# Patient Record
Sex: FEMALE | Race: WHITE | NOT HISPANIC OR LATINO | Employment: OTHER | URBAN - METROPOLITAN AREA
[De-identification: names, ages, dates, MRNs, and addresses within clinical notes are randomized per-mention and may not be internally consistent; named-entity substitution may affect disease eponyms.]

---

## 2017-07-06 ENCOUNTER — TRANSCRIBE ORDERS (OUTPATIENT)
Dept: ADMINISTRATIVE | Facility: HOSPITAL | Age: 77
End: 2017-07-06

## 2017-07-06 DIAGNOSIS — M81.8 IDIOPATHIC OSTEOPOROSIS: Primary | ICD-10-CM

## 2017-07-12 ENCOUNTER — HOSPITAL ENCOUNTER (OUTPATIENT)
Dept: RADIOLOGY | Age: 77
Discharge: HOME | End: 2017-07-12
Payer: MEDICARE

## 2017-07-12 DIAGNOSIS — M81.8 IDIOPATHIC OSTEOPOROSIS: ICD-10-CM

## 2017-07-12 PROCEDURE — 77080 DXA BONE DENSITY AXIAL: CPT

## 2018-11-24 ENCOUNTER — HOSPITAL ENCOUNTER (EMERGENCY)
Facility: HOSPITAL | Age: 78
End: 2018-11-24
Attending: EMERGENCY MEDICINE
Payer: MEDICARE

## 2018-11-24 ENCOUNTER — APPOINTMENT (EMERGENCY)
Dept: CT IMAGING | Facility: HOSPITAL | Age: 78
End: 2018-11-24
Payer: MEDICARE

## 2018-11-24 ENCOUNTER — HOSPITAL ENCOUNTER (INPATIENT)
Facility: HOSPITAL | Age: 78
LOS: 16 days | Discharge: HOME HEALTH | DRG: 021 | End: 2018-12-10
Attending: EMERGENCY MEDICINE | Admitting: ANESTHESIOLOGY
Payer: MEDICARE

## 2018-11-24 VITALS
SYSTOLIC BLOOD PRESSURE: 122 MMHG | RESPIRATION RATE: 16 BRPM | DIASTOLIC BLOOD PRESSURE: 59 MMHG | OXYGEN SATURATION: 95 % | TEMPERATURE: 98.1 F | WEIGHT: 140 LBS | HEART RATE: 88 BPM

## 2018-11-24 DIAGNOSIS — Z98.890 S/P COIL EMBOLIZATION OF CEREBRAL ANEURYSM: ICD-10-CM

## 2018-11-24 DIAGNOSIS — R30.0 DYSURIA: ICD-10-CM

## 2018-11-24 DIAGNOSIS — I60.7 RUPTURED ANEURYSM OF INTRACRANIAL ARTERY (HCC): ICD-10-CM

## 2018-11-24 DIAGNOSIS — I60.9 SAH (SUBARACHNOID HEMORRHAGE) (HCC): Primary | ICD-10-CM

## 2018-11-24 PROBLEM — H40.9 GLAUCOMA: Status: ACTIVE | Noted: 2018-11-24

## 2018-11-24 LAB
ABO GROUP BLD: NORMAL
ANION GAP SERPL CALCULATED.3IONS-SCNC: 9 MMOL/L (ref 4–13)
APTT PPP: 27 SECONDS (ref 26–38)
BASOPHILS # BLD AUTO: 0.05 THOUSANDS/ΜL (ref 0–0.1)
BASOPHILS NFR BLD AUTO: 1 % (ref 0–1)
BLD GP AB SCN SERPL QL: NEGATIVE
BUN SERPL-MCNC: 11 MG/DL (ref 5–25)
CALCIUM SERPL-MCNC: 9 MG/DL (ref 8.3–10.1)
CHLORIDE SERPL-SCNC: 103 MMOL/L (ref 100–108)
CO2 SERPL-SCNC: 28 MMOL/L (ref 21–32)
CREAT SERPL-MCNC: 0.57 MG/DL (ref 0.6–1.3)
EOSINOPHIL # BLD AUTO: 0.04 THOUSAND/ΜL (ref 0–0.61)
EOSINOPHIL NFR BLD AUTO: 1 % (ref 0–6)
ERYTHROCYTE [DISTWIDTH] IN BLOOD BY AUTOMATED COUNT: 13.7 % (ref 11.6–15.1)
GFR SERPL CREATININE-BSD FRML MDRD: 90 ML/MIN/1.73SQ M
GLUCOSE SERPL-MCNC: 102 MG/DL (ref 65–140)
HCT VFR BLD AUTO: 40.5 % (ref 34.8–46.1)
HGB BLD-MCNC: 12.7 G/DL (ref 11.5–15.4)
IMM GRANULOCYTES # BLD AUTO: 0.02 THOUSAND/UL (ref 0–0.2)
IMM GRANULOCYTES NFR BLD AUTO: 0 % (ref 0–2)
INR PPP: 1.05 (ref 0.86–1.17)
LYMPHOCYTES # BLD AUTO: 0.6 THOUSANDS/ΜL (ref 0.6–4.47)
LYMPHOCYTES NFR BLD AUTO: 11 % (ref 14–44)
MCH RBC QN AUTO: 31 PG (ref 26.8–34.3)
MCHC RBC AUTO-ENTMCNC: 31.4 G/DL (ref 31.4–37.4)
MCV RBC AUTO: 99 FL (ref 82–98)
MONOCYTES # BLD AUTO: 0.25 THOUSAND/ΜL (ref 0.17–1.22)
MONOCYTES NFR BLD AUTO: 5 % (ref 4–12)
NEUTROPHILS # BLD AUTO: 4.51 THOUSANDS/ΜL (ref 1.85–7.62)
NEUTS SEG NFR BLD AUTO: 82 % (ref 43–75)
NRBC BLD AUTO-RTO: 0 /100 WBCS
PLATELET # BLD AUTO: 188 THOUSANDS/UL (ref 149–390)
PMV BLD AUTO: 10.5 FL (ref 8.9–12.7)
POTASSIUM SERPL-SCNC: 3.9 MMOL/L (ref 3.5–5.3)
PROTHROMBIN TIME: 13.8 SECONDS (ref 11.8–14.2)
RBC # BLD AUTO: 4.1 MILLION/UL (ref 3.81–5.12)
RH BLD: POSITIVE
SODIUM SERPL-SCNC: 140 MMOL/L (ref 136–145)
SPECIMEN EXPIRATION DATE: NORMAL
TROPONIN I SERPL-MCNC: <0.02 NG/ML
WBC # BLD AUTO: 5.47 THOUSAND/UL (ref 4.31–10.16)

## 2018-11-24 PROCEDURE — 84484 ASSAY OF TROPONIN QUANT: CPT | Performed by: EMERGENCY MEDICINE

## 2018-11-24 PROCEDURE — 86900 BLOOD TYPING SEROLOGIC ABO: CPT | Performed by: RADIOLOGY

## 2018-11-24 PROCEDURE — 96375 TX/PRO/DX INJ NEW DRUG ADDON: CPT

## 2018-11-24 PROCEDURE — 36415 COLL VENOUS BLD VENIPUNCTURE: CPT | Performed by: EMERGENCY MEDICINE

## 2018-11-24 PROCEDURE — 85730 THROMBOPLASTIN TIME PARTIAL: CPT | Performed by: RADIOLOGY

## 2018-11-24 PROCEDURE — 96365 THER/PROPH/DIAG IV INF INIT: CPT

## 2018-11-24 PROCEDURE — 1123F ACP DISCUSS/DSCN MKR DOCD: CPT | Performed by: FAMILY MEDICINE

## 2018-11-24 PROCEDURE — 99291 CRITICAL CARE FIRST HOUR: CPT

## 2018-11-24 PROCEDURE — 99221 1ST HOSP IP/OBS SF/LOW 40: CPT | Performed by: ANESTHESIOLOGY

## 2018-11-24 PROCEDURE — 86850 RBC ANTIBODY SCREEN: CPT | Performed by: RADIOLOGY

## 2018-11-24 PROCEDURE — 80048 BASIC METABOLIC PNL TOTAL CA: CPT | Performed by: EMERGENCY MEDICINE

## 2018-11-24 PROCEDURE — 70496 CT ANGIOGRAPHY HEAD: CPT

## 2018-11-24 PROCEDURE — 85025 COMPLETE CBC W/AUTO DIFF WBC: CPT | Performed by: EMERGENCY MEDICINE

## 2018-11-24 PROCEDURE — 86901 BLOOD TYPING SEROLOGIC RH(D): CPT | Performed by: RADIOLOGY

## 2018-11-24 PROCEDURE — 85610 PROTHROMBIN TIME: CPT | Performed by: RADIOLOGY

## 2018-11-24 PROCEDURE — 70498 CT ANGIOGRAPHY NECK: CPT

## 2018-11-24 PROCEDURE — 93005 ELECTROCARDIOGRAM TRACING: CPT

## 2018-11-24 PROCEDURE — 96366 THER/PROPH/DIAG IV INF ADDON: CPT

## 2018-11-24 RX ORDER — NIMODIPINE 30 MG/1
60 CAPSULE, LIQUID FILLED ORAL
Status: DISCONTINUED | OUTPATIENT
Start: 2018-11-24 | End: 2018-11-24 | Stop reason: HOSPADM

## 2018-11-24 RX ORDER — DORZOLAMIDE HYDROCHLORIDE AND TIMOLOL MALEATE 20; 5 MG/ML; MG/ML
1 SOLUTION/ DROPS OPHTHALMIC 2 TIMES DAILY
COMMUNITY

## 2018-11-24 RX ORDER — ACETAMINOPHEN 325 MG/1
650 TABLET ORAL EVERY 4 HOURS PRN
Status: DISCONTINUED | OUTPATIENT
Start: 2018-11-24 | End: 2018-11-28

## 2018-11-24 RX ORDER — CHLORHEXIDINE GLUCONATE 0.12 MG/ML
15 RINSE ORAL EVERY 12 HOURS SCHEDULED
Status: DISCONTINUED | OUTPATIENT
Start: 2018-11-24 | End: 2018-12-01

## 2018-11-24 RX ORDER — ONDANSETRON 2 MG/ML
4 INJECTION INTRAMUSCULAR; INTRAVENOUS ONCE
Status: COMPLETED | OUTPATIENT
Start: 2018-11-24 | End: 2018-11-24

## 2018-11-24 RX ORDER — NIMODIPINE 30 MG/1
60 CAPSULE, LIQUID FILLED ORAL
Status: DISCONTINUED | OUTPATIENT
Start: 2018-11-24 | End: 2018-11-24

## 2018-11-24 RX ORDER — LEVETIRACETAM 500 MG/1
500 TABLET ORAL EVERY 12 HOURS
Status: COMPLETED | OUTPATIENT
Start: 2018-11-25 | End: 2018-12-01

## 2018-11-24 RX ORDER — LABETALOL HYDROCHLORIDE 5 MG/ML
10 INJECTION, SOLUTION INTRAVENOUS EVERY 4 HOURS PRN
Status: DISCONTINUED | OUTPATIENT
Start: 2018-11-24 | End: 2018-12-08

## 2018-11-24 RX ORDER — TRAVOPROST OPHTHALMIC SOLUTION 0.04 MG/ML
1 SOLUTION OPHTHALMIC
COMMUNITY

## 2018-11-24 RX ORDER — DIPHENOXYLATE HYDROCHLORIDE AND ATROPINE SULFATE 2.5; .025 MG/1; MG/1
1 TABLET ORAL DAILY
COMMUNITY

## 2018-11-24 RX ORDER — LEVETIRACETAM 500 MG/1
500 TABLET ORAL EVERY 12 HOURS SCHEDULED
Status: DISCONTINUED | OUTPATIENT
Start: 2018-11-24 | End: 2018-11-24

## 2018-11-24 RX ORDER — HYDRALAZINE HYDROCHLORIDE 20 MG/ML
5 INJECTION INTRAMUSCULAR; INTRAVENOUS EVERY 4 HOURS PRN
Status: DISCONTINUED | OUTPATIENT
Start: 2018-11-24 | End: 2018-11-25

## 2018-11-24 RX ORDER — DORZOLAMIDE HYDROCHLORIDE AND TIMOLOL MALEATE 20; 5 MG/ML; MG/ML
1 SOLUTION/ DROPS OPHTHALMIC 2 TIMES DAILY
Status: DISCONTINUED | OUTPATIENT
Start: 2018-11-25 | End: 2018-12-10 | Stop reason: HOSPADM

## 2018-11-24 RX ADMIN — LEVETIRACETAM 1000 MG: 100 INJECTION, SOLUTION INTRAVENOUS at 18:04

## 2018-11-24 RX ADMIN — NIMODIPINE 60 MG: 30 CAPSULE, LIQUID FILLED ORAL at 22:20

## 2018-11-24 RX ADMIN — CHLORHEXIDINE GLUCONATE 15 ML: 1.2 RINSE ORAL at 22:00

## 2018-11-24 RX ADMIN — IOHEXOL 85 ML: 350 INJECTION, SOLUTION INTRAVENOUS at 16:26

## 2018-11-24 RX ADMIN — ONDANSETRON 4 MG: 2 INJECTION INTRAMUSCULAR; INTRAVENOUS at 17:51

## 2018-11-24 RX ADMIN — SODIUM CHLORIDE 2.5 MG/HR: 0.9 INJECTION, SOLUTION INTRAVENOUS at 17:12

## 2018-11-24 NOTE — ED NOTES
Assumed care to call pharmacy for Cardene, per pharmacy they are making it and will send it when ready       Raymond Silva RN  11/24/18 2381

## 2018-11-24 NOTE — EMTALA/ACUTE CARE TRANSFER
[address] [place] [address],[address] [place] [address]  Dept: [phone]      EMTALA TRANSFER CONSENT    NAME Regulo Pruitt                                         1940                              MRN [id number]    I have been informed of my rights regarding examination, treatment, and transfer   by Dr Sherry Shi MD    Benefits: Specialized equipment and/or services available at the receiving facility (Include comment)________________________ (neurosurgery evaluation)    Risks: Potential for delay in receiving treatment, Potential deterioration of medical condition, Loss of IV, Increased discomfort during transfer, Possible worsening of condition or death during transfer      Transfer Request   I acknowledge that my medical condition has been evaluated and explained to me by the emergency department physician or other qualified medical person and/or my attending physician who has recommended and offered to me further medical examination and treatment  I understand the Hospital's obligation with respect to the treatment and stabilization of my emergency medical condition  I nevertheless request to be transferred  I release the Hospital, the doctor, and any other persons caring for me from all responsibility or liability for any injury or ill effects that may result from my transfer and agree to accept all responsibility for the consequences of my choice to transfer, rather than receive stabilizing treatment at the Hospital  I understand that because the transfer is my request, my insurance may not provide reimbursement for the services  The Hospital will assist and direct me and my family in how to make arrangements for transfer, but the hospital is not liable for any fees charged by the transport service    In spite of this understanding, I refuse to consent to further medical examination and treatment which has been offered to me, and request transfer to Accepting Facility Name, [address] : [facility]  I authorize the performance of emergency medical procedures and treatments upon me in both transit and upon arrival at the receiving facility  Additionally, I authorize the release of any and all medical records to the receiving facility and request they be transported with me, if possible  I authorize the performance of emergency medical procedures and treatments upon me in both transit and upon arrival at the receiving facility  Additionally, I authorize the release of any and all medical records to the receiving facility and request they be transported with me, if possible  I understand that the safest mode of transportation during a medical emergency is an ambulance and that the Hospital advocates the use of this mode of transport  Risks of traveling to the receiving facility by car, including absence of medical control, life sustaining equipment, such as oxygen, and medical personnel has been explained to me and I fully understand them  (EYAD CORRECT BOX BELOW)  [  ]  I consent to the stated transfer and to be transported by ambulance/helicopter  [  ]  I consent to the stated transfer, but refuse transportation by ambulance and accept full responsibility for my transportation by car  I understand the risks of non-ambulance transfers and I exonerate the Hospital and its staff from any deterioration in my condition that results from this refusal     X___________________________________________    DATE  18  TIME________  Signature of patient or legally responsible individual signing on patient behalf           RELATIONSHIP TO PATIENT_________________________          Provider Certification    NAME Ofelia Garcia                                         1940                              N [id number]    A medical screening exam was performed on the above named patient    Based on the examination:    Condition Necessitating Transfer The primary encounter diagnosis was SAH (subarachnoid hemorrhage) ([facility] [address] )  A diagnosis of Ruptured aneurysm of intracranial artery (HCC) was also pertinent to this visit  Patient Condition: The patient has been stabilized such that within reasonable medical probability, no material deterioration of the patient condition or the condition of the unborn child(francia) is likely to result from the transfer    Reason for Transfer: Level of Care needed not available at this facility    Transfer Requirements: [facility]   · Space available and qualified personnel available for treatment as acknowledged by    · Agreed to accept transfer and to provide appropriate medical treatment as acknowledged by          · Appropriate medical records of the examination and treatment of the patient are provided at the time of transfer   [facility], [address] _______  · Transfer will be performed by qualified personnel from    and appropriate transfer equipment as required, including the use of necessary and appropriate life support measures      Provider Certification: I have examined the patient and explained the following risks and benefits of being transferred/refusing transfer to the patient/family:  General risk, such as traffic hazards, adverse weather conditions, rough terrain or turbulence, possible failure of equipment (including vehicle or aircraft), or consequences of actions of persons outside the control of the transport personnel      Based on these reasonable risks and benefits to the patient and/or the unborn child(francia), and based upon the information available at the time of the patients examination, I certify that the medical benefits reasonably to be expected from the provision of appropriate medical treatments at another medical facility outweigh the increasing risks, if any, to the individuals medical condition, and in the case of labor to the unborn child, from effecting the transfer      X____________________________________________ DATE 11/24/18        TIME_______      ORIGINAL - SEND TO MEDICAL RECORDS   COPY - SEND WITH PATIENT DURING TRANSFER
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
## 2018-11-25 ENCOUNTER — ANESTHESIA EVENT (INPATIENT)
Dept: RADIOLOGY | Facility: HOSPITAL | Age: 78
DRG: 021 | End: 2018-11-25
Payer: MEDICARE

## 2018-11-25 ENCOUNTER — APPOINTMENT (INPATIENT)
Dept: RADIOLOGY | Facility: HOSPITAL | Age: 78
DRG: 021 | End: 2018-11-25
Payer: MEDICARE

## 2018-11-25 ENCOUNTER — APPOINTMENT (INPATIENT)
Dept: NON INVASIVE DIAGNOSTICS | Facility: HOSPITAL | Age: 78
DRG: 021 | End: 2018-11-25
Payer: MEDICARE

## 2018-11-25 ENCOUNTER — ANESTHESIA (INPATIENT)
Dept: RADIOLOGY | Facility: HOSPITAL | Age: 78
DRG: 021 | End: 2018-11-25
Payer: MEDICARE

## 2018-11-25 LAB
ANION GAP SERPL CALCULATED.3IONS-SCNC: 5 MMOL/L (ref 4–13)
ATRIAL RATE: 79 BPM
BASOPHILS # BLD AUTO: 0.01 THOUSANDS/ΜL (ref 0–0.1)
BASOPHILS NFR BLD AUTO: 0 % (ref 0–1)
BUN SERPL-MCNC: 10 MG/DL (ref 5–25)
CALCIUM SERPL-MCNC: 9 MG/DL (ref 8.3–10.1)
CHLORIDE SERPL-SCNC: 102 MMOL/L (ref 100–108)
CHOLEST SERPL-MCNC: 211 MG/DL (ref 50–200)
CO2 SERPL-SCNC: 28 MMOL/L (ref 21–32)
CREAT SERPL-MCNC: 0.49 MG/DL (ref 0.6–1.3)
EOSINOPHIL # BLD AUTO: 0.01 THOUSAND/ΜL (ref 0–0.61)
EOSINOPHIL NFR BLD AUTO: 0 % (ref 0–6)
ERYTHROCYTE [DISTWIDTH] IN BLOOD BY AUTOMATED COUNT: 13.8 % (ref 11.6–15.1)
EST. AVERAGE GLUCOSE BLD GHB EST-MCNC: 100 MG/DL
GFR SERPL CREATININE-BSD FRML MDRD: 94 ML/MIN/1.73SQ M
GLUCOSE SERPL-MCNC: 97 MG/DL (ref 65–140)
HBA1C MFR BLD: 5.1 % (ref 4.2–6.3)
HCT VFR BLD AUTO: 36.6 % (ref 34.8–46.1)
HDLC SERPL-MCNC: 82 MG/DL (ref 40–60)
HGB BLD-MCNC: 11.6 G/DL (ref 11.5–15.4)
IMM GRANULOCYTES # BLD AUTO: 0.02 THOUSAND/UL (ref 0–0.2)
IMM GRANULOCYTES NFR BLD AUTO: 0 % (ref 0–2)
LDLC SERPL CALC-MCNC: 117 MG/DL (ref 0–100)
LYMPHOCYTES # BLD AUTO: 0.98 THOUSANDS/ΜL (ref 0.6–4.47)
LYMPHOCYTES NFR BLD AUTO: 14 % (ref 14–44)
MCH RBC QN AUTO: 30.8 PG (ref 26.8–34.3)
MCHC RBC AUTO-ENTMCNC: 31.7 G/DL (ref 31.4–37.4)
MCV RBC AUTO: 97 FL (ref 82–98)
MONOCYTES # BLD AUTO: 0.53 THOUSAND/ΜL (ref 0.17–1.22)
MONOCYTES NFR BLD AUTO: 8 % (ref 4–12)
NEUTROPHILS # BLD AUTO: 5.36 THOUSANDS/ΜL (ref 1.85–7.62)
NEUTS SEG NFR BLD AUTO: 78 % (ref 43–75)
NRBC BLD AUTO-RTO: 0 /100 WBCS
P AXIS: 69 DEGREES
PLATELET # BLD AUTO: 203 THOUSANDS/UL (ref 149–390)
PMV BLD AUTO: 10.3 FL (ref 8.9–12.7)
POTASSIUM SERPL-SCNC: 3.2 MMOL/L (ref 3.5–5.3)
PR INTERVAL: 170 MS
QRS AXIS: 51 DEGREES
QRSD INTERVAL: 92 MS
QT INTERVAL: 372 MS
QTC INTERVAL: 426 MS
RBC # BLD AUTO: 3.77 MILLION/UL (ref 3.81–5.12)
SODIUM SERPL-SCNC: 135 MMOL/L (ref 136–145)
T WAVE AXIS: 54 DEGREES
TRIGL SERPL-MCNC: 59 MG/DL
VENTRICULAR RATE: 79 BPM
WBC # BLD AUTO: 6.91 THOUSAND/UL (ref 4.31–10.16)

## 2018-11-25 PROCEDURE — 76937 US GUIDE VASCULAR ACCESS: CPT | Performed by: RADIOLOGY

## 2018-11-25 PROCEDURE — 83036 HEMOGLOBIN GLYCOSYLATED A1C: CPT | Performed by: EMERGENCY MEDICINE

## 2018-11-25 PROCEDURE — 36223 PLACE CATH CAROTID/INOM ART: CPT

## 2018-11-25 PROCEDURE — 99291 CRITICAL CARE FIRST HOUR: CPT | Performed by: EMERGENCY MEDICINE

## 2018-11-25 PROCEDURE — 76937 US GUIDE VASCULAR ACCESS: CPT

## 2018-11-25 PROCEDURE — C1894 INTRO/SHEATH, NON-LASER: HCPCS

## 2018-11-25 PROCEDURE — C1769 GUIDE WIRE: HCPCS

## 2018-11-25 PROCEDURE — 99223 1ST HOSP IP/OBS HIGH 75: CPT | Performed by: NEUROLOGICAL SURGERY

## 2018-11-25 PROCEDURE — C1887 CATHETER, GUIDING: HCPCS

## 2018-11-25 PROCEDURE — 80048 BASIC METABOLIC PNL TOTAL CA: CPT | Performed by: EMERGENCY MEDICINE

## 2018-11-25 PROCEDURE — 75898 FOLLOW-UP ANGIOGRAPHY: CPT | Performed by: RADIOLOGY

## 2018-11-25 PROCEDURE — 75894 X-RAYS TRANSCATH THERAPY: CPT | Performed by: RADIOLOGY

## 2018-11-25 PROCEDURE — 85025 COMPLETE CBC W/AUTO DIFF WBC: CPT | Performed by: EMERGENCY MEDICINE

## 2018-11-25 PROCEDURE — 80061 LIPID PANEL: CPT | Performed by: EMERGENCY MEDICINE

## 2018-11-25 PROCEDURE — 61626 TCAT PERM OCCLS/EMBOL NONCNS: CPT

## 2018-11-25 PROCEDURE — 93010 ELECTROCARDIOGRAM REPORT: CPT | Performed by: INTERNAL MEDICINE

## 2018-11-25 PROCEDURE — 36226 PLACE CATH VERTEBRAL ART: CPT | Performed by: RADIOLOGY

## 2018-11-25 PROCEDURE — 36223 PLACE CATH CAROTID/INOM ART: CPT | Performed by: RADIOLOGY

## 2018-11-25 PROCEDURE — 61624 TCAT PERM OCCLS/EMBOLJ CNS: CPT | Performed by: RADIOLOGY

## 2018-11-25 PROCEDURE — 03VG3DZ RESTRICTION OF INTRACRANIAL ARTERY WITH INTRALUMINAL DEVICE, PERCUTANEOUS APPROACH: ICD-10-PCS | Performed by: FAMILY MEDICINE

## 2018-11-25 PROCEDURE — 36217 PLACE CATHETER IN ARTERY: CPT | Performed by: RADIOLOGY

## 2018-11-25 PROCEDURE — C2628 CATHETER, OCCLUSION: HCPCS

## 2018-11-25 PROCEDURE — 36226 PLACE CATH VERTEBRAL ART: CPT

## 2018-11-25 PROCEDURE — C1760 CLOSURE DEV, VASC: HCPCS

## 2018-11-25 PROCEDURE — 75894 X-RAYS TRANSCATH THERAPY: CPT

## 2018-11-25 RX ORDER — HYDROMORPHONE HCL/PF 1 MG/ML
0.2 SYRINGE (ML) INJECTION
Status: CANCELLED | OUTPATIENT
Start: 2018-11-25

## 2018-11-25 RX ORDER — ONDANSETRON 2 MG/ML
INJECTION INTRAMUSCULAR; INTRAVENOUS AS NEEDED
Status: DISCONTINUED | OUTPATIENT
Start: 2018-11-25 | End: 2018-11-25 | Stop reason: SURG

## 2018-11-25 RX ORDER — LABETALOL HYDROCHLORIDE 5 MG/ML
5 INJECTION, SOLUTION INTRAVENOUS
Status: CANCELLED | OUTPATIENT
Start: 2018-11-25

## 2018-11-25 RX ORDER — DEXAMETHASONE SODIUM PHOSPHATE 4 MG/ML
INJECTION, SOLUTION INTRA-ARTICULAR; INTRALESIONAL; INTRAMUSCULAR; INTRAVENOUS; SOFT TISSUE AS NEEDED
Status: DISCONTINUED | OUTPATIENT
Start: 2018-11-25 | End: 2018-11-25 | Stop reason: SURG

## 2018-11-25 RX ORDER — ONDANSETRON 2 MG/ML
INJECTION INTRAMUSCULAR; INTRAVENOUS
Status: COMPLETED
Start: 2018-11-25 | End: 2018-11-25

## 2018-11-25 RX ORDER — SODIUM CHLORIDE, SODIUM GLUCONATE, SODIUM ACETATE, POTASSIUM CHLORIDE, MAGNESIUM CHLORIDE, SODIUM PHOSPHATE, DIBASIC, AND POTASSIUM PHOSPHATE .53; .5; .37; .037; .03; .012; .00082 G/100ML; G/100ML; G/100ML; G/100ML; G/100ML; G/100ML; G/100ML
100 INJECTION, SOLUTION INTRAVENOUS CONTINUOUS
Status: DISCONTINUED | OUTPATIENT
Start: 2018-11-25 | End: 2018-11-27

## 2018-11-25 RX ORDER — MEPERIDINE HYDROCHLORIDE 25 MG/ML
12.5 INJECTION INTRAMUSCULAR; INTRAVENOUS; SUBCUTANEOUS AS NEEDED
Status: CANCELLED | OUTPATIENT
Start: 2018-11-25

## 2018-11-25 RX ORDER — POTASSIUM CHLORIDE 14.9 MG/ML
20 INJECTION INTRAVENOUS
Status: COMPLETED | OUTPATIENT
Start: 2018-11-25 | End: 2018-11-25

## 2018-11-25 RX ORDER — ALBUTEROL SULFATE 2.5 MG/3ML
2.5 SOLUTION RESPIRATORY (INHALATION) ONCE AS NEEDED
Status: CANCELLED | OUTPATIENT
Start: 2018-11-25

## 2018-11-25 RX ORDER — LIDOCAINE HYDROCHLORIDE 10 MG/ML
INJECTION, SOLUTION INFILTRATION; PERINEURAL AS NEEDED
Status: DISCONTINUED | OUTPATIENT
Start: 2018-11-25 | End: 2018-11-25 | Stop reason: SURG

## 2018-11-25 RX ORDER — POTASSIUM CHLORIDE 20MEQ/15ML
40 LIQUID (ML) ORAL ONCE
Status: DISCONTINUED | OUTPATIENT
Start: 2018-11-25 | End: 2018-11-25

## 2018-11-25 RX ORDER — PROPOFOL 10 MG/ML
INJECTION, EMULSION INTRAVENOUS AS NEEDED
Status: DISCONTINUED | OUTPATIENT
Start: 2018-11-25 | End: 2018-11-25 | Stop reason: SURG

## 2018-11-25 RX ORDER — EPHEDRINE SULFATE 50 MG/ML
INJECTION, SOLUTION INTRAVENOUS AS NEEDED
Status: DISCONTINUED | OUTPATIENT
Start: 2018-11-25 | End: 2018-11-25 | Stop reason: SURG

## 2018-11-25 RX ORDER — ALBUMIN, HUMAN INJ 5% 5 %
12.5 SOLUTION INTRAVENOUS ONCE
Status: COMPLETED | OUTPATIENT
Start: 2018-11-25 | End: 2018-11-25

## 2018-11-25 RX ORDER — ONDANSETRON 2 MG/ML
4 INJECTION INTRAMUSCULAR; INTRAVENOUS ONCE AS NEEDED
Status: CANCELLED | OUTPATIENT
Start: 2018-11-25

## 2018-11-25 RX ORDER — SODIUM CHLORIDE 9 MG/ML
100 INJECTION, SOLUTION INTRAVENOUS CONTINUOUS
Status: CANCELLED | OUTPATIENT
Start: 2018-11-25

## 2018-11-25 RX ORDER — ALBUMIN, HUMAN INJ 5% 5 %
25 SOLUTION INTRAVENOUS ONCE
Status: DISCONTINUED | OUTPATIENT
Start: 2018-11-25 | End: 2018-11-25

## 2018-11-25 RX ORDER — FENTANYL CITRATE 50 UG/ML
INJECTION, SOLUTION INTRAMUSCULAR; INTRAVENOUS AS NEEDED
Status: DISCONTINUED | OUTPATIENT
Start: 2018-11-25 | End: 2018-11-25 | Stop reason: SURG

## 2018-11-25 RX ORDER — FENTANYL CITRATE/PF 50 MCG/ML
50 SYRINGE (ML) INJECTION
Status: CANCELLED | OUTPATIENT
Start: 2018-11-25

## 2018-11-25 RX ORDER — HYDRALAZINE HYDROCHLORIDE 20 MG/ML
5 INJECTION INTRAMUSCULAR; INTRAVENOUS EVERY 4 HOURS PRN
Status: DISCONTINUED | OUTPATIENT
Start: 2018-11-25 | End: 2018-12-08

## 2018-11-25 RX ORDER — ROCURONIUM BROMIDE 10 MG/ML
INJECTION, SOLUTION INTRAVENOUS AS NEEDED
Status: DISCONTINUED | OUTPATIENT
Start: 2018-11-25 | End: 2018-11-25 | Stop reason: SURG

## 2018-11-25 RX ORDER — SODIUM CHLORIDE 9 MG/ML
INJECTION, SOLUTION INTRAVENOUS CONTINUOUS PRN
Status: DISCONTINUED | OUTPATIENT
Start: 2018-11-25 | End: 2018-11-25 | Stop reason: SURG

## 2018-11-25 RX ORDER — TRAVOPROST OPHTHALMIC SOLUTION 0.04 MG/ML
1 SOLUTION OPHTHALMIC
Status: DISCONTINUED | OUTPATIENT
Start: 2018-11-25 | End: 2018-12-10 | Stop reason: HOSPADM

## 2018-11-25 RX ORDER — ONDANSETRON 2 MG/ML
4 INJECTION INTRAMUSCULAR; INTRAVENOUS EVERY 4 HOURS PRN
Status: DISCONTINUED | OUTPATIENT
Start: 2018-11-25 | End: 2018-12-10 | Stop reason: HOSPADM

## 2018-11-25 RX ORDER — FENTANYL CITRATE/PF 50 MCG/ML
25 SYRINGE (ML) INJECTION
Status: CANCELLED | OUTPATIENT
Start: 2018-11-25

## 2018-11-25 RX ORDER — POTASSIUM CHLORIDE 20MEQ/15ML
40 LIQUID (ML) ORAL ONCE
Status: DISCONTINUED | OUTPATIENT
Start: 2018-11-25 | End: 2018-11-26

## 2018-11-25 RX ORDER — GLYCOPYRROLATE 0.2 MG/ML
INJECTION INTRAMUSCULAR; INTRAVENOUS AS NEEDED
Status: DISCONTINUED | OUTPATIENT
Start: 2018-11-25 | End: 2018-11-25 | Stop reason: SURG

## 2018-11-25 RX ADMIN — DORZOLAMIDE HYDROCHLORIDE AND TIMOLOL MALEATE 1 DROP: 20; 5 SOLUTION/ DROPS OPHTHALMIC at 13:57

## 2018-11-25 RX ADMIN — LIDOCAINE HYDROCHLORIDE 50 MG: 10 INJECTION, SOLUTION INFILTRATION; PERINEURAL at 09:53

## 2018-11-25 RX ADMIN — ROCURONIUM BROMIDE 10 MG: 10 INJECTION INTRAVENOUS at 11:53

## 2018-11-25 RX ADMIN — ALBUMIN HUMAN 12.5 G: 0.05 INJECTION, SOLUTION INTRAVENOUS at 07:30

## 2018-11-25 RX ADMIN — SODIUM CHLORIDE: 0.9 INJECTION, SOLUTION INTRAVENOUS at 13:17

## 2018-11-25 RX ADMIN — LEVETIRACETAM 500 MG: 500 TABLET, FILM COATED ORAL at 17:11

## 2018-11-25 RX ADMIN — PROPOFOL 150 MG: 10 INJECTION, EMULSION INTRAVENOUS at 09:53

## 2018-11-25 RX ADMIN — IODIXANOL 115 ML: 320 INJECTION, SOLUTION INTRAVASCULAR at 13:14

## 2018-11-25 RX ADMIN — NIMODIPINE 30 MG: 30 CAPSULE, LIQUID FILLED ORAL at 13:56

## 2018-11-25 RX ADMIN — ACETAMINOPHEN 650 MG: 325 TABLET, FILM COATED ORAL at 21:35

## 2018-11-25 RX ADMIN — NIMODIPINE 60 MG: 30 CAPSULE, LIQUID FILLED ORAL at 02:28

## 2018-11-25 RX ADMIN — CHLORHEXIDINE GLUCONATE 15 ML: 1.2 RINSE ORAL at 07:30

## 2018-11-25 RX ADMIN — DEXAMETHASONE SODIUM PHOSPHATE 4 MG: 4 INJECTION, SOLUTION INTRAMUSCULAR; INTRAVENOUS at 11:06

## 2018-11-25 RX ADMIN — EPHEDRINE SULFATE 10 MG: 50 INJECTION, SOLUTION INTRAMUSCULAR; INTRAVENOUS; SUBCUTANEOUS at 10:07

## 2018-11-25 RX ADMIN — NIMODIPINE 30 MG: 30 CAPSULE, LIQUID FILLED ORAL at 20:28

## 2018-11-25 RX ADMIN — EPHEDRINE SULFATE 10 MG: 50 INJECTION, SOLUTION INTRAMUSCULAR; INTRAVENOUS; SUBCUTANEOUS at 10:13

## 2018-11-25 RX ADMIN — SODIUM CHLORIDE, SODIUM GLUCONATE, SODIUM ACETATE, POTASSIUM CHLORIDE AND MAGNESIUM CHLORIDE 75 ML/HR: 526; 502; 368; 37; 30 INJECTION, SOLUTION INTRAVENOUS at 04:00

## 2018-11-25 RX ADMIN — POTASSIUM CHLORIDE 20 MEQ: 200 INJECTION, SOLUTION INTRAVENOUS at 15:15

## 2018-11-25 RX ADMIN — NIMODIPINE 30 MG: 30 CAPSULE, LIQUID FILLED ORAL at 21:35

## 2018-11-25 RX ADMIN — SODIUM CHLORIDE: 0.9 INJECTION, SOLUTION INTRAVENOUS at 09:46

## 2018-11-25 RX ADMIN — NIMODIPINE 60 MG: 30 CAPSULE, LIQUID FILLED ORAL at 05:46

## 2018-11-25 RX ADMIN — ACETAMINOPHEN 650 MG: 325 TABLET, FILM COATED ORAL at 02:32

## 2018-11-25 RX ADMIN — EPHEDRINE SULFATE 5 MG: 50 INJECTION, SOLUTION INTRAMUSCULAR; INTRAVENOUS; SUBCUTANEOUS at 11:14

## 2018-11-25 RX ADMIN — LEVETIRACETAM 500 MG: 500 TABLET, FILM COATED ORAL at 05:45

## 2018-11-25 RX ADMIN — GLYCOPYRROLATE 0.4 MG: 0.2 INJECTION, SOLUTION INTRAMUSCULAR; INTRAVENOUS at 13:06

## 2018-11-25 RX ADMIN — ONDANSETRON 4 MG: 2 INJECTION INTRAMUSCULAR; INTRAVENOUS at 15:15

## 2018-11-25 RX ADMIN — ROCURONIUM BROMIDE 10 MG: 10 INJECTION INTRAVENOUS at 11:01

## 2018-11-25 RX ADMIN — TRAVOPROST OPHTHALMIC SOLUTION 1 DROP: 0.04 SOLUTION OPHTHALMIC at 21:35

## 2018-11-25 RX ADMIN — ROCURONIUM BROMIDE 50 MG: 10 INJECTION INTRAVENOUS at 09:53

## 2018-11-25 RX ADMIN — NEOSTIGMINE METHYLSULFATE 3 MG: 1 INJECTION, SOLUTION INTRAMUSCULAR; INTRAVENOUS; SUBCUTANEOUS at 13:06

## 2018-11-25 RX ADMIN — ONDANSETRON 4 MG: 2 INJECTION INTRAMUSCULAR; INTRAVENOUS at 11:06

## 2018-11-25 RX ADMIN — POTASSIUM CHLORIDE 20 MEQ: 200 INJECTION, SOLUTION INTRAVENOUS at 17:11

## 2018-11-25 RX ADMIN — DORZOLAMIDE HYDROCHLORIDE AND TIMOLOL MALEATE 1 DROP: 20; 5 SOLUTION/ DROPS OPHTHALMIC at 00:41

## 2018-11-25 RX ADMIN — ROCURONIUM BROMIDE 10 MG: 10 INJECTION INTRAVENOUS at 10:44

## 2018-11-25 RX ADMIN — ROCURONIUM BROMIDE 20 MG: 10 INJECTION INTRAVENOUS at 12:15

## 2018-11-25 RX ADMIN — FENTANYL CITRATE 100 MCG: 50 INJECTION, SOLUTION INTRAMUSCULAR; INTRAVENOUS at 09:53

## 2018-11-25 RX ADMIN — EPHEDRINE SULFATE 5 MG: 50 INJECTION, SOLUTION INTRAMUSCULAR; INTRAVENOUS; SUBCUTANEOUS at 10:38
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
## 2018-11-25 NOTE — SEDATION DOCUMENTATION
Cerebral arteriogram completed  Coil placed anterior communicating artery   R femoral artery closed with mynx   Site CDI , no oozing no hematoma  Bedrest x 4 hours starting 1300   Report called to ICU , spoke with [facility] [place] operated by [facility]   Patient transferred back to ICU with IR RN and anesthesia

## 2018-11-25 NOTE — ANESTHESIA PROCEDURE NOTES
Arterial Line Insertion  Date/Time: 11/25/2018 9:50 AM  Performed by: Radha Screen by: Mohini Aragon   Consent: Verbal consent obtained  Written consent obtained  Preparation: Patient was prepped and draped in the usual sterile fashion    Indications: hemodynamic monitoring  Orientation:  Left  Location: radial artery  Anesthesia: see MAR for details  Procedure Details:  Needle gauge: 20  Seldinger technique: Seldinger technique used  Number of attempts: 1

## 2018-11-25 NOTE — ANESTHESIA POSTPROCEDURE EVALUATION
Post-Op Assessment Note      CV Status:  Stable    Mental Status:  Alert and awake    Hydration Status:  Euvolemic    PONV Controlled:  Controlled    Airway Patency:  Patent    Post Op Vitals Reviewed: Yes          Staff: CRNA       Comments: pt  transported to icu on portable monitor  pt  awake following commands    report given to receiving RN ar bedside          BP   118/57   Temp   96 6   Pulse  80   Resp   16   SpO2   99

## 2018-11-25 NOTE — PROGRESS NOTES
Ms Eduar Novak presents with a HH1, [address] on 11/24/18  She was found to have an ACOM aneurysm on CTA most likely responsible for her hemorrhage  Her symptoms presented acutely therefore she is BD 2  On exam she is fully intact without appreciable deficit  Currently with minimal complaints of headaches  Labs have been reviewed  Plan is for cerebral angiogram with possible coil embolization  Risks and benefits have been discussed with her and her family  She has elected to proceed

## 2018-11-25 NOTE — PHYSICAL THERAPY NOTE
PT Cancellation     PT orders received and chart reviewed  Per RN, pt is scheduled for IR for conventional angio and coiling and is not currently appropriate for PT evaluation  Will follow and see when medically appropriate       Antione Joe, PT, DPT

## 2018-11-25 NOTE — SPEECH THERAPY NOTE
Speech Language/Pathology  Dysphagia consult received  Patient currently in IR  Will most likely be on ventilator during and after procedure   RN asked to check back and assess if appropriate in am

## 2018-11-25 NOTE — CONSULTS
Consultation - Neurosurgery   Nallely Neville 69 y o  female MRN: [id number]  Unit/Bed#: CW -01 Encounter: [id number]      Assessment/Plan     · 70 yo SAH #2, HH1, F3  · AComm aneurysm on CTA  · For IR today with Dr Maldonado Distance to attempt to coil  If that is not technically feasible, may need to discuss craniotomy  · MAP goal < 100, SBP < 140 until aneurysm secured  · Nimotop to prevent vasospasm  TCDs daily  · Na goal > 135    History of Present Illness     69y o  year old female who yesterday at 2:30 p m  had acute onset of nausea, neck pain, and malaise  Did not have any vomiting  Denied focal weakness  Was brought to [facility] ER, also mention diaphoresis, photophobia  CT scan showed aneurysmal pattern of subarachnoid hemorrhage, more base of skull and anterior to the rostral spinal cord  CTA demonstrated 5 mm A-comm aneurysm  Patient was [facility] for management  Review of Systems   From ED note, personally reviewed:   Constitutional: Positive for diaphoresis (resolved)  Negative for chills and fever  HENT: Negative for congestion  Eyes: Negative for visual disturbance  Respiratory: Negative for cough and shortness of breath  Cardiovascular: Negative for chest pain and leg swelling  Gastrointestinal: Negative for abdominal pain, diarrhea, nausea and vomiting  Genitourinary: Negative for dysuria and frequency  Musculoskeletal: Positive for neck pain  Negative for arthralgias, back pain, joint swelling, myalgias and neck stiffness  Skin: Negative for rash  Neurological: Positive for headaches  Negative for dizziness, syncope, speech difficulty, weakness, light-headedness and numbness     Psychiatric/Behavioral: Negative for agitation, behavioral problems and confusion         Historical Information     Past Medical History:   Diagnosis Date    Fracture of arm     Glaucoma     Hypotension        Past Surgical History:   Procedure Laterality Date    BUNIONECTOMY      BUNIONECTOMY      CATARACT EXTRACTION      TONSILLECTOMY         History   Alcohol Use    1 2 oz/week    2 Glasses of wine per week     Comment: social     History   Drug Use No     History   Smoking Status    Never Smoker   Smokeless Tobacco    Not on file       No family history on file  Denies family history of aneurysm    Meds/Allergies     all current active meds have been reviewed, current meds:   Current Facility-Administered Medications   Medication Dose Route Frequency    acetaminophen (TYLENOL) tablet 650 mg  650 mg Oral Q4H PRN    chlorhexidine (PERIDEX) 0 12 % oral rinse 15 mL  15 mL Swish & Spit Q12H [facility]    dorzolamide-timolol (COSOPT) 22 3-6 8 MG/ML ophthalmic solution 1 drop  1 drop Both Eyes BID    hydrALAZINE (APRESOLINE) injection 5 mg  5 mg Intravenous Q4H PRN    labetalol (NORMODYNE) injection 10 mg  10 mg Intravenous Q4H PRN    levETIRAcetam (KEPPRA) tablet 500 mg  500 mg Oral Q12H    multi-electrolyte (ISOLYTE-S PH 7 4 equivalent) IV solution  100 mL/hr Intravenous Continuous    niMODipine (NIMOTOP) oral solution 30 mg  30 mg Per NG Tube Q2H    and PTA meds:   Prior to Admission Medications   Prescriptions Last Dose Informant Patient Reported?  Taking?   dorzolamide-timolol (COSOPT) 22 3-6 8 MG/ML ophthalmic solution   Yes No   Sig: Administer 1 drop to both eyes 2 (two) times a day   multivitamin (THERAGRAN) TABS   Yes No   Sig: Take 1 tablet by mouth daily   travoprost (TRAVATAN-Z) 0 004 % ophthalmic solution   Yes No   Si drop daily at bedtime      Facility-Administered Medications: None       Allergies   Allergen Reactions    Codeine GI Intolerance       Objective       Intake/Output Summary (Last 24 hours) at 18 0818  Last data filed at 18 0800   Gross per 24 hour   Intake            587 5 ml   Output              700 ml   Net           -112 5 ml       Vitals:Blood pressure 94/53, pulse 70, temperature 98 3 °F (36 8 °C), temperature source Oral, resp  rate 15, height 5' 4" (1 626 m), weight 61 7 kg (136 lb 0 4 oz), SpO2 99 %, not currently breastfeeding  ,Body mass index is 23 35 kg/m²  Physical Exam   Constitutional: She is oriented to person, place, and time  She appears well-developed and well-nourished  No distress  HENT:   Head: Normocephalic  Eyes: No scleral icterus  Neck: Normal range of motion  Cardiovascular: Normal rate  Pulmonary/Chest: Effort normal    Abdominal: Soft  Neurological: She is alert and oriented to person, place, and time  GCS eye subscore is 4  GCS verbal subscore is 5  GCS motor subscore is 6  Skin: Skin is warm and dry  Psychiatric: She has a normal mood and affect  Her speech is normal and behavior is normal        Neurologic Exam     Mental Status   Oriented to person, place, and time  Attention: normal    Speech: speech is normal   Level of consciousness: alert    Cranial Nerves     CN VII   Facial expression full, symmetric  Motor Exam   Muscle bulk: normal  Overall muscle tone: normal  Moves all extremities, grossly normal     Gait, Coordination, and Reflexes     Tremor   Resting tremor: absent  Intention tremor: absent  Action tremor: absent  No aids  Lab Results:   I have personally reviewed pertinent results      Lab Results   Component Value Date    WBC 6 91 11/25/2018    HGB 11 6 11/25/2018    HCT 36 6 11/25/2018    MCV 97 11/25/2018     11/25/2018    MCH 30 8 11/25/2018    MCHC 31 7 11/25/2018    RDW 13 8 11/25/2018    MPV 10 3 11/25/2018    NRBC 0 11/25/2018    SODIUM 135 (L) 11/25/2018     11/25/2018    CO2 28 11/25/2018    BUN 10 11/25/2018    CREATININE 0 49 (L) 11/25/2018    CALCIUM 9 0 11/25/2018    EGFR 94 11/25/2018    ABO A 11/24/2018    INR 1 05 11/24/2018       Imaging Studies:     Cta Head And Neck With And Without Contrast    Result Date: 11/24/2018  Narrative: CTA NECK AND BRAIN WITH AND WITHOUT CONTRAST INDICATION: Severe headache COMPARISON:   None  TECHNIQUE:  Routine CT imaging of the Brain without contrast   Post contrast imaging was performed after administration of iodinated contrast through the neck and brain  Post contrast axial 0 625 mm images timed to opacify the arterial system  3D rendering was performed on an independent workstation  MIP reconstructions performed  Coronal reconstructions were performed of the noncontrast portion of the brain  Radiation dose length product (DLP) for this visit:  [phone] mGy-cm   This examination, like all CT scans performed in the [facility], was performed utilizing techniques to minimize radiation dose exposure, including the use of iterative reconstruction and automated exposure control  IV Contrast:  85 mL of iohexol (OMNIPAQUE)  IMAGE QUALITY:   Diagnostic FINDINGS: NONCONTRAST BRAIN PARENCHYMA:  Diffuse acute subarachnoid hemorrhage throughout the basal cisterns  Subarachnoid hemorrhage in the interhemispheric fissure anteriorly and throughout the sylvian fissures  Few scattered foci over the frontoparietal convexities and temporal sulci  Minimal microangiopathic disease  No evidence of mass effect  VENTRICLES AND EXTRA-AXIAL SPACES:  No hydrocephalus  VISUALIZED ORBITS AND PARANASAL SINUSES:  Right lens replacement noted  Bilateral enophthalmos  No retro-orbital inflammation or hematoma  No significant paranasal sinus disease CERVICAL VASCULATURE AORTIC ARCH AND GREAT VESSELS:  Three-vessel configuration aortic arch  No subclavian artery stenosis  RIGHT VERTEBRAL ARTERY CERVICAL SEGMENT:  Normal origin  The vessel is normal in caliber throughout the neck  LEFT VERTEBRAL ARTERY CERVICAL SEGMENT:  Normal origin  The vessel is normal in caliber throughout the neck  RIGHT EXTRACRANIAL CAROTID SEGMENT:  Normal caliber common carotid artery  Normal bifurcation and cervical internal carotid artery  No stenosis or dissection   LEFT EXTRACRANIAL CAROTID SEGMENT: Normal caliber common carotid artery  Normal bifurcation and cervical internal carotid artery  No stenosis or dissection  NASCET criteria was used to determine the degree of internal carotid artery diameter stenosis  INTRACRANIAL VASCULATURE INTERNAL CAROTID ARTERIES:  Normal enhancement of the intracranial portions of the internal carotid arteries  Normal ophthalmic artery origins  Normal ICA terminus  ANTERIOR CIRCULATION:  Hypoplastic or absent right A1 segment  Anterior communicating artery aneurysm pointing anteriorly and superiorly  Aneurysm measures 5 x 4 x 3 mm  Approximately 2 mm neck  Bilateral A2 segments arise from the aneurysm MIDDLE CEREBRAL ARTERY CIRCULATION:  M1 segment and middle cerebral artery branches demonstrate normal enhancement bilaterally  DISTAL VERTEBRAL ARTERIES:  Normal distal vertebral arteries  Posterior inferior cerebellar artery origins are normal  Normal vertebral basilar junction  BASILAR ARTERY:  Basilar artery is normal in caliber  Normal superior cerebellar arteries  POSTERIOR CEREBRAL ARTERIES: Both posterior cerebral arteries arises from the basilar tip  Both arteries demonstrate normal enhancement  Posterior communicating arteries not visualized  DURAL VENOUS SINUSES:  Patent  NON VASCULAR ANATOMY BONY STRUCTURES:  No acute osseous abnormality  SOFT TISSUES OF THE NECK:  No soft tissue mass or collection  THORACIC INLET:  Clear lung apices  Impression: 1  Diffuse subarachnoid hemorrhage throughout the basal cisterns, sylvian fissures and over the convexities  No evidence of mass effect  2   Anterior communicating artery 5 mm aneurysm  3   No evidence of stenosis or occlusion of the major vessels of the [place]  4   No atherosclerotic disease, stenosis, dissection or occlusion of the carotid or vertebral arteries    I personally discussed this study with Ana Maria Tran on 11/24/2018 at 4:45 PM   Workstation performed: [id number]       I have personally reviewed pertinent reports     and I have personally reviewed pertinent films in PACS    VTE Prophylaxis: Sequential compression device (Venodyne)  and RX contraindicated due to: need for surgery

## 2018-11-25 NOTE — ANESTHESIA PREPROCEDURE EVALUATION
Review of Systems/Medical History  Patient summary reviewed        Cardiovascular  Negative cardio ROS    Pulmonary  Negative pulmonary ROS        GI/Hepatic  Negative GI/hepatic ROS          Negative  ROS        Endo/Other  Negative endo/other ROS      GYN  Negative gynecology ROS          Hematology  Negative hematology ROS      Musculoskeletal  Negative musculoskeletal ROS        Neurology  Negative neurology ROS   Cerebral bleeding with side effects ,    Psychology   Negative psychology ROS              Physical Exam    Airway    Mallampati score: I  TM Distance: >3 FB  Neck ROM: full     Dental   No notable dental hx     Cardiovascular  Comment: Negative ROS,     Pulmonary      Other Findings        Anesthesia Plan  ASA Score- 3 Emergent    Anesthesia Type- general with ASA Monitors  Additional Monitors: arterial line  Airway Plan: ETT  Comment: Patient seen and examined  History reviewed  Patient to be done under general anesthesia with ETT and routine monitors  Risks discussed with the patient  Consent obtained        Plan Factors-    Induction- intravenous  Postoperative Plan-     Informed Consent- Anesthetic plan and risks discussed with patient  I personally reviewed this patient with the CRNA  Discussed and agreed on the Anesthesia Plan with the ANGÉLICA Paul

## 2018-11-25 NOTE — PROGRESS NOTES
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 today  - PT, OT, speech eval  - TCD b i d  For 21 days starting 11/25  - Nimodipine 30 mg Q2H oral solution 2/2 BP drop w/ 60 mg dose  -Patient is unable to tolerate the tablet  - strict blood pressure control of systolic BP <812 with p r n  Labetalol and hydralazine  - 500 mg Keppra q 12 hours for seizure prophylaxis  - neuro checks q 1 hour    Glaucoma  - Start home Cosopt BID   - Patient's son will bring in Travoprost today                CV:  Goal blood pressure SBP<140  Controlled with medications as noted above                    Lung:  No acute issues                 GI:  No acute issues, GI ppx not indicated                 FEN:      · Fluids: 75 ml/ hr isolyte until no longer NPO  · Electrolytes:  K 3 2, will replete when patient back from IR  · Nutrition: NPO for procedure                 :  No acute issues  creatinine of 0 57, 0 49 today                 ID: Afebrile  No leukocytosis                 Heme:  Hemoglobin of 12 7 yesterday, today 11 6  DVT prophylaxis held  SCDs B/L                 Endo:  Glucose of 102 yesterday, 97 on today's BMP                   Msk/Skin:  Frequent off loading                 Disposition:  ICU level care    Code Status: Level 1 - Full Code    ______________________________________________________________________    Chief Complaint:  Subarachnoid hemorrhage    24 Hour Events:  Patient initially presented to [facility] on   Patient had acute onset of a headache, though patient does normally have headaches  Patient was found to have a subarachnoid hemorrhage and aneurysm at [facility]  Patient was [facility] to [facility]  Patient had no focal neurological deficits on exam and had an uneventful evening  Planned coiling of aneurysm today  Review of Systems   Musculoskeletal: Positive for neck pain  All other systems reviewed and are negative     ______________________________________________________________________    Physical Exam:   Physical Exam   Constitutional: She appears well-developed and well-nourished  No distress  HENT:   Head: Normocephalic and atraumatic  Eyes: Pupils are equal, round, and reactive to light  Cardiovascular: Normal rate and regular rhythm  Pulmonary/Chest: Effort normal and breath sounds normal    Abdominal: Soft  Bowel sounds are normal    Neurological:   Alert & oriented w/o focal motor deficits or CN deficits on exam today   Skin: Skin is warm and dry  Vitals reviewed  ______________________________________________________________________  Vitals:    18 0110 18 0140 18 0210 18 0228   BP: 107/58 111/59 110/64 110/64   Pulse: 78 74 72    Resp: 15 18 14    Temp:       TempSrc:       SpO2: 100% 100% 100%    Weight:       Height:          Temp:  [98 1 °F (36 7 °C)-99 7 °F (37 6 °C)] 99 7 °F (37 6 °C)  HR:  [72-96] 72  Resp:  [14-20] 14  BP: (101-172)/(54-78) 110/64    Temperature:   Temp (24hrs), Av 9 °F (37 2 °C), Min:98 1 °F (36 7 °C), Max:99 7 °F (37 6 °C)    Current Temperature: 99 7 °F (37 6 °C)  Weights:   IBW: 54 7 kg    Body mass index is 23 35 kg/m²    Weight (last 2 days)     Date/Time   Weight    18  61 7 (136 02)            Hemodynamic Monitoring:  N/A     Non-Invasive/Invasive Ventilation Settings:  Respiratory    Lab Data (Last 4 hours)    None         O2/Vent Data (Last 4 hours)    None No results found for: PHART, WIE5WNJ, PO2ART, JZK4IKG, V0NNBYHU, BEART, SOURCE  SpO2: SpO2: 100 %  Intake and Outputs:  I/O       11/23 0701 - 11/24 0700 11/24 0701 - 11/25 0700    Urine (mL/kg/hr)  700    Total Output   700    Net   -700              UOP: 100 ml/hr   Nutrition:        Diet Orders            Start     Ordered    11/25/18 0001  Diet NPO  Diet effective midnight     Question Answer Comment   Diet Type NPO    RD to adjust diet per protocol? No        11/24/18 2110          Labs:     Results from last 7 days  Lab Units 11/25/18  0501 11/24/18  1610   WBC Thousand/uL 6 91 5 47   HEMOGLOBIN g/dL 11 6 12 7   HEMATOCRIT % 36 6 40 5   PLATELETS Thousands/uL 203 188   NEUTROS PCT % 78* 82*   MONOS PCT % 8 5       Results from last 7 days  Lab Units 11/25/18  0501 11/24/18  1610   SODIUM mmol/L 135* 140   POTASSIUM mmol/L 3 2* 3 9   CHLORIDE mmol/L 102 103   CO2 mmol/L 28 28   ANION GAP mmol/L 5 9   BUN mg/dL 10 11   CREATININE mg/dL 0 49* 0 57*   CALCIUM mg/dL 9 0 9 0            Results from last 7 days  Lab Units 11/24/18  2159   INR  1 05   PTT seconds 27       Results from last 7 days  Lab Units 11/24/18  1610   TROPONIN I ng/mL <0 02         Imaging: Cta Head And Neck With And Without Contrast    Result Date: 11/24/2018  Impression: 1  Diffuse subarachnoid hemorrhage throughout the basal cisterns, sylvian fissures and over the convexities  No evidence of mass effect  2   Anterior communicating artery 5 mm aneurysm  3   No evidence of stenosis or occlusion of the major vessels of the [place]  4   No atherosclerotic disease, stenosis, dissection or occlusion of the carotid or vertebral arteries  I personally discussed this study with Juan Ramon Aguilera on 11/24/2018 at 4:45 PM   Workstation performed: [id number]    I have personally reviewed pertinent reports  Allergies:    Allergies   Allergen Reactions    Codeine GI Intolerance     Medications:   Scheduled Meds:    Current Facility-Administered Medications:  acetaminophen 650 mg Oral Q4H PRN Milady Dobson, DO    chlorhexidine 15 mL Swish & Spit Q12H [facility] Tao Dobson, DO    dorzolamide-timolol 1 drop Both Eyes BID Tao Dobson, DO    hydrALAZINE 5 mg Intravenous Q4H PRN Tao Dobson, DO    labetalol 10 mg Intravenous Q4H PRN Tao Dobson, DO    levETIRAcetam 500 mg Oral Q12H Tao Dobson, DO    multi-electrolyte 100 mL/hr Intravenous Continuous Veverly MD Lebron Last Rate: 100 mL/hr (11/25/18 0630)   niMODipine 30 mg Per NG Tube Q2H Veverly MD Lebron      Facility-Administered Medications Ordered in Other Encounters:  ePHEDrine   PRN Curtis Crew, CRNA   fentanyl citrate (PF)  Intravenous PRN Curtis Crew, CRNA   lidocaine   PRN Curtis Crew, CRNA   phenlyephrine   PRN Curtis Crew, CRNA   propofol  Intravenous PRN Curtis Crew, CRNA   rocuronium   PRN Curtis Crew, CRNA   sodium chloride   Continuous PRN Curtis Crew, CRNA     Continuous Infusions:   PRN Meds:    acetaminophen 650 mg Q4H PRN   hydrALAZINE 5 mg Q4H PRN   labetalol 10 mg Q4H PRN     VTE Pharmacologic Prophylaxis: Holding currently  VTE Mechanical Prophylaxis: sequential compression device  Invasive lines and devices:   Invasive Devices     Peripheral Intravenous Line            Peripheral IV 11/24/18 Left Antecubital less than 1 day    Peripheral IV 11/24/18 Right Antecubital less than 1 day                 YG Ballard

## 2018-11-25 NOTE — PROGRESS NOTES
Patient alert and oriented  Came in with Elissa Dailey and [facility] Nurse anesthetists  1 Coil anterior communicating artery  VS: 125/64 (84) 74HR 100% 18RR, room air  Dr Mk Patel and Dr Kelly Colon bedside to evaluate patient and speak to family

## 2018-11-25 NOTE — OCCUPATIONAL THERAPY NOTE
OT Cancel Note  Orders received, pt's chart reviewed  Per nursing, pt is currently not appropriate for OT eval   OT to continue to follow and re-attempt as appropriate      HUNG Holbrook, OTR/L

## 2018-11-25 NOTE — BRIEF OP NOTE (RAD/CATH)
IR CEREBRAL ANGIOGRAPHY / INTERVENTION  Procedure Note    PATIENT NAME: Ingrid George  : 1940  MRN: [id number]     Pre-op Diagnosis:   1  SAH (subarachnoid hemorrhage) ([facility])      Post-op Diagnosis:   1  SAH (subarachnoid hemorrhage) ([facility] [address] )        Surgeon:   Julio Garcia MD  Assistants:     No qualified resident was available, Resident is only observing    Estimated Blood Loss: 50 cc  Findings: 4 5 x 3 5 x 2 5 mm (width x height x neck) ACOM aneurysm  Successful coil embolization with trace residual filling at the base  Right femoral sheath removed, closure device deployed       Specimens: none    Complications:  none    Anesthesia: General    Jluio Garcia MD     Date: 2018  Time: 1:10 PM

## 2018-11-25 NOTE — PROGRESS NOTES
Patient assessed at bedside with attending, Dr Tika Larios, upon return from coil embolization of ACOM aneurysm by Dr Juan Aguila  Patient awake, in no distress, denies pain, calm, responds appropriately, follows commands, exhibits normal motor strength of B/L UE & LE grossly  Bed rest for 3 hours post-procedure, BP monitoring via A-line with SBP goal <140 and MAP>75 per NeuroSx  Will continue to monitor, [facility] neuro checks      YG Gregory  PGY-2  [place] [id number]

## 2018-11-26 ENCOUNTER — APPOINTMENT (INPATIENT)
Dept: NON INVASIVE DIAGNOSTICS | Facility: HOSPITAL | Age: 78
DRG: 021 | End: 2018-11-26
Payer: MEDICARE

## 2018-11-26 PROBLEM — Z98.890 S/P COIL EMBOLIZATION OF CEREBRAL ANEURYSM: Status: ACTIVE | Noted: 2018-11-26

## 2018-11-26 LAB
ANION GAP SERPL CALCULATED.3IONS-SCNC: 6 MMOL/L (ref 4–13)
BASOPHILS # BLD AUTO: 0.03 THOUSANDS/ΜL (ref 0–0.1)
BASOPHILS NFR BLD AUTO: 0 % (ref 0–1)
BUN SERPL-MCNC: 8 MG/DL (ref 5–25)
CALCIUM SERPL-MCNC: 8.2 MG/DL (ref 8.3–10.1)
CHLORIDE SERPL-SCNC: 108 MMOL/L (ref 100–108)
CO2 SERPL-SCNC: 28 MMOL/L (ref 21–32)
CREAT SERPL-MCNC: 0.47 MG/DL (ref 0.6–1.3)
EOSINOPHIL # BLD AUTO: 0.04 THOUSAND/ΜL (ref 0–0.61)
EOSINOPHIL NFR BLD AUTO: 1 % (ref 0–6)
ERYTHROCYTE [DISTWIDTH] IN BLOOD BY AUTOMATED COUNT: 13.9 % (ref 11.6–15.1)
GFR SERPL CREATININE-BSD FRML MDRD: 96 ML/MIN/1.73SQ M
GLUCOSE SERPL-MCNC: 100 MG/DL (ref 65–140)
HCT VFR BLD AUTO: 33.9 % (ref 34.8–46.1)
HGB BLD-MCNC: 10.6 G/DL (ref 11.5–15.4)
IMM GRANULOCYTES # BLD AUTO: 0.03 THOUSAND/UL (ref 0–0.2)
IMM GRANULOCYTES NFR BLD AUTO: 0 % (ref 0–2)
LYMPHOCYTES # BLD AUTO: 1.29 THOUSANDS/ΜL (ref 0.6–4.47)
LYMPHOCYTES NFR BLD AUTO: 16 % (ref 14–44)
MAGNESIUM SERPL-MCNC: 2.2 MG/DL (ref 1.6–2.6)
MCH RBC QN AUTO: 30.8 PG (ref 26.8–34.3)
MCHC RBC AUTO-ENTMCNC: 31.3 G/DL (ref 31.4–37.4)
MCV RBC AUTO: 99 FL (ref 82–98)
MONOCYTES # BLD AUTO: 0.77 THOUSAND/ΜL (ref 0.17–1.22)
MONOCYTES NFR BLD AUTO: 10 % (ref 4–12)
NEUTROPHILS # BLD AUTO: 5.97 THOUSANDS/ΜL (ref 1.85–7.62)
NEUTS SEG NFR BLD AUTO: 73 % (ref 43–75)
NRBC BLD AUTO-RTO: 0 /100 WBCS
PHOSPHATE SERPL-MCNC: 2.9 MG/DL (ref 2.3–4.1)
PLATELET # BLD AUTO: 193 THOUSANDS/UL (ref 149–390)
PMV BLD AUTO: 10.2 FL (ref 8.9–12.7)
POTASSIUM SERPL-SCNC: 3.3 MMOL/L (ref 3.5–5.3)
RBC # BLD AUTO: 3.44 MILLION/UL (ref 3.81–5.12)
SODIUM SERPL-SCNC: 142 MMOL/L (ref 136–145)
WBC # BLD AUTO: 8.13 THOUSAND/UL (ref 4.31–10.16)

## 2018-11-26 PROCEDURE — 83735 ASSAY OF MAGNESIUM: CPT | Performed by: FAMILY MEDICINE

## 2018-11-26 PROCEDURE — 93886 INTRACRANIAL COMPLETE STUDY: CPT

## 2018-11-26 PROCEDURE — 84100 ASSAY OF PHOSPHORUS: CPT | Performed by: FAMILY MEDICINE

## 2018-11-26 PROCEDURE — 99024 POSTOP FOLLOW-UP VISIT: CPT | Performed by: PHYSICIAN ASSISTANT

## 2018-11-26 PROCEDURE — 99291 CRITICAL CARE FIRST HOUR: CPT | Performed by: EMERGENCY MEDICINE

## 2018-11-26 PROCEDURE — 85025 COMPLETE CBC W/AUTO DIFF WBC: CPT | Performed by: FAMILY MEDICINE

## 2018-11-26 PROCEDURE — 80048 BASIC METABOLIC PNL TOTAL CA: CPT | Performed by: FAMILY MEDICINE

## 2018-11-26 PROCEDURE — 92610 EVALUATE SWALLOWING FUNCTION: CPT

## 2018-11-26 PROCEDURE — G8998 SWALLOW D/C STATUS: HCPCS

## 2018-11-26 PROCEDURE — 93886 INTRACRANIAL COMPLETE STUDY: CPT | Performed by: SURGERY

## 2018-11-26 PROCEDURE — G8978 MOBILITY CURRENT STATUS: HCPCS | Performed by: PHYSICAL THERAPIST

## 2018-11-26 PROCEDURE — G8997 SWALLOW GOAL STATUS: HCPCS

## 2018-11-26 PROCEDURE — G8979 MOBILITY GOAL STATUS: HCPCS | Performed by: PHYSICAL THERAPIST

## 2018-11-26 PROCEDURE — G8996 SWALLOW CURRENT STATUS: HCPCS

## 2018-11-26 PROCEDURE — 99222 1ST HOSP IP/OBS MODERATE 55: CPT | Performed by: PHYSICAL MEDICINE & REHABILITATION

## 2018-11-26 PROCEDURE — 97163 PT EVAL HIGH COMPLEX 45 MIN: CPT | Performed by: PHYSICAL THERAPIST

## 2018-11-26 RX ORDER — ALBUMIN, HUMAN INJ 5% 5 %
12.5 SOLUTION INTRAVENOUS ONCE
Status: COMPLETED | OUTPATIENT
Start: 2018-11-26 | End: 2018-11-26

## 2018-11-26 RX ORDER — ALBUMIN, HUMAN INJ 5% 5 %
SOLUTION INTRAVENOUS
Status: DISPENSED
Start: 2018-11-26 | End: 2018-11-26

## 2018-11-26 RX ORDER — LIDOCAINE HYDROCHLORIDE 10 MG/ML
INJECTION, SOLUTION EPIDURAL; INFILTRATION; INTRACAUDAL; PERINEURAL
Status: COMPLETED
Start: 2018-11-26 | End: 2018-11-26

## 2018-11-26 RX ORDER — POTASSIUM CHLORIDE 20 MEQ/1
40 TABLET, EXTENDED RELEASE ORAL 2 TIMES DAILY
Status: DISCONTINUED | OUTPATIENT
Start: 2018-11-26 | End: 2018-11-28

## 2018-11-26 RX ORDER — HEPARIN SODIUM 5000 [USP'U]/ML
5000 INJECTION, SOLUTION INTRAVENOUS; SUBCUTANEOUS EVERY 8 HOURS SCHEDULED
Status: DISCONTINUED | OUTPATIENT
Start: 2018-11-26 | End: 2018-12-07

## 2018-11-26 RX ADMIN — CHLORHEXIDINE GLUCONATE 15 ML: 1.2 RINSE ORAL at 20:06

## 2018-11-26 RX ADMIN — LEVETIRACETAM 500 MG: 500 TABLET, FILM COATED ORAL at 06:20

## 2018-11-26 RX ADMIN — LIDOCAINE HYDROCHLORIDE: 10 INJECTION, SOLUTION EPIDURAL; INFILTRATION; INTRACAUDAL; PERINEURAL at 22:23

## 2018-11-26 RX ADMIN — POTASSIUM CHLORIDE 40 MEQ: 1500 TABLET, EXTENDED RELEASE ORAL at 08:52

## 2018-11-26 RX ADMIN — HEPARIN SODIUM 5000 UNITS: 5000 INJECTION INTRAVENOUS; SUBCUTANEOUS at 22:26

## 2018-11-26 RX ADMIN — NOREPINEPHRINE BITARTRATE 2 MCG/MIN: 1 INJECTION INTRAVENOUS at 04:44

## 2018-11-26 RX ADMIN — TRAVOPROST OPHTHALMIC SOLUTION 1 DROP: 0.04 SOLUTION OPHTHALMIC at 22:27

## 2018-11-26 RX ADMIN — POTASSIUM CHLORIDE 40 MEQ: 1500 TABLET, EXTENDED RELEASE ORAL at 17:49

## 2018-11-26 RX ADMIN — NIMODIPINE 30 MG: 30 CAPSULE, LIQUID FILLED ORAL at 08:52

## 2018-11-26 RX ADMIN — NIMODIPINE 30 MG: 30 CAPSULE, LIQUID FILLED ORAL at 17:50

## 2018-11-26 RX ADMIN — ALBUMIN HUMAN 12.5 G: 0.05 INJECTION, SOLUTION INTRAVENOUS at 04:15

## 2018-11-26 RX ADMIN — NIMODIPINE 30 MG: 30 CAPSULE, LIQUID FILLED ORAL at 20:07

## 2018-11-26 RX ADMIN — NIMODIPINE 30 MG: 30 CAPSULE, LIQUID FILLED ORAL at 04:55

## 2018-11-26 RX ADMIN — DORZOLAMIDE HYDROCHLORIDE AND TIMOLOL MALEATE 1 DROP: 20; 5 SOLUTION/ DROPS OPHTHALMIC at 00:00

## 2018-11-26 RX ADMIN — NIMODIPINE 30 MG: 30 CAPSULE, LIQUID FILLED ORAL at 22:27

## 2018-11-26 RX ADMIN — DORZOLAMIDE HYDROCHLORIDE AND TIMOLOL MALEATE 1 DROP: 20; 5 SOLUTION/ DROPS OPHTHALMIC at 12:24

## 2018-11-26 RX ADMIN — NIMODIPINE 30 MG: 30 CAPSULE, LIQUID FILLED ORAL at 06:20

## 2018-11-26 RX ADMIN — NIMODIPINE 30 MG: 30 CAPSULE, LIQUID FILLED ORAL at 10:19

## 2018-11-26 RX ADMIN — NIMODIPINE 30 MG: 30 CAPSULE, LIQUID FILLED ORAL at 16:17

## 2018-11-26 RX ADMIN — ACETAMINOPHEN 650 MG: 325 TABLET, FILM COATED ORAL at 07:31

## 2018-11-26 RX ADMIN — SODIUM CHLORIDE 500 ML: 0.9 INJECTION, SOLUTION INTRAVENOUS at 17:45

## 2018-11-26 RX ADMIN — NOREPINEPHRINE BITARTRATE 6 MCG/MIN: 1 INJECTION INTRAVENOUS at 13:30

## 2018-11-26 RX ADMIN — NIMODIPINE 30 MG: 30 CAPSULE, LIQUID FILLED ORAL at 12:25

## 2018-11-26 RX ADMIN — HEPARIN SODIUM 5000 UNITS: 5000 INJECTION INTRAVENOUS; SUBCUTANEOUS at 14:20

## 2018-11-26 RX ADMIN — CHLORHEXIDINE GLUCONATE 15 ML: 1.2 RINSE ORAL at 08:52

## 2018-11-26 RX ADMIN — NIMODIPINE 30 MG: 30 CAPSULE, LIQUID FILLED ORAL at 14:20

## 2018-11-26 RX ADMIN — LEVETIRACETAM 500 MG: 500 TABLET, FILM COATED ORAL at 17:49

## 2018-11-26 NOTE — SPEECH THERAPY NOTE
Speech-Language Pathology Bedside Swallow Evaluation      Patient Name: Cindy Chen    PVVSF'O Date: 11/26/2018     Problem List  Patient Active Problem List   Diagnosis    SAH ([address]arachnoid [address]) [address] [address]    Glaucoma    S/P coil embolization of cerebral aneurysm       Past Medical History  Past Medical History:   Diagnosis Date    Fracture of arm     Glaucoma     Hypotension        Past Surgical History  Past Surgical History:   Procedure Laterality Date    BUNIONECTOMY      BUNIONECTOMY      CATARACT EXTRACTION      TONSILLECTOMY         Summary   Pt presented with functional appearing oral and pharyngeal stage swallowing skills with materials administered today  Risk for Aspiration: Absent      Recommendations: regular diet and thin liquids     Recommended Form of Meds: whole with liquid     Aspiration precautions and compensatory swallowing strategies: n/a      Current Medical Status  Pt is a 69 y o  female who presented to [facility] from [address] s/p sudden onset headache  Patient found to have aneurysm of the anterior communicating artery  Patient transferred to [place] and is s/p coil embolization yesterday  Past medical history:  Please see H&P for details    Social/Education/Vocational Hx:  Pt lives with family      Swallow Information   Current Risks for Dysphagia & Aspiration: recent surgery and recent intubation     Current Symptoms/Concerns: none    Current Diet: regular diet and thin liquids      Baseline Diet: regular diet and thin liquids      Baseline Assessment   Behavior/Cognition: alert    Speech/Language Status: able to participate in conversation and able to follow commands    Patient Positioning: upright in chair    Pain Status/Interventions/Response to Interventions: No report of or nonverbal indications of pain  Swallow Mechanism Exam   Oral-motor structures and function are WNL for symmetry, strength, ROM & coordination      Facial: symmetrical  Labial: WFL  Lingual: WFL  Velum: symmetrical  Mandible: adequate ROM  Dentition: adequate  Vocal quality:clear/adequate     Consistencies Assessed and Performance   Consistencies Administered: thin liquids and hard solids  Specific materials administered included saltine and thin liquids     Oral Stage: WFL  Mastication was adequate with the materials administered today  Bolus formation and transfer were functional with no significant oral residue noted  No overt s/s reduced oral control  Pharyngeal Stage: WFL    Swallow Mechanics:  Swallowing initiation appeared prompt  Laryngeal rise was palpated and judged to be within functional limits  No coughing, throat clearing, change in vocal quality or respiratory status noted today  Esophageal Concerns: none reported    Summary and Recommendations (see above)    Results Reviewed with: patient     Treatment Recommended: No ST warranted at this time   Continue regular diet with thin liquids

## 2018-11-26 NOTE — PLAN OF CARE
Problem: PHYSICAL THERAPY ADULT  Goal: Performs mobility at highest level of function for planned discharge setting  See evaluation for individualized goals  Treatment/Interventions: Functional transfer training, LE strengthening/ROM, Therapeutic exercise, Endurance training, Cognitive reorientation, Patient/family training, Bed mobility, Gait training, Equipment eval/education, Spoke to nursing, Continued evaluation, Compensatory technique education  Equipment Recommended: Nakul Palm       See flowsheet documentation for full assessment, interventions and recommendations  Prognosis: Good  Problem List: Decreased strength, Decreased endurance, Decreased range of motion, Impaired balance, Decreased safety awareness, Impaired judgement, Decreased coordination, Decreased mobility  Assessment: Pt is a 69year old female [facility] with complaints of HA, neck pain and diaphoresis which has since resolved  Pt transferred to [facility] on 11/24/18 for aSAH and has since undergone a coil emobolization for ACOM aneurysm  Current medical needs include fall risk precautions, daily TCD and multiple lines  PMHx significant for glaucoma  Upon PT evaluation pt presented w functional impairments including Decreased strength;Decreased endurance;Decreased range of motion; Impaired balance;Decreased safety awareness; Impaired judgement;Decreased coordination;Decreased mobility  Objective measures on the Barthel Index also reveal limitations  Pt transf supine to sit supervision  Upon positional changes pt noted some lightheadedness that resolved w time  Pt amb 240' w RW min assist x1 for safety  Observed gait deficits include instability, Forward Flexion;Decreased foot clearance; Excessively slow; Short stride  Pt transfer sit to stand/stand to sit min assist x1 w cues for safety following impulsivity  Pt seated in chair end of session w all needs in reach; nsg made aware of pt set up   Prior to admission pt was living in two story [facility] w her ; 2 [address]RI[address] no rails  She was prev Independent w ADLs and mobility and denies use of AD  Pt states her  is available to help upon d/c but states he is currently undergoing testing for a suggested dx of PD  She also has a son who lives locally and can help prn  Based on current functional impairments, environ barriers and medical hx, pt presented to PT for a high complexity evaluation and her projected prognosis is good  Current presentation is unstable given her need for daily TCD monitoring, fall risk precautions and decline from baseline mobility  Recommend cont w skilled PT during hospital stay and d/c home w family support and OP PT f/u when medically cleared  Barriers to Discharge: Inaccessible home environment  Barriers to Discharge Comments: pt needs to demonstrate competence w stairs  Recommendation: Home with family support, Outpatient PT (pending progress)          See flowsheet documentation for full assessment

## 2018-11-26 NOTE — PROGRESS NOTES
Progress Note - Neurosurgery   Per Left 69 y o  female MRN: [id number]  Unit/Bed#:  -01 Encounter: [id number]    Assessment:  1  Status post coil embolization for subarachnoid hemorrhage (11/25/2018)  2  HH 1, F3 SAH (11/24/2018)  3  BD ( 11/23/2018)  4  ACOM       Plan:   · Exam: A&OX3, PERRL, EOMI, 2 mm, conjugate  Speech fluent  Finger-to-nose normal and no pronator drift bilaterally  Motor strength is 5/5 throughout  Sensory function to light touch is normal bilaterally  DTR brisk throughout  No clonus and Babinski negative bilaterally  · Images:  CTA done on 11/24/2018 demonstrates diffuse subarachnoid hemorrhage throughout the basal cisterns, sylvian fissures and over the convexities without evidence of mass effect  Anterior communicating artery 5 mm aneurysm noted  · Pain control:  Primary team  · DVT ppx:   · SCDs  · Continue heparin subcu  · Seizure ppx:  Continue Keppra  · Activity:  As tolerated  · PT/OT evaluation & treatment  · Medical Mx:  Per Primary team  · Seizure prophylaxis:  continue Keppra  · Neurocheck:  Q 1 H  · HOB>30-45 degree  · TCD:   · Right =1 33  · Left = 0 92  · SBP<220  · MAP>75  · NSx following by neuromonitoring, spasm watch and reviewing images  Call for concern or problem  Subjective/Objective   Chief Complaint: " I am doing fine"/ post  coil progress note    Subjective:  Patient reports improvement with her headache and neck pain, claims mild headache  Denies any blurry vision or double vision  Appetite is good, denies nausea or vomiting  Denies speech issues or swallowing problem  Denies numbness, weakness, paresthesia and extremities  She was out of bed and walked around without gait instability or tendency to fall  Denies fever, chills, rigors, cough or chest pain      Objective:  Patient is supine in bed propped up in no pain distress    I/O       11/24 0701 - 11/25 0700 11/25 0701 - 11/26 0700 11/26 0701 - 11/27 0700    I V  (mL/kg) 151 3 (2 5) 3408 9 (55 2)     IV Piggyback  697 5     Total Intake(mL/kg) 151 3 (2 5) 4106 4 (66 6)     Urine (mL/kg/hr) 700 5735 (3 9) 175 (1 9)    Blood  150     Total Output 700 5885 175    Net -548 8 1776 6 -175                 Invasive Devices     Peripheral Intravenous Line            Peripheral IV 11/24/18 Left Antecubital 1 day    Peripheral IV 11/24/18 Right Antecubital 1 day          Arterial Line            Arterial Line 11/25/18 Left Radial less than 1 day          Drain            Urethral Catheter Temperature probe 16 Fr  less than 1 day                Physical Exam:  Vitals: Blood pressure 110/55, pulse 76, temperature 98 6 °F (37 °C), temperature source Probe, resp  rate (!) 11, height 5' 4" (1 626 m), weight 61 7 kg (136 lb 0 4 oz), SpO2 97 %, not currently breastfeeding  ,Body mass index is 23 35 kg/m²        General appearance: alert, appears stated age, cooperative and no distress  Head: Normocephalic, without obvious abnormality, atraumatic  Eyes: EOMI, PERRL, 2 mm conjugate  Neck: supple, symmetrical, trachea midline and NT  Lungs: non labored breathing  Heart: regular heart rate  Neurologic:   Mental status: Alert, oriented x3, thought content appropriate  Cranial nerves: grossly intact (Cranial nerves II-XII)  Sensory: normal to  light throughout  Motor: moving all extremities without focal weakness, strength is 5/5 bilaterally  Reflexes: 3+ and symmetric, without clonus and Babinski is negative bilaterally  Coordination: finger to nose normal bilaterally, no drift bilaterally      Lab Results:    Results from last 7 days  Lab Units 11/26/18  0514 11/25/18  0501 11/24/18  1610   WBC Thousand/uL 8 13 6 91 5 47   HEMOGLOBIN g/dL 10 6* 11 6 12 7   HEMATOCRIT % 33 9* 36 6 40 5   PLATELETS Thousands/uL 193 203 188   NEUTROS PCT % 73 78* 82*   MONOS PCT % 10 8 5       Results from last 7 days  Lab Units 11/26/18  0514 11/25/18  0501 11/24/18  1610   POTASSIUM mmol/L 3 3* 3 2* 3 9   CHLORIDE mmol/L 108 102 103   CO2 mmol/L 28 28 28   BUN mg/dL 8 10 11   CREATININE mg/dL 0 47* 0 49* 0 57*   CALCIUM mg/dL 8 2* 9 0 9 0       Results from last 7 days  Lab Units 11/26/18  0514   MAGNESIUM mg/dL 2 2       Results from last 7 days  Lab Units 11/26/18  0514   PHOSPHORUS mg/dL 2 9       Results from last 7 days  Lab Units 11/24/18  2159   INR  1 05   PTT seconds 27     No results found for: TROPONINT  ABG:No results found for: PHART, EMU7TDC, PO2ART, XON5NUU, E0VGVECH, BEART, SOURCE    Imaging Studies: I have personally reviewed pertinent reports  and I have personally reviewed pertinent films in PACS    EKG, Pathology, and Other Studies: I have personally reviewed pertinent reports        VTE Pharmacologic Prophylaxis: Heparin SQ    VTE Mechanical Prophylaxis: sequential compression device
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
## 2018-11-26 NOTE — PROGRESS NOTES
Progress Note - Critical Care   Anabell Cheema 69 y o  female MRN: [id number]  Unit/Bed#: [address] -01 Encounter: [id number]    Attending Physician: Dontae Vega, DO  ______________________________________________________________________  Assessment and Plan:   Principal Problem:    SAH (subarachnoid hemorrhage) ([facility] [address] )  Active Problems:    Glaucoma  Resolved Problems:    * No resolved hospital problems  *               Neuro:   SAH   1   SAH (HH1, F3) 2/2 ACoA aneurysm, bleed day #3  -POD#1 s/p coiling embolization of anterior communicating artery 5 mm aneurysm   - TCD BID for 21 days starting today; R: 1 33, L: 0 92  - Nimodipine 30 mg Q2H oral solution 2/2 BP drop w/ 60 mg dose                -Patient is unable to tolerate the tablet     -Levo added 2/2 BP inadequate for Nimodipine to be given  -BP control of systolic BP <439 with PRN Labetalol & hydralazine (no doses given)  -500 mg Keppra Q12H for seizure prophylaxis  -Neuro checks Q1H  -PT, OT, speech eval     Glaucoma  -Continue home Cosopt BID & Travoprost 1gtt OU QHS     CV:    -Goal SBP<140, MAP>75  -SBP controlled without PRN labetalol/hydralazine  -Levo added overnight to meet MAP goal, currently slightly below goal     Pulm:   -No acute issues, SpO2>96% on RA  -Encourage incentive spirometry, deep breathing and coughing     GI:    -No acute issues  -GI ppx not indicated     FEN:   -Fluids: 75 ml/ hr isolyte stopped, will give isolyte bolus/albumin as needed   -Na 142 today, mag 2 2, phos 2 9  -HypoK: K 3 3, 3 2 yesterday but repleted only via 40 mEq as patient refused 2nd dose   -Replete today via 40 mEq x2  -Nutrition: Regular diet, no restrictions     :    -UOP 1400 over 2 hours per nursing staff, IVF shut off and next 2 hour  mL  -UOP 5735 over past 24H, 238 mL/hr; will D/C rudolph and do voiding trial  -Continue I/O monitoring for increased UOP, urinary retention protocol  -Cr 0 47 today     ID:    -No active issues  -Afebrile, no leukocytosis     Heme:    -Hemoglobin of 11 6 yesterday, today 10 6 , continue to monitor, goal Hb>8   -DVT pharmacologic prophylaxis held, start today; SCDs B/L     Endo:    -No active issues  -Glucose of 97 yesterday, 100 on today's BMP     Msk/Skin:    -No active issues  -Frequent off loading & repositioning to prevent skin breakdown     Disposition:  ICU level care 2/2 SAH     Code Status: Level 1 - Full Code    ______________________________________________________________________    24 Hour Events: UOP 1400 over 2 hours overnight per RN, IVF stopped and UOP normalized to 175 mL over next 2 hours  Levo added 2/2 nimodipine parameters  Denies pain, had mild headache earlier but now resolved  Eating and drinking normally, passing flatus, no BM yet   ______________________________________________________________________    Physical Exam:   Physical Exam   Constitutional: She is oriented to person, place, and time  She appears well-developed and well-nourished  Cardiovascular: Normal rate and regular rhythm  2/6 LEW   Pulmonary/Chest: Effort normal and breath sounds normal    Abdominal: Soft  Bowel sounds are normal    Neurological: She is alert and oriented to person, place, and time  No cranial nerve deficit  Motor strength 5/5 B/L UE & LE, follows all commands, no pronator drift   Skin: Skin is warm      ______________________________________________________________________  Vitals:    18 0631 18 0636 18 0641 18 0645   BP: 110/51 (!) 107/46 (!) 105/43 111/56   Pulse: 80 82 84 82   Resp: 14 15 15 15   Temp: 98 2 °F (36 8 °C) 98 2 °F (36 8 °C) 98 2 °F (36 8 °C) 98 2 °F (36 8 °C)   TempSrc:       SpO2: 98% 98% 98% 98%   Weight:       Height:           Temperature:   Temp (24hrs), Av 1 °F (36 7 °C), Min:96 1 °F (35 6 °C), Max:99 3 °F (37 4 °C)    Current Temperature: 98 2 °F (36 8 °C)  Weights:   IBW: 54 7 kg    Body mass index is 23 35 kg/m²    Weight (last 2 days)     Date/Time Weight    11/24/18 2015  61 7 (136 02)            Hemodynamic Monitoring: A-line in L radial placed 11/25/18     Non-Invasive/Invasive Ventilation Settings:  Respiratory    Lab Data (Last 4 hours)    None         O2/Vent Data (Last 4 hours)    None              Intake and Outputs:  I/O       11/24 0701 - 11/25 0700 11/25 0701 - 11/26 0700 11/26 0701 - 11/27 0700    I V  (mL/kg) 151 3 (2 5) 3408 9 (55 2)     IV Piggyback  697 5     Total Intake(mL/kg) 151 3 (2 5) 4106 4 (66 6)     Urine (mL/kg/hr) 700 5735 (3 9)     Blood  150     Total Output 700 5885      Net -548 8 -1778 6                 UOP: 238 mL/hour     Nutrition:        Diet Orders            Start     Ordered    11/25/18 1732  Diet Regular; Regular House  Diet effective midnight     Question Answer Comment   Diet Type Regular    Regular Regular House    RD to adjust diet per protocol? No        11/25/18 1731        Labs:     Results from last 7 days  Lab Units 11/26/18  0514 11/25/18  0501 11/24/18  1610   WBC Thousand/uL 8 13 6 91 5 47   HEMOGLOBIN g/dL 10 6* 11 6 12 7   HEMATOCRIT % 33 9* 36 6 40 5   PLATELETS Thousands/uL 193 203 188   NEUTROS PCT % 73 78* 82*   MONOS PCT % 10 8 5       Results from last 7 days  Lab Units 11/26/18  0514 11/25/18  0501 11/24/18  1610   POTASSIUM mmol/L 3 3* 3 2* 3 9   CHLORIDE mmol/L 108 102 103   CO2 mmol/L 28 28 28   BUN mg/dL 8 10 11   CREATININE mg/dL 0 47* 0 49* 0 57*   CALCIUM mg/dL 8 2* 9 0 9 0       Results from last 7 days  Lab Units 11/26/18  0514   MAGNESIUM mg/dL 2 2     Lab Results   Component Value Date    PHOS 2 9 11/26/2018        Results from last 7 days  Lab Units 11/24/18  2159   INR  1 05   PTT seconds 27       0  Lab Value Date/Time   TROPONINI <0 02 11/24/2018 1610     Allergies:    Allergies   Allergen Reactions    Codeine GI Intolerance     Medications:   Scheduled Meds:    Current Facility-Administered Medications:  acetaminophen 650 mg Oral Q4H PRN Tao Dobson, DO    albumin human chlorhexidine 15 mL Swish & Spit Q12H [address] Tao Dobson, DO    dorzolamide-timolol 1 drop Both Eyes BID George Zainab Dobson DO    hydrALAZINE 5 mg Intravenous Q4H PRN Flores Szymanski MD    labetalol 10 mg Intravenous Q4H PRN Dorfelisa Dobson, DO    levETIRAcetam 500 mg Oral Q12H [place] Dobson DO    multi-electrolyte 100 mL/hr Intravenous Continuous Flores Szymanski MD Last Rate: Stopped (11/26/18 0600)   niMODipine 30 mg Per NG Tube Q2H Flores Szymanski MD    norepinephrine 1-30 mcg/min Intravenous Titrated Kana Ewing MD Last Rate: 8 mcg/min (11/26/18 0800)   ondansetron 4 mg Intravenous Q4H PRN Flores Szymanski MD    potassium chloride 40 mEq Oral BID Flores Szymanski MD    potassium chloride 40 mEq Oral Once Flores Szymanski MD    travoprost 1 drop Both Eyes HS Flores Szymanski MD      Continuous Infusions:    multi-electrolyte 100 mL/hr Last Rate: Stopped (11/26/18 0600)   norepinephrine 1-30 mcg/min Last Rate: 8 mcg/min (11/26/18 0800)     PRN Meds:    acetaminophen 650 mg Q4H PRN   hydrALAZINE 5 mg Q4H PRN   labetalol 10 mg Q4H PRN   ondansetron 4 mg Q4H PRN     VTE Pharmacologic Prophylaxis: Pharmacologic VTE Prophylaxis contraindicated due to [facility], will start SQ heparin today  VTE Mechanical Prophylaxis: sequential compression device  Invasive lines and devices: Invasive Devices     Peripheral Intravenous Line            Peripheral IV 11/24/18 Left Antecubital 1 day    Peripheral IV 11/24/18 Right Antecubital 1 day          Arterial Line            Arterial Line 11/25/18 Left Radial less than 1 day          Drain            Urethral Catheter Temperature probe 16 Fr  less than 1 day                     Portions of the record may have been created with voice recognition software  Occasional wrong word or "sound a like" substitutions may have occurred due to the inherent limitations of voice recognition software  Read the chart carefully and recognize, using context, where substitutions have occurred      Flores Szymanski MD

## 2018-11-26 NOTE — NUTRITION
11/26/18 1421   Recommendations/Interventions   Interventions Diet: continued as ordered   Nutrition Recommendations Continue diet as ordered; Other (specify)  (Replete K+)

## 2018-11-26 NOTE — PHYSICAL THERAPY NOTE
PT Initial Evaluation     11/26/18 1008   Pain Assessment   Pain Assessment No/denies pain   Pain Score No Pain   Hospital Pain Intervention(s) Repositioned; Ambulation/increased activity   Response to Interventions tolerated; noted mild LE stiffness during amb that was relieved w rest   Home Living   Type of Home House  (townhouse)   Home Layout Two level; Able to live on main level with bedroom/bathroom;Stairs to enter without rails  (2 CHRISTIAN)   Additional Comments pt stated rails are going to be installed for CHRISTIAN   Prior Function   Level of Ray Independent with ADLs and functional mobility   Lives With Spouse  ()   Receives Help From Family  (son lives locally)   Falls in the last 6 months 0   Vocational Retired   Comments pt states she was prev very active, denies use of AD to amb   Restrictions/Precautions   Weight Bearing Precautions Per Order No   Other Precautions Fall Risk;Telemetry;Multiple lines   General   Additional Pertinent History s/p coil emobolization for ACOM aneurysm   Family/Caregiver Present No   Cognition   Overall Cognitive Status WFL   Arousal/Participation Alert   Orientation Level Oriented X4   Memory Within functional limits   Following Commands Follows all commands and directions without difficulty   RUE Assessment   RUE Assessment WFL  (able to propel RW and assist in transf)   LUE Assessment   LUE Assessment WFL  (able to propel RW and assist in transf)   RLE Assessment   RLE Assessment WFL   Strength RLE   RLE Overall Strength 4-/5   LLE Assessment   LLE Assessment WFL   Strength LLE   LLE Overall Strength 4-/5   Coordination   Movements are Fluid and Coordinated 0   Coordination and Movement Description instability noted during amb   Sensation WFL   Light Touch   RLE Light Touch Grossly intact   LLE Light Touch Grossly intact   Bed Mobility   Supine to Sit 5  Supervision   Additional items HOB elevated; Bedrails; Increased time required;Verbal cues  (vc for safety to monitor sx during transfers)   Transfers   Sit to Stand 4  Minimal assistance   Additional items Assist x 1; Increased time required;Verbal cues   Stand to Sit 4  Minimal assistance   Additional items Assist x 1; Impulsive;Verbal cues;Armrests   Stand pivot 4  Minimal assistance   Additional items Assist x 1;Verbal cues; Impulsive   Additional Comments CG and vc for safety   Ambulation/Elevation   Gait pattern Forward Flexion;Decreased foot clearance; Excessively slow; Short stride   Gait Assistance 4  Minimal assist   Additional items Assist x 1;Verbal cues  (vc for proper use of RW and for safety)   Assistive Device Rolling walker   Distance 240'   Balance   Dynamic Sitting Fair   Static Standing Fair -  (RW for stability)   Dynamic Standing Poor +  (RW for stability)   Ambulatory Poor +  (RW for stability)   Endurance Deficit   Endurance Deficit Yes   Endurance Deficit Description limited by fatigue   Activity Tolerance   Activity Tolerance Patient tolerated treatment well   Nurse Made Aware yes- cleared for PT   Assessment   Prognosis Good   Problem List Decreased strength;Decreased endurance;Decreased range of motion; Impaired balance;Decreased safety awareness; Impaired judgement;Decreased coordination;Decreased mobility   Assessment Pt is a 69year old female admitted to [facility] with complaints of HA, neck pain and diaphoresis which has since resolved  Pt transferred to [facility] on 11/24/18 for aSAH and has since undergone a coil emobolization for ACOM aneurysm  Current medical needs include fall risk precautions, daily TCD and multiple lines  PMHx significant for glaucoma  Upon PT evaluation pt presented w functional impairments including Decreased strength;Decreased endurance;Decreased range of motion; Impaired balance;Decreased safety awareness; Impaired judgement;Decreased coordination;Decreased mobility  Objective measures on the Barthel Index also reveal limitations  Pt transf supine to sit supervision   Upon positional changes pt noted some lightheadedness that resolved w time  Pt amb 240' w RW min assist x1 for safety  Observed gait deficits include instability, Forward Flexion;Decreased foot clearance; Excessively slow; Short stride  Pt transfer sit to stand/stand to sit min assist x1 w cues for safety following impulsivity  Pt seated in chair end of session w all needs in reach; nsg made aware of pt set up  Prior to admission pt was living in two story town house w her ; 2 CHRISTIAN no rails  She was prev Independent w ADLs and mobility and denies use of AD  Pt states her  is available to help upon d/c but states he is currently undergoing testing for a suggested dx of PD  She also has a son who lives locally and can help prn  Based on current functional impairments, environ barriers and medical hx, pt presented to PT for a high complexity evaluation and her projected prognosis is good  Current presentation is unstable given her need for daily TCD monitoring, fall risk precautions and decline from baseline mobility  Recommend cont w skilled PT during hospital stay and d/c home w family support and OP PT f/u when medically cleared  Barriers to Discharge Inaccessible home environment   Barriers to Discharge Comments pt needs to demonstrate competence w stairs   Goals   Patient Goals return home   STG Expiration Date 12/06/18   Short Term Goal #1 1  Pt will be Independent w bed mobility (roll L/R) for repositioning and comfort  2  Pt will improve dynamic standing balance by 1 grade  3  Pt will improve BLE strength by 1 grade to facilitate functional activity  4  Pt will transfer sit to stand/stand to sit supervision  5  Pt will amb 300' supervision w least restrictive device for community and household distances  6  Pt will ascend/descend 2 steps min assist x1 for access to home  Plan   Treatment/Interventions Functional transfer training;LE strengthening/ROM; Therapeutic exercise; Endurance training;Cognitive reorientation;Patient/family training;Bed mobility;Gait training;Equipment eval/education;Spoke to nursing;Continued evaluation; Compensatory technique education   PT Frequency (3-5/wk)   Recommendation   Recommendation Home with family support; Outpatient PT  (pending progress)   Equipment Recommended Walker   Modified Aman Scale   Modified Leake Scale 3   Barthel Index   Feeding 10   Bathing 0   Grooming Score 5   Dressing Score 5   Bladder Score 10   Bowels Score 10   Toilet Use Score 5   Transfers (Bed/Chair) Score 10   Mobility (Level Surface) Score 10   Stairs Score 0   Barthel Index Score 65

## 2018-11-27 ENCOUNTER — APPOINTMENT (INPATIENT)
Dept: NON INVASIVE DIAGNOSTICS | Facility: HOSPITAL | Age: 78
DRG: 021 | End: 2018-11-27
Payer: MEDICARE

## 2018-11-27 ENCOUNTER — APPOINTMENT (INPATIENT)
Dept: RADIOLOGY | Facility: HOSPITAL | Age: 78
DRG: 021 | End: 2018-11-27
Payer: MEDICARE

## 2018-11-27 PROBLEM — E87.6 HYPOKALEMIA: Status: ACTIVE | Noted: 2018-11-27

## 2018-11-27 PROBLEM — E83.39 HYPOPHOSPHATEMIA: Status: ACTIVE | Noted: 2018-11-27

## 2018-11-27 LAB
ANION GAP SERPL CALCULATED.3IONS-SCNC: 6 MMOL/L (ref 4–13)
BASOPHILS # BLD AUTO: 0.04 THOUSANDS/ΜL (ref 0–0.1)
BASOPHILS NFR BLD AUTO: 1 % (ref 0–1)
BUN SERPL-MCNC: 9 MG/DL (ref 5–25)
CALCIUM SERPL-MCNC: 8.4 MG/DL (ref 8.3–10.1)
CHLORIDE SERPL-SCNC: 106 MMOL/L (ref 100–108)
CO2 SERPL-SCNC: 26 MMOL/L (ref 21–32)
CREAT SERPL-MCNC: 0.43 MG/DL (ref 0.6–1.3)
EOSINOPHIL # BLD AUTO: 0.1 THOUSAND/ΜL (ref 0–0.61)
EOSINOPHIL NFR BLD AUTO: 1 % (ref 0–6)
ERYTHROCYTE [DISTWIDTH] IN BLOOD BY AUTOMATED COUNT: 13.9 % (ref 11.6–15.1)
GFR SERPL CREATININE-BSD FRML MDRD: 98 ML/MIN/1.73SQ M
GLUCOSE SERPL-MCNC: 99 MG/DL (ref 65–140)
HCT VFR BLD AUTO: 37.1 % (ref 34.8–46.1)
HGB BLD-MCNC: 12 G/DL (ref 11.5–15.4)
IMM GRANULOCYTES # BLD AUTO: 0.03 THOUSAND/UL (ref 0–0.2)
IMM GRANULOCYTES NFR BLD AUTO: 0 % (ref 0–2)
LYMPHOCYTES # BLD AUTO: 1.1 THOUSANDS/ΜL (ref 0.6–4.47)
LYMPHOCYTES NFR BLD AUTO: 14 % (ref 14–44)
MAGNESIUM SERPL-MCNC: 2 MG/DL (ref 1.6–2.6)
MCH RBC QN AUTO: 31.3 PG (ref 26.8–34.3)
MCHC RBC AUTO-ENTMCNC: 32.3 G/DL (ref 31.4–37.4)
MCV RBC AUTO: 97 FL (ref 82–98)
MONOCYTES # BLD AUTO: 0.63 THOUSAND/ΜL (ref 0.17–1.22)
MONOCYTES NFR BLD AUTO: 8 % (ref 4–12)
NEUTROPHILS # BLD AUTO: 6.01 THOUSANDS/ΜL (ref 1.85–7.62)
NEUTS SEG NFR BLD AUTO: 76 % (ref 43–75)
NRBC BLD AUTO-RTO: 0 /100 WBCS
PHOSPHATE SERPL-MCNC: 2.2 MG/DL (ref 2.3–4.1)
PLATELET # BLD AUTO: 203 THOUSANDS/UL (ref 149–390)
PMV BLD AUTO: 9.8 FL (ref 8.9–12.7)
POTASSIUM SERPL-SCNC: 3.9 MMOL/L (ref 3.5–5.3)
RBC # BLD AUTO: 3.84 MILLION/UL (ref 3.81–5.12)
SODIUM SERPL-SCNC: 138 MMOL/L (ref 136–145)
WBC # BLD AUTO: 7.91 THOUSAND/UL (ref 4.31–10.16)

## 2018-11-27 PROCEDURE — 84100 ASSAY OF PHOSPHORUS: CPT | Performed by: INTERNAL MEDICINE

## 2018-11-27 PROCEDURE — 97116 GAIT TRAINING THERAPY: CPT

## 2018-11-27 PROCEDURE — 99233 SBSQ HOSP IP/OBS HIGH 50: CPT | Performed by: RADIOLOGY

## 2018-11-27 PROCEDURE — 83735 ASSAY OF MAGNESIUM: CPT | Performed by: INTERNAL MEDICINE

## 2018-11-27 PROCEDURE — 93886 INTRACRANIAL COMPLETE STUDY: CPT

## 2018-11-27 PROCEDURE — 80048 BASIC METABOLIC PNL TOTAL CA: CPT | Performed by: INTERNAL MEDICINE

## 2018-11-27 PROCEDURE — G8988 SELF CARE GOAL STATUS: HCPCS

## 2018-11-27 PROCEDURE — 97167 OT EVAL HIGH COMPLEX 60 MIN: CPT

## 2018-11-27 PROCEDURE — 70496 CT ANGIOGRAPHY HEAD: CPT

## 2018-11-27 PROCEDURE — 99291 CRITICAL CARE FIRST HOUR: CPT | Performed by: EMERGENCY MEDICINE

## 2018-11-27 PROCEDURE — 85025 COMPLETE CBC W/AUTO DIFF WBC: CPT | Performed by: INTERNAL MEDICINE

## 2018-11-27 PROCEDURE — 93886 INTRACRANIAL COMPLETE STUDY: CPT | Performed by: SURGERY

## 2018-11-27 PROCEDURE — 70498 CT ANGIOGRAPHY NECK: CPT

## 2018-11-27 PROCEDURE — G8987 SELF CARE CURRENT STATUS: HCPCS

## 2018-11-27 RX ORDER — SODIUM CHLORIDE, SODIUM GLUCONATE, SODIUM ACETATE, POTASSIUM CHLORIDE, MAGNESIUM CHLORIDE, SODIUM PHOSPHATE, DIBASIC, AND POTASSIUM PHOSPHATE .53; .5; .37; .037; .03; .012; .00082 G/100ML; G/100ML; G/100ML; G/100ML; G/100ML; G/100ML; G/100ML
100 INJECTION, SOLUTION INTRAVENOUS CONTINUOUS
Status: DISPENSED | OUTPATIENT
Start: 2018-11-27 | End: 2018-11-27

## 2018-11-27 RX ORDER — SODIUM CHLORIDE, SODIUM GLUCONATE, SODIUM ACETATE, POTASSIUM CHLORIDE, MAGNESIUM CHLORIDE, SODIUM PHOSPHATE, DIBASIC, AND POTASSIUM PHOSPHATE .53; .5; .37; .037; .03; .012; .00082 G/100ML; G/100ML; G/100ML; G/100ML; G/100ML; G/100ML; G/100ML
75 INJECTION, SOLUTION INTRAVENOUS CONTINUOUS
Status: DISPENSED | OUTPATIENT
Start: 2018-11-27 | End: 2018-11-28

## 2018-11-27 RX ORDER — SODIUM CHLORIDE, SODIUM GLUCONATE, SODIUM ACETATE, POTASSIUM CHLORIDE, MAGNESIUM CHLORIDE, SODIUM PHOSPHATE, DIBASIC, AND POTASSIUM PHOSPHATE .53; .5; .37; .037; .03; .012; .00082 G/100ML; G/100ML; G/100ML; G/100ML; G/100ML; G/100ML; G/100ML
1000 INJECTION, SOLUTION INTRAVENOUS ONCE
Status: COMPLETED | OUTPATIENT
Start: 2018-11-27 | End: 2018-11-27

## 2018-11-27 RX ADMIN — NIMODIPINE 30 MG: 30 CAPSULE, LIQUID FILLED ORAL at 00:15

## 2018-11-27 RX ADMIN — SODIUM CHLORIDE, SODIUM GLUCONATE, SODIUM ACETATE, POTASSIUM CHLORIDE, MAGNESIUM CHLORIDE, SODIUM PHOSPHATE, DIBASIC, AND POTASSIUM PHOSPHATE 75 ML/HR: .53; .5; .37; .037; .03; .012; .00082 INJECTION, SOLUTION INTRAVENOUS at 11:53

## 2018-11-27 RX ADMIN — CHLORHEXIDINE GLUCONATE 15 ML: 1.2 RINSE ORAL at 21:20

## 2018-11-27 RX ADMIN — NIMODIPINE 30 MG: 30 CAPSULE, LIQUID FILLED ORAL at 16:18

## 2018-11-27 RX ADMIN — NIMODIPINE 30 MG: 30 CAPSULE, LIQUID FILLED ORAL at 17:54

## 2018-11-27 RX ADMIN — DORZOLAMIDE HYDROCHLORIDE AND TIMOLOL MALEATE 1 DROP: 20; 5 SOLUTION/ DROPS OPHTHALMIC at 00:15

## 2018-11-27 RX ADMIN — POTASSIUM CHLORIDE 40 MEQ: 1500 TABLET, EXTENDED RELEASE ORAL at 17:54

## 2018-11-27 RX ADMIN — HEPARIN SODIUM 5000 UNITS: 5000 INJECTION INTRAVENOUS; SUBCUTANEOUS at 21:22

## 2018-11-27 RX ADMIN — LEVETIRACETAM 500 MG: 500 TABLET, FILM COATED ORAL at 06:11

## 2018-11-27 RX ADMIN — LEVETIRACETAM 500 MG: 500 TABLET, FILM COATED ORAL at 17:54

## 2018-11-27 RX ADMIN — HEPARIN SODIUM 5000 UNITS: 5000 INJECTION INTRAVENOUS; SUBCUTANEOUS at 14:52

## 2018-11-27 RX ADMIN — NIMODIPINE 30 MG: 30 CAPSULE, LIQUID FILLED ORAL at 06:11

## 2018-11-27 RX ADMIN — NIMODIPINE 30 MG: 30 CAPSULE, LIQUID FILLED ORAL at 13:39

## 2018-11-27 RX ADMIN — ACETAMINOPHEN 650 MG: 325 TABLET, FILM COATED ORAL at 16:18

## 2018-11-27 RX ADMIN — NIMODIPINE 30 MG: 30 CAPSULE, LIQUID FILLED ORAL at 14:52

## 2018-11-27 RX ADMIN — NIMODIPINE 30 MG: 30 CAPSULE, LIQUID FILLED ORAL at 21:32

## 2018-11-27 RX ADMIN — IOHEXOL 85 ML: 350 INJECTION, SOLUTION INTRAVENOUS at 05:59

## 2018-11-27 RX ADMIN — NIMODIPINE 30 MG: 30 CAPSULE, LIQUID FILLED ORAL at 02:41

## 2018-11-27 RX ADMIN — SODIUM CHLORIDE, SODIUM GLUCONATE, SODIUM ACETATE, POTASSIUM CHLORIDE, MAGNESIUM CHLORIDE, SODIUM PHOSPHATE, DIBASIC, AND POTASSIUM PHOSPHATE 100 ML/HR: .53; .5; .37; .037; .03; .012; .00082 INJECTION, SOLUTION INTRAVENOUS at 10:40

## 2018-11-27 RX ADMIN — NIMODIPINE 30 MG: 30 CAPSULE, LIQUID FILLED ORAL at 04:33

## 2018-11-27 RX ADMIN — NIMODIPINE 30 MG: 30 CAPSULE, LIQUID FILLED ORAL at 19:59

## 2018-11-27 RX ADMIN — TRAVOPROST OPHTHALMIC SOLUTION 1 DROP: 0.04 SOLUTION OPHTHALMIC at 21:22

## 2018-11-27 RX ADMIN — NIMODIPINE 30 MG: 30 CAPSULE, LIQUID FILLED ORAL at 10:39

## 2018-11-27 RX ADMIN — POTASSIUM & SODIUM PHOSPHATES POWDER PACK 280-160-250 MG 2 PACKET: 280-160-250 PACK at 10:37

## 2018-11-27 RX ADMIN — DORZOLAMIDE HYDROCHLORIDE AND TIMOLOL MALEATE 1 DROP: 20; 5 SOLUTION/ DROPS OPHTHALMIC at 11:54

## 2018-11-27 RX ADMIN — POTASSIUM & SODIUM PHOSPHATES POWDER PACK 280-160-250 MG 2 PACKET: 280-160-250 PACK at 16:18

## 2018-11-27 RX ADMIN — POTASSIUM CHLORIDE 40 MEQ: 1500 TABLET, EXTENDED RELEASE ORAL at 09:26

## 2018-11-27 RX ADMIN — ACETAMINOPHEN 650 MG: 325 TABLET, FILM COATED ORAL at 04:33

## 2018-11-27 RX ADMIN — SODIUM CHLORIDE, SODIUM GLUCONATE, SODIUM ACETATE, POTASSIUM CHLORIDE, MAGNESIUM CHLORIDE, SODIUM PHOSPHATE, DIBASIC, AND POTASSIUM PHOSPHATE 1000 ML: .53; .5; .37; .037; .03; .012; .00082 INJECTION, SOLUTION INTRAVENOUS at 09:26

## 2018-11-27 RX ADMIN — HEPARIN SODIUM 5000 UNITS: 5000 INJECTION INTRAVENOUS; SUBCUTANEOUS at 06:11

## 2018-11-27 RX ADMIN — NIMODIPINE 30 MG: 30 CAPSULE, LIQUID FILLED ORAL at 09:20

## 2018-11-27 NOTE — PHYSICAL THERAPY NOTE
PHYSICAL THERAPY NOTE          Patient Name: Ivory Velasco  JGYQU'D Date: 11/27/2018 11/27/18 1445   Pain Assessment   Pain Assessment No/denies pain   Pain Score No Pain   Restrictions/Precautions   Weight Bearing Precautions Per Order No   Other Precautions Chair Alarm; Bed Alarm;Multiple lines; Fall Risk;Telemetry   General   Chart Reviewed Yes   Family/Caregiver Present Yes   Cognition   Orientation Level Oriented X4   Subjective   Subjective "I feel better since I ate"   Bed Mobility   Supine to Sit 5  Supervision   Additional items Assist x 1; Increased time required   Transfers   Sit to Stand 5  Supervision   Additional items Assist x 1; Increased time required   Stand to Sit 5  Supervision   Additional items Assist x 1; Increased time required   Stand pivot 5  Supervision   Additional items Assist x 1; Increased time required   Ambulation/Elevation   Gait pattern Foward flexed; Inconsistent kee   Gait Assistance 4  Minimal assist   Additional items Assist x 1   Assistive Device Other (Comment)  (IV pole)   Distance 240   Balance   Static Sitting Fair +   Dynamic Sitting Fair   Static Standing Fair -   Dynamic Standing Poor +   Ambulatory Poor +   Endurance Deficit   Endurance Deficit Yes   Endurance Deficit Description limited by fatigue   Activity Tolerance   Activity Tolerance Patient limited by fatigue   Nurse Made Aware yes, Florinda Adjutant gave clearance to work with pt   Assessment   Prognosis Good   Problem List Decreased strength;Decreased endurance;Decreased mobility; Decreased safety awareness; Impaired balance   Assessment Pt demonstrated improved ability to complete transfers this session with decreased A  Required instruction for pacing and sitting EOB prior to standing to avoid additional orthostatic episodes as nsg reported pt experienced this morning  Improved balance with decreased need for Rw this session   Continues to require single UE support, utilized IV this session, may benefit from trial with SPC  Pt will benefit from inpt skilled PT to maximize functional mobility and safety  Barriers to Discharge Inaccessible home environment   Goals   Patient Goals To go home   STG Expiration Date 12/06/18   Treatment Day 1   Plan   Treatment/Interventions Functional transfer training;LE strengthening/ROM; Endurance training;Bed mobility;Gait training;Spoke to nursing;OT   Progress Improving as expected   PT Frequency (3-5x/wk)   Recommendation   Recommendation Outpatient PT; Home with family support   Equipment Recommended Alisa Rojas PT

## 2018-11-27 NOTE — PROGRESS NOTES
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   - TCD BID for 21 days (through 12/17), R: 1 32 (1 33 11/26), L: 0 69 (0 92 11/26)  - Nimodipine 30 mg Q2H PO soln 2/2 low BP (PO soln since unable to tolerate tablet)               -Levo added 2/2 BP inadequate for Nimodipine to be given  -BP control of systolic BP <593 with PRN Labetalol & hydralazine (no doses given)  -500 mg Keppra Q12H for seizure prophylaxis, neuro checks Q1H  -CTA overnight unchanged, no MRI needed per NeuroSx  -PT, OT, speech eval      Glaucoma  -Continue home Cosopt BID & Travoprost 1gtt OU QHS     CV:    -Goal SBP<140, MAP>75  -SBP controlled without PRN labetalol/hydralazine  -Levo on to meet MAP goal, currently at goal; may transition to midodrine to wean levo    1  s/p syncope: Likely orthostatic hypotensive episode, especially since patient is of advanced age & nimodipine given 10 minutes prior to episode; continue to monitor  -Out of bed slowly with assistance      Pulm:   -No acute issues, SpO2>96% on RA  -Encourage incentive spirometry, deep breathing and coughing     GI:    -No acute issues  -GI ppx not indicated     FEN:   -Fluids:  Will give 1L isolyte bolus 2/2 net -1992 2 mL over past 24H  -Na 138 today, mag 2 0, phos 2 2, replete with 32 mmol via phos-nak 2 packets x2  -K 3 9, replete today via 28 4 mEq contained in phos-nak  -Nutrition: Regular diet, no restrictions     :    -UOP 4250 over past 24H, 177 mL/hr  -Goal euvolemia; overall net -1992 2 mL over past 24H  -Continue I/O monitoring, urinary retention protocol  -Cr 0 43 today, monitor and give IVF 2/2 contrast load overnight     ID:    -No active issues  -Afebrile, no leukocytosis     Heme:    -Hemoglobin of 10 6 yesterday, today 12 0, continue to monitor, goal Hb>8   -DVT ppx: SQ Heparin; SCDs B/L     Endo:    -No active issues  -Glucose of 97 yesterday, 100 on today's BMP     Msk/Skin:    -No active issues  -Frequent off loading & repositioning to prevent skin breakdown     Disposition:  ICU level care 2/2 SAH  Code Status: Level 1 - Full Code    ______________________________________________________________________    24 Hour Events:   Patient had episode of syncope at 4:43 a m  this morning when trying to get out of bed, but had no fall as RN was there and helped her back into bed  Says she felt slightly lightheaded at time of episode but denies having experienced any chest pain, shortness of breath, vertigo, headache, vision changes, or extremity weakness prior to episode  RN reported patient did not respond to him for about 30 seconds before returning to GCS 15  Currently, patient states she feels well, slept okay with adequate PO Intake, denies pain     ______________________________________________________________________    Physical Exam:   Physical Exam   Constitutional: She is oriented to person, place, and time  She appears well-developed and well-nourished  No distress  HENT:   Head: Normocephalic and atraumatic  Eyes: Pupils are equal, round, and reactive to light  Conjunctivae and EOM are normal    Cardiovascular: Normal rate and regular rhythm  Pulmonary/Chest: Effort normal and breath sounds normal    Abdominal: Soft  Bowel sounds are normal    Neurological: She is alert and oriented to person, place, and time  No cranial nerve deficit     Motor strength 5/5 B/L UE & LE, follows all commands, no pronator drift, sensation intact grossly     ______________________________________________________________________  Vitals:    18 0400 18 0433 18 0500 18 0611   BP: 109/63 117/56 98/56 128/59   BP Location: Right arm  Right arm Right arm   Pulse: 74  70 68   Resp: 14  16 12   Temp: 98 5 °F (36 9 °C)      TempSrc: Oral      SpO2: 94%  95% 96%   Weight:       Height:           Temperature:   Temp (24hrs), Av 5 °F (36 9 °C), Min:98 °F (36 7 °C), Max:99 °F (37 2 °C)    Current Temperature: 98 5 °F (36 9 °C)  Weights:   IBW: 54 7 kg    Body mass index is 23 35 kg/m²  Weight (last 2 days)     None           Non-Invasive/Invasive Ventilation Settings:  Respiratory    Lab Data (Last 4 hours)    None         O2/Vent Data (Last 4 hours)    None                Intake and Outputs:  I/O       701 -  0700  07 -  0700  07 -  0700    P  O   1360     I V  (mL/kg) 3408 9 (55 2) 397 8 (6 4)     IV Piggyback 697 5 500     Total Intake(mL/kg) 4106 4 (66 6) 2257 8 (36 6)     Urine (mL/kg/hr) 5735 (3 9) 4250 (2 9)     Blood 150      Total Output 5885 4250      Net -1778  2                 UOP: 177 mlL/hour     Nutrition:        Diet Orders            Start     Ordered    18 173  Diet Regular; Regular House  Diet effective midnight     Question Answer Comment   Diet Type Regular    Regular Regular House    RD to adjust diet per protocol?  No        18 1731          Labs:     Results from last 7 days  Lab Units 18  0440 18  0514 18  0501   WBC Thousand/uL 7 91 8 13 6 91   HEMOGLOBIN g/dL 12 0 10 6* 11 6   HEMATOCRIT % 37 1 33 9* 36 6   PLATELETS Thousands/uL 203 193 203   NEUTROS PCT % 76* 73 78*   MONOS PCT % 8 10 8       Results from last 7 days  Lab Units 18  0440 18  0514 18  0501   POTASSIUM mmol/L 3 9 3 3* 3 2*   CHLORIDE mmol/L 106 108 102   CO2 mmol/L 26 28 28   BUN mg/dL 9 8 10   CREATININE mg/dL 0 43* 0 47* 0 49*   CALCIUM mg/dL 8 4 8 2* 9 0       Results from last 7 days  Lab Units 11/27/18  0440 11/26/18  0514   MAGNESIUM mg/dL 2 0 2 2     Lab Results   Component Value Date    PHOS 2 2 (L) 11/27/2018    PHOS 2 9 11/26/2018        Results from last 7 days  Lab Units 11/24/18  2159   INR  1 05   PTT seconds 27     Imaging: I have personally reviewed pertinent reports  and I have personally reviewed pertinent films in PACS  CTA head and neck with and without contrast [[id number]] Collected: 11/27/18 0600   Order Status: Completed Updated: 11/27/18 0635   Narrative:     CTA NECK AND BRAIN WITH AND WITHOUT CONTRAST    INDICATION: s/p coiling, with acute change in mental status  r/o bleed, vasospasm history of headache   Found to have anterior communicating artery aneurysm   Status post 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, post procedure day #2     Evaluate for vasospasm   Dizziness   Fall while walking to bathroom  COMPARISON:   CTA neck and brain November 24, 2018 and cerebral angiogram with coil embolization November 25, 2018  TECHNIQUE:  Routine CT imaging of the Brain without contrast   Post contrast imaging was performed after administration of iodinated contrast through the neck and brain  Post contrast axial 0 625 mm images timed to opacify the arterial system       3D rendering was performed on an independent workstation    MIP reconstructions performed   Coronal reconstructions were performed of the noncontrast portion of the brain       Radiation dose length product (DLP) for this visit:  1417 43 mGy-cm    This examination, like all CT scans performed in the [facility], was performed utilizing techniques to minimize radiation dose exposure, including the use of   iterative reconstruction and automated exposure control        IV Contrast:  85 mL of iohexol (OMNIPAQUE)     IMAGE QUALITY:   Diagnostic    FINDINGS:  NONCONTRAST BRAIN  PARENCHYMA:    No acute intraparenchymal hemorrhage  There is resolving diffuse subarachnoid hemorrhage       There has been interval 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   Beam hardening artifact from the coil pack  Areas of decreased attenuation in the periventricular regions and centrum semiovale bilaterally, consistent with chronic small vessel ischemic change  Area of decreased attenuation in the right basal ganglia (series 2, image 22)   In retrospect, this may have been present on the prior examination, however obscured due to the subarachnoid hemorrhage in the right sylvian fissure as well as the mild mass   effect on the right temporal lobe       Partially empty sella turcica  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     VENTRICLES AND EXTRA-AXIAL SPACES:    Ventricles are mildly dilated, however remain similar to the prior study  Persistent mild sulcal effacement of the temporal lobes bilaterally  VISUALIZED ORBITS AND PARANASAL SINUSES:    Prior right-sided lens surgery   Globes otherwise symmetric and intact  Mild mucosal thickening in the ethmoid air cells bilaterally  Remaining paranasal sinuses and mastoid air cells are clear  CERVICAL VASCULATURE  AORTIC ARCH AND GREAT VESSELS:    The brachiocephalic artery and left common carotid artery arise from the ascending aorta, normal variant   Mild calcified atherosclerotic plaque formation at the origin of the left subclavian artery  RIGHT VERTEBRAL ARTERY CERVICAL SEGMENT:  Normal origin  The vessel is normal in caliber throughout the neck  LEFT VERTEBRAL ARTERY CERVICAL SEGMENT:  Normal origin  The vessel is normal in caliber throughout the neck  RIGHT EXTRACRANIAL CAROTID SEGMENT:  Normal caliber common carotid artery   Normal bifurcation and cervical internal carotid artery   No stenosis or dissection  LEFT EXTRACRANIAL CAROTID SEGMENT:  Normal caliber common carotid artery   Normal bifurcation and cervical internal carotid artery   No stenosis or dissection  NASCET criteria was used to determine the degree of internal carotid artery diameter stenosis  INTRACRANIAL VASCULATURE   INTERNAL CAROTID ARTERIES:  Normal enhancement of the intracranial portions of the internal carotid arteries   Normal ophthalmic artery origins   Normal ICA terminus  ANTERIOR CIRCULATION:    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   A1 segment of the left anterior cerebral artery is normal     Successful 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  Symmetric filling of bilateral A2 segments  MIDDLE CEREBRAL ARTERY CIRCULATION:  M1 segment and middle cerebral artery branches demonstrate normal enhancement bilaterally  DISTAL VERTEBRAL ARTERIES:  Normal distal vertebral arteries   Posterior inferior cerebellar artery origins are normal  Normal vertebral basilar junction  BASILAR ARTERY:  Basilar artery is normal in caliber   Normal superior cerebellar arteries  POSTERIOR CEREBRAL ARTERIES: Both posterior cerebral arteries arises from the basilar tip   Both arteries demonstrate normal enhancement    Normal posterior communicating arteries  DURAL VENOUS SINUSES:  Normal       NON VASCULAR ANATOMY  BONY STRUCTURES:  No acute osseous abnormality   Mild degenerative changes in the cervical spine  SOFT TISSUES OF THE NECK:  Unremarkable  THORACIC INLET:  Unremarkable  Impression:     1   Status post 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, post procedure day #2   Resolving diffuse subarachnoid hemorrhage   No evidence of vasospasm    2  Ruthellen Mattes of decreased attenuation right basal ganglia, compatible with age-indeterminate lacunar infarction   In retrospect, this may have been present on the prior examination, however obscured due to the subarachnoid hemorrhage in the right sylvian   fissure as well as the mild mass effect on the right temporal lobe   Consider follow-up MRI of the brain with diffusion-weighted imaging if clinically warranted  3   Cerebral atrophy with chronic small vessel ischemic change  Allergies: Allergies   Allergen Reactions    Codeine GI Intolerance     Medications:   Scheduled Meds:  Current Facility-Administered Medications:  acetaminophen 650 mg Oral Q4H PRN Tao Dobson, DO    chlorhexidine 15 mL Swish & Spit Q12H [facility] Tao Dobson, DO    dorzolamide-timolol 1 drop Both Eyes BID Tao Dobson,     heparin (porcine) 5,000 Units Subcutaneous [facility] Kd Mcmahon, DO    hydrALAZINE 5 mg Intravenous Q4H PRN Laura Valentino MD    labetalol 10 mg Intravenous Q4H PRN Fili Sadaf Dobson, DO    levETIRAcetam 500 mg Oral Q12H Kd Mcmahon, DO    multi-electrolyte 100 mL/hr Intravenous Continuous Laura Valentino MD Last Rate: Stopped (11/26/18 0600)   niMODipine 30 mg Per NG Tube Q2H Laura Valentino MD    norepinephrine 1-30 mcg/min Intravenous Titrated Yoli Hough MD Last Rate: 1 mcg/min (11/27/18 0456)   ondansetron 4 mg Intravenous Q4H PRN Laura Valentino MD    potassium chloride 40 mEq Oral BID Laura Valentino MD    travoprost 1 drop Both Eyes HS Laura Valentino MD      Continuous Infusions:  multi-electrolyte 100 mL/hr Last Rate: Stopped (11/26/18 0600)   norepinephrine 1-30 mcg/min Last Rate: 1 mcg/min (11/27/18 0456)     PRN Meds:    acetaminophen 650 mg Q4H PRN   hydrALAZINE 5 mg Q4H PRN   labetalol 10 mg Q4H PRN   ondansetron 4 mg Q4H PRN     VTE Pharmacologic Prophylaxis: Heparin  VTE Mechanical Prophylaxis: sequential compression device  Invasive lines and devices: Invasive Devices     Peripheral Intravenous Line            Peripheral IV 11/24/18 Left Antecubital 2 days    Peripheral IV 11/24/18 Right Antecubital 2 days          Arterial Line            Arterial Line 11/25/18 Left Radial 1 day                     Portions of the record may have been created with voice recognition software    Occasional wrong word or "sound a like" substitutions may have occurred due to the inherent limitations of voice recognition software  Read the chart carefully and recognize, using context, where substitutions have occurred      Ana Dan MD

## 2018-11-27 NOTE — PLAN OF CARE
Problem: OCCUPATIONAL THERAPY ADULT  Goal: Performs self-care activities at highest level of function for planned discharge setting  See evaluation for individualized goals  Treatment Interventions: ADL retraining, Functional transfer training, Endurance training, Patient/family training, Equipment evaluation/education, Continued evaluation, Energy conservation, Activityengagement          See flowsheet documentation for full assessment, interventions and recommendations  Limitation: Decreased ADL status, Decreased endurance, Decreased self-care trans, Decreased high-level ADLs  Prognosis: Fair  Assessment: Pt is a 68 y/o female seen for OT eval s/p adm to [facility] w/ acute onset of headache  Initially presented to [facility], 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  Transferred to [facility] for further management  Pt is s/p "coil embolization of ACOM aneurysm " performed on 11/25/18  Pt is dx'd w/ SAH  Comorbidities include a h/o glaucoma, hypophosphatemia  Pt with active OT orders and activity as tolerated orders  Pt lives with spouse (son lives 15 mins away but works during the day), in [address], 1st floor setup w/ 1/2 bath, 2 CHRISTIAN, no handrails  Pt was I w/  ADLS and IADLS, drove, & required use of DME PTA including s/c, grab bars, raised toilet seat and can get a stair glide if needed  Pt is currently demonstrating the following occupational deficits: S UB ADLS, Min A LB ADLS, S transfers and Min A functional mobility  These deficits that are impacting pt's baseline areas of occupation are a result of the following impairments: endurance, activity tolerance, functional mobility, functional standing tolerance and decreased I w/ ADLS/IADLS  The following Occupational Performance Areas to address include: grooming, bathing/shower, toilet hygiene, dressing, health maintenance, functional mobility, community mobility, clothing management, cleaning, meal prep and household maintenance   Pt scored overall 65/100 on the Barthel Index  Based on the aforementioned OT evaluation, functional performance deficits, and assessments, pt has been identified as a high complexity evaluation  Recommend Home OT vs  Home w/ family support upon D/C, pending progress, once medically stable   Pt to continue to benefit from acute immediate OT services to address the following goals 3-5x/week to  w/in 7-10 days:      OT Discharge Recommendation: Home OT (vs  Home w/ Family support pending progress)  OT - OK to Discharge: Yes (when medically stable)      Comments: Kelly Araiza MS, OTR/L

## 2018-11-27 NOTE — OCCUPATIONAL THERAPY NOTE
[address]  Evaluation     Patient Name: Paco Sesay  [id number] Date: 11/27/2018  Problem List  Patient Active Problem List   Diagnosis    [facility] [address] )    Glaucoma    S/P coil embolization of cerebral aneurysm    Hypophosphatemia     Past Medical History  Past Medical History:   Diagnosis Date    Fracture of arm     Glaucoma     Hypotension      Past Surgical History  Past Surgical History:   Procedure Laterality Date    BUNIONECTOMY      BUNIONECTOMY      CATARACT EXTRACTION      IR CEREBRAL ANGIOGRAPHY / INTERVENTION  11/25/2018    TONSILLECTOMY             11/27/18 1435   Note Type   Note type Eval/Treat   Restrictions/Precautions   Weight Bearing Precautions Per Order No   Other Precautions Chair Alarm; Bed Alarm;Multiple lines;Telemetry; Fall Risk;Pain   Pain Assessment   Pain Assessment No/denies pain   Pain Score No Pain   Home Living   Type of [address] [place] [address] Two level;1/2 bath on main level;Bed/bath upstairs   Bathroom Shower/Tub Tub/shower unit   Bathroom Toilet Raised   Bathroom Equipment Shower chair;Grab bars in shower;Grab bars around toilet   [address] Other (Comment)  (denies any)   Additional Comments Pt reports living in 2 SH, 1/2 bath on 1st, bed/bath upstairs   Prior Function   Level of [place] Independent with ADLs and functional mobility   Lives With Spouse   Receives Help From Family  (son lives local)   ADL Assistance Independent   IADLs Independent   Falls in the last 6 months 0   Vocational Retired   Comments Pt reports being I w/ ADLS, IADLS, transfers and functional mobility PTA   Lifestyle   Autonomy Pt reports being I w/ ADLS, IADLS, transfers and functional mobility PTA   Reciprocal Relationships Pt lives w/ spouse; has son who lives nearby   Service to Others Pt is retired   Intrinsic Gratification Pt enjoys being active   Psychosocial   Psychosocial (WDL) WDL   Length of Time/Family Visitation 6-15 min  (spouse, son) ADL   Eating Assistance 5  Supervision/Setup   Eating Deficit Setup; Increased time to complete   Grooming Assistance 5  Supervision/Setup   UB Bathing Assistance 5  Supervision/Setup   LB Bathing Assistance 4  Minimal Assistance   [address] 5  Supervision/Setup   LB [place] 4  Minimal Assistance   Functional Deficit Steadying;Verbal cueing;Supervision/safety; Increased time to complete   Bed Mobility   Supine to Sit 5  Supervision   Additional items Increased time required;HOB elevated;Verbal cues   Sit to Supine Unable to assess   Additional Comments Pt went from supine to sit w/ S for safety and increased time   HOB elevated for assist   Transfers   Sit to Stand 5  Supervision   Additional items Increased time required;Verbal cues   Stand to Sit 5  Supervision   Additional items Increased time required;Verbal cues   Additional Comments Pt performed sit-stand from EOB w/ S for safety, VC for proper technique   Functional Mobility   Functional Mobility 4  Minimal assistance   Additional Comments Pt ambulated short household distance w/ Min A x1 for safety/balance, no use of DME   Balance   Static Sitting Fair +   Dynamic Sitting Fair   Static Standing Fair -   Dynamic Standing Poor +   Ambulatory Poor +   Activity Tolerance   Activity Tolerance Patient limited by fatigue   Medical Staff Made Aware Zabrina TOBIN Escort   Nurse Made Aware yes, Risa   RUE Assessment   RUE Assessment WFL   LUE Assessment   LUE Assessment WFL   Hand Function   Gross Motor Coordination Functional   Fine Motor Coordination Functional   Sensation   Light Touch No apparent deficits   Proprioception   Proprioception No apparent deficits   Vision-Basic Assessment   Current Vision Wears glasses all the time   Visual History Glaucoma   Cognition   Overall Cognitive Status WFL   Arousal/Participation Cooperative;Responsive   Attention Within functional limits   Orientation Level Oriented X4   Memory Within functional limits   Following Commands Follows all commands and directions without difficulty   Comments Pt is pleasant and cooperative   Assessment   Limitation Decreased ADL status; Decreased endurance;Decreased self-care trans;Decreased high-level ADLs   Prognosis Fair   Assessment Pt is a 70 y/o female seen for OT eval s/p adm to [facility] w/ acute onset of headache  Initially presented to [facility], 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  Transferred to [facility] for further management  Pt is s/p "coil embolization of ACOM aneurysm " performed on 11/25/18  Pt is dx'd w/ SAH  Comorbidities include a h/o glaucoma, hypophosphatemia  Pt with active OT orders and activity as tolerated orders  Pt lives with spouse (son lives 15 mins away but works during the day), in [address], 1st floor setup w/ 1/2 bath, 2 CHRISTIAN, no handrails  Pt was I w/  ADLS and IADLS, drove, & required use of DME PTA including s/c, grab bars, raised toilet seat and can get a stair glide if needed  Pt is currently demonstrating the following occupational deficits: S UB ADLS, Min A LB ADLS, S transfers and Min A functional mobility  These deficits that are impacting pt's baseline areas of occupation are a result of the following impairments: endurance, activity tolerance, functional mobility, functional standing tolerance and decreased I w/ ADLS/IADLS  The following Occupational Performance Areas to address include: grooming, bathing/shower, toilet hygiene, dressing, health maintenance, functional mobility, community mobility, clothing management, cleaning, meal prep and household maintenance  Pt scored overall 65/100 on the Barthel Index  Based on the aforementioned OT evaluation, functional performance deficits, and assessments, pt has been identified as a high complexity evaluation   Recommend Home OT vs  Home w/ family support upon D/C, pending progress, once medically stable  Pt to continue to benefit from acute immediate OT services to address the following goals 3-5x/week to  w/in 7-10 days:    Goals   Patient Goals to be independent again   LTG Time Frame 7-10   Long Term Goal #1 below listed goals   Plan   Treatment Interventions ADL retraining;Functional transfer training; Endurance training;Patient/family training;Equipment evaluation/education;Continued evaluation; Energy conservation; Activityengagement   Goal Expiration Date 18   OT Frequency 3-5x/wk   Recommendation   OT Discharge Recommendation Home OT  (vs  Home w/ Family support pending progress)   OT - OK to Discharge Yes  (when medically stable)   Barthel Index   Feeding 10   Bathing 0   Grooming Score 5   Dressing Score 5   Bladder Score 10   Bowels Score 10   Toilet Use Score 5   Transfers (Bed/Chair) Score 10   Mobility (Level Surface) Score 10   Stairs Score 0   Barthel Index Score 65   Modified Hardesty Scale   Modified Hardesty Scale 3        GOALS    1) Pt will improve activity tolerance to G for min 30 min txment sessions    2) Pt will complete UB/LB dressing/self care w/ mod I using adaptive device and DME as needed    3) Pt will complete bathing w/ Mod I w/ use of AE and DME as needed    4) Pt will complete toileting w/ mod I w/ G hygiene/thoroughness using DME as needed    5) Pt will improve functional transfers to Mod I on/off all surfaces using DME as needed w/ G balance/safety     6) Pt will improve functional mobility during ADL/IADL/leisure tasks to Mod I using DME as needed w/ G balance/safety     7) Pt will participate in simulated IADL management task to increase independence to Mod I w/ G safety and endurance    8) Pt will be attentive 100% of the time during ongoing functional cognitive assessment w/ G participation to assist w/ safe d/c planning/recommendations    9) Pt will demonstrate G carryover of pt/caregiver education and training as appropriate w/o cues w/ good tolerance to increase safety during functional tasks    10) Pt will demonstrate 100% carryover of energy conservation techniques t/o functional I/ADL/leisure tasks w/o cues s/p skilled education       Jaquan Peralta MS, OTR/L

## 2018-11-27 NOTE — PROGRESS NOTES
Progress Note - Neurosurgery   Savannah Em 69 y o  female MRN: [id number]  Unit/Bed#:  -01 Encounter: [id number]      Assessment:  1  Status post coil embolization for subarachnoid hemorrhage (11/25/2018)  2  HH 1, F3 SAH (11/24/2018)  3  BD ( 11/23/2018)  4  ACOM         Plan:   § Exam: A&OX3, PERRL, EOMI, 2 mm, conjugate  Speech fluent  Finger-to-nose normal and no pronator drift bilaterally  Motor strength is 5/5 throughout  Sensory function to light touch is normal bilaterally  DTR brisk throughout  No clonus and Babinski negative bilaterally  § Images:  CTA done on 11/24/2018 demonstrates diffuse subarachnoid hemorrhage throughout the basal cisterns, sylvian fissures and over the convexities without evidence of mass effect  Anterior communicating artery 5 mm aneurysm noted  § CTA or 11/27/2018 demonstrates status post coil embolization of anterior communicating artery and the postop day 2 resolving diffuse subarachnoid hemorrhage without evidence of vasospasm  Also area of decreased attenuation in the right basal ganglia compatible with age-indeterminate lacunar infraction  ? Pain control:  Primary team  ? DVT ppx:   § SCDs  § Continue heparin subcu  ? Seizure ppx:  Continue Keppra  ? Activity:  As tolerated  ? PT/OT evaluation & treatment  ? Medical Mx:  Per Primary team  ? Seizure prophylaxis:  continue Keppra  ? Neurocheck:  Q 1 H  CT head if GCS drops 2POINTS/1h  ? Continue Vasospasm watch  ? HOB>30-45 degree  ? TCD:   § Right =1 32  § Left = 0 69  ? SBP<220  ? MAP>75, currently 66   ? Patient is doing great today, except that she had an episode of light headedness last night when she get OOB and walked to the bath room  Feeling tired  Neurologically nonfocal   Repeat CT head shows improvement with subarachnoid hemorrhage, without evidence of a spasm  Neurosurgery following closely by neuromonitoring, vasospasm watch, and pain control    Call for concern or problem  Subjective/Objective   Chief Complaint:  "I am a little tired"/postop progress note    Subjective:  Patient reports his feeling tired and sleepy this morning because she was up frequently for exam during the night and she could not  get enough sleep  She denies any headache, blurry vision, diplopia, nausea or vomiting  Denies numbness, weakness or paresthesias extremities  She claims she had lightheadedness yesterday night when she goes to the bathroom  Denies history of gait instability or tendency to fall  Denies bowel or bladder issues  Fever, chills, rigors, cough or chest pain  Objective:  Patient is supine in bed, and communicating well and in no pain distress    I/O       11/25 0701 - 11/26 0700 11/26 0701 - 11/27 0700 11/27 0701 - 11/28 0700    P  O   1360 240    I V  (mL/kg) 3408 9 (55 2) 397 8 (6 4)     IV Piggyback 697 5 500     Total Intake(mL/kg) 4106 4 (66 6) 2257 8 (36 6) 240 (3 9)    Urine (mL/kg/hr) 5735 (3 9) 4250 (2 9) 900 (2 3)    Blood 150      Total Output 5885 4250 900    Net -1778 6 -1992 2 -660                 Invasive Devices     Peripheral Intravenous Line            Peripheral IV 11/24/18 Left Antecubital 2 days    Peripheral IV 11/24/18 Right Antecubital 2 days          Arterial Line            Arterial Line 11/25/18 Left Radial 2 days                Physical Exam:  Vitals: Blood pressure 102/55, pulse 78, temperature 99 °F (37 2 °C), temperature source Oral, resp  rate 17, height 5' 4" (1 626 m), weight 61 7 kg (136 lb 0 4 oz), SpO2 98 %, not currently breastfeeding  ,Body mass index is 23 35 kg/m²      Hemodynamic Monitoring: MAP: Arterial Line MAP (mmHg): 78 mmHg    General appearance: alert, appears stated age, cooperative and no distress  Head: Normocephalic, without obvious abnormality, atraumatic  Eyes: EOMI, 2 mm conjugate  Neck: supple, symmetrical, trachea midline and NT  Lungs: non labored breathing  Heart: regular heart rate  Neurologic:   Mental status: Alert, oriented x3, thought content appropriate  Cranial nerves: grossly intact (Cranial nerves II-XII)  Sensory: normal to light touch throughout  Motor: moving all extremities without focal weakness, strength is 5/5 bilaterally  Reflexes:  3 + and symmetric, without clonus and Babinski negative bilaterally  Coordination: finger to nose normal bilaterally, no drift bilaterally      Lab Results:    Results from last 7 days  Lab Units 11/27/18  0440 11/26/18  0514 11/25/18  0501   WBC Thousand/uL 7 91 8 13 6 91   HEMOGLOBIN g/dL 12 0 10 6* 11 6   HEMATOCRIT % 37 1 33 9* 36 6   PLATELETS Thousands/uL 203 193 203   NEUTROS PCT % 76* 73 78*   MONOS PCT % 8 10 8       Results from last 7 days  Lab Units 11/27/18  0440 11/26/18  0514 11/25/18  0501   POTASSIUM mmol/L 3 9 3 3* 3 2*   CHLORIDE mmol/L 106 108 102   CO2 mmol/L 26 28 28   BUN mg/dL 9 8 10   CREATININE mg/dL 0 43* 0 47* 0 49*   CALCIUM mg/dL 8 4 8 2* 9 0       Results from last 7 days  Lab Units 11/27/18  0440 11/26/18  0514   MAGNESIUM mg/dL 2 0 2 2       Results from last 7 days  Lab Units 11/27/18  0440 11/26/18  0514   PHOSPHORUS mg/dL 2 2* 2 9       Results from last 7 days  Lab Units 11/24/18  2159   INR  1 05   PTT seconds 27     No results found for: TROPONINT  ABG:No results found for: PHART, BEM0JEH, PO2ART, ZVN8UNA, B2EDBXNC, BEART, SOURCE    Imaging Studies: I have personally reviewed pertinent reports  and I have personally reviewed pertinent films in PACS    EKG, Pathology, and Other Studies: I have personally reviewed pertinent reports        VTE Pharmacologic Prophylaxis: Heparin SQ    VTE Mechanical Prophylaxis: sequential compression device

## 2018-11-27 NOTE — PLAN OF CARE
Problem: PHYSICAL THERAPY ADULT  Goal: Performs mobility at highest level of function for planned discharge setting  See evaluation for individualized goals  Treatment/Interventions: Functional transfer training, LE strengthening/ROM, Therapeutic exercise, Endurance training, Cognitive reorientation, Patient/family training, Bed mobility, Gait training, Equipment eval/education, Spoke to nursing, Continued evaluation, Compensatory technique education  Equipment Recommended: Arabella Socks       See flowsheet documentation for full assessment, interventions and recommendations  Outcome: Progressing  Prognosis: Good  Problem List: Decreased strength, Decreased endurance, Decreased mobility, Decreased safety awareness, Impaired balance  Assessment: Pt demonstrated improved ability to complete transfers this session with decreased A  Required instruction for pacing and sitting EOB prior to standing to avoid additional orthostatic episodes as nsg reported pt experienced this morning  Improved balance with decreased need for Rw this session  Continues to require single UE support, utilized IV this session, may benefit from trial with SPC  Pt will benefit from inpt skilled PT to maximize functional mobility and safety  Barriers to Discharge: Inaccessible home environment  Barriers to Discharge Comments: pt needs to demonstrate competence w stairs  Recommendation: Outpatient PT, Home with family support          See flowsheet documentation for full assessment

## 2018-11-28 ENCOUNTER — APPOINTMENT (INPATIENT)
Dept: NON INVASIVE DIAGNOSTICS | Facility: HOSPITAL | Age: 78
DRG: 021 | End: 2018-11-28
Payer: MEDICARE

## 2018-11-28 PROBLEM — E83.39 HYPOPHOSPHATEMIA: Status: RESOLVED | Noted: 2018-11-27 | Resolved: 2018-11-28

## 2018-11-28 PROBLEM — E87.6 HYPOKALEMIA: Status: ACTIVE | Noted: 2018-11-28

## 2018-11-28 LAB
ANION GAP SERPL CALCULATED.3IONS-SCNC: 6 MMOL/L (ref 4–13)
BASOPHILS # BLD AUTO: 0.03 THOUSANDS/ΜL (ref 0–0.1)
BASOPHILS NFR BLD AUTO: 0 % (ref 0–1)
BUN SERPL-MCNC: 8 MG/DL (ref 5–25)
CALCIUM SERPL-MCNC: 8.1 MG/DL (ref 8.3–10.1)
CHLORIDE SERPL-SCNC: 106 MMOL/L (ref 100–108)
CO2 SERPL-SCNC: 27 MMOL/L (ref 21–32)
CREAT SERPL-MCNC: 0.35 MG/DL (ref 0.6–1.3)
EOSINOPHIL # BLD AUTO: 0.07 THOUSAND/ΜL (ref 0–0.61)
EOSINOPHIL NFR BLD AUTO: 1 % (ref 0–6)
ERYTHROCYTE [DISTWIDTH] IN BLOOD BY AUTOMATED COUNT: 13.9 % (ref 11.6–15.1)
GFR SERPL CREATININE-BSD FRML MDRD: 105 ML/MIN/1.73SQ M
GLUCOSE SERPL-MCNC: 109 MG/DL (ref 65–140)
HCT VFR BLD AUTO: 37.2 % (ref 34.8–46.1)
HGB BLD-MCNC: 11.9 G/DL (ref 11.5–15.4)
IMM GRANULOCYTES # BLD AUTO: 0.02 THOUSAND/UL (ref 0–0.2)
IMM GRANULOCYTES NFR BLD AUTO: 0 % (ref 0–2)
LYMPHOCYTES # BLD AUTO: 0.81 THOUSANDS/ΜL (ref 0.6–4.47)
LYMPHOCYTES NFR BLD AUTO: 10 % (ref 14–44)
MAGNESIUM SERPL-MCNC: 2.2 MG/DL (ref 1.6–2.6)
MCH RBC QN AUTO: 30.7 PG (ref 26.8–34.3)
MCHC RBC AUTO-ENTMCNC: 32 G/DL (ref 31.4–37.4)
MCV RBC AUTO: 96 FL (ref 82–98)
MONOCYTES # BLD AUTO: 0.53 THOUSAND/ΜL (ref 0.17–1.22)
MONOCYTES NFR BLD AUTO: 6 % (ref 4–12)
NEUTROPHILS # BLD AUTO: 7.05 THOUSANDS/ΜL (ref 1.85–7.62)
NEUTS SEG NFR BLD AUTO: 83 % (ref 43–75)
NRBC BLD AUTO-RTO: 0 /100 WBCS
PHOSPHATE SERPL-MCNC: 2.9 MG/DL (ref 2.3–4.1)
PLATELET # BLD AUTO: 215 THOUSANDS/UL (ref 149–390)
PMV BLD AUTO: 9.9 FL (ref 8.9–12.7)
POTASSIUM SERPL-SCNC: 3.6 MMOL/L (ref 3.5–5.3)
RBC # BLD AUTO: 3.88 MILLION/UL (ref 3.81–5.12)
SODIUM SERPL-SCNC: 139 MMOL/L (ref 136–145)
WBC # BLD AUTO: 8.51 THOUSAND/UL (ref 4.31–10.16)

## 2018-11-28 PROCEDURE — 99024 POSTOP FOLLOW-UP VISIT: CPT | Performed by: PHYSICIAN ASSISTANT

## 2018-11-28 PROCEDURE — 97116 GAIT TRAINING THERAPY: CPT

## 2018-11-28 PROCEDURE — 05HC33Z INSERTION OF INFUSION DEVICE INTO LEFT BASILIC VEIN, PERCUTANEOUS APPROACH: ICD-10-PCS | Performed by: FAMILY MEDICINE

## 2018-11-28 PROCEDURE — 99232 SBSQ HOSP IP/OBS MODERATE 35: CPT | Performed by: EMERGENCY MEDICINE

## 2018-11-28 PROCEDURE — 93886 INTRACRANIAL COMPLETE STUDY: CPT | Performed by: SURGERY

## 2018-11-28 PROCEDURE — 93886 INTRACRANIAL COMPLETE STUDY: CPT

## 2018-11-28 PROCEDURE — 97110 THERAPEUTIC EXERCISES: CPT

## 2018-11-28 PROCEDURE — 99232 SBSQ HOSP IP/OBS MODERATE 35: CPT | Performed by: PHYSICAL MEDICINE & REHABILITATION

## 2018-11-28 PROCEDURE — 36569 INSJ PICC 5 YR+ W/O IMAGING: CPT

## 2018-11-28 PROCEDURE — C1751 CATH, INF, PER/CENT/MIDLINE: HCPCS

## 2018-11-28 PROCEDURE — 80048 BASIC METABOLIC PNL TOTAL CA: CPT | Performed by: FAMILY MEDICINE

## 2018-11-28 PROCEDURE — 83735 ASSAY OF MAGNESIUM: CPT | Performed by: FAMILY MEDICINE

## 2018-11-28 PROCEDURE — 85025 COMPLETE CBC W/AUTO DIFF WBC: CPT | Performed by: FAMILY MEDICINE

## 2018-11-28 PROCEDURE — 84100 ASSAY OF PHOSPHORUS: CPT | Performed by: FAMILY MEDICINE

## 2018-11-28 RX ORDER — LANOLIN ALCOHOL/MO/W.PET/CERES
2 CREAM (GRAM) TOPICAL DAILY
COMMUNITY
End: 2020-01-06

## 2018-11-28 RX ORDER — SODIUM CHLORIDE, SODIUM GLUCONATE, SODIUM ACETATE, POTASSIUM CHLORIDE, MAGNESIUM CHLORIDE, SODIUM PHOSPHATE, DIBASIC, AND POTASSIUM PHOSPHATE .53; .5; .37; .037; .03; .012; .00082 G/100ML; G/100ML; G/100ML; G/100ML; G/100ML; G/100ML; G/100ML
50 INJECTION, SOLUTION INTRAVENOUS CONTINUOUS
Status: DISCONTINUED | OUTPATIENT
Start: 2018-11-28 | End: 2018-11-29

## 2018-11-28 RX ORDER — BISACODYL 10 MG
10 SUPPOSITORY, RECTAL RECTAL DAILY PRN
Status: DISCONTINUED | OUTPATIENT
Start: 2018-11-28 | End: 2018-12-08

## 2018-11-28 RX ORDER — SODIUM CHLORIDE, SODIUM GLUCONATE, SODIUM ACETATE, POTASSIUM CHLORIDE, MAGNESIUM CHLORIDE, SODIUM PHOSPHATE, DIBASIC, AND POTASSIUM PHOSPHATE .53; .5; .37; .037; .03; .012; .00082 G/100ML; G/100ML; G/100ML; G/100ML; G/100ML; G/100ML; G/100ML
1000 INJECTION, SOLUTION INTRAVENOUS ONCE
Status: COMPLETED | OUTPATIENT
Start: 2018-11-28 | End: 2018-11-28

## 2018-11-28 RX ORDER — MAGNESIUM 30 MG
30 TABLET ORAL DAILY
COMMUNITY
End: 2020-06-05

## 2018-11-28 RX ORDER — ACETAMINOPHEN 325 MG/1
650 TABLET ORAL EVERY 6 HOURS PRN
Status: DISCONTINUED | OUTPATIENT
Start: 2018-11-28 | End: 2018-11-30

## 2018-11-28 RX ORDER — POTASSIUM CHLORIDE 20 MEQ/1
40 TABLET, EXTENDED RELEASE ORAL ONCE
Status: COMPLETED | OUTPATIENT
Start: 2018-11-28 | End: 2018-11-28

## 2018-11-28 RX ORDER — POLYETHYLENE GLYCOL 3350 17 G/17G
17 POWDER, FOR SOLUTION ORAL DAILY PRN
Status: DISCONTINUED | OUTPATIENT
Start: 2018-11-28 | End: 2018-11-29

## 2018-11-28 RX ORDER — AMOXICILLIN 250 MG
1 CAPSULE ORAL
Status: DISCONTINUED | OUTPATIENT
Start: 2018-11-28 | End: 2018-11-29

## 2018-11-28 RX ORDER — BISACODYL 10 MG
10 SUPPOSITORY, RECTAL RECTAL DAILY PRN
Status: DISCONTINUED | OUTPATIENT
Start: 2018-11-28 | End: 2018-11-28

## 2018-11-28 RX ADMIN — ACETAMINOPHEN 650 MG: 325 TABLET, FILM COATED ORAL at 03:25

## 2018-11-28 RX ADMIN — CHLORHEXIDINE GLUCONATE 15 ML: 1.2 RINSE ORAL at 22:28

## 2018-11-28 RX ADMIN — POTASSIUM CHLORIDE 40 MEQ: 1500 TABLET, EXTENDED RELEASE ORAL at 07:38

## 2018-11-28 RX ADMIN — NIMODIPINE 30 MG: 30 CAPSULE, LIQUID FILLED ORAL at 02:10

## 2018-11-28 RX ADMIN — TRAVOPROST OPHTHALMIC SOLUTION 1 DROP: 0.04 SOLUTION OPHTHALMIC at 22:30

## 2018-11-28 RX ADMIN — LEVETIRACETAM 500 MG: 500 TABLET, FILM COATED ORAL at 05:00

## 2018-11-28 RX ADMIN — NIMODIPINE 30 MG: 30 CAPSULE, LIQUID FILLED ORAL at 20:46

## 2018-11-28 RX ADMIN — HEPARIN SODIUM 5000 UNITS: 5000 INJECTION INTRAVENOUS; SUBCUTANEOUS at 05:00

## 2018-11-28 RX ADMIN — ONDANSETRON 4 MG: 2 INJECTION INTRAMUSCULAR; INTRAVENOUS at 16:45

## 2018-11-28 RX ADMIN — NIMODIPINE 30 MG: 30 CAPSULE, LIQUID FILLED ORAL at 06:22

## 2018-11-28 RX ADMIN — SODIUM CHLORIDE, SODIUM GLUCONATE, SODIUM ACETATE, POTASSIUM CHLORIDE, MAGNESIUM CHLORIDE, SODIUM PHOSPHATE, DIBASIC, AND POTASSIUM PHOSPHATE 75 ML/HR: .53; .5; .37; .037; .03; .012; .00082 INJECTION, SOLUTION INTRAVENOUS at 14:30

## 2018-11-28 RX ADMIN — NIMODIPINE 30 MG: 30 CAPSULE, LIQUID FILLED ORAL at 14:44

## 2018-11-28 RX ADMIN — SODIUM CHLORIDE, SODIUM GLUCONATE, SODIUM ACETATE, POTASSIUM CHLORIDE, MAGNESIUM CHLORIDE, SODIUM PHOSPHATE, DIBASIC, AND POTASSIUM PHOSPHATE 1000 ML: .53; .5; .37; .037; .03; .012; .00082 INJECTION, SOLUTION INTRAVENOUS at 07:38

## 2018-11-28 RX ADMIN — NIMODIPINE 30 MG: 30 CAPSULE, LIQUID FILLED ORAL at 04:56

## 2018-11-28 RX ADMIN — HEPARIN SODIUM 5000 UNITS: 5000 INJECTION INTRAVENOUS; SUBCUTANEOUS at 22:28

## 2018-11-28 RX ADMIN — POTASSIUM & SODIUM PHOSPHATES POWDER PACK 280-160-250 MG 2 PACKET: 280-160-250 PACK at 07:38

## 2018-11-28 RX ADMIN — NIMODIPINE 30 MG: 30 CAPSULE, LIQUID FILLED ORAL at 16:08

## 2018-11-28 RX ADMIN — NIMODIPINE 30 MG: 30 CAPSULE, LIQUID FILLED ORAL at 00:18

## 2018-11-28 RX ADMIN — DORZOLAMIDE HYDROCHLORIDE AND TIMOLOL MALEATE 1 DROP: 20; 5 SOLUTION/ DROPS OPHTHALMIC at 11:15

## 2018-11-28 RX ADMIN — NIMODIPINE 30 MG: 30 CAPSULE, LIQUID FILLED ORAL at 12:00

## 2018-11-28 RX ADMIN — NOREPINEPHRINE BITARTRATE 2 MCG/MIN: 1 INJECTION INTRAVENOUS at 02:08

## 2018-11-28 RX ADMIN — DORZOLAMIDE HYDROCHLORIDE AND TIMOLOL MALEATE 1 DROP: 20; 5 SOLUTION/ DROPS OPHTHALMIC at 00:18

## 2018-11-28 RX ADMIN — NIMODIPINE 30 MG: 30 CAPSULE, LIQUID FILLED ORAL at 07:40

## 2018-11-28 RX ADMIN — CHLORHEXIDINE GLUCONATE 15 ML: 1.2 RINSE ORAL at 07:38

## 2018-11-28 RX ADMIN — SENNOSIDES AND DOCUSATE SODIUM 1 TABLET: 8.6; 5 TABLET ORAL at 22:38

## 2018-11-28 RX ADMIN — LEVETIRACETAM 500 MG: 500 TABLET, FILM COATED ORAL at 17:45

## 2018-11-28 RX ADMIN — NIMODIPINE 30 MG: 30 CAPSULE, LIQUID FILLED ORAL at 22:29

## 2018-11-28 RX ADMIN — NIMODIPINE 30 MG: 30 CAPSULE, LIQUID FILLED ORAL at 17:45

## 2018-11-28 RX ADMIN — HEPARIN SODIUM 5000 UNITS: 5000 INJECTION INTRAVENOUS; SUBCUTANEOUS at 14:45

## 2018-11-28 RX ADMIN — NIMODIPINE 30 MG: 30 CAPSULE, LIQUID FILLED ORAL at 11:15

## 2018-11-28 RX ADMIN — POLYETHYLENE GLYCOL 3350 17 G: 17 POWDER, FOR SOLUTION ORAL at 15:00

## 2018-11-28 NOTE — PHYSICAL THERAPY NOTE
Physical Therapy Evaluation     [facility]    Admitting Diagnosis  [facility] [I60 9]    Problem List  Patient Active Problem List   Diagnosis    [facility]    Glaucoma    S/P coil embolization of cerebral aneurysm    Hypokalemia       Past Medical History  Past Medical History:   Diagnosis Date    Fracture of arm     Glaucoma     Hypotension        Past Surgical History  Past Surgical History:   Procedure Laterality Date    BUNIONECTOMY      BUNIONECTOMY      CATARACT EXTRACTION      IR CEREBRAL ANGIOGRAPHY / INTERVENTION  11/25/2018    TONSILLECTOMY         Felipe Skinnerarely         11/28/18 1226   Pain Assessment   Pain Assessment No/denies pain   Pain Score No Pain   Restrictions/Precautions   Weight Bearing Precautions Per Order No   Other Precautions Chair Alarm; Fall Risk;Multiple lines; Bed Alarm;Telemetry   General   Chart Reviewed Yes   Additional Pertinent History s/p coil emobolization for ACOM aneurysm   Response to Previous Treatment Patient with no complaints from previous session  Family/Caregiver Present No   Cognition   Overall Cognitive Status WFL   Arousal/Participation Cooperative   Attention Within functional limits   Orientation Level Oriented X4   Memory Within functional limits   Following Commands Follows all commands and directions without difficulty   Comments Pt is pleasant and cooperative and able to converse beyond basic needs  Subjective   Subjective pt reports that she feels much better since her last PT session  She is excited to get up and moving  Bed Mobility   Additional Comments Pt OOB in chair at the start of session   Transfers   Sit to Stand 5  Supervision   Additional items Assist x 1; Increased time required;Armrests   Stand to Sit 5  Supervision   Additional items Assist x 1; Increased time required;Verbal cues;Armrests   Ambulation/Elevation   Gait pattern Decreased foot clearance   Gait Assistance 5  Supervision   Additional items Assist x 1;Verbal cues   Assistive Device Rolling walker   Distance 480'   Balance   Static Sitting Fair +   Dynamic Sitting Fair   Static Standing Fair -   Dynamic Standing Fair -   Ambulatory Fair -   Endurance Deficit   Endurance Deficit Yes   Endurance Deficit Description fatigue   Activity Tolerance   Activity Tolerance Patient tolerated treatment well;Patient limited by fatigue   Nurse Made Aware okay to see per RN   Exercises   Hamstring Sets Sitting;20 reps;AROM; Bilateral  (manual resistance knee flexion)   Quad Sets 20 reps; Sitting;Bilateral;AROM  (Manual resistance knee extension )   Hip Flexion 20 reps; Sitting;AROM; Bilateral   Ankle Pumps 20 reps; Sitting;Bilateral;AROM   Assessment   Prognosis Good   Problem List Decreased strength;Decreased endurance; Impaired balance;Decreased mobility; Decreased safety awareness   Assessment Pt was seen for PT tx session for focus on LE strengthening and ambulation/endurance training  Pt was pleasanat and cooperative throughout the session and agreeable to participate in PT  Pt performed the above listed LE therapuetic exercises seated in recliner  She was (S) for transfers and ambulation  Pt ambulated approximately 480' with IV pole for support and had normalized gait speed  Pt was fatigued by the end of the session but was pleased with progress  Pt was educated on HEP consisting of above exercise  PT will continue to see pt to address balance and endurance  PT recommendation remains home with OPPT  Barriers to Discharge Inaccessible home environment   Barriers to Discharge Comments Pt needs to demonstrate stair competence   Goals   Patient Goals Go home   STG Expiration Date 12/05/18   Short Term Goal #1 1  Pt will amb greater than 500' supervision w least restrictive device for community and household distances   2  Pt will ascend/descend 2 steps (S) for access to home  3  Pt will improve standing dynamic and ambulatory balance by one grade for improved safety during mobility  4  Pt will participate in strength, endurance, balance, and transfer training for improved activity tolerance  Treatment Day 2   Plan   Treatment/Interventions Functional transfer training;LE strengthening/ROM; Therapeutic exercise; Endurance training;Equipment eval/education;Gait training; Compensatory technique education   Progress Progressing toward goals   PT Frequency Other (Comment)  (3-5x/wk)   Recommendation   Recommendation Outpatient PT; Home with family support  (pending progress)   Equipment Recommended Other (Comment)  (Continue to assess)   PT - OK to Discharge No  (Pending stair competence)            Julia Arreola, SPT

## 2018-11-28 NOTE — UTILIZATION REVIEW
Continued Stay Review    Date:11-28-18      Vital Signs: BP (!) 131/45   Pulse 74   Temp 98 °F (36 7 °C) (Oral)   Resp 16   Ht 5' 4" (1 626 m)   Wt 61 7 kg (136 lb 0 4 oz)   LMP  (LMP Unknown)   SpO2 98%   Breastfeeding? No   BMI 23 35 kg/m²     Medications:   Scheduled Meds:   Current Facility-Administered Medications:  acetaminophen 650 mg Oral Q6H PRN   bisacodyl 10 mg Rectal Daily PRN   chlorhexidine 15 mL Swish & Spit Q12H [facility]   dorzolamide-timolol 1 drop Both Eyes BID   heparin (porcine) 5,000 Units Subcutaneous Q8H [facility]   hydrALAZINE 5 mg Intravenous Q4H PRN   labetalol 10 mg Intravenous Q4H PRN   levETIRAcetam 500 mg Oral Q12H   multi-electrolyte 75 mL/hr Intravenous Continuous   niMODipine 30 mg Per NG Tube Q2H   ondansetron 4 mg Intravenous Q4H PRN   polyethylene glycol 17 g Oral Daily PRN   senna-docusate sodium 1 tablet Oral HS   travoprost 1 drop Both Eyes HS          Age/Sex: 69 y o  female   picc line placement today  Iv LEVOPHED DISCONTINUED  TODAY    ICU level care 2/2 SAH, TCDs daily        Assessment/Plan:     Assessment:  1  Status post coil embolization for subarachnoid hemorrhage (11/25/2018)  2  HH 1, F3 SAH (11/24/2018)  3  BD ( 11/23/2018)  4  ACOM         Plan:   § Exam: A&OX3, EOMI, 2 mm, conjugate   Speech fluent   Finger-to-nose normal and no pronator drift bilaterally   Motor strength is 5/5 throughout   Sensory function to light touch is normal bilaterally  DTR brisk throughout   No clonus and Babinski negative bilaterally    [place] done on 11/24/2018 demonstrates diffuse subarachnoid hemorrhage throughout the basal cisterns, sylvian fissures and over the convexities without evidence of mass effect   Anterior communicating artery 5 mm aneurysm noted  § CTA or 11/27/2018 demonstrates status post coil embolization of anterior communicating artery and the postop day 2 resolving diffuse subarachnoid hemorrhage without evidence of vasospasm   Also area of decreased attenuation in the right basal ganglia compatible with age-indeterminate lacunar infraction  ? Pain control:  Primary team  ? DVT ppx:   § SCDs  § Continue heparin subcu  ? Seizure ppx:  Continue Keppra  ? Activity:  As tolerated  ? PT/OT evaluation & treatment  ? Medical Mx:  Per Primary team  ? Seizure prophylaxis:  continue Keppra  ? Neurocheck:  Q 1 H  CT head if GCS drops 2POINTS/1h  ? Continue Vasospasm watch  ? HOB>30-45 degree  ? TCD:   § Right =1 45  § Left = 0 9  ? SBP<220  ? MAP>65      :  -UOP 5725 mL over past 24H (238 mL/hr)  -Monitor I&Os, net -2056 mL over past 24H  -Will give isolyte bolus, increase IVF               -Euvolemia goal to reduce risk of vasospasm  -Continue I/O monitoring, urinary retention protocol  -Cr 0 35 today, monitor and continue IVF 2/2 contrast load from CTA 11/27/18     F/E/N:   1  Fluids/Electrolytes  -Will give isolyte 1L bolus & start IVF isolyte @ 75 mL/hr 2/2 net -2L  -Na 139 today, mag 2 2, phos 2 9 s/p repletion yesterday  -Will give phos-nak 2 packets (16 mmol phos, 14 2 mEQ K, 13 6 mEq Na)  -K 3 6, replete today via 40 mEq Kdur + 14 2 mEq in phos-nak as noted above  -BMP in a m  Discharge Plan: PM &R CONSULT COMPLETED     CLEARED FOR DISCHARGE TO HOME WITH OP FOLLOW UP

## 2018-11-28 NOTE — PLAN OF CARE
Problem: PHYSICAL THERAPY ADULT  Goal: Performs mobility at highest level of function for planned discharge setting  See evaluation for individualized goals  Treatment/Interventions: Functional transfer training, LE strengthening/ROM, Therapeutic exercise, Endurance training, Cognitive reorientation, Patient/family training, Bed mobility, Gait training, Equipment eval/education, Spoke to nursing, Continued evaluation, Compensatory technique education  Equipment Recommended: Ilya Dhaliwal       See flowsheet documentation for full assessment, interventions and recommendations  Outcome: Progressing  Prognosis: Good  Problem List: Decreased strength, Decreased endurance, Impaired balance, Decreased mobility, Decreased safety awareness  Assessment: Pt was seen for PT tx session for focus on LE strengthening and ambulation/endurance training  Pt was pleasanat and cooperative throughout the session and agreeable to participate in PT  Pt performed the above listed LE therapuetic exercises seated in recliner  She was (S) for transfers and ambulation  Pt ambulated approximately 480' with IV pole for support and had normalized gait speed  Pt was fatigued by the end of the session but was pleased with progress  Pt was educated on HEP consisting of above exercise  PT will continue to see pt to address balance and endurance  PT recommendation remains home with OPPT  Barriers to Discharge: Inaccessible home environment  Barriers to Discharge Comments: Pt needs to demonstrate stair competence  Recommendation: Outpatient PT, Home with family support (pending progress)     PT - OK to Discharge: No (Pending stair competence)    See flowsheet documentation for full assessment

## 2018-11-28 NOTE — PROGRESS NOTES
11/28/18 1100   Clinical Encounter Type   Visited With Patient   Routine Visit Introduction   Patient Spiritual Encounters   Spiritual Encounter Notes prayer support

## 2018-11-28 NOTE — PROGRESS NOTES
Dr Hershal Baumgarten and critical care team rounded and evaluated patient face to face at bedside  Per order the MAP goal is now greater than 65mmHg and Levophed drip was discontinued  Picc line insertion was discussed and agreed upon  There was room change done for better monitoring of patient, patient is now located in ICU bed 209

## 2018-11-28 NOTE — PROGRESS NOTES
PM&R Consult Follow Up Note  Tiff Whitehead 69 y o  female MRN: [id number]  Unit/Bed#: CW -01 Encounter: [id number]     Assessment and Recommendations:  Ms Tiff Whitehead is a 69 y o  female with PMH of glaucoma and documented history of HTN who presented with [facility] s/p [facility] coiling  SAH  Glaucoma  Syncope    Impairments:  Impaired functional mobility and ability to perform ADL's    Recommendations:  - Continue PT/OT while inpatient  Discussed patient's progress with therapies who state patient is motivated and tolerating therapies     - On Nimodipine  - On Keppra (Day 5)  - On Levophed still  - DVT PPx: heparin, SCDs  - Consider YARELI stockings, monitoring orthostatics  - Bowel: On senokot QD, miralax and dulcolax as needed  No documented BM in 4 days  Consider giving miralax today  - Based on her current function, anticipate with continued sessions with PT/OT during her acute hospital stay, she should be able to be progressed to at least a S level and can be discharged home with outpatient therapies once medically stable  She is agreeable to this plan  Thank you for allowing the PM&R service to participate in the care of Ms Tiff Whitehead  We will continue to follow her progress with you  Please do not hesitate to call with questions or concerns  Interval History:    - Had near syncope 11/26 at night  On levophed for BP support  - CTA 11/27/18 with resolving diffuse SAH without evidence of vasospasm, and age-indeterminate R basal ganglia lacunar infarction    Subjective: No acute events  Notes some lightheadedness transferring from bed to chair, that resolves once she "gets her bearings " She denies any headache, changes in vision, palpitations, CP, SOB  She notes at home, she eats frequently to keep her sugar up and tends to run low  She denies any N/V, fevers, chills, new numbness/tingling/weakness   Has not yet had a bowel movement, but no abdominal pain, tolerating PO, and passing gas  No new urinary dysfunction      Objective:    Physical Therapy: from 11/27:  Bed Mobility   Supine to Sit 5  Supervision   Additional items Assist x 1; Increased time required   Transfers   Sit to Stand 5  Supervision   Additional items Assist x 1; Increased time required   Stand to Sit 5  Supervision   Additional items Assist x 1; Increased time required   Stand pivot 5  Supervision   Additional items Assist x 1; Increased time required   Ambulation/Elevation   Gait pattern Foward flexed; Inconsistent kee   Gait Assistance 4  Minimal assist   Additional items Assist x 1   Assistive Device Other (Comment)  (IV pole)   Distance 240          Occupational Therapy from 11/27: ADL   Eating Assistance 5  Supervision/Setup   Eating Deficit Setup; Increased time to complete   Grooming Assistance 5  Supervision/Setup   UB Bathing Assistance 5  Supervision/Setup   LB Bathing Assistance 4  Minimal Assistance   [address] 5  Supervision/Setup   LB [place] 4  Minimal Assistance   Functional Deficit Steadying;Verbal cueing;Supervision/safety; Increased time to complete   Bed Mobility   Supine to Sit 5  Supervision   Additional items Increased time required;HOB elevated;Verbal cues   Sit to Supine Unable to assess   Additional Comments Pt went from supine to sit w/ S for safety and increased time   HOB elevated for assist   Transfers   Sit to Stand 5  Supervision   Additional items Increased time required;Verbal cues   Stand to Sit 5  Supervision   Additional items Increased time required;Verbal cues   Additional Comments Pt performed sit-stand from EOB w/ S for safety, VC for proper technique   Functional Mobility   Functional Mobility 4  Minimal assistance   Additional Comments Pt ambulated short household distance w/ Min A x1 for safety/balance, no use of DME          Speech Therapy 11/26/18:  No further speech needs, cognition and swallow WFL  Vital Signs:      Temp:  [97 7 °F (36 5 °C)-99 °F (37 2 °C)] 98 6 °F (37 °C)  HR:  [68-88] 73  Resp:  [13-20] 16  BP: ()/() 127/64  Arterial Line BP: (108-134)/(50-74) 126/63   Intake/Output Summary (Last 24 hours) at 11/28/18 0847  Last data filed at 11/28/18 3276   Gross per 24 hour   Intake          3982 16 ml   Output             4700 ml   Net          -717 84 ml        Laboratory:      Lab Results   Component Value Date    HGB 11 9 11/28/2018    HCT 37 2 11/28/2018    WBC 8 51 11/28/2018     Lab Results   Component Value Date    BUN 8 11/28/2018    K 3 6 11/28/2018     11/28/2018    CREATININE 0 35 (L) 11/28/2018     Lab Results   Component Value Date    PROTIME 13 8 11/24/2018    INR 1 05 11/24/2018        Gen: No acute distress, Well-nourished, well-appearing  HEENT: Moist mucus membranes, Normocephalic/Atraumatic  Cardiovascular: Regular rate, rhythm, S1/S2  Distal pulses palpable  Heme/Extr: No edema/clubbing/cyanosis  Pulmonary: Non-labored breathing  Lungs CTAB  : No rudolph  GI: Soft, non-tender, non-distended  BS+  MSK: ROM is WFL in all extremities  No effusions or deformities  Bulk is symmetric  Strength is intact throughout  Integumentary: Skin is warm, dry  No rashes or ulcers  Neuro: AAOx3, CN 2-12 intact  Sensation intact to light touch throughout  Speech is intact  Appropriate to questioning  Tone is normal  DTR brisk, but no UMN signs  Psych: Normal mood and affect

## 2018-11-28 NOTE — PROGRESS NOTES
Progress Note - Neurosurgery   Fleurette Section 69 y o  female MRN: [id number]  Unit/Bed#:  -01 Encounter: [id number]      Assessment:  1  Status post coil embolization for subarachnoid hemorrhage (11/25/2018)  2  HH 1, F3 SAH (11/24/2018)  3  BD ( 11/23/2018)  4  ACOM         Plan:   § Exam: A&OX3, EOMI, 2 mm, conjugate   Speech fluent   Finger-to-nose normal and no pronator drift bilaterally   Motor strength is 5/5 throughout   Sensory function to light touch is normal bilaterally  DTR brisk throughout   No clonus and Babinski negative bilaterally  [place] done on 11/24/2018 demonstrates diffuse subarachnoid hemorrhage throughout the basal cisterns, sylvian fissures and over the convexities without evidence of mass effect   Anterior communicating artery 5 mm aneurysm noted  § CTA or 11/27/2018 demonstrates status post coil embolization of anterior communicating artery and the postop day 2 resolving diffuse subarachnoid hemorrhage without evidence of vasospasm  Also area of decreased attenuation in the right basal ganglia compatible with age-indeterminate lacunar infraction  ? Pain control:  Primary team  ? DVT ppx:   § SCDs  § Continue heparin subcu  ? Seizure ppx:  Continue Keppra  ? Activity:  As tolerated  ? PT/OT evaluation & treatment  ? Medical Mx:  Per Primary team  ? Seizure prophylaxis:  continue Keppra  ? Neurocheck:  Q 1 H  CT head if GCS drops 2POINTS/1h  ? Continue Vasospasm watch  ? HOB>30-45 degree  ? TCD:   § Right =1 45  § Left = 0 9  ? SBP<220  ? MAP>65  ? Patient is doing great, has no complaints over night, neuro exam non focal  Patient doesn't need any pressors right now, [place] and  MAP >65  Continue TCD monitoring  Call for concern or problem  Subjective/Objective   Chief Complaint: "I am doing fine"/post embolization progress note    Subjective:  Patient reports no complaints overnight  Denies any headache, nausea or vomiting  Denies blurry vision or diplopia  Denies numbness weakness, paresthesia and extremities  Has good appetite  Denies bowel or bladder issues  She was up and walked to the bathroom without feeling dizziness or lightheadedness  Denies fever, chills, rigors, cough or chest pain  Objective:  Patient is supine in bed in no pain distress    I/O       11/26 0701 - 11/27 0700 11/27 0701 - 11/28 0700 11/28 0701 - 11/29 0700    P  O  1360 1550     I V  (mL/kg) 397 8 (6 4) 2639 7 (42 8)     IV Piggyback 500      Total Intake(mL/kg) 2257 8 (36 6) 4189 7 (67 9)     Urine (mL/kg/hr) 4250 (2 9) 5725 (3 9)     Total Output 4250 5725      Net -1992 2 -4387 3                   Invasive Devices     Peripheral Intravenous Line            Peripheral IV 11/24/18 Left Antecubital 3 days    Peripheral IV 11/24/18 Right Antecubital 3 days          Arterial Line            Arterial Line 11/25/18 Left Radial 2 days                Physical Exam:  Vitals: Blood pressure 127/64, pulse 73, temperature 98 6 °F (37 °C), temperature source Oral, resp  rate 16, height 5' 4" (1 626 m), weight 61 7 kg (136 lb 0 4 oz), SpO2 96 %, not currently breastfeeding  ,Body mass index is 23 35 kg/m²      Hemodynamic Monitoring: MAP: Arterial Line MAP (mmHg): 90 mmHg    General appearance: alert, appears stated age, cooperative and no distress  Head: Normocephalic, without obvious abnormality, atraumatic  Eyes: EOMI, 2 mm conjugate  Neck: supple, symmetrical, trachea midline and NT  Lungs: non labored breathing  Heart: regular heart rate  Neurologic:   Mental status: Alert, oriented x3, thought content appropriate  Cranial nerves: grossly intact (Cranial nerves II-XII)  Sensory: normal to light touch throughout  Motor: moving all extremities without focal weakness; strength is 5/5 bilaterally  Reflexes: 3+ and symmetric, without clonus and Babinski negative bilaterally  Coordination: finger to nose normal bilaterally, no drift bilaterally      Lab Results:    Results from last 7 days  Lab Units 11/28/18  0451 11/27/18  0440 11/26/18  0514   WBC Thousand/uL 8 51 7 91 8 13   HEMOGLOBIN g/dL 11 9 12 0 10 6*   HEMATOCRIT % 37 2 37 1 33 9*   PLATELETS Thousands/uL 215 203 193   NEUTROS PCT % 83* 76* 73   MONOS PCT % 6 8 10       Results from last 7 days  Lab Units 11/28/18  0451 11/27/18  0440 11/26/18  0514   POTASSIUM mmol/L 3 6 3 9 3 3*   CHLORIDE mmol/L 106 106 108   CO2 mmol/L 27 26 28   BUN mg/dL 8 9 8   CREATININE mg/dL 0 35* 0 43* 0 47*   CALCIUM mg/dL 8 1* 8 4 8 2*       Results from last 7 days  Lab Units 11/28/18  0451 11/27/18  0440 11/26/18  0514   MAGNESIUM mg/dL 2 2 2 0 2 2       Results from last 7 days  Lab Units 11/28/18  0451 11/27/18  0440 11/26/18  0514   PHOSPHORUS mg/dL 2 9 2 2* 2 9       Results from last 7 days  Lab Units 11/24/18  2159   INR  1 05   PTT seconds 27     No results found for: TROPONINT  ABG:No results found for: PHART, TZN1SGP, PO2ART, DNB2TUQ, I0OZZFDK, BEART, SOURCE    Imaging Studies: I have personally reviewed pertinent reports  and I have personally reviewed pertinent films in PACS    EKG, Pathology, and Other Studies: I have personally reviewed pertinent reports        VTE Pharmacologic Prophylaxis: Heparin sq    VTE Mechanical Prophylaxis: sequential compression device

## 2018-11-28 NOTE — PROCEDURES
Insert PICC line  Date/Time: 11/28/2018 2:20 PM  Performed by: Jarrett Diaz  Authorized by: Deana Puga     Patient location:  Bedside  Other Assisting Provider: Yes (comment) Lynn Posada)    Consent:     Consent obtained:  Written (by MD)    Consent given by:  Patient    Procedural risks discussed: by MD   Universal protocol:     Procedure explained and questions answered to patient or proxy's satisfaction: yes      Relevant documents present and verified: yes      Test results available and properly labeled: yes      Radiology Images displayed and confirmed  If images not available, report reviewed: yes      Required blood products, implants, devices, and special equipment available: yes      Site/side marked: yes      Immediately prior to procedure, a time out was called: yes      Patient identity confirmed:  Verbally with patient and arm band  Pre-procedure details:     Hand hygiene: Hand hygiene performed prior to insertion      Sterile barrier technique: All elements of maximal sterile technique followed      Skin preparation:  ChloraPrep    Skin preparation agent: Skin preparation agent completely dried prior to procedure    Indications:     PICC line indications: medications requiring central line    Anesthesia (see MAR for exact dosages):      Anesthesia method:  Local infiltration    Local anesthetic:  Lidocaine 1% w/o epi  Procedure details:     Location:  [place]    Vessel type: vein      Laterality:  Right    Approach: percutaneous technique used      Patient position:  Flat    Procedural supplies:  Triple lumen    Catheter size:  5 Fr    Landmarks identified: yes      Ultrasound guidance: yes      Sterile ultrasound techniques: Sterile gel and sterile probe covers were used      Number of attempts:  1    Successful placement: yes      Vessel of catheter tip end:  [place]lock 3CG confirmed    Total catheter length (cm):  34    Catheter out on skin (cm):  0    Max flow rate:  999    Arm circumference:  30  Post-procedure details:     Post-procedure:  Dressing applied and securement device placed    Assessment:  Blood return through all ports and free fluid flow    Post-procedure complications: none      Patient tolerance of procedure:   Tolerated well, no immediate complications    Observer: Yes      Observer name:  Fabby Vasquez RN

## 2018-11-28 NOTE — PROGRESS NOTES
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   -TCD BID for 21 days (through 12/17), R: 1 45 (1 32 11/27), L: 0 9 (0 69 11/27)  -Patient remains GCS 15 w/o any deficits on exam  - Nimodipine 30 mg Q2H PO soln 2/2 low BP (PO soln since unable to tolerate tablet)               -Levo on 2/2 goal MAP>75 mL   -BP control of SBP <140 with PRN Labetalol & hydralazine (no doses given)  -500 mg Keppra Q12H for seizure prophylaxis (on day 5), seizure precautions  -Neuro checks Q2H, exam stable; PT, OT  -CTA 11/27/18 unchanged (obtained s/p syncope w/o fall), no MRI per NeuroSx  -Tylenol 650 mg Q6H PRN for mild pain/HA/fever, 1 dose given over past 24H     2  Glaucoma  -Continue home Cosopt BID & Travoprost 1gtt OU QHS     CV:    -Goal SBP<140, MAP>75  -SBP controlled without PRN labetalol/hydralazine  -Levo running @ 2 mcg/hr for MAP>75, currently at goal over past 24H (76-94)  -Consider PICC placement if levo will remain on for long duration  -Alternatively, could transition to midodrine to wean levo     1  s/p syncope 11/27/18: Likely orthostatic hypotensive episode, especially since patient is of advanced age & nimodipine given 10 minutes prior to episode; continue to monitor  -Out of bed slowly with assistance, fall precautions  -Of note, patient apparently got in & out of bed on her own overnight w/o issues      Pulm:   -No acute issues, SpO2>95% on RA  -Encourage incentive spirometry, deep breathing and coughing    GI:   -GI ppx: Not indicated  -Bowel regimen: senokot QD, miralax qd PRN, dulcolax supp  PRN    :  -UOP 5725 mL over past 24H (238 mL/hr)  -Monitor I&Os, net -2056 mL over past 24H  -Will give isolyte bolus, increase IVF    -Euvolemia goal to reduce risk of vasospasm  -Continue I/O monitoring, urinary retention protocol  -Cr 0 35 today, monitor and continue IVF 2/2 contrast load from CTA 11/27/18    F/E/N:   1  Fluids/Electrolytes  -Will give isolyte 1L bolus & start IVF isolyte @ 75 mL/hr 2/2 net -2L  -Na 139 today, mag 2 2, phos 2 9 s/p repletion yesterday  -Will give phos-nak 2 packets (16 mmol phos, 14 2 mEQ K, 13 6 mEq Na)  -K 3 6, replete today via 40 mEq Kdur + 14 2 mEq in phos-nak as noted above  -BMP in a m      2   Nutrition   -Regular diet, adequate PO intake 1550 over past 24H     ID:    -No active issues  -Remains afebrile, no leukocytosis     Heme:    -Hemoglobin of 12 0 yesterday, today 11 9, stable   -Continue to monitor, goal Hb>8   -DVT ppx: SQ Heparin; SCDs B/L     Endo:    -No active issues  -Glucose of 100 yesterday, 109 on today's BMP     Msk/Skin:    -No active issues  -Frequent off loading & repositioning to prevent skin breakdown     Disposition:  ICU level care 2/2 SAH, TCDs daily    Code Status: Level 1 - Full Code  ______________________________________________________________________    24 Hour Events: Denies pain, slept well, eating well, no complaints  Of note, patient apparently got in & out of bed on her own overnight w/o issues, no syncope or unsteadiness per her report   ______________________________________________________________________    Physical Exam:   Physical Exam   Constitutional: She is oriented to person, place, and time  She appears well-developed and well-nourished  HENT:   Head: Normocephalic and atraumatic     Bony protuberance on R-sided superior hard palate   Eyes: Pupils are equal, round, and reactive to light  EOM are normal    Cardiovascular: Normal rate and regular rhythm  2/6 LEW   Pulmonary/Chest: Effort normal and breath sounds normal    Abdominal: Soft  Bowel sounds are normal    Musculoskeletal: She exhibits no edema  Neurological: She is alert and oriented to person, place, and time  No cranial nerve deficit  GCS 15, follows all commands, no pronator drift, motor strength 5/5 B/L UE/LE   Skin: Skin is warm and dry  Vitals reviewed  ______________________________________________________________________  Vitals:    18 0400 18 0456 18 0500 18 0622   BP: 123/62 123/62 134/69 120/60   BP Location: Right arm  Right arm Right arm   Pulse: 68  70 73   Resp: 14  13 16   Temp:   98 6 °F (37 °C)    TempSrc:   Oral    SpO2: 95%  95% 96%   Weight:       Height:           Temperature:   Temp (24hrs), Av 3 °F (36 8 °C), Min:97 7 °F (36 5 °C), Max:99 °F (37 2 °C)    Current Temperature: 98 6 °F (37 °C)  Weights:   IBW: 54 7 kg    Body mass index is 23 35 kg/m²  Weight (last 2 days)     None        Hemodynamic Monitoring: A-line L radial in place (no EVD)     Non-Invasive/Invasive Ventilation Settings:  Respiratory    Lab Data (Last 4 hours)    None         O2/Vent Data (Last 4 hours)    None              Intake and Outputs:  I/O        07 -  0700  07 -  0700    P  O  1360 1550    I V  (mL/kg) 397 8 (6 4) 2118 6 (34 3)    IV Piggyback 500     Total Intake(mL/kg) 2257 8 (36 6) 3668 6 (59 5)    Urine (mL/kg/hr) 4250 (2 9) 5325 (3 6)    Total Output 4250 5325    Net - 2  4              UOP: 238 mL/hour     Nutrition:        Diet Orders            Start     Ordered    18 1732  Diet Regular; Regular House  Diet effective midnight     Question Answer Comment   Diet Type Regular    Regular Regular House    RD to adjust diet per protocol?  No        18 1735        Labs:     Results from last 7 days  Lab Units 18  5525 11/27/18  0440 11/26/18  0514   WBC Thousand/uL 8 51 7 91 8 13   HEMOGLOBIN g/dL 11 9 12 0 10 6*   HEMATOCRIT % 37 2 37 1 33 9*   PLATELETS Thousands/uL 215 203 193   NEUTROS PCT % 83* 76* 73   MONOS PCT % 6 8 10       Results from last 7 days  Lab Units 11/28/18  0451 11/27/18  0440 11/26/18  0514   POTASSIUM mmol/L 3 6 3 9 3 3*   CHLORIDE mmol/L 106 106 108   CO2 mmol/L 27 26 28   BUN mg/dL 8 9 8   CREATININE mg/dL 0 35* 0 43* 0 47*   CALCIUM mg/dL 8 1* 8 4 8 2*       Results from last 7 days  Lab Units 11/28/18  0451 11/27/18  0440 11/26/18  0514   MAGNESIUM mg/dL 2 2 2 0 2 2     Lab Results   Component Value Date    PHOS 2 9 11/28/2018    PHOS 2 2 (L) 11/27/2018    PHOS 2 9 11/26/2018        Results from last 7 days  Lab Units 11/24/18  2159   INR  1 05   PTT seconds 27       0  Lab Value Date/Time   TROPONINI <0 02 11/24/2018 1610     Allergies:    Allergies   Allergen Reactions    Codeine GI Intolerance     Medications:   Scheduled Meds:  Current Facility-Administered Medications:  acetaminophen 650 mg Oral Q4H PRN Tao Dobson DO    chlorhexidine 15 mL Swish & Spit Q12H [address] Tao Dobson DO    dorzolamide-timolol 1 drop Both Eyes BID Tao Dobson DO    heparin (porcine) 5,000 Units Subcutaneous [facility] Danial Rincon DO    hydrALAZINE 5 mg Intravenous Q4H PRN Ester Tinsley MD    labetalol 10 mg Intravenous Q4H PRN Saunderskasey Dobson DO    levETIRAcetam 500 mg Oral Q12H Danial Rincon DO    multi-electrolyte 75 mL/hr Intravenous Continuous Ester Tinsley MD Last Rate: 75 mL/hr (11/27/18 1153)   niMODipine 30 mg Per NG Tube Q2H Ester Tinsley MD    norepinephrine 1-30 mcg/min Intravenous Titrated Radha Boss MD Last Rate: 2 mcg/min (11/28/18 0208)   ondansetron 4 mg Intravenous Q4H PRN Ester Tinsley MD    potassium chloride 40 mEq Oral BID Ester Tinsley MD    travoprost 1 drop Both Eyes HS Ester Tinsley MD      Continuous Infusions:  multi-electrolyte 75 mL/hr Last Rate: 75 mL/hr (11/27/18 0397) norepinephrine 1-30 mcg/min Last Rate: 2 mcg/min (11/28/18 0208)     PRN Meds:    acetaminophen 650 mg Q4H PRN   hydrALAZINE 5 mg Q4H PRN   labetalol 10 mg Q4H PRN   ondansetron 4 mg Q4H PRN     VTE Pharmacologic Prophylaxis: Heparin  VTE Mechanical Prophylaxis: sequential compression device  Invasive lines and devices: Invasive Devices     Peripheral Intravenous Line            Peripheral IV 11/24/18 Left Antecubital 3 days    Peripheral IV 11/24/18 Right Antecubital 3 days          Arterial Line            Arterial Line 11/25/18 Left Radial 2 days                     Portions of the record may have been created with voice recognition software  Occasional wrong word or "sound a like" substitutions may have occurred due to the inherent limitations of voice recognition software  Read the chart carefully and recognize, using context, where substitutions have occurred      Danyelle Lane MD

## 2018-11-28 NOTE — PROGRESS NOTES
11/28/18 1100   Spiritual Beliefs/Perceptions   Support Systems Spouse/significant other;Children   Stress Factors   Patient Stress Factors None identified   Coping Responses   Patient Coping Accepting   Plan of Care   Comments provided pastoral presence and prayer support     Assessment Completed by: Unit visit

## 2018-11-29 ENCOUNTER — APPOINTMENT (INPATIENT)
Dept: NON INVASIVE DIAGNOSTICS | Facility: HOSPITAL | Age: 78
DRG: 021 | End: 2018-11-29
Payer: MEDICARE

## 2018-11-29 PROBLEM — E87.6 HYPOKALEMIA: Status: RESOLVED | Noted: 2018-11-28 | Resolved: 2018-11-29

## 2018-11-29 LAB
ALBUMIN SERPL BCP-MCNC: 3.3 G/DL (ref 3.5–5)
ALP SERPL-CCNC: 50 U/L (ref 46–116)
ALT SERPL W P-5'-P-CCNC: 25 U/L (ref 12–78)
ANION GAP SERPL CALCULATED.3IONS-SCNC: 5 MMOL/L (ref 4–13)
AST SERPL W P-5'-P-CCNC: 25 U/L (ref 5–45)
BILIRUB SERPL-MCNC: 0.49 MG/DL (ref 0.2–1)
BUN SERPL-MCNC: 7 MG/DL (ref 5–25)
CA-I BLD-SCNC: 1.14 MMOL/L (ref 1.12–1.32)
CALCIUM SERPL-MCNC: 8.3 MG/DL (ref 8.3–10.1)
CHLORIDE SERPL-SCNC: 104 MMOL/L (ref 100–108)
CO2 SERPL-SCNC: 28 MMOL/L (ref 21–32)
CREAT SERPL-MCNC: 0.49 MG/DL (ref 0.6–1.3)
GFR SERPL CREATININE-BSD FRML MDRD: 94 ML/MIN/1.73SQ M
GLUCOSE SERPL-MCNC: 96 MG/DL (ref 65–140)
MAGNESIUM SERPL-MCNC: 2.3 MG/DL (ref 1.6–2.6)
PHOSPHATE SERPL-MCNC: 3.1 MG/DL (ref 2.3–4.1)
POTASSIUM SERPL-SCNC: 4 MMOL/L (ref 3.5–5.3)
PROT SERPL-MCNC: 6.5 G/DL (ref 6.4–8.2)
SODIUM SERPL-SCNC: 137 MMOL/L (ref 136–145)

## 2018-11-29 PROCEDURE — 99291 CRITICAL CARE FIRST HOUR: CPT | Performed by: EMERGENCY MEDICINE

## 2018-11-29 PROCEDURE — 93886 INTRACRANIAL COMPLETE STUDY: CPT

## 2018-11-29 PROCEDURE — 82330 ASSAY OF CALCIUM: CPT | Performed by: FAMILY MEDICINE

## 2018-11-29 PROCEDURE — 83735 ASSAY OF MAGNESIUM: CPT | Performed by: INTERNAL MEDICINE

## 2018-11-29 PROCEDURE — 99024 POSTOP FOLLOW-UP VISIT: CPT | Performed by: PHYSICIAN ASSISTANT

## 2018-11-29 PROCEDURE — 84100 ASSAY OF PHOSPHORUS: CPT | Performed by: INTERNAL MEDICINE

## 2018-11-29 PROCEDURE — 93886 INTRACRANIAL COMPLETE STUDY: CPT | Performed by: SURGERY

## 2018-11-29 PROCEDURE — 80053 COMPREHEN METABOLIC PANEL: CPT | Performed by: FAMILY MEDICINE

## 2018-11-29 RX ORDER — POLYETHYLENE GLYCOL 3350 17 G/17G
17 POWDER, FOR SOLUTION ORAL DAILY
Status: COMPLETED | OUTPATIENT
Start: 2018-11-29 | End: 2018-11-29

## 2018-11-29 RX ORDER — POLYETHYLENE GLYCOL 3350 17 G/17G
17 POWDER, FOR SOLUTION ORAL DAILY PRN
Status: COMPLETED | OUTPATIENT
Start: 2018-11-29 | End: 2018-11-29

## 2018-11-29 RX ORDER — POTASSIUM CHLORIDE 20 MEQ/1
20 TABLET, EXTENDED RELEASE ORAL ONCE
Status: COMPLETED | OUTPATIENT
Start: 2018-11-29 | End: 2018-11-29

## 2018-11-29 RX ORDER — AMOXICILLIN 250 MG
2 CAPSULE ORAL
Status: DISCONTINUED | OUTPATIENT
Start: 2018-11-29 | End: 2018-12-03

## 2018-11-29 RX ADMIN — DORZOLAMIDE HYDROCHLORIDE AND TIMOLOL MALEATE 1 DROP: 20; 5 SOLUTION/ DROPS OPHTHALMIC at 11:00

## 2018-11-29 RX ADMIN — TRAVOPROST OPHTHALMIC SOLUTION 1 DROP: 0.04 SOLUTION OPHTHALMIC at 22:46

## 2018-11-29 RX ADMIN — NIMODIPINE 30 MG: 30 CAPSULE, LIQUID FILLED ORAL at 02:35

## 2018-11-29 RX ADMIN — DORZOLAMIDE HYDROCHLORIDE AND TIMOLOL MALEATE 1 DROP: 20; 5 SOLUTION/ DROPS OPHTHALMIC at 00:40

## 2018-11-29 RX ADMIN — NIMODIPINE 30 MG: 30 CAPSULE, LIQUID FILLED ORAL at 17:55

## 2018-11-29 RX ADMIN — NIMODIPINE 30 MG: 30 CAPSULE, LIQUID FILLED ORAL at 16:17

## 2018-11-29 RX ADMIN — NIMODIPINE 30 MG: 30 CAPSULE, LIQUID FILLED ORAL at 06:23

## 2018-11-29 RX ADMIN — LEVETIRACETAM 500 MG: 500 TABLET, FILM COATED ORAL at 17:55

## 2018-11-29 RX ADMIN — POTASSIUM CHLORIDE 20 MEQ: 1500 TABLET, EXTENDED RELEASE ORAL at 08:52

## 2018-11-29 RX ADMIN — HEPARIN SODIUM 5000 UNITS: 5000 INJECTION INTRAVENOUS; SUBCUTANEOUS at 22:46

## 2018-11-29 RX ADMIN — NIMODIPINE 30 MG: 30 CAPSULE, LIQUID FILLED ORAL at 00:40

## 2018-11-29 RX ADMIN — NIMODIPINE 30 MG: 30 CAPSULE, LIQUID FILLED ORAL at 08:52

## 2018-11-29 RX ADMIN — LEVETIRACETAM 500 MG: 500 TABLET, FILM COATED ORAL at 05:00

## 2018-11-29 RX ADMIN — NIMODIPINE 30 MG: 30 CAPSULE, LIQUID FILLED ORAL at 14:25

## 2018-11-29 RX ADMIN — SODIUM CHLORIDE 500 ML: 0.9 INJECTION, SOLUTION INTRAVENOUS at 08:59

## 2018-11-29 RX ADMIN — NIMODIPINE 30 MG: 30 CAPSULE, LIQUID FILLED ORAL at 20:19

## 2018-11-29 RX ADMIN — POTASSIUM & SODIUM PHOSPHATES POWDER PACK 280-160-250 MG 2 PACKET: 280-160-250 PACK at 08:52

## 2018-11-29 RX ADMIN — NIMODIPINE 30 MG: 30 CAPSULE, LIQUID FILLED ORAL at 10:58

## 2018-11-29 RX ADMIN — POLYETHYLENE GLYCOL 3350 17 G: 17 POWDER, FOR SOLUTION ORAL at 16:28

## 2018-11-29 RX ADMIN — NIMODIPINE 30 MG: 30 CAPSULE, LIQUID FILLED ORAL at 22:46

## 2018-11-29 RX ADMIN — HEPARIN SODIUM 5000 UNITS: 5000 INJECTION INTRAVENOUS; SUBCUTANEOUS at 14:25

## 2018-11-29 RX ADMIN — NIMODIPINE 30 MG: 30 CAPSULE, LIQUID FILLED ORAL at 12:56

## 2018-11-29 RX ADMIN — NIMODIPINE 30 MG: 30 CAPSULE, LIQUID FILLED ORAL at 04:57

## 2018-11-29 RX ADMIN — SENNOSIDES AND DOCUSATE SODIUM 2 TABLET: 8.6; 5 TABLET ORAL at 22:46

## 2018-11-29 RX ADMIN — SODIUM CHLORIDE, SODIUM GLUCONATE, SODIUM ACETATE, POTASSIUM CHLORIDE, MAGNESIUM CHLORIDE, SODIUM PHOSPHATE, DIBASIC, AND POTASSIUM PHOSPHATE 75 ML/HR: .53; .5; .37; .037; .03; .012; .00082 INJECTION, SOLUTION INTRAVENOUS at 04:56

## 2018-11-29 RX ADMIN — CHLORHEXIDINE GLUCONATE 15 ML: 1.2 RINSE ORAL at 22:46

## 2018-11-29 RX ADMIN — POLYETHYLENE GLYCOL 3350 17 G: 17 POWDER, FOR SOLUTION ORAL at 08:52

## 2018-11-29 RX ADMIN — HEPARIN SODIUM 5000 UNITS: 5000 INJECTION INTRAVENOUS; SUBCUTANEOUS at 05:00

## 2018-11-29 NOTE — PROGRESS NOTES
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   -TCD BID for 21 days (through 12/17), R: 1 6 (1 45 11/27), L: 1 0 (0 90 11/27)  -Patient remains GCS 15 w/o any deficits on exam  -Nimodipine 30 mg Q2H PO soln 2/2 low BP (PO soln since unable to tolerate tablet)   -Maintain goal SBP <140, MAP>65 to reduce vasospasm risk; A-line removed 11/29/18  -500 mg Keppra Q12H for seizure prophylaxis (on day 6/7), seizure precautions  -Monitor electrolytes for cerebral salt wasting w/ goal Na 140-150  -Neuro checks Q2H, PT, OT  -CTA 11/27/18 unchanged (obtained s/p syncope w/o fall), no MRI per NeuroSx  -Tylenol 650 mg Q6H PRN for mild pain/HA/fever, 1 dose given over past 24H     2  Glaucoma  -Continue home Cosopt BID & Travoprost 1gtt OU QHS     CV:    -Goal SBP<140, MAP>65; off levo, currently at goal over past 24H  -SBP controlled without meds, no PRN labetalol/hydralazine needed     1  s/p syncope 11/27/18: Likely orthostatic hypotensive episode, especially since patient is of advanced age & nimodipine given 10 minutes prior to episode  -No recurrent syncopal episodes  -Out of bed slowly with assistance, fall precautions     Pulm:   -No acute issues, SpO2>95% on RA  -Encourage incentive spirometry, deep breathing and coughing     GI:   -GI ppx: Not indicated  -Bowel regimen: senokot QD, miralax qd PRN, dulcolax supp   PRN  -No BM since admission, will give miralax today & increase senna to 2 tabs     :  -UOP 4000 mL over past 24H (166 7 mL/hr)  -Monitor I&Os, net -1106 mL over past 24H  -Will give  mL bolus   -Continue IVF isolyte @50 mL/hr 2/2 net -1L w/ increased UOP              -Euvolemia goal to reduce risk of vasospasm  -Continue I/O monitoring, urinary retention protocol  -Cr 0 49 today (stable) s/p contrast load from CTA 11/27/18     F/E/N:   1  Fluids/Electrolytes  -Will give  mL bolus & continue IVF isolyte @50 mL/hr   -Goal Na 140-150, Na 137 today; will encourage salty diet  -Mag 2 3, phos 3 1 s/p phos-nak 2 packets (16 mmol phos, 14 2 mEQ K, 13 6 mEq Na)   -K 4 0, replete today via 20 mEq Kdur + 14 2 mEq in phos-nak x2 as noted above  -iCal 1 14; repeat BMP in a m      2   Nutrition   -Regular diet, adequate PO intake 1076 over past 24H  -Encourage salty diet to keep Na 140-150     ID:    -No active issues  -Remains afebrile, no leukocytosis     Heme:    -Hemoglobin of 11 9 yesterday, 12 0 11/27/18  -CBC lab holiday today  -Continue to monitor, goal Hb>8   -DVT ppx: SQ Heparin; SCDs B/L     Endo:    -No active issues  -HbA1C 5 1% on admission  -Glucose of 96 on today's BMP (109 yesterday)     Msk/Skin:    -No active issues, patient ambulating around unit with assistance  -Frequent off loading & repositioning to prevent skin breakdown  -PICC placed R basilic 65/34/39 for ease of IVF, lab draws     Disposition:  ICU level care 2/2 SAH, TCDs daily     Code Status: Level 1 - Full Code  ______________________________________________________________________    24 Hour Events:  Patient slept well (other than being woken up every 2 hours for nimodipine); , vision changes, denies pain anywhere  No bowel movement since Saturday, eating well & drinking well  A-line removed 11/29/18    No events overnight reported by nursing staff   ______________________________________________________________________    Physical Exam:   Physical Exam   Constitutional: She is oriented to person, place, and time  She appears well-developed and well-nourished  HENT:   Head: Normocephalic and atraumatic  Eyes: Pupils are equal, round, and reactive to light  EOM are normal    Cardiovascular: Normal rate and regular rhythm  2/6 LEW   Pulmonary/Chest: Effort normal and breath sounds normal    Abdominal: Soft  Bowel sounds are normal    Musculoskeletal: Normal range of motion  She exhibits no edema  Neurological: She is alert and oriented to person, place, and time  No cranial nerve deficit or sensory deficit  GCS 15 w/ 5/5 motor strength in B/L UE & LE, no pronator drift   Skin: Skin is warm and dry  Vitals reviewed  ______________________________________________________________________  Vitals:    18 0300 18 0400 18 0500 18 0600   BP: 113/60 125/61 116/58 117/61   BP Location: Left arm Left arm Left arm Left arm   Pulse: 78 72 76 72   Resp: 17 14 14 12   Temp:  98 7 °F (37 1 °C)     TempSrc:  Oral     SpO2: 96% 96% 96% 96%   Weight:       Height:           Temperature:   Temp (24hrs), Av 5 °F (36 9 °C), Min:98 °F (36 7 °C), Max:99 1 °F (37 3 °C)    Current Temperature: 98 7 °F (37 1 °C)  Weights:   IBW: 54 7 kg    Body mass index is 23 35 kg/m²  Weight (last 2 days)     None           Non-Invasive/Invasive Ventilation Settings:  Respiratory    Lab Data (Last 4 hours)    None         O2/Vent Data (Last 4 hours)    None              Intake and Outputs:  I/O        07 -  0700 701 -  0700    P  O  1550 1076    I V  (mL/kg) 2639 7 (42 8) 1817 6 (29 5)    Total Intake(mL/kg) 4189 7 (67 9) 2893 6 (46 9)    Urine (mL/kg/hr) 5725 (3 9) 4000 (2 7)    Total Output 5725 4000    Net -1535 3 -1106 4              UOP: 166 7 mL/hour (2 7 mL/kg/hr)    Nutrition:        Diet Orders            Start     Ordered    18 1732  Diet Regular; Regular House  Diet effective midnight     Question Answer Comment   Diet Type Regular    Regular Regular House RD to adjust diet per protocol? No        11/25/18 1731          Labs:     Results from last 7 days  Lab Units 11/28/18  0451 11/27/18  0440 11/26/18  0514   WBC Thousand/uL 8 51 7 91 8 13   HEMOGLOBIN g/dL 11 9 12 0 10 6*   HEMATOCRIT % 37 2 37 1 33 9*   PLATELETS Thousands/uL 215 203 193   NEUTROS PCT % 83* 76* 73   MONOS PCT % 6 8 10       Results from last 7 days  Lab Units 11/29/18  0450 11/28/18  0451 11/27/18  0440   POTASSIUM mmol/L 4 0 3 6 3 9   CHLORIDE mmol/L 104 106 106   CO2 mmol/L 28 27 26   BUN mg/dL 7 8 9   CREATININE mg/dL 0 49* 0 35* 0 43*   CALCIUM mg/dL 8 3 8 1* 8 4   ALK PHOS U/L 50  --   --    ALT U/L 25  --   --    AST U/L 25  --   --        Results from last 7 days  Lab Units 11/29/18 0450 11/28/18 0451 11/27/18  0440   MAGNESIUM mg/dL 2 3 2 2 2 0     Lab Results   Component Value Date    PHOS 3 1 11/29/2018    PHOS 2 9 11/28/2018    PHOS 2 2 (L) 11/27/2018        Results from last 7 days  Lab Units 11/24/18  2159   INR  1 05   PTT seconds 27     Allergies:    Allergies   Allergen Reactions    Codeine GI Intolerance     Medications:   Scheduled Meds:  Current Facility-Administered Medications:  acetaminophen 650 mg Oral Q6H PRN Veverly MD Lebron    bisacodyl 10 mg Rectal Daily PRN Veverava Diana MD    chlorhexidine 15 mL Swish & Spit Q12H [address] Milady Dobson DO    dorzolamide-timolol 1 drop Both Eyes BID Tao Dobson DO    heparin (porcine) 5,000 Units Subcutaneous [facility] Maciej Adam DO    hydrALAZINE 5 mg Intravenous Q4H PRN Velaura Diana MD    labetalol 10 mg Intravenous Q4H PRN Milady Dobson DO    levETIRAcetam 500 mg Oral Q12H Maciej Adam DO    multi-electrolyte 75 mL/hr Intravenous Continuous Veverly MD Lebron Last Rate: 75 mL/hr (11/29/18 8864)   niMODipine 30 mg Per NG Tube Q2H Veverly MD Lebron    ondansetron 4 mg Intravenous Q4H PRN Veverly MD Lebron    polyethylene glycol 17 g Oral Daily PRN Veverly MD Lebron    senna-docusate sodium 1 tablet Oral HS Veverly MD Lebron    travoprost 1 drop Both Eyes HS Ellen Jurado MD      Continuous Infusions:  multi-electrolyte 75 mL/hr Last Rate: 75 mL/hr (11/29/18 0456)     PRN Meds:    acetaminophen 650 mg Q6H PRN   bisacodyl 10 mg Daily PRN   hydrALAZINE 5 mg Q4H PRN   labetalol 10 mg Q4H PRN   ondansetron 4 mg Q4H PRN   polyethylene glycol 17 g Daily PRN     VTE Pharmacologic Prophylaxis: Heparin  VTE Mechanical Prophylaxis: sequential compression device  Invasive lines and devices: Invasive Devices     Peripherally Inserted Central Catheter Line            PICC Line 17/25/82 Right Basilic less than 1 day          Peripheral Intravenous Line            Peripheral IV 11/24/18 Left Antecubital 4 days              Portions of the record may have been created with voice recognition software  Occasional wrong word or "sound a like" substitutions may have occurred due to the inherent limitations of voice recognition software  Read the chart carefully and recognize, using context, where substitutions have occurred      Ellen Jurado MD

## 2018-11-29 NOTE — PROGRESS NOTES
Progress Note - Neurosurgery   Cherry Taylor 69 y o  female MRN: [id number]  Unit/Bed#:  -01 Encounter: [id number]      Assessment:  1  Status post coil embolization for subarachnoid hemorrhage (11/25/2018)  2  HH 1, F3 SAH (11/24/2018)  3  BD ( 11/23/2018)  4  ACOM         Plan:   § Exam: A&OX3, EOMI, 2 mm, conjugate   Speech fluent   Finger-to-nose normal and no pronator drift bilaterally   Motor strength is 5/5 throughout   Sensory function to light touch is normal bilaterally  DTR brisk throughout   No clonus and Babinski negative bilaterally  Grace Cass done on 11/24/2018 demonstrates diffuse subarachnoid hemorrhage throughout the basal cisterns, sylvian fissures and over the convexities without evidence of mass effect   Anterior communicating artery 5 mm aneurysm noted  § CTA or 11/27/2018 demonstrates status post coil embolization of anterior communicating artery and the postop day 2 resolving diffuse subarachnoid hemorrhage without evidence of vasospasm   Also area of decreased attenuation in the right basal ganglia compatible with age-indeterminate lacunar infraction  ? Pain control:  Primary team  ? DVT ppx:   § SCDs  § Continue heparin subcu  ? Seizure ppx:  Continue Keppra  ? Activity:  As tolerated  ? PT/OT evaluation & treatment  ? Medical Mx:  Per Primary team  ? Seizure prophylaxis:  continue Keppra  ? Neurocheck:  Q 1 H  CT head if GCS drops 2POINTS/1h  ? Continue Vasospasm watch  ? HOB>30-45 degree  ? Zr=350  ? IO/OP=negative balance  ? TCD:   § Right =1 0  § Left = 1 6  ? SBP<220, currently 117/61  ? MAP>65, currently 80  ? Patient complains of  slight generalized weakness associated with low blood sugar-feels better when she eats crackers  Otherwise, neurologically stable  Continue neuromonitoring, spasm watch  Call for concern or problem  Subjective/Objective   Chief Complaint: "Sometimes I feel weak/tired''/progress note    Subjective:   The patient complains occasional feeling tired, she states her blood glucose sometimes goes down and she feels better when she is crackers  Denies any dizziness or lightheadedness, headache, nausea or vomiting  Denies blurry vision or diplopia  She was up and walking around without gait instability or tendency to fall  Denies any numbness, weakness, paresthesia and extremities  Appetite is good denies any bowel or bladder issues  Objective:  Patient is sitting in the recliner, and in no pain distress    I/O       11/27 0701 - 11/28 0700 11/28 0701 - 11/29 0700 11/29 0701 - 11/30 0700    P  O  1550 1076 120    I V  (mL/kg) 2639 7 (42 8) 1817 6 (29 5)     Total Intake(mL/kg) 4189 7 (67 9) 2893 6 (46 9) 120 (1 9)    Urine (mL/kg/hr) 5725 (3 9) 4000 (2 7)     Total Output 5725 4000      Net -1535 3 -1106 4 +120                 Invasive Devices     Peripherally Inserted Central Catheter Line            PICC Line 32/83/46 Right Basilic less than 1 day          Peripheral Intravenous Line            Peripheral IV 11/24/18 Left Antecubital 4 days                Physical Exam:  Vitals: Blood pressure 117/61, pulse 72, temperature 98 7 °F (37 1 °C), temperature source Oral, resp  rate 12, height 5' 4" (1 626 m), weight 61 7 kg (136 lb 0 4 oz), SpO2 96 %, not currently breastfeeding  ,Body mass index is 23 35 kg/m²      Hemodynamic Monitoring: MAP: Arterial Line MAP (mmHg): 84 mmHg    General appearance: alert, appears stated age, cooperative and no distress  Head: Normocephalic, without obvious abnormality, atraumatic  Eyes: EOMI, 2 mm,  conjugate  Neck: supple, symmetrical, trachea midline and NT  Lungs: non labored breathing  Heart: regular heart rate  Neurologic:   Mental status: Alert, oriented x3, thought content appropriate  Cranial nerves: grossly intact (Cranial nerves II-XII)  Sensory: normal to light touch throughout  Motor: moving all extremities without focal weakness, 5/5 bilaterally  Reflexes: 3+ and symmetric; without clonus and Babinski negative bilaterally  Coordination: finger to nose normal bilaterally, no drift bilaterally      Lab Results:    Results from last 7 days  Lab Units 11/28/18  0451 11/27/18  0440 11/26/18  0514   WBC Thousand/uL 8 51 7 91 8 13   HEMOGLOBIN g/dL 11 9 12 0 10 6*   HEMATOCRIT % 37 2 37 1 33 9*   PLATELETS Thousands/uL 215 203 193   NEUTROS PCT % 83* 76* 73   MONOS PCT % 6 8 10       Results from last 7 days  Lab Units 11/29/18  0450 11/28/18  0451 11/27/18  0440   POTASSIUM mmol/L 4 0 3 6 3 9   CHLORIDE mmol/L 104 106 106   CO2 mmol/L 28 27 26   BUN mg/dL 7 8 9   CREATININE mg/dL 0 49* 0 35* 0 43*   CALCIUM mg/dL 8 3 8 1* 8 4   ALK PHOS U/L 50  --   --    ALT U/L 25  --   --    AST U/L 25  --   --        Results from last 7 days  Lab Units 11/29/18  0450 11/28/18 0451 11/27/18  0440   MAGNESIUM mg/dL 2 3 2 2 2 0       Results from last 7 days  Lab Units 11/29/18  0450 11/28/18  0451 11/27/18  0440   PHOSPHORUS mg/dL 3 1 2 9 2 2*       Results from last 7 days  Lab Units 11/24/18 2159   INR  1 05   PTT seconds 27     No results found for: TROPONINT  ABG:No results found for: PHART, MDK7WMZ, PO2ART, FBS4ZCV, F6MTEICQ, BEART, SOURCE    Imaging Studies: I have personally reviewed pertinent reports  and I have personally reviewed pertinent films in PACS    EKG, Pathology, and Other Studies: I have personally reviewed pertinent reports        VTE Pharmacologic Prophylaxis: Heparin SQ    VTE Mechanical Prophylaxis: sequential compression device

## 2018-11-30 ENCOUNTER — APPOINTMENT (INPATIENT)
Dept: NON INVASIVE DIAGNOSTICS | Facility: HOSPITAL | Age: 78
DRG: 021 | End: 2018-11-30
Payer: MEDICARE

## 2018-11-30 PROBLEM — K59.00 CONSTIPATED: Status: ACTIVE | Noted: 2018-11-30

## 2018-11-30 LAB
ANION GAP SERPL CALCULATED.3IONS-SCNC: 6 MMOL/L (ref 4–13)
BASOPHILS # BLD AUTO: 0.04 THOUSANDS/ΜL (ref 0–0.1)
BASOPHILS NFR BLD AUTO: 1 % (ref 0–1)
BUN SERPL-MCNC: 10 MG/DL (ref 5–25)
CALCIUM SERPL-MCNC: 8.2 MG/DL (ref 8.3–10.1)
CHLORIDE SERPL-SCNC: 102 MMOL/L (ref 100–108)
CO2 SERPL-SCNC: 28 MMOL/L (ref 21–32)
CREAT SERPL-MCNC: 0.56 MG/DL (ref 0.6–1.3)
EOSINOPHIL # BLD AUTO: 0.15 THOUSAND/ΜL (ref 0–0.61)
EOSINOPHIL NFR BLD AUTO: 2 % (ref 0–6)
ERYTHROCYTE [DISTWIDTH] IN BLOOD BY AUTOMATED COUNT: 13.8 % (ref 11.6–15.1)
GFR SERPL CREATININE-BSD FRML MDRD: 90 ML/MIN/1.73SQ M
GLUCOSE SERPL-MCNC: 100 MG/DL (ref 65–140)
HCT VFR BLD AUTO: 36 % (ref 34.8–46.1)
HGB BLD-MCNC: 11.5 G/DL (ref 11.5–15.4)
IMM GRANULOCYTES # BLD AUTO: 0.05 THOUSAND/UL (ref 0–0.2)
IMM GRANULOCYTES NFR BLD AUTO: 1 % (ref 0–2)
LYMPHOCYTES # BLD AUTO: 1.48 THOUSANDS/ΜL (ref 0.6–4.47)
LYMPHOCYTES NFR BLD AUTO: 19 % (ref 14–44)
MAGNESIUM SERPL-MCNC: 2.2 MG/DL (ref 1.6–2.6)
MCH RBC QN AUTO: 30.9 PG (ref 26.8–34.3)
MCHC RBC AUTO-ENTMCNC: 31.9 G/DL (ref 31.4–37.4)
MCV RBC AUTO: 97 FL (ref 82–98)
MONOCYTES # BLD AUTO: 0.82 THOUSAND/ΜL (ref 0.17–1.22)
MONOCYTES NFR BLD AUTO: 10 % (ref 4–12)
NEUTROPHILS # BLD AUTO: 5.32 THOUSANDS/ΜL (ref 1.85–7.62)
NEUTS SEG NFR BLD AUTO: 67 % (ref 43–75)
NRBC BLD AUTO-RTO: 0 /100 WBCS
PHOSPHATE SERPL-MCNC: 4 MG/DL (ref 2.3–4.1)
PLATELET # BLD AUTO: 220 THOUSANDS/UL (ref 149–390)
PMV BLD AUTO: 9.9 FL (ref 8.9–12.7)
POTASSIUM SERPL-SCNC: 3.9 MMOL/L (ref 3.5–5.3)
RBC # BLD AUTO: 3.72 MILLION/UL (ref 3.81–5.12)
SODIUM SERPL-SCNC: 136 MMOL/L (ref 136–145)
WBC # BLD AUTO: 7.86 THOUSAND/UL (ref 4.31–10.16)

## 2018-11-30 PROCEDURE — 93886 INTRACRANIAL COMPLETE STUDY: CPT

## 2018-11-30 PROCEDURE — 99291 CRITICAL CARE FIRST HOUR: CPT | Performed by: ANESTHESIOLOGY

## 2018-11-30 PROCEDURE — 80048 BASIC METABOLIC PNL TOTAL CA: CPT | Performed by: FAMILY MEDICINE

## 2018-11-30 PROCEDURE — 84100 ASSAY OF PHOSPHORUS: CPT | Performed by: FAMILY MEDICINE

## 2018-11-30 PROCEDURE — 85025 COMPLETE CBC W/AUTO DIFF WBC: CPT | Performed by: FAMILY MEDICINE

## 2018-11-30 PROCEDURE — 93886 INTRACRANIAL COMPLETE STUDY: CPT | Performed by: SURGERY

## 2018-11-30 PROCEDURE — 99024 POSTOP FOLLOW-UP VISIT: CPT | Performed by: PHYSICIAN ASSISTANT

## 2018-11-30 PROCEDURE — 83735 ASSAY OF MAGNESIUM: CPT | Performed by: FAMILY MEDICINE

## 2018-11-30 PROCEDURE — 97112 NEUROMUSCULAR REEDUCATION: CPT

## 2018-11-30 PROCEDURE — 97116 GAIT TRAINING THERAPY: CPT

## 2018-11-30 RX ORDER — FLUDROCORTISONE ACETATE 0.1 MG/1
0.1 TABLET ORAL DAILY
Status: DISCONTINUED | OUTPATIENT
Start: 2018-11-30 | End: 2018-12-05

## 2018-11-30 RX ORDER — ACETAMINOPHEN 325 MG/1
650 TABLET ORAL EVERY 6 HOURS PRN
Status: DISCONTINUED | OUTPATIENT
Start: 2018-11-30 | End: 2018-12-06

## 2018-11-30 RX ORDER — SODIUM CHLORIDE 9 MG/ML
75 INJECTION, SOLUTION INTRAVENOUS CONTINUOUS
Status: DISPENSED | OUTPATIENT
Start: 2018-11-30 | End: 2018-11-30

## 2018-11-30 RX ORDER — SODIUM CHLORIDE 1000 MG
1 TABLET, SOLUBLE MISCELLANEOUS
Status: DISCONTINUED | OUTPATIENT
Start: 2018-11-30 | End: 2018-12-01

## 2018-11-30 RX ORDER — POTASSIUM CHLORIDE 20 MEQ/1
20 TABLET, EXTENDED RELEASE ORAL ONCE
Status: COMPLETED | OUTPATIENT
Start: 2018-11-30 | End: 2018-11-30

## 2018-11-30 RX ORDER — ALBUMIN, HUMAN INJ 5% 5 %
12.5 SOLUTION INTRAVENOUS ONCE
Status: COMPLETED | OUTPATIENT
Start: 2018-11-30 | End: 2018-11-30

## 2018-11-30 RX ADMIN — SODIUM CHLORIDE TAB 1 GM 1 G: 1 TAB at 12:11

## 2018-11-30 RX ADMIN — NIMODIPINE 30 MG: 30 CAPSULE, LIQUID FILLED ORAL at 08:12

## 2018-11-30 RX ADMIN — ACETAMINOPHEN 650 MG: 325 TABLET, FILM COATED ORAL at 08:37

## 2018-11-30 RX ADMIN — NIMODIPINE 30 MG: 30 CAPSULE, LIQUID FILLED ORAL at 14:32

## 2018-11-30 RX ADMIN — NIMODIPINE 30 MG: 30 CAPSULE, LIQUID FILLED ORAL at 22:20

## 2018-11-30 RX ADMIN — NIMODIPINE 30 MG: 30 CAPSULE, LIQUID FILLED ORAL at 12:55

## 2018-11-30 RX ADMIN — SODIUM CHLORIDE TAB 1 GM 1 G: 1 TAB at 16:26

## 2018-11-30 RX ADMIN — CHLORHEXIDINE GLUCONATE 15 ML: 1.2 RINSE ORAL at 08:16

## 2018-11-30 RX ADMIN — ALBUMIN HUMAN 12.5 G: 0.05 INJECTION, SOLUTION INTRAVENOUS at 07:44

## 2018-11-30 RX ADMIN — NIMODIPINE 30 MG: 30 CAPSULE, LIQUID FILLED ORAL at 16:26

## 2018-11-30 RX ADMIN — NIMODIPINE 30 MG: 30 CAPSULE, LIQUID FILLED ORAL at 18:10

## 2018-11-30 RX ADMIN — BISACODYL 5 MG: 5 TABLET, COATED ORAL at 08:12

## 2018-11-30 RX ADMIN — SODIUM CHLORIDE 75 ML/HR: 0.9 INJECTION, SOLUTION INTRAVENOUS at 07:44

## 2018-11-30 RX ADMIN — FLUDROCORTISONE ACETATE 0.1 MG: 0.1 TABLET ORAL at 12:11

## 2018-11-30 RX ADMIN — DORZOLAMIDE HYDROCHLORIDE AND TIMOLOL MALEATE 1 DROP: 20; 5 SOLUTION/ DROPS OPHTHALMIC at 12:11

## 2018-11-30 RX ADMIN — CHLORHEXIDINE GLUCONATE 15 ML: 1.2 RINSE ORAL at 22:19

## 2018-11-30 RX ADMIN — DORZOLAMIDE HYDROCHLORIDE AND TIMOLOL MALEATE 1 DROP: 20; 5 SOLUTION/ DROPS OPHTHALMIC at 00:32

## 2018-11-30 RX ADMIN — HEPARIN SODIUM 5000 UNITS: 5000 INJECTION INTRAVENOUS; SUBCUTANEOUS at 14:31

## 2018-11-30 RX ADMIN — TRAVOPROST OPHTHALMIC SOLUTION 1 DROP: 0.04 SOLUTION OPHTHALMIC at 22:21

## 2018-11-30 RX ADMIN — LEVETIRACETAM 500 MG: 500 TABLET, FILM COATED ORAL at 05:00

## 2018-11-30 RX ADMIN — POTASSIUM CHLORIDE 20 MEQ: 1500 TABLET, EXTENDED RELEASE ORAL at 08:12

## 2018-11-30 RX ADMIN — HEPARIN SODIUM 5000 UNITS: 5000 INJECTION INTRAVENOUS; SUBCUTANEOUS at 05:00

## 2018-11-30 RX ADMIN — NIMODIPINE 30 MG: 30 CAPSULE, LIQUID FILLED ORAL at 06:12

## 2018-11-30 RX ADMIN — HEPARIN SODIUM 5000 UNITS: 5000 INJECTION INTRAVENOUS; SUBCUTANEOUS at 22:20

## 2018-11-30 RX ADMIN — LEVETIRACETAM 500 MG: 500 TABLET, FILM COATED ORAL at 18:10

## 2018-11-30 RX ADMIN — NIMODIPINE 30 MG: 30 CAPSULE, LIQUID FILLED ORAL at 04:52

## 2018-11-30 RX ADMIN — NIMODIPINE 30 MG: 30 CAPSULE, LIQUID FILLED ORAL at 10:22

## 2018-11-30 RX ADMIN — NIMODIPINE 30 MG: 30 CAPSULE, LIQUID FILLED ORAL at 20:06

## 2018-11-30 RX ADMIN — NIMODIPINE 30 MG: 30 CAPSULE, LIQUID FILLED ORAL at 02:31

## 2018-11-30 RX ADMIN — NIMODIPINE 30 MG: 30 CAPSULE, LIQUID FILLED ORAL at 00:32

## 2018-11-30 NOTE — PLAN OF CARE
Problem: Neurological Deficit  Goal: Neurological status is stable or improving  Interventions:  - Monitor and assess patient's level of consciousness, motor function, sensory function, and level of assistance needed for ADLs  - Monitor and report changes from baseline  Collaborate with interdisciplinary team to initiate plan and implement interventions as ordered  - Provide and maintain a safe environment  - Utilize seizure precautions  - Utilize fall precautions  - Utilize aspiration precautions  - Utilize bleeding precautions  Outcome: Progressing      Problem: Activity Intolerance/Impaired Mobility  Goal: Mobility/activity is maintained at optimum level for patient  Interventions:  - Assess and monitor patient  barriers to mobility and need for assistive/adaptive devices  - Assess patient's emotional response to limitations  - Collaborate with interdisciplinary team and initiate plans and interventions as ordered  - Encourage independent activity per ability   - Maintain proper body alignment  - Perform active/passive rom as tolerated/ordered  - Plan activities to conserve energy   - Turn patient   Outcome: Progressing      Problem: Communication Impairment  Goal: Ability to express needs and understand communication  Assess patient's communication skills and ability to understand information  Patient will demonstrate use of effective communication techniques, alternative methods of communication and understanding even if not able to speak  - Encourage communication and provide alternate methods of communication as needed  - Collaborate with case management/ for discharge needs  - Include patient/family/caregiver in decisions related to communication  Outcome: Progressing      Problem: Potential for Aspiration  Goal: Non-ventilated patient's risk of aspiration is minimized  Assess and monitor vital signs, respiratory status, and labs (WBC)    Monitor for signs of aspiration (tachypnea, cough, rales, wheezing, cyanosis, fever)  - Assess and monitor patient's ability to swallow  - Place patient up in chair to eat if possible  - HOB up at 90 degrees to eat if unable to get patient up into chair   - Supervise patient during oral intake  - Instruct patient to take small bites  - Instruct patient to take small single sips when taking liquids  - Follow patient-specific strategies generated by speech pathologist    Outcome: Progressing    Goal: Ventilated patient's risk of aspiration is minimized  Assess and monitor vital signs, respiratory status, airway cuff pressure, and labs (WBC)  Monitor for signs of aspiration (tachypnea, cough, rales, wheezing, cyanosis, fever)  - Elevate head of bed 30 degrees if patient has tube feeding   - Monitor tube feeding  Outcome: Completed Date Met: 11/30/18      Problem: Nutrition  Goal: Nutrition/Hydration status is improving  Monitor and assess patient's nutrition/hydration status for malnutrition (ex- brittle hair, bruises, dry skin, pale skin and conjunctiva, muscle wasting, smooth red tongue, and disorientation)  Collaborate with interdisciplinary team and initiate plan and interventions as ordered  Monitor patient's weight and dietary intake as ordered or per policy  Utilize nutrition screening tool and intervene per policy  Determine patient's food preferences and provide high-protein, high-caloric foods as appropriate  - Assist patient with eating   - Allow adequate time for meals   - Encourage patient to take dietary supplement as ordered  - Collaborate with clinical nutritionist   - Include patient/family/caregiver in decisions related to nutrition     Outcome: Progressing      Problem: PAIN - ADULT  Goal: Verbalizes/displays adequate comfort level or baseline comfort level  Interventions:  - Encourage patient to monitor pain and request assistance  - Assess pain using appropriate pain scale  - Administer analgesics based on type and severity of pain and evaluate response  - Implement non-pharmacological measures as appropriate and evaluate response  - Consider cultural and social influences on pain and pain management  - Notify physician/advanced practitioner if interventions unsuccessful or patient reports new pain   Outcome: Progressing      Problem: INFECTION - ADULT  Goal: Absence or prevention of progression during hospitalization  INTERVENTIONS:  - Assess and monitor for signs and symptoms of infection  - Monitor lab/diagnostic results  - Monitor all insertion sites, i e  indwelling lines, tubes, and drains  - Monitor endotracheal (as able) and nasal secretions for changes in amount and color  - [place] appropriate cooling/warming therapies per order  - Administer medications as ordered  - Instruct and encourage patient and family to use good hand hygiene technique  - Identify and instruct in appropriate isolation precautions for identified infection/condition   Outcome: Progressing    Goal: Absence of fever/infection during neutropenic period  INTERVENTIONS:  - Monitor WBC  - Implement neutropenic guidelines   Outcome: Progressing      Problem: SAFETY ADULT  Goal: Patient will remain free of falls  INTERVENTIONS:  - Assess patient frequently for physical needs  -  Identify cognitive and physical deficits and behaviors that affect risk of falls    -  [place] fall precautions as indicated by assessment   - Educate patient/family on patient safety including physical limitations  - Instruct patient to call for assistance with activity based on assessment  - Modify environment to reduce risk of injury  - Consider OT/PT consult to assist with strengthening/mobility    Outcome: Not Progressing    Goal: Maintain or return to baseline ADL function  INTERVENTIONS:  -  Assess patient's ability to carry out ADLs; assess patient's baseline for ADL function and identify physical deficits which impact ability to perform ADLs (bathing, care of mouth/teeth, toileting, grooming, dressing, etc )  - Assess/evaluate cause of self-care deficits   - Assess range of motion  - Assess patient's mobility; develop plan if impaired  - Assess patient's need for assistive devices and provide as appropriate  - Encourage maximum independence but intervene and supervise when necessary  ¯ Involve family in performance of ADLs  ¯ Assess for home care needs following discharge   ¯ Request OT consult to assist with ADL evaluation and planning for discharge  ¯ Provide patient education as appropriate   Outcome: Progressing    Goal: Maintain or return mobility status to optimal level  INTERVENTIONS:  - Assess patient's baseline mobility status (ambulation, transfers, stairs, etc )    - Identify cognitive and physical deficits and behaviors that affect mobility  - Identify mobility aids required to assist with transfers and/or ambulation (gait belt, sit-to-stand, lift, walker, cane, etc )  - [place] fall precautions as indicated by assessment  - Record patient progress and toleration of activity level on Mobility SBAR; progress patient to next Phase/Stage  - Instruct patient to call for assistance with activity based on assessment  - Request Rehabilitation consult to assist with strengthening/weightbearing, etc    Outcome: Progressing      Problem: DISCHARGE PLANNING  Goal: Discharge to home or other facility with appropriate resources  INTERVENTIONS:  - Identify barriers to discharge w/patient and caregiver  - Arrange for needed discharge resources and transportation as appropriate  - Identify discharge learning needs (meds, wound care, etc )  - Arrange for interpretive services to assist at discharge as needed  - Refer to Case Management Department for coordinating discharge planning if the patient needs post-hospital services based on physician/advanced practitioner order or complex needs related to functional status, cognitive ability, or social support system   Outcome: Progressing      Problem: Knowledge Deficit  Goal: Patient/family/caregiver demonstrates understanding of disease process, treatment plan, medications, and discharge instructions  Complete learning assessment and assess knowledge base  Interventions:  - Provide teaching at level of understanding  - Provide teaching via preferred learning methods   Outcome: Progressing      Problem: NEUROSENSORY - ADULT  Goal: Achieves stable or improved neurological status  INTERVENTIONS  - Monitor and report changes in neurological status  - Initiate measures to prevent increased intracranial pressure  - Maintain blood pressure and fluid volume within ordered parameters to optimize cerebral perfusion  - Monitor temperature, glucose, and sodium or any other associated labs   Initiate appropriate interventions as ordered  - Monitor for seizure activity   - Administer anti-seizure medications as ordered   Outcome: Progressing    Goal: Absence of seizures  INTERVENTIONS  - Monitor for seizure activity  - Administer anti-seizure medications as ordered  - Monitor neurological status   Outcome: Progressing    Goal: Remains free of injury related to seizures activity  INTERVENTIONS  - Maintain airway, patient safety  and administer oxygen as ordered  - Monitor patient for seizure activity, document and report duration and description of seizure to physician/advanced practitioner  - If seizure occurs,  ensure patient safety during seizure  - Reorient patient post seizure  - Seizure pads on all 4 side rails  - Instruct patient/family to notify RN of any seizure activity including if an aura is experienced  - Instruct patient/family to call for assistance with activity based on nursing assessment  - Administer anti-seizure medications as ordered  - Monitor fetal well being   Outcome: Progressing    Goal: Achieves maximal functionality and self care  INTERVENTIONS  - Monitor swallowing and airway patency with patient fatigue and changes in neurological status  - Encourage and assist patient to increase activity and self care with guidance from rehab services  - Encourage visually impaired, hearing impaired and aphasic patients to use assistive/communication devices   Outcome: Progressing      Problem: MUSCULOSKELETAL - ADULT  Goal: Maintain or return mobility to safest level of function  INTERVENTIONS:  - Assess patient's ability to carry out ADLs; assess patient's baseline for ADL function and identify physical deficits which impact ability to perform ADLs (bathing, care of mouth/teeth, toileting, grooming, dressing, etc )  - Assess/evaluate cause of self-care deficits   - Assess range of motion  - Assess patient's mobility; develop plan if impaired  - Assess patient's need for assistive devices and provide as appropriate  - Encourage maximum independence but intervene and supervise when necessary  - Involve family in performance of ADLs  - Assess for home care needs following discharge   - Request OT consult to assist with ADL evaluation and planning for discharge  - Provide patient education as appropriate   Outcome: Progressing    Goal: Maintain proper alignment of affected body part  INTERVENTIONS:  - Support, maintain and protect limb and body alignment  - Provide pt/fam with appropriate education   Outcome: Adequate for Discharge      Problem: Prexisting or High Potential for Compromised Skin Integrity  Goal: Skin integrity is maintained or improved  INTERVENTIONS:  - Identify patients at risk for skin breakdown  - Assess and monitor skin integrity  - Assess and monitor nutrition and hydration status  - Monitor labs (i e  albumin)  - Assess for incontinence   - Turn and reposition patient  - Assist with mobility/ambulation  - Relieve pressure over bony prominences  - Avoid friction and shearing  - Provide appropriate hygiene as needed including keeping skin clean and dry  - Evaluate need for skin moisturizer/barrier cream  - Collaborate with interdisciplinary team (i e  Nutrition, Rehabilitation, etc )   - Patient/family teaching   Outcome: Progressing      Problem: Nutrition/Hydration-ADULT  Goal: Nutrient/Hydration intake appropriate for improving, restoring or maintaining nutritional needs  Monitor and assess patient's nutrition/hydration status for malnutrition (ex- brittle hair, bruises, dry skin, pale skin and conjunctiva, muscle wasting, smooth red tongue, and disorientation)  Collaborate with interdisciplinary team and initiate plan and interventions as ordered  Monitor patient's weight and dietary intake as ordered or per policy  Utilize nutrition screening tool and intervene per policy  Determine patient's food preferences and provide high-protein, high-caloric foods as appropriate  INTERVENTIONS:  - Monitor oral intake, urinary output, labs, and treatment plans  - Assess nutrition and hydration status and recommend course of action  - Evaluate amount of meals eaten  - Assist patient with eating if necessary   - Allow adequate time for meals  - Recommend/ encourage appropriate diets, oral nutritional supplements, and vitamin/mineral supplements  - Order, calculate, and assess calorie counts as needed  - Recommend, monitor, and adjust tube feedings and TPN/PPN based on assessed needs  - Assess need for intravenous fluids  - Provide specific nutrition/hydration education as appropriate  - Include patient/family/caregiver in decisions related to nutrition   Outcome: Progressing      Problem: Potential for Falls  Goal: Patient will remain free of falls  INTERVENTIONS:  - Assess patient frequently for physical needs  -  Identify cognitive and physical deficits and behaviors that affect risk of falls    -  [place] fall precautions as indicated by assessment   - Educate patient/family on patient safety including physical limitations  - Instruct patient to call for assistance with activity based on assessment  - Modify environment to reduce risk of injury  - Consider OT/PT consult to assist with strengthening/mobility    Outcome: Not Progressing      Problem: DISCHARGE PLANNING - CARE MANAGEMENT  Goal: Discharge to post-acute care or home with appropriate resources  INTERVENTIONS:  - Conduct assessment to determine patient/family and health care team treatment goals, and need for post-acute services based on payer coverage, community resources, and patient preferences, and barriers to discharge  - Address psychosocial, clinical, and financial barriers to discharge as identified in assessment in conjunction with the patient/family and health care team  - Arrange appropriate level of post-acute services according to patient's   needs and preference and payer coverage in collaboration with the physician and health care team  - Communicate with and update the patient/family, physician, and health care team regarding progress on the discharge plan  - Arrange appropriate transportation to post-acute venues  - Pt to d/c with appropriate resources when medically stable     Outcome: Progressing

## 2018-11-30 NOTE — PROGRESS NOTES
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   -TCD BID for 21 days (through 12/17), R: 1 43 (1 6 11/29), L: 1 05 (1 0 11/29)  -Patient remains GCS 15 w/o any deficits on exam  -Nimodipine 30 mg Q2H PO soln 2/2 low BP (PO soln since unable to tolerate tablet)    -Consider trial of nimodipine 60 mg Q4H @night to optimize sleep-wake cycle   -May need to add midodrine or ramp up IVF to ensure BP tolerates higher dose  -Maintain goal SBP <140, MAP>65 to reduce vasospasm risk  -500 mg Keppra Q12H (completes course tomorrow), seizure precautions  -Monitor electrolytes for cerebral salt wasting w/ goal Na 140-150   -Encourage salty diet; consider salt tabs 2/2 Na 136 today  -Neuro checks Q2H, PT, OT  -May space neuro checks to Q4H overnight if patient able to tolerate Q4H nimodipine   -CTA 11/27/18 unchanged (obtained s/p syncope w/o fall)  -Tylenol 650 mg Q6H PRN for mild pain/HA/fever     2  Glaucoma  -Continue home Cosopt BID & Travoprost 1gtt OU QHS     CV:    -Goal SBP<140, MAP>65; off levo, currently at goal over past 24H  -SBP controlled without meds, no PRN labetalol/hydralazine needed  -May need to add low-dose midodrine if increasing nimodipine to 60 mg Q4H     1  s/p syncope 11/27/18: Likely orthostatic hypotensive episode 2/2 nimodipine  -No recurrent syncopal episodes  -Out of bed slowly with assistance, fall precautions     Pulm:   -No acute issues, SpO2>94% on RA  -Encourage incentive spirometry, deep breathing and coughing     GI:   -GI ppx: Not indicated  -Bowel regimen: senokot QD, miralax qd PRN, dulcolax supp  PRN  -No BM since 11/24 per patient, given miralax & senna (2 tabs) yesterday  -Will give dulcolax tab 5 mg today; consider mag citrate tomorrow if no BM      :  -UOP 2450 mL over past 24H (102 mL/hr)  -Monitor I&Os, net -806 mL over past 24H  -Restart IVF NS @75 mL/hr 2/2 net -806 L              -Euvolemia goal to reduce risk of vasospasm (net +/- 500 mL)  -Continue I/O monitoring, urinary retention protocol; no issues  -Cr 0 56 today (stable) s/p contrast load from CTA 11/27/18     F/E/N:   1   Fluids/Electrolytes  -IVF NS @75 mL/hr; will give albumin 12 5 g  -Goal Na 140-150, Na 136 today; encourage salty diet, consider salt tabs  -Mag 2 2, phos 4 0 s/p repletion via phos-nak 2 packets yesterday  -K 3 9, replete today via 20 mEq Kdur      2   Nutrition   -Regular diet, adequate PO intake 1074 over past 24H  -Encourage salty diet to keep Na 140-150     ID:    -No active issues, no evidence of infection, no leukocytosis, WBC 7 86  -Fever x1 yesterday a m  (100 5°F, but no tylenol given)   -Elevated temps yesterday (100°F) & overnight (99 9°F); currently afebrile  -Possible central fever 2/2 SAH; draw Cxs if WBC climbs and/or pt  symptomatic  -Continue to monitor    Heme:    -Hemoglobin 11 5, stable  -Continue to monitor, goal Hb>8   -DVT ppx: SQ Heparin; SCDs B/L     Endo:    -No active issues  -HbA1C 5 1% on admission  -Glucose of 100 on today's BMP (96 yesterday)     Msk/Skin:    -No active issues, patient continues to ambulate daily with assistance  -Frequent off loading & repositioning to prevent skin breakdown  -PICC placed R basilic 64/32/84 for ease of IVF, lab draws, site C/D/I    Code Status: Level 1 - Full Code  ______________________________________________________________________    24 Hour Events:  Patient had 1 episode of fever at 0800 a yesterday; had 2 additional elevated tabs yesterday afternoon and overnight but <100 4°F, patient reports being asymptomatic  Eating and drinking well has been ambulating around the unit without any lightheadedness or unsteadiness on her feet  Sleep remains disturbed by Q2H nimodipine administration  Has not had a bowel movement since , but denies any abdominal cramping or discomfort  No other complaints     ______________________________________________________________________    Physical Exam:   Physical Exam   Constitutional: She is oriented to person, place, and time  She appears well-developed and well-nourished  No distress  HENT:   Head: Normocephalic and atraumatic  Palatal torus R superior hard palate   Eyes: Pupils are equal, round, and reactive to light  Conjunctivae and EOM are normal    Neck: Normal range of motion  Cardiovascular: Normal rate, regular rhythm and intact distal pulses  2/6 LEW   Pulmonary/Chest: Effort normal and breath sounds normal    Abdominal: Soft  Bowel sounds are normal  She exhibits no distension  There is no tenderness  Musculoskeletal: Normal range of motion  She exhibits no edema  Neurological: She is alert and oriented to person, place, and time  No cranial nerve deficit or sensory deficit  She exhibits normal muscle tone     GCS 15, follows all commands, 5/5 motor strength in B/L UE & LE, sensation grossly intact, no pronator drift   Psychiatric: She has a normal mood and affect      ______________________________________________________________________  Vitals:    18 0500 18 0530 18 0600 18 0612   BP: 122/73 117/64  117/70   BP Location: Left arm Left arm     Pulse: 80 78 74    Resp: 20 15 14    Temp:       TempSrc:       SpO2: 100%      Weight:       Height:           Temperature:   Temp (24hrs), Av 6 °F (37 6 °C), Min:98 4 °F (36 9 °C), Max:100 5 °F (38 1 °C)    Current Temperature: 99 9 °F (37 7 °C)  Weights:   IBW: 54 7 kg    Body mass index is 23 35 kg/m²  Weight (last 2 days)     None          Intake and Outputs:  I/O       11/28 0701 - 11/29 0700 11/29 0701 - 11/30 0700    P  O  1076 1074    I V  (mL/kg) 1817 6 (29 5) 70 (1 1)    IV Piggyback  500    Total Intake(mL/kg) 2893 6 (46 9) 1644 (26 6)    Urine (mL/kg/hr) 4000 (2 7) 2450 (1 7)    Total Output 4000 2450    Net -1106 4 -806              UOP: 102 mL/hour     Nutrition:        Diet Orders            Start     Ordered    11/30/18 0733  Diet Regular; Regular House  Diet effective midnight     Comments:  Please give salty foods to boost sodium serum level   Question Answer Comment   Diet Type Regular    Regular Regular House    RD to adjust diet per protocol? No        11/30/18 0733          Labs:     Results from last 7 days  Lab Units 11/30/18 0452 11/28/18 0451 11/27/18  0440   WBC Thousand/uL 7 86 8 51 7 91   HEMOGLOBIN g/dL 11 5 11 9 12 0   HEMATOCRIT % 36 0 37 2 37 1   PLATELETS Thousands/uL 220 215 203   NEUTROS PCT % 67 83* 76*   MONOS PCT % 10 6 8       Results from last 7 days  Lab Units 11/30/18 0452 11/29/18 0450 11/28/18  0451   POTASSIUM mmol/L 3 9 4 0 3 6   CHLORIDE mmol/L 102 104 106   CO2 mmol/L 28 28 27   BUN mg/dL 10 7 8   CREATININE mg/dL 0 56* 0 49* 0 35*   CALCIUM mg/dL 8 2* 8 3 8 1*   ALK PHOS U/L  --  50  --    ALT U/L  --  25  --    AST U/L  --  25  --        Results from last 7 days  Lab Units 11/30/18 0452 11/29/18 0450 11/28/18  0451   MAGNESIUM mg/dL 2 2 2 3 2 2     Lab Results   Component Value Date    PHOS 4 0 11/30/2018    PHOS 3 1 11/29/2018    PHOS 2 9 11/28/2018        Results from last 7 days  Lab Units 11/24/18  2159   INR  1 05   PTT seconds 27       0  Lab Value Date/Time   TROPONINI <0 02 11/24/2018 1610     Allergies:    Allergies   Allergen Reactions    Codeine GI Intolerance     Medications:   Scheduled Meds:    Current Facility-Administered Medications:  acetaminophen 650 mg Oral Q6H PRN Susi Chaudhary MD   albumin human 12 5 g Intravenous Once Adalberto Le MD   bisacodyl 5 mg Oral Once Adalberto Le MD   bisacodyl 10 mg Rectal Daily PRN Adalberto eL MD   chlorhexidine 15 mL Swish & Spit Q12H [facility] Maria Victoria Dobson, DO   dorzolamide-timolol 1 drop Both Eyes BID Maria Victoria Dobson, DO   heparin (porcine) 5,000 Units Subcutaneous [facility] Iva Gamble DO   hydrALAZINE 5 mg Intravenous Q4H PRN Adalberto Le MD   labetalol 10 mg Intravenous Q4H PRN Maria Victoria Dobson, DO   levETIRAcetam 500 mg Oral Q12H Iva Gamble DO   niMODipine 30 mg Per NG Tube Q2H Adalberto Le MD   ondansetron 4 mg Intravenous Q4H PRN Adalberto Le MD   potassium chloride 20 mEq Oral Once Adalberto Le MD   senna-docusate sodium 2 tablet Oral HS Adalberto Le MD   sodium chloride 75 mL/hr Intravenous Continuous Adalberto Le MD   travoprost 1 drop Both Eyes HS Adalberto Le MD     Continuous Infusions:   PRN Meds:    acetaminophen 650 mg Q6H PRN   bisacodyl 10 mg Daily PRN   hydrALAZINE 5 mg Q4H PRN   labetalol 10 mg Q4H PRN   ondansetron 4 mg Q4H PRN     VTE Pharmacologic Prophylaxis: Heparin  VTE Mechanical Prophylaxis: sequential compression device  Invasive lines and devices: Invasive Devices     Peripherally Inserted Central Catheter Line            PICC Line 36/72/55 Right Basilic 1 day                Portions of the record may have been created with voice recognition software  Occasional wrong word or "sound a like" substitutions may have occurred due to the inherent limitations of voice recognition software  Read the chart carefully and recognize, using context, where substitutions have occurred      Adalberto Le MD

## 2018-11-30 NOTE — PROGRESS NOTES
Progress Note - Neurosurgery   Femi Carlos 69 y o  female MRN: [id number]  Unit/Bed#:  -01 Encounter: [id number]        Assessment:  1  Status post coil embolization for subarachnoid hemorrhage (11/25/2018)  2  HH 1, F3 SAH (11/24/2018)  3  BD ( 11/23/2018)  4  ACOM   5  Dizziness and Light headedness resolved        Plan:   § Exam: A&OX3, EOMI, conjugate, finger-to-nose is normal and without drift bilaterally  Strength is 5/5 throughout, sensation to light touch is normal bilaterally  DTR 3+ and symmetrical   No clonus or Babinski negative bilaterally  § Images:  CTA done on 11/24/2018 demonstrates diffuse subarachnoid hemorrhage throughout the basal cisterns, sylvian fissures and over the convexities without evidence of mass effect   Anterior communicating artery 5 mm aneurysm noted  § CTA or 11/27/2018 demonstrates status post coil embolization of anterior communicating artery and the postop day 2 resolving diffuse subarachnoid hemorrhage without evidence of vasospasm   Also area of decreased attenuation in the right basal ganglia compatible with age-indeterminate lacunar infraction  ? Pain control:  Primary team  ? DVT ppx:   § SCDs  § Continue heparin subcu  ? Seizure ppx:  Continue Keppra  ? Activity:  As tolerated  ? PT/OT evaluation & treatment  ? Medical Mx:  Per Primary team  ? Seizure prophylaxis:  continue Keppra  ? Neurocheck:  Q 1 H  CT head if GCS drops 2POINTS/1h  ? Continue Vasospasm watch  ? HOB>30-45 degree  ? Do=710  ? IO/OP=negative balance  ? TCD:   § Right =1 43  § Left = 1 05  ? SBP<220, currently 105/59  ? MAP>65, currently 79-89  ? No complaints overnight, neurological non focal   Nurse states salty diet added to her diet  Continuie close neuro monitoring, Vasospasm watch and encourage fluid intake  Call  for concern or problem  Subjective/Objective   Chief Complaint: " I am doing fine"/progress note    Subjective:  Reports states she is doing fine    Denies any headache, nausea or vomiting  Appetite is good denies any bowel or bladder issues  Denies fever, chills, rigors, cough or chest pain  Denies blurry vision or diplopia  Denies dizziness or lightheadedness  Denies numbness, weakness or paresthesia and extremities  She was up and walking around without gait instability or tendency to fall  Objective:  Patient is supine in bed in no pain distress    I/O       11/28 0701 - 11/29 0700 11/29 0701 - 11/30 0700 11/30 0701 - 12/01 0700    P  O  1076 1074     I V  (mL/kg) 1817 6 (29 5) 70 (1 1) 20 (0 3)    IV Piggyback  500     Total Intake(mL/kg) 2893 6 (46 9) 1644 (26 6) 20 (0 3)    Urine (mL/kg/hr) 4000 (2 7) 2450 (1 7)     Total Output 4000 2450      Net -1106 4 -806 +20                 Invasive Devices     Peripherally Inserted Central Catheter Line            PICC Line 36/76/65 Right Basilic 1 day                Physical Exam:  Vitals: Blood pressure 131/65, pulse 78, temperature 99 9 °F (37 7 °C), temperature source Oral, resp  rate 15, height 5' 4" (1 626 m), weight 61 7 kg (136 lb 0 4 oz), SpO2 93 %, not currently breastfeeding  ,Body mass index is 23 35 kg/m²          General appearance: alert, appears stated age, cooperative and no distress  Head: Normocephalic, without obvious abnormality, atraumatic  Eyes: EOMI, conjugate  Neck: supple, symmetrical, trachea midline and NT  Lungs: non labored breathing  Heart: regular heart rate  Neurologic:   Mental status: Alert, oriented x3, thought content appropriate  Cranial nerves: grossly intact (Cranial nerves II-XII)  Sensory: normal to light touch throughout  Motor: moving all extremities without focal weakness; strength is 5/5 bilaterally  Reflexes: 3+ and symmetric, without clonus and Babinski is negative bilaterally  Coordination: finger to nose normal bilaterally, no drift bilaterally      Lab Results:    Results from last 7 days  Lab Units 11/30/18  0452 11/28/18  0451 11/27/18  0440   WBC Thousand/uL 7 86 8  51 7 91   HEMOGLOBIN g/dL 11 5 11 9 12 0   HEMATOCRIT % 36 0 37 2 37 1   PLATELETS Thousands/uL 220 215 203   NEUTROS PCT % 67 83* 76*   MONOS PCT % 10 6 8       Results from last 7 days  Lab Units 11/30/18 0452 11/29/18 0450 11/28/18  0451   POTASSIUM mmol/L 3 9 4 0 3 6   CHLORIDE mmol/L 102 104 106   CO2 mmol/L 28 28 27   BUN mg/dL 10 7 8   CREATININE mg/dL 0 56* 0 49* 0 35*   CALCIUM mg/dL 8 2* 8 3 8 1*   ALK PHOS U/L  --  50  --    ALT U/L  --  25  --    AST U/L  --  25  --        Results from last 7 days  Lab Units 11/30/18 0452 11/29/18 0450 11/28/18  0451   MAGNESIUM mg/dL 2 2 2 3 2 2       Results from last 7 days  Lab Units 11/30/18  0452 11/29/18 0450 11/28/18  0451   PHOSPHORUS mg/dL 4 0 3 1 2 9       Results from last 7 days  Lab Units 11/24/18  2159   INR  1 05   PTT seconds 27     No results found for: TROPONINT  ABG:No results found for: PHART, HKH7NXO, PO2ART, TOX0ZBD, O4NUSFQH, BEART, SOURCE    Imaging Studies: I have personally reviewed pertinent reports  and I have personally reviewed pertinent films in PACS    EKG, Pathology, and Other Studies: I have personally reviewed pertinent reports        VTE Pharmacologic Prophylaxis: Heparin SQ    VTE Mechanical Prophylaxis: sequential compression device

## 2018-11-30 NOTE — PHYSICAL THERAPY NOTE
PHYSICAL THERAPY NOTE      Patient Name: Leesa Olea  NRPAR'Z Date: 11/30/2018 11/30/18 1144   Pain Assessment   Pain Assessment No/denies pain   Pain Score No Pain   Restrictions/Precautions   Weight Bearing Precautions Per Order No   Other Precautions Chair Alarm; Fall Risk;Multiple lines   General   Chart Reviewed Yes   Additional Pertinent History Status post coil embolization for subarachnoid hemorrhage (11/25/2018)   Response to Previous Treatment Patient with no complaints from previous session  Family/Caregiver Present No   Cognition   Overall Cognitive Status WFL   Arousal/Participation Cooperative   Attention Within functional limits   Orientation Level Oriented X4   Memory Within functional limits   Following Commands Follows all commands and directions without difficulty   Comments Pt pleasant and cooperative and able to converse beyoind basic needs   Subjective   Subjective Pt reports no complaints from previous session and is agreeable to participate in PT  Bed Mobility   Additional Comments Pt OOB in chair at the start of session   Transfers   Sit to Stand 5  Supervision   Additional items Increased time required;Armrests   Stand to Sit 5  Supervision   Additional items Increased time required;Verbal cues;Armrests   Ambulation/Elevation   Gait pattern Decreased foot clearance; Short stride   Gait Assistance 5  Supervision   Additional items Assist x 1;Verbal cues  (VC for speed and to avoid obstacles)   Assistive Device (IV pole)   Distance 525'   Balance   Static Sitting Fair +   Dynamic Sitting Fair   Static Standing Fair   Dynamic Standing Fair -   Ambulatory Fair -   Higher level balance Tandem   Higher level balance rep range (30s x 2 eyes open)   Endurance Deficit   Endurance Deficit Yes   Endurance Deficit Description Fatigue   Activity Tolerance   Activity Tolerance Patient tolerated treatment well Nurse Made Aware Okay to see per RN   Exercises   Hamstring Sets Sitting;20 reps;AROM; Bilateral  (Manual resistance knee flexion)   Quad Sets Sitting;20 reps;AROM; Bilateral  (Manual resistance knee extension)   Squat 20 reps;Standing;Sitting   Neuro re-ed Dynamic head turns during gait 4 sets x 8 turns   Balance training  tandem stance, narrow DONTE stance eyes open and eyes closed, narrow DONTE UE weight shift/reaching  (7')   Assessment   Prognosis Good   Problem List Decreased strength;Decreased endurance;Decreased mobility; Decreased safety awareness; Impaired balance   Assessment Pt seen for PT tx session for focus on high level balance during stance and ambulation  Pt was agreeable to participate in PT session  Pt was OOB at start of the session and performed the above listed therapeutic exercises  She then performed standing balance activities as detailed above and demonstrated good standing balance  Pt demonstrates intact and appropriate balance reactions but still has difficulty with dynamic balance  Pt ambulated greater than 500' using IV pole for support and performed dynamic gait turns which caused minor path deviation  She required VC to avoid striking IV pole on carts in hallway  Dynamic head turns not attempted without IV pole support  Pt was able to ambulate without IV pole support for 25' at end of session  Pt will progress to consistent ambulation without AD and increased awareness of path obstacles and will attempt stairs in future session  Pt still rpesents with deficits in balance strength and mobility that are a regression from baseline and she will continue to benefity from skilled therapy prior to DC to address these deficits  PT recommendation remains DC to OPPT when medically appropriate      Barriers to Discharge Inaccessible home environment  ([place])   Barriers to Discharge Comments Pt needs to demonstrate stair competence   Goals   Patient Goals Go home   STG Expiration Date 12/06/18   Short Term Goal #1 1  Pt will amb greater than 600' supervision w least restrictive device for community and household distances  2  Pt will ascend/descend 2 steps (S) for access to home  3  Pt will improve standing dynamic and ambulatory balance by one grade for improved safety during mobility  4  Pt will demonstrate minimal gait deviations with dynamic head turns during gait for safe ambulation  5  Pt will participate in strength, endurance, balance, and transfer training for improved activity tolerance  Treatment Day 3   Plan   Treatment/Interventions Functional transfer training;LE strengthening/ROM; Therapeutic exercise; Endurance training;Equipment eval/education;Gait training; Compensatory technique education   Progress Progressing toward goals   PT Frequency Other (Comment)  (3-5x/wk)   Recommendation   Recommendation Outpatient PT   Equipment Recommended Other (Comment)  (Continue to assess)   PT - OK to Discharge No  (Pending stairs)                   Felipe Jerome, SPT

## 2018-11-30 NOTE — PLAN OF CARE
Problem: PHYSICAL THERAPY ADULT  Goal: Performs mobility at highest level of function for planned discharge setting  See evaluation for individualized goals  Treatment/Interventions: Functional transfer training, LE strengthening/ROM, Therapeutic exercise, Endurance training, Cognitive reorientation, Patient/family training, Bed mobility, Gait training, Equipment eval/education, Spoke to nursing, Continued evaluation, Compensatory technique education  Equipment Recommended: Valeria Shaffer       See flowsheet documentation for full assessment, interventions and recommendations  Outcome: Progressing  Prognosis: Good  Problem List: Decreased strength, Decreased endurance, Decreased mobility, Decreased safety awareness, Impaired balance  Assessment:  Pt seen for PT tx session for focus on high level balance during stance and ambulation  Pt was agreeable to participate in PT session  Pt was OOB at start of the session and performed the above listed therapeutic exercises  She then performed standing balance activities as detailed above and demonstrated good standing balance  Pt demonstrates intact and appropriate balance reactions but still has difficulty with dynamic balance  Pt ambulated greater than 500' using IV pole for support and performed dynamic gait turns which caused minor path deviation  She required VC to avoid striking IV pole on carts in hallway  Dynamic head turns not attempted without IV pole support  Pt was able to ambulate without IV pole support for 25' at end of session  Pt will progress to consistent ambulation without AD and increased awareness of path obstacles and will attempt stairs in future session  Pt still rpesents with deficits in balance strength and mobility that are a regression from baseline and she will continue to benefity from skilled therapy prior to DC to address these deficits  PT recommendation remains DC to OPPT when medically appropriate     Barriers to Discharge: Inaccessible home environment ([place])  Barriers to Discharge Comments: Pt needs to demonstrate stair competence  Recommendation: Outpatient PT     PT - OK to Discharge: No (Pending stairs)    See flowsheet documentation for full assessment

## 2018-12-01 ENCOUNTER — APPOINTMENT (INPATIENT)
Dept: NON INVASIVE DIAGNOSTICS | Facility: HOSPITAL | Age: 78
DRG: 021 | End: 2018-12-01
Payer: MEDICARE

## 2018-12-01 LAB
ANION GAP SERPL CALCULATED.3IONS-SCNC: 4 MMOL/L (ref 4–13)
ANION GAP SERPL CALCULATED.3IONS-SCNC: 7 MMOL/L (ref 4–13)
BASOPHILS # BLD AUTO: 0.04 THOUSANDS/ΜL (ref 0–0.1)
BASOPHILS NFR BLD AUTO: 1 % (ref 0–1)
BUN SERPL-MCNC: 10 MG/DL (ref 5–25)
BUN SERPL-MCNC: 9 MG/DL (ref 5–25)
CALCIUM SERPL-MCNC: 8.2 MG/DL (ref 8.3–10.1)
CALCIUM SERPL-MCNC: 9 MG/DL (ref 8.3–10.1)
CHLORIDE SERPL-SCNC: 104 MMOL/L (ref 100–108)
CHLORIDE SERPL-SCNC: 99 MMOL/L (ref 100–108)
CO2 SERPL-SCNC: 27 MMOL/L (ref 21–32)
CO2 SERPL-SCNC: 28 MMOL/L (ref 21–32)
CREAT SERPL-MCNC: 0.45 MG/DL (ref 0.6–1.3)
CREAT SERPL-MCNC: 0.47 MG/DL (ref 0.6–1.3)
EOSINOPHIL # BLD AUTO: 0.08 THOUSAND/ΜL (ref 0–0.61)
EOSINOPHIL NFR BLD AUTO: 1 % (ref 0–6)
ERYTHROCYTE [DISTWIDTH] IN BLOOD BY AUTOMATED COUNT: 13.9 % (ref 11.6–15.1)
GFR SERPL CREATININE-BSD FRML MDRD: 96 ML/MIN/1.73SQ M
GFR SERPL CREATININE-BSD FRML MDRD: 97 ML/MIN/1.73SQ M
GLUCOSE SERPL-MCNC: 100 MG/DL (ref 65–140)
GLUCOSE SERPL-MCNC: 96 MG/DL (ref 65–140)
HCT VFR BLD AUTO: 33.2 % (ref 34.8–46.1)
HGB BLD-MCNC: 10.6 G/DL (ref 11.5–15.4)
IMM GRANULOCYTES # BLD AUTO: 0.05 THOUSAND/UL (ref 0–0.2)
IMM GRANULOCYTES NFR BLD AUTO: 1 % (ref 0–2)
LYMPHOCYTES # BLD AUTO: 1.35 THOUSANDS/ΜL (ref 0.6–4.47)
LYMPHOCYTES NFR BLD AUTO: 18 % (ref 14–44)
MAGNESIUM SERPL-MCNC: 2.1 MG/DL (ref 1.6–2.6)
MCH RBC QN AUTO: 30.8 PG (ref 26.8–34.3)
MCHC RBC AUTO-ENTMCNC: 31.9 G/DL (ref 31.4–37.4)
MCV RBC AUTO: 97 FL (ref 82–98)
MONOCYTES # BLD AUTO: 0.84 THOUSAND/ΜL (ref 0.17–1.22)
MONOCYTES NFR BLD AUTO: 11 % (ref 4–12)
NEUTROPHILS # BLD AUTO: 5.32 THOUSANDS/ΜL (ref 1.85–7.62)
NEUTS SEG NFR BLD AUTO: 68 % (ref 43–75)
NRBC BLD AUTO-RTO: 0 /100 WBCS
PHOSPHATE SERPL-MCNC: 3 MG/DL (ref 2.3–4.1)
PLATELET # BLD AUTO: 201 THOUSANDS/UL (ref 149–390)
PMV BLD AUTO: 9.5 FL (ref 8.9–12.7)
POTASSIUM SERPL-SCNC: 3.5 MMOL/L (ref 3.5–5.3)
POTASSIUM SERPL-SCNC: 3.6 MMOL/L (ref 3.5–5.3)
RBC # BLD AUTO: 3.44 MILLION/UL (ref 3.81–5.12)
SODIUM SERPL-SCNC: 131 MMOL/L (ref 136–145)
SODIUM SERPL-SCNC: 138 MMOL/L (ref 136–145)
WBC # BLD AUTO: 7.68 THOUSAND/UL (ref 4.31–10.16)

## 2018-12-01 PROCEDURE — 80048 BASIC METABOLIC PNL TOTAL CA: CPT | Performed by: INTERNAL MEDICINE

## 2018-12-01 PROCEDURE — 80048 BASIC METABOLIC PNL TOTAL CA: CPT | Performed by: FAMILY MEDICINE

## 2018-12-01 PROCEDURE — 85025 COMPLETE CBC W/AUTO DIFF WBC: CPT | Performed by: FAMILY MEDICINE

## 2018-12-01 PROCEDURE — 84100 ASSAY OF PHOSPHORUS: CPT | Performed by: FAMILY MEDICINE

## 2018-12-01 PROCEDURE — 99232 SBSQ HOSP IP/OBS MODERATE 35: CPT | Performed by: NEUROLOGICAL SURGERY

## 2018-12-01 PROCEDURE — 93886 INTRACRANIAL COMPLETE STUDY: CPT | Performed by: SURGERY

## 2018-12-01 PROCEDURE — 99291 CRITICAL CARE FIRST HOUR: CPT | Performed by: INTERNAL MEDICINE

## 2018-12-01 PROCEDURE — 93886 INTRACRANIAL COMPLETE STUDY: CPT

## 2018-12-01 PROCEDURE — 83735 ASSAY OF MAGNESIUM: CPT | Performed by: FAMILY MEDICINE

## 2018-12-01 RX ORDER — ONDANSETRON 2 MG/ML
4 INJECTION INTRAMUSCULAR; INTRAVENOUS ONCE AS NEEDED
Status: DISCONTINUED | OUTPATIENT
Start: 2018-12-01 | End: 2018-12-01 | Stop reason: SDUPTHER

## 2018-12-01 RX ORDER — SODIUM CHLORIDE 9 MG/ML
75 INJECTION, SOLUTION INTRAVENOUS CONTINUOUS
Status: DISCONTINUED | OUTPATIENT
Start: 2018-12-01 | End: 2018-12-03

## 2018-12-01 RX ORDER — ALBUMIN, HUMAN INJ 5% 5 %
25 SOLUTION INTRAVENOUS ONCE
Status: COMPLETED | OUTPATIENT
Start: 2018-12-01 | End: 2018-12-01

## 2018-12-01 RX ORDER — BISACODYL 10 MG
10 SUPPOSITORY, RECTAL RECTAL DAILY
Status: DISCONTINUED | OUTPATIENT
Start: 2018-12-01 | End: 2018-12-01

## 2018-12-01 RX ORDER — SODIUM CHLORIDE 1000 MG
2 TABLET, SOLUBLE MISCELLANEOUS
Status: DISCONTINUED | OUTPATIENT
Start: 2018-12-01 | End: 2018-12-02

## 2018-12-01 RX ORDER — NIMODIPINE 30 MG/1
60 CAPSULE, LIQUID FILLED ORAL
Status: DISCONTINUED | OUTPATIENT
Start: 2018-12-01 | End: 2018-12-01

## 2018-12-01 RX ORDER — POTASSIUM CHLORIDE 20 MEQ/1
40 TABLET, EXTENDED RELEASE ORAL ONCE
Status: COMPLETED | OUTPATIENT
Start: 2018-12-01 | End: 2018-12-01

## 2018-12-01 RX ORDER — NIMODIPINE 30 MG/1
30 CAPSULE, LIQUID FILLED ORAL
Status: DISCONTINUED | OUTPATIENT
Start: 2018-12-01 | End: 2018-12-03

## 2018-12-01 RX ADMIN — NIMODIPINE 30 MG: 30 CAPSULE, LIQUID FILLED ORAL at 14:12

## 2018-12-01 RX ADMIN — LEVETIRACETAM 500 MG: 500 TABLET, FILM COATED ORAL at 05:56

## 2018-12-01 RX ADMIN — SODIUM CHLORIDE TAB 1 GM 1 G: 1 TAB at 07:39

## 2018-12-01 RX ADMIN — SENNOSIDES AND DOCUSATE SODIUM 2 TABLET: 8.6; 5 TABLET ORAL at 22:14

## 2018-12-01 RX ADMIN — SODIUM CHLORIDE TAB 1 GM 2 G: 1 TAB at 12:09

## 2018-12-01 RX ADMIN — DORZOLAMIDE HYDROCHLORIDE AND TIMOLOL MALEATE 1 DROP: 20; 5 SOLUTION/ DROPS OPHTHALMIC at 00:31

## 2018-12-01 RX ADMIN — HEPARIN SODIUM 5000 UNITS: 5000 INJECTION INTRAVENOUS; SUBCUTANEOUS at 22:07

## 2018-12-01 RX ADMIN — TRAVOPROST OPHTHALMIC SOLUTION 1 DROP: 0.04 SOLUTION OPHTHALMIC at 22:08

## 2018-12-01 RX ADMIN — ALBUMIN HUMAN 25 G: 0.05 INJECTION, SOLUTION INTRAVENOUS at 12:21

## 2018-12-01 RX ADMIN — SODIUM CHLORIDE TAB 1 GM 2 G: 1 TAB at 16:05

## 2018-12-01 RX ADMIN — NIMODIPINE 30 MG: 30 CAPSULE, LIQUID FILLED ORAL at 00:30

## 2018-12-01 RX ADMIN — NIMODIPINE 30 MG: 30 CAPSULE, LIQUID FILLED ORAL at 19:53

## 2018-12-01 RX ADMIN — NIMODIPINE 30 MG: 30 CAPSULE, LIQUID FILLED ORAL at 05:55

## 2018-12-01 RX ADMIN — HEPARIN SODIUM 5000 UNITS: 5000 INJECTION INTRAVENOUS; SUBCUTANEOUS at 14:11

## 2018-12-01 RX ADMIN — CHLORHEXIDINE GLUCONATE 15 ML: 1.2 RINSE ORAL at 09:14

## 2018-12-01 RX ADMIN — FLUDROCORTISONE ACETATE 0.1 MG: 0.1 TABLET ORAL at 09:14

## 2018-12-01 RX ADMIN — NIMODIPINE 30 MG: 30 CAPSULE, LIQUID FILLED ORAL at 07:39

## 2018-12-01 RX ADMIN — NIMODIPINE 30 MG: 30 CAPSULE, LIQUID FILLED ORAL at 04:07

## 2018-12-01 RX ADMIN — NIMODIPINE 30 MG: 30 CAPSULE, LIQUID FILLED ORAL at 22:07

## 2018-12-01 RX ADMIN — POTASSIUM CHLORIDE 40 MEQ: 1500 TABLET, EXTENDED RELEASE ORAL at 16:05

## 2018-12-01 RX ADMIN — SODIUM CHLORIDE 75 ML/HR: 0.9 INJECTION, SOLUTION INTRAVENOUS at 09:43

## 2018-12-01 RX ADMIN — ACETAMINOPHEN 650 MG: 325 TABLET, FILM COATED ORAL at 09:50

## 2018-12-01 RX ADMIN — NIMODIPINE 30 MG: 30 CAPSULE, LIQUID FILLED ORAL at 16:05

## 2018-12-01 RX ADMIN — NIMODIPINE 30 MG: 30 CAPSULE, LIQUID FILLED ORAL at 02:04

## 2018-12-01 RX ADMIN — DORZOLAMIDE HYDROCHLORIDE AND TIMOLOL MALEATE 1 DROP: 20; 5 SOLUTION/ DROPS OPHTHALMIC at 12:09

## 2018-12-01 RX ADMIN — SODIUM CHLORIDE 500 ML: 0.9 INJECTION, SOLUTION INTRAVENOUS at 09:37

## 2018-12-01 RX ADMIN — NIMODIPINE 30 MG: 30 CAPSULE, LIQUID FILLED ORAL at 18:03

## 2018-12-01 RX ADMIN — HEPARIN SODIUM 5000 UNITS: 5000 INJECTION INTRAVENOUS; SUBCUTANEOUS at 05:56

## 2018-12-01 NOTE — PROGRESS NOTES
Progress Note - Neurosurgery   Bayron Rodriguez 69 y o  female MRN: [id number]  Unit/Bed#:  -01 Encounter: [id number]        Assessment:  1  Status post coil embolization for subarachnoid hemorrhage (11/25/2018)  2  HH 1, F3 SAH (11/24/2018)  3  BD ( 11/23/2018)  4  ACOM   5  Dizziness and Light headedness resolved        Plan:   § Exam: A&OX3, EOMI, conjugate, finger-to-nose is normal and without drift bilaterally  Strength is 5/5 throughout, sensation to light touch is normal bilaterally  DTR 3+ and symmetrical   No clonus or Babinski negative bilaterally  § Images:  CTA done on 11/24/2018 demonstrates diffuse subarachnoid hemorrhage throughout the basal cisterns, sylvian fissures and over the convexities without evidence of mass effect   Anterior communicating artery 5 mm aneurysm noted  § CTA or 11/27/2018 demonstrates status post coil embolization of anterior communicating artery and the postop day 2 resolving diffuse subarachnoid hemorrhage without evidence of vasospasm   Also area of decreased attenuation in the right basal ganglia compatible with age-indeterminate lacunar infraction  ? Pain control:  Primary team  ? DVT ppx:   § SCDs  § Continue heparin subcu  ? Seizure ppx:  Continue Keppra  ? Activity:  As tolerated  ? PT/OT evaluation & treatment  ? Medical Mx:  Per Primary team  ? Seizure prophylaxis:  continue Keppra  ? Neurocheck:  Q 1 H  CT head if GCS drops 2POINTS/1h  ? Continue Vasospasm watch  ? HOB>30-45 degree  ? Jd=715  ? IO/OP=negative balance  ? TCD:   § Right =1 43  § Left = 1 05  ? SBP<220, currently 105/59  ? MAP>65, currently 79-89  ? No complaints overnight, neurological non focal   Nurse states salty diet added to her diet  Continuie close neuro monitoring, Vasospasm watch and encourage fluid intake  Call  for concern or problem        Subjective/Objective   Chief Complaint:aneurysmal subarachnoid hemorrhage    Subjective:  No acute issues patient reports no symptoms  Objective:  Patient is supine in bed in no pain distress    I/O       11/28 0701 - 11/29 0700 11/29 0701 - 11/30 0700 11/30 0701 - 12/01 0700    P  O  1076 1074     I V  (mL/kg) 1817 6 (29 5) 70 (1 1) 20 (0 3)    IV Piggyback  500     Total Intake(mL/kg) 2893 6 (46 9) 1644 (26 6) 20 (0 3)    Urine (mL/kg/hr) 4000 (2 7) 2450 (1 7)     Total Output 4000 2450      Net -1106 4 -806 +20                 Invasive Devices     Peripherally Inserted Central Catheter Line            PICC Line 62/78/42 Right Basilic 2 days                Physical Exam:  Vitals: Blood pressure 118/60, pulse 82, temperature 100 1 °F (37 8 °C), temperature source Oral, resp  rate 16, height 5' 4" (1 626 m), weight 61 7 kg (136 lb 0 4 oz), SpO2 95 %, not currently breastfeeding  ,Body mass index is 23 35 kg/m²          General appearance: alert, appears stated age, cooperative and no distress  Head: Normocephalic, without obvious abnormality, atraumatic  Eyes: EOMI, conjugate  Neck: supple, symmetrical, trachea midline and NT  Lungs: non labored breathing  Heart: regular heart rate  Neurologic:   Mental status: Alert, oriented x3, thought content appropriate  Cranial nerves: grossly intact (Cranial nerves II-XII)  Sensory: normal to light touch throughout  Motor: moving all extremities without focal weakness; strength is 5/5 bilaterally  Reflexes: 3+ and symmetric, without clonus and Babinski is negative bilaterally  Coordination: finger to nose normal bilaterally, no drift bilaterally      Lab Results:    Results from last 7 days  Lab Units 12/01/18 0538 11/30/18 0452 11/28/18  0451   WBC Thousand/uL 7 68 7 86 8 51   HEMOGLOBIN g/dL 10 6* 11 5 11 9   HEMATOCRIT % 33 2* 36 0 37 2   PLATELETS Thousands/uL 201 220 215   NEUTROS PCT % 68 67 83*   MONOS PCT % 11 10 6       Results from last 7 days  Lab Units 12/01/18  0538 11/30/18 0452 11/29/18  0450   POTASSIUM mmol/L 3 6 3 9 4 0   CHLORIDE mmol/L 99* 102 104   CO2 mmol/L 28 28 28   BUN mg/dL 9 10 7   CREATININE mg/dL 0 47* 0 56* 0 49*   CALCIUM mg/dL 9 0 8 2* 8 3   ALK PHOS U/L  --   --  50   ALT U/L  --   --  25   AST U/L  --   --  25       Results from last 7 days  Lab Units 12/01/18  0538 11/30/18  0452 11/29/18  0450   MAGNESIUM mg/dL 2 1 2 2 2 3       Results from last 7 days  Lab Units 12/01/18  0538 11/30/18  0452 11/29/18  0450   PHOSPHORUS mg/dL 3 0 4 0 3 1       Results from last 7 days  Lab Units 11/24/18  2159   INR  1 05   PTT seconds 27     No results found for: TROPONINT  ABG:No results found for: PHART, MRJ0LOK, PO2ART, ACX4RZP, H0UIPOCG, BEART, SOURCE    Imaging Studies: I have personally reviewed pertinent reports  and I have personally reviewed pertinent films in PACS    EKG, Pathology, and Other Studies: I have personally reviewed pertinent reports        VTE Pharmacologic Prophylaxis: Heparin SQ    VTE Mechanical Prophylaxis: sequential compression device

## 2018-12-01 NOTE — PROGRESS NOTES
Progress Note - Critical Care   Bayron Rodriguez 69 y o  female MRN: [id number]  Unit/Bed#:  -01 Encounter: [id number]    Impression:  Principal Problem:    SAH (subarachnoid hemorrhage) (HCC)  Active Problems:    Glaucoma    S/P coil embolization of cerebral aneurysm    Constipated  Resolved Problems:    Hypophosphatemia    Hypokalemia      Plan:  1  SAH (HH1, F3) 2/2 ACoA aneurysm, bleed day #8  -POD#6 s/p coiling embolization of anterior communicating artery 5 mm aneurysm   -TCD BID for 21 days (through 12/17), R: 1 43 (1 6 11/29), L: 1 05 (1 0 11/29)  -Patient remains GCS 15 w/o any deficits on exam  -Nimodipine 30 mg Q2H PO soln 2/2 low BP (PO soln since unable to tolerate tablet)   -Maintain goal SBP <220, MAP>65 to reduce vasospasm risk  -500 mg Keppra Q12H  seizure precaution - stop after today   -Monitor electrolytes for cerebral salt wasting w/ goal Na 140-150              -Encourage salty diet; consider salt tabs 2/2 Na 131 today  -Neuro checks Q2H, PT, OT  -CTA 11/27/18 unchanged (obtained s/p syncope w/o fall)  -Tylenol 650 mg Q6H PRN for mild pain/HA/fever     2  Glaucoma  -Continue home Cosopt BID & Travoprost 1gtt OU QHS     CV:    -Goal SBP<220, MAP>65; off levo, currently at goal over past 24H  -SBP controlled without meds, no PRN labetalol/hydralazine needed       1  s/p syncope 11/27/18: Likely orthostatic hypotensive episode 2/2 nimodipine  -No recurrent syncopal episodes  -Out of bed slowly with assistance, fall precautions - walking w/o dizziness      Pulm:   -No acute issues, SpO2>94% on RA  -Encourage incentive spirometry, deep breathing and coughing     GI:   -Bowel regimen: senokot QD, miralax qd PRN, dulcolax supp   PRN  -No BM since 11/24 per patient, given miralax & senna (2 tabs) yesterday  -Will give dulcolax tab 5 mg today; consider supository no BM      :  - mL over past 24H   -Monitor I&Os, net +563 mL over past 24H  -DC IVF Daisy@[employer]               -Euvolemia goal to reduce risk of vasospasm (net +/- 500 mL)  -Continue I/O monitoring, urinary retention protocol; no issues       F/E/N:   1  Fluids/Electrolytes  -Goal Na 140-150, Na 131 today; encourage salty diet, consider salt tabs  -Mag 2 1, phos 3 0   -K 3 6, replete today via 20 mEq Kdur      2   Nutrition   -Regular diet,   -Encourage salty diet to keep Na 140-150     ID:    -No active issues, no evidence of infection, no leukocytosis, WBC 7 86  -Elevated temps overnight (100 4°F)  -Continue to monitor     Heme:    -Hemoglobin 10 6, stable  -Continue to monitor, goal Hb>8   -DVT ppx: SQ Heparin; SCDs B/L     Endo:    -No active issues     Msk/Skin:    -No active issues, patient continues to ambulate daily with assistance  -Frequent off loading & repositioning to prevent skin breakdown  -PICC placed R basilic          Disposition:  Continue ICU care per Neurosurgery    Code Status: Level 1 - Full Code    Counseling / Coordination of Care  Total time spent today 30 minutes  Greater than 50% of total time was spent with the patient and / or family counseling and / or coordination of care  A description of the counseling / coordination of care: 10          HPI/24hr events:     No acute events overnight patient does not complain of any pain, feeling comfortable stated ambulation is improving       Physical Exam   Vitals:   Vitals:    18 0505 18 0555 18 0605 18 0705   BP: 114/63 122/69 109/66 118/60   Pulse: 82  84 82   Resp: 14  12 16   Temp:       TempSrc:       SpO2: 94%  94% 95%   Weight:       Height:         Arterial Line BP: 90/80  Arterial Line MAP (mmHg): 84 mmHg    Tele Rhythm: NSR This was personally reviewed by myself  Temperature: Temp (24hrs), Av °F (37 2 °C), Min:98 3 °F (36 8 °C), Max:100 1 °F (37 8 °C)  Current: Temperature: 100 1 °F (37 8 °C)    Weights: IBW: 54 7 kg  Body mass index is 23 35 kg/m²        Physical Exam   Constitutional: She is oriented to person, place, and time  She appears well-developed and well-nourished  No distress  HENT:   Head: Normocephalic and atraumatic  Eyes: Pupils are equal, round, and reactive to light  Conjunctivae and EOM are normal  No scleral icterus  Neck: Normal range of motion  Neck supple  No JVD present  No tracheal deviation present  Cardiovascular: Normal rate, regular rhythm, normal heart sounds and intact distal pulses  Exam reveals no friction rub  No murmur heard  Pulmonary/Chest: Effort normal and breath sounds normal  No stridor  No respiratory distress  She has no wheezes  She has no rales  Abdominal: Soft  Bowel sounds are normal  She exhibits no distension  There is no tenderness  Musculoskeletal: She exhibits no edema  MSK 5/5 bilateral lower and upper extremities   Neurological: She is alert and oriented to person, place, and time  She has normal reflexes  A logically intact   Skin: Skin is warm and dry  She is not diaphoretic  Psychiatric: She has a normal mood and affect  Her behavior is normal          Respiratory:  SpO2: SpO2: 95 %, SpO2 Activity: SpO2 Activity: At Rest, SpO2 Device: O2 Device: None (Room air)  O2 Flow Rate (L/min): 2 L/min    Non-Invasive/Invasive Ventilation Settings:  Respiratory    Lab Data (Last 4 hours)    None         O2/Vent Data (Last 4 hours)    None              No results found for: PHART, AYX2OGY, PO2ART, WQI0MVQ, G3TQGFJK, BEART, SOURCE    Intake and Outputs:  Intake/Output Summary (Last 24 hours) at 12/01/18 0726  Last data filed at 11/30/18 1842   Gross per 24 hour   Intake             1413 ml   Output              850 ml   Net              563 ml     I/O last 24 hours: In: 1332 [P O :718;  I V :445; IV Piggyback:250]  Out: 850 [Urine:850]    Nutrition:        Diet Orders            Start     Ordered    11/30/18 0733  Diet Regular; Regular House  Diet effective midnight     Comments:  Please give salty foods to boost sodium serum level   Question Answer Comment   Diet Type Regular    Regular Regular House    RD to adjust diet per protocol? No        11/30/18 0733        Labs:     Results from last 7 days  Lab Units 12/01/18  0538 11/30/18  0452 11/28/18  0451   WBC Thousand/uL 7 68 7 86 8 51   HEMOGLOBIN g/dL 10 6* 11 5 11 9   HEMATOCRIT % 33 2* 36 0 37 2   PLATELETS Thousands/uL 201 220 215   NEUTROS PCT % 68 67 83*   MONOS PCT % 11 10 6       Results from last 7 days  Lab Units 12/01/18  0538 11/30/18  0452 11/29/18  0450   POTASSIUM mmol/L 3 6 3 9 4 0   CHLORIDE mmol/L 99* 102 104   CO2 mmol/L 28 28 28   BUN mg/dL 9 10 7   CREATININE mg/dL 0 47* 0 56* 0 49*   CALCIUM mg/dL 9 0 8 2* 8 3   ALK PHOS U/L  --   --  50   ALT U/L  --   --  25   AST U/L  --   --  25       Results from last 7 days  Lab Units 12/01/18  0538 11/30/18  0452 11/29/18  0450   MAGNESIUM mg/dL 2 1 2 2 2 3       Results from last 7 days  Lab Units 12/01/18  0538 11/30/18  0452 11/29/18  0450   PHOSPHORUS mg/dL 3 0 4 0 3 1        Results from last 7 days  Lab Units 11/24/18  2159   INR  1 05   PTT seconds 27           0  Lab Value Date/Time   TROPONINI <0 02 11/24/2018 1610     ABG:      Imaging:   RIGHT SIDE:  Evaluation shows no evidence of vasospasm in the MCA, CHEMO, or PCA  The  [place] ratio is normal, 1 43  Prior: 1 6  LEFT SIDE:  Evaluation shows no evidence of vasospasm in the MCA, CHEMO, or PCA  The  [place] ratio is normal, 1 05  Prior: 1 0  Micro:   Blood Culture: No results found for: BLOODCX  Urine Culture: No results found for: URINECX  Sputum Culture: No components found for: SPUTUMCX  Wound Culure: No results found for: WOUNDCULT        Allergies:    Allergies   Allergen Reactions    Codeine GI Intolerance       Medications:   Scheduled Meds:  Current Facility-Administered Medications:  acetaminophen 650 mg Oral Q6H PRN Bonilla Ureña MD   bisacodyl 10 mg Rectal Daily PRN Bonilla Ureña MD   chlorhexidine 15 mL Swish & Spit Q12H [address] Tao Dobson, DO   dorzolamide-timolol 1 drop Both Eyes BID Tania Dobson, DO   fludrocortisone 0 1 mg Oral Daily Bonilla Ureña MD   heparin (porcine) 5,000 Units Subcutaneous [facility] Juan Carlos Malloy, DO   hydrALAZINE 5 mg Intravenous Q4H PRN Bonilla Ureña MD   labetalol 10 mg Intravenous Q4H PRN Tania Dobson, DO   niMODipine 30 mg Per NG Tube Q2H Bonilla Ureña MD   ondansetron 4 mg Intravenous Q4H PRN Bonilla Ureña MD   senna-docusate sodium 2 tablet Oral HS Bonilla Ureña MD   sodium chloride 1 g Oral TID With Meals Bonilla Ureña MD   travoprost 1 drop Both Eyes HS Bonilla Ureña MD       VTE Pharmacologic Prophylaxis: Sequential compression device (Venodyne)  and Heparin  VTE Mechanical Prophylaxis: sequential compression device    Continuous Infusions:   PRN Meds:    acetaminophen 650 mg Q6H PRN   bisacodyl 10 mg Daily PRN   hydrALAZINE 5 mg Q4H PRN   labetalol 10 mg Q4H PRN   ondansetron 4 mg Q4H PRN       Invasive lines and devices:   Invasive Devices     Peripherally Inserted Central Catheter Line            PICC Line 48/13/76 Right Basilic 2 days                Code Status: Level 1 - Full Code      SIGNATURE: Jonathan Livingston PA-C  DATE: December 1, 2018  TIME: 7:26 AM

## 2018-12-02 ENCOUNTER — APPOINTMENT (INPATIENT)
Dept: NON INVASIVE DIAGNOSTICS | Facility: HOSPITAL | Age: 78
DRG: 021 | End: 2018-12-02
Payer: MEDICARE

## 2018-12-02 LAB
ANION GAP SERPL CALCULATED.3IONS-SCNC: 7 MMOL/L (ref 4–13)
BASOPHILS # BLD AUTO: 0.06 THOUSANDS/ΜL (ref 0–0.1)
BASOPHILS NFR BLD AUTO: 1 % (ref 0–1)
BUN SERPL-MCNC: 7 MG/DL (ref 5–25)
CALCIUM SERPL-MCNC: 8.4 MG/DL (ref 8.3–10.1)
CHLORIDE SERPL-SCNC: 101 MMOL/L (ref 100–108)
CO2 SERPL-SCNC: 26 MMOL/L (ref 21–32)
CREAT SERPL-MCNC: 0.47 MG/DL (ref 0.6–1.3)
EOSINOPHIL # BLD AUTO: 0.14 THOUSAND/ΜL (ref 0–0.61)
EOSINOPHIL NFR BLD AUTO: 2 % (ref 0–6)
ERYTHROCYTE [DISTWIDTH] IN BLOOD BY AUTOMATED COUNT: 13.6 % (ref 11.6–15.1)
GFR SERPL CREATININE-BSD FRML MDRD: 96 ML/MIN/1.73SQ M
GLUCOSE SERPL-MCNC: 96 MG/DL (ref 65–140)
HCT VFR BLD AUTO: 33 % (ref 34.8–46.1)
HGB BLD-MCNC: 10.6 G/DL (ref 11.5–15.4)
IMM GRANULOCYTES # BLD AUTO: 0.03 THOUSAND/UL (ref 0–0.2)
IMM GRANULOCYTES NFR BLD AUTO: 0 % (ref 0–2)
LYMPHOCYTES # BLD AUTO: 1.1 THOUSANDS/ΜL (ref 0.6–4.47)
LYMPHOCYTES NFR BLD AUTO: 14 % (ref 14–44)
MAGNESIUM SERPL-MCNC: 2.2 MG/DL (ref 1.6–2.6)
MCH RBC QN AUTO: 31.1 PG (ref 26.8–34.3)
MCHC RBC AUTO-ENTMCNC: 32.1 G/DL (ref 31.4–37.4)
MCV RBC AUTO: 97 FL (ref 82–98)
MONOCYTES # BLD AUTO: 0.82 THOUSAND/ΜL (ref 0.17–1.22)
MONOCYTES NFR BLD AUTO: 11 % (ref 4–12)
NEUTROPHILS # BLD AUTO: 5.68 THOUSANDS/ΜL (ref 1.85–7.62)
NEUTS SEG NFR BLD AUTO: 72 % (ref 43–75)
NRBC BLD AUTO-RTO: 0 /100 WBCS
PHOSPHATE SERPL-MCNC: 2.4 MG/DL (ref 2.3–4.1)
PLATELET # BLD AUTO: 219 THOUSANDS/UL (ref 149–390)
PMV BLD AUTO: 9.5 FL (ref 8.9–12.7)
POTASSIUM SERPL-SCNC: 3.6 MMOL/L (ref 3.5–5.3)
RBC # BLD AUTO: 3.41 MILLION/UL (ref 3.81–5.12)
SODIUM SERPL-SCNC: 134 MMOL/L (ref 136–145)
WBC # BLD AUTO: 7.83 THOUSAND/UL (ref 4.31–10.16)

## 2018-12-02 PROCEDURE — 80048 BASIC METABOLIC PNL TOTAL CA: CPT | Performed by: PHYSICIAN ASSISTANT

## 2018-12-02 PROCEDURE — 83735 ASSAY OF MAGNESIUM: CPT | Performed by: PHYSICIAN ASSISTANT

## 2018-12-02 PROCEDURE — 85025 COMPLETE CBC W/AUTO DIFF WBC: CPT | Performed by: PHYSICIAN ASSISTANT

## 2018-12-02 PROCEDURE — 84100 ASSAY OF PHOSPHORUS: CPT | Performed by: PHYSICIAN ASSISTANT

## 2018-12-02 PROCEDURE — 99232 SBSQ HOSP IP/OBS MODERATE 35: CPT | Performed by: ANESTHESIOLOGY

## 2018-12-02 PROCEDURE — 93886 INTRACRANIAL COMPLETE STUDY: CPT | Performed by: SURGERY

## 2018-12-02 PROCEDURE — 93886 INTRACRANIAL COMPLETE STUDY: CPT

## 2018-12-02 PROCEDURE — 99232 SBSQ HOSP IP/OBS MODERATE 35: CPT | Performed by: NEUROLOGICAL SURGERY

## 2018-12-02 PROCEDURE — 93005 ELECTROCARDIOGRAM TRACING: CPT

## 2018-12-02 RX ORDER — SODIUM CHLORIDE 1000 MG
3 TABLET, SOLUBLE MISCELLANEOUS
Status: DISCONTINUED | OUTPATIENT
Start: 2018-12-02 | End: 2018-12-08

## 2018-12-02 RX ADMIN — NIMODIPINE 30 MG: 30 CAPSULE, LIQUID FILLED ORAL at 14:44

## 2018-12-02 RX ADMIN — FLUDROCORTISONE ACETATE 0.1 MG: 0.1 TABLET ORAL at 11:25

## 2018-12-02 RX ADMIN — SODIUM CHLORIDE TAB 1 GM 3 G: 1 TAB at 15:57

## 2018-12-02 RX ADMIN — NIMODIPINE 30 MG: 30 CAPSULE, LIQUID FILLED ORAL at 02:04

## 2018-12-02 RX ADMIN — SODIUM CHLORIDE TAB 1 GM 2 G: 1 TAB at 07:58

## 2018-12-02 RX ADMIN — SODIUM CHLORIDE TAB 1 GM 3 G: 1 TAB at 11:28

## 2018-12-02 RX ADMIN — HEPARIN SODIUM 5000 UNITS: 5000 INJECTION INTRAVENOUS; SUBCUTANEOUS at 13:19

## 2018-12-02 RX ADMIN — NIMODIPINE 30 MG: 30 CAPSULE, LIQUID FILLED ORAL at 13:19

## 2018-12-02 RX ADMIN — NIMODIPINE 30 MG: 30 CAPSULE, LIQUID FILLED ORAL at 06:05

## 2018-12-02 RX ADMIN — SODIUM CHLORIDE 500 ML: 0.9 INJECTION, SOLUTION INTRAVENOUS at 12:30

## 2018-12-02 RX ADMIN — NIMODIPINE 30 MG: 30 CAPSULE, LIQUID FILLED ORAL at 15:57

## 2018-12-02 RX ADMIN — ACETAMINOPHEN 650 MG: 325 TABLET, FILM COATED ORAL at 08:16

## 2018-12-02 RX ADMIN — TRAVOPROST OPHTHALMIC SOLUTION 1 DROP: 0.04 SOLUTION OPHTHALMIC at 22:04

## 2018-12-02 RX ADMIN — NIMODIPINE 30 MG: 30 CAPSULE, LIQUID FILLED ORAL at 18:27

## 2018-12-02 RX ADMIN — SODIUM CHLORIDE 500 ML: 0.9 INJECTION, SOLUTION INTRAVENOUS at 13:19

## 2018-12-02 RX ADMIN — NIMODIPINE 30 MG: 30 CAPSULE, LIQUID FILLED ORAL at 22:04

## 2018-12-02 RX ADMIN — NIMODIPINE 30 MG: 30 CAPSULE, LIQUID FILLED ORAL at 20:19

## 2018-12-02 RX ADMIN — DORZOLAMIDE HYDROCHLORIDE AND TIMOLOL MALEATE 1 DROP: 20; 5 SOLUTION/ DROPS OPHTHALMIC at 11:29

## 2018-12-02 RX ADMIN — NIMODIPINE 30 MG: 30 CAPSULE, LIQUID FILLED ORAL at 11:25

## 2018-12-02 RX ADMIN — HEPARIN SODIUM 5000 UNITS: 5000 INJECTION INTRAVENOUS; SUBCUTANEOUS at 06:05

## 2018-12-02 RX ADMIN — NIMODIPINE 30 MG: 30 CAPSULE, LIQUID FILLED ORAL at 00:35

## 2018-12-02 RX ADMIN — NIMODIPINE 30 MG: 30 CAPSULE, LIQUID FILLED ORAL at 03:35

## 2018-12-02 RX ADMIN — HEPARIN SODIUM 5000 UNITS: 5000 INJECTION INTRAVENOUS; SUBCUTANEOUS at 22:04

## 2018-12-02 RX ADMIN — NIMODIPINE 30 MG: 30 CAPSULE, LIQUID FILLED ORAL at 07:59

## 2018-12-02 RX ADMIN — DORZOLAMIDE HYDROCHLORIDE AND TIMOLOL MALEATE 1 DROP: 20; 5 SOLUTION/ DROPS OPHTHALMIC at 00:40

## 2018-12-02 NOTE — PROGRESS NOTES
Progress Note - Neurosurgery   Venora Alcoa 69 y o  female MRN: [id number]  Unit/Bed#: CW -01 Encounter: [id number]        Assessment:  1  Status post coil embolization for subarachnoid hemorrhage (11/25/2018)  2  HH 1, F3 SAH (11/24/2018)  3  BD ( 11/23/2018)  4  ACOM   5  Dizziness and Light headedness resolved        Plan:   § Exam: A&OX3, EOMI, conjugate, finger-to-nose is normal and without drift bilaterally  Strength is 5/5 throughout, sensation to light touch is normal bilaterally  DTR 3+ and symmetrical   No clonus or Babinski negative bilaterally  § Images:  CTA done on 11/24/2018 demonstrates diffuse subarachnoid hemorrhage throughout the basal cisterns, sylvian fissures and over the convexities without evidence of mass effect   Anterior communicating artery 5 mm aneurysm noted  § CTA or 11/27/2018 demonstrates status post coil embolization of anterior communicating artery and the postop day 2 resolving diffuse subarachnoid hemorrhage without evidence of vasospasm   Also area of decreased attenuation in the right basal ganglia compatible with age-indeterminate lacunar infraction  ? Pain control:  Primary team  ? DVT ppx:   § SCDs  § Continue heparin subcu  ? Seizure ppx:  Continue Keppra  ? Activity:  As tolerated  ? PT/OT evaluation & treatment  ? Medical Mx:  Per Primary team  ? Seizure prophylaxis:  continue Keppra  ? Neurocheck:  Q 1 H  CT head if GCS drops 2POINTS/1h  ? Continue Vasospasm watch  ? HOB>30-45 degree  ? Ts=474  ? IO/OP=negative balance  ? TCD:   § Right =1 43  § Left = 1 05  ? SBP<220, currently 105/59  ? MAP>65, currently 79-89  ? No complaints overnight, neurological non focal   Continuie close neuro monitoring, Vasospasm watch and encourage fluid intake  Call  for concern or problem   TCDs good today      Subjective/Objective   Chief Complaint:aneurysmal subarachnoid hemorrhage    Subjective:  No complaints was on the phone when patient was seen    Objective:  Patient sitting upright in bed no distress    I/O       11/28 0701 - 11/29 0700 11/29 0701 - 11/30 0700 11/30 0701 - 12/01 0700    P  O  1076 1074     I V  (mL/kg) 1817 6 (29 5) 70 (1 1) 20 (0 3)    IV Piggyback  500     Total Intake(mL/kg) 2893 6 (46 9) 1644 (26 6) 20 (0 3)    Urine (mL/kg/hr) 4000 (2 7) 2450 (1 7)     Total Output 4000 2450      Net -1106 4 -806 +20                 Invasive Devices     Peripherally Inserted Central Catheter Line            PICC Line 26/58/27 Right Basilic 3 days                Physical Exam:  Vitals: Blood pressure 109/65, pulse 92, temperature 99 7 °F (37 6 °C), temperature source Oral, resp  rate 16, height 5' 4" (1 626 m), weight 61 7 kg (136 lb 0 4 oz), SpO2 96 %, not currently breastfeeding  ,Body mass index is 23 35 kg/m²          General appearance: alert, appears stated age, cooperative and no distress  Head: Normocephalic, without obvious abnormality, atraumatic  Eyes: EOMI, conjugate  Neck: supple, symmetrical, trachea midline and NT  Lungs: non labored breathing  Heart: regular heart rate  Neurologic:   Mental status: Alert, oriented x3, thought content appropriate  Cranial nerves: grossly intact (Cranial nerves II-XII)  Sensory: normal to light touch throughout  Motor: moving all extremities without focal weakness; strength is 5/5 bilaterally  Reflexes: 3+ and symmetric, without clonus and Babinski is negative bilaterally  Coordination: finger to nose normal bilaterally, no drift bilaterally      Lab Results:    Results from last 7 days  Lab Units 12/02/18  0605 12/01/18  0538 11/30/18  0452   WBC Thousand/uL 7 83 7 68 7 86   HEMOGLOBIN g/dL 10 6* 10 6* 11 5   HEMATOCRIT % 33 0* 33 2* 36 0   PLATELETS Thousands/uL 219 201 220   NEUTROS PCT % 72 68 67   MONOS PCT % 11 11 10       Results from last 7 days  Lab Units 12/02/18  0605 12/01/18  1218 12/01/18  0538  11/29/18  0450   POTASSIUM mmol/L 3 6 3 5 3 6  < > 4 0   CHLORIDE mmol/L 101 104 99*  < > 104   CO2 mmol/L 26 27 28  < > 28   BUN mg/dL 7 10 9  < > 7   CREATININE mg/dL 0 47* 0 45* 0 47*  < > 0 49*   CALCIUM mg/dL 8 4 8 2* 9 0  < > 8 3   ALK PHOS U/L  --   --   --   --  50   ALT U/L  --   --   --   --  25   AST U/L  --   --   --   --  25   < > = values in this interval not displayed  Results from last 7 days  Lab Units 12/02/18  0605 12/01/18  0538 11/30/18  0452   MAGNESIUM mg/dL 2 2 2 1 2 2       Results from last 7 days  Lab Units 12/02/18  0605 12/01/18  0538 11/30/18  0452   PHOSPHORUS mg/dL 2 4 3 0 4 0         No results found for: TROPONINT  ABG:No results found for: PHART, MWU0HHD, PO2ART, HTK5QEO, J5MMAAYD, BEART, SOURCE    Imaging Studies: I have personally reviewed pertinent reports  and I have personally reviewed pertinent films in PACS    EKG, Pathology, and Other Studies: I have personally reviewed pertinent reports        VTE Pharmacologic Prophylaxis: Heparin SQ    VTE Mechanical Prophylaxis: sequential compression device

## 2018-12-02 NOTE — PROGRESS NOTES
While walking to the bathroom patient had a syncopal episode  PCA assisted patient back to bed, patient did not fall or hit her head  CCM team was made aware and patient is receiving fluid bolus  Will continue to monitor  Patient vital signs stable at this time

## 2018-12-02 NOTE — PROGRESS NOTES
Progress Note - Critical Care   Femi Carlos 69 y o  female MRN: [id number]  Unit/Bed#: [address] [facility]  Encounter: [id number]    Attending Physician: Ashley Kidd MD    _____________________________________________________________________  ______________________________________________________________________    Chief Complaint: No acute     24 Hour Events:     Review of Systems   Constitutional: Negative  HENT: Negative  Eyes: Negative  Respiratory: Negative  Cardiovascular: Negative  Gastrointestinal: Negative  Endocrine: Negative  Genitourinary: Negative  Musculoskeletal: Negative  Skin: Negative  Neurological: Negative       ______________________________________________________________________  Vitals:    18 0105 18 0205 18 0305 18 0400   BP: 118/63 121/73 116/68 113/59   Pulse: 92 84 82 86   Resp: 16 17 15 15   Temp:    99 7 °F (37 6 °C)   TempSrc:    Oral   SpO2: 93% 93% 93% 96%   Weight:       Height:           Temperature:   Temp (24hrs), Av 9 °F (37 2 °C), Min:98 2 °F (36 8 °C), Max:99 7 °F (37 6 °C)    Current Temperature: 99 7 °F (37 6 °C)  Weights:   IBW: 54 7 kg    Body mass index is 23 35 kg/m²  Weight (last 2 days)     None                Physical Exam:   Physical Exam   Constitutional: She is oriented to person, place, and time  She appears well-developed and well-nourished  HENT:   Head: Normocephalic and atraumatic  Eyes: Pupils are equal, round, and reactive to light  Conjunctivae are normal  Right eye exhibits no discharge  Left eye exhibits no discharge  Cardiovascular: Normal rate and regular rhythm  Pulmonary/Chest: Effort normal  No respiratory distress  Abdominal: Soft  She exhibits no distension  There is no tenderness  There is no rebound  Musculoskeletal: She exhibits no edema, tenderness or deformity  Neurological: She is alert and oriented to person, place, and time     Patient 5/5 strength in upper and lower extremities bilaterally  Patient with cranial nerves 2-12 grossly intact  Nonfocal neurological exam   Patient alert orient x3  Skin: Skin is warm and dry  No erythema  Psychiatric: She has a normal mood and affect  Her behavior is normal       Physical Exam         ______________________________________________________________________  Hemodynamic Monitoring:       Non-Invasive/Invasive Ventilation Settings:  Respiratory    Lab Data (Last 4 hours)    None         O2/Vent Data (Last 4 hours)    None              No results found for: PHART, PHZ3ZSK, PO2ART, MOP5CJX, Y4PFSLDP, BEART, SOURCE    Intake and Outputs:  I/O       11/30 0701 - 12/01 0700 12/01 0701 - 12/02 0700    P  O  718 420    I V  (mL/kg) 445 (7 2) 1372 5 (22 2)    IV Piggyback 250     Total Intake(mL/kg) 1413 (22 9) 1792 5 (29 1)    Urine (mL/kg/hr) 850 (0 6)     Total Output 850      Net +563 +1792 5          Unmeasured Urine Occurrence 3 x 6 x    Unmeasured Stool Occurrence 2 x 2 x        UOP: inaccurately recorded ml/hr     Nutrition:        Diet Orders            Start     Ordered    12/01/18 1156  Diet Regular; Regular House  Diet effective now     Question Answer Comment   Diet Type Regular    Regular Regular House    RD to adjust diet per protocol?  No        12/01/18 1155          Labs:     Results from last 7 days  Lab Units 12/01/18  0538 11/30/18  0452 11/28/18  0451 11/27/18  0440 11/26/18  0514   WBC Thousand/uL 7 68 7 86 8 51 7 91 8 13   HEMOGLOBIN g/dL 10 6* 11 5 11 9 12 0 10 6*   HEMATOCRIT % 33 2* 36 0 37 2 37 1 33 9*   PLATELETS Thousands/uL 201 220 215 203 193   NEUTROS PCT % 68 67 83* 76* 73   MONOS PCT % 11 10 6 8 10       Results from last 7 days  Lab Units 12/01/18  1218 12/01/18  0538 11/30/18  0452 11/29/18  0450 11/28/18  0451 11/27/18  0440 11/26/18  0514   SODIUM mmol/L 138 131* 136 137 139 138 142   POTASSIUM mmol/L 3 5 3 6 3 9 4 0 3 6 3 9 3 3*   CHLORIDE mmol/L 104 99* 102 104 106 106 108   CO2 mmol/L 27 28 28 28 27 26 28   ANION GAP mmol/L 7 4 6 5 6 6 6   BUN mg/dL 10 9 10 7 8 9 8   CREATININE mg/dL 0 45* 0 47* 0 56* 0 49* 0 35* 0 43* 0 47*   CALCIUM mg/dL 8 2* 9 0 8 2* 8 3 8 1* 8 4 8 2*   ALT U/L  --   --   --  25  --   --   --    AST U/L  --   --   --  25  --   --   --    ALK PHOS U/L  --   --   --  50  --   --   --    ALBUMIN g/dL  --   --   --  3 3*  --   --   --    TOTAL BILIRUBIN mg/dL  --   --   --  0 49  --   --   --        Results from last 7 days  Lab Units 12/01/18  0538 11/30/18  0452 11/29/18  0450 11/28/18  0451 11/27/18  0440 11/26/18  0514   MAGNESIUM mg/dL 2 1 2 2 2 3 2 2 2 0 2 2   PHOSPHORUS mg/dL 3 0 4 0 3 1 2 9 2 2* 2 9                  ABG:No results found for: PHART, SRO1XLJ, PO2ART, KTF6EWY, P1OJMUZM, BEART, SOURCE  VBG:        No results found for: [facility]   Imaging:  I have personally reviewed pertinent reports  EKG: Occasional sinus tachycardia on telemetry  Micro: Allergies:    Allergies   Allergen Reactions    Codeine GI Intolerance     Medications:   Scheduled Meds:  Current Facility-Administered Medications:  acetaminophen 650 mg Oral Q6H PRN Aneta Rodríguez MD    bisacodyl 10 mg Rectal Daily PRN Aneta Rodríguez MD    dorzolamide-timolol 1 drop Both Eyes BID Cielo Dobson DO    fludrocortisone 0 1 mg Oral Daily Aneta Rodríguez MD    heparin (porcine) 5,000 Units Subcutaneous [facility] Lovely Corey DO    hydrALAZINE 5 mg Intravenous Q4H PRN Aneta Rodríguez MD    labetalol 10 mg Intravenous Q4H PRN Sofía Dobson DO    niMODipine 30 mg Oral Q2H Otoniel Mooney MD    ondansetron 4 mg Intravenous Q4H PRN Aneta Rodríguez MD    senna-docusate sodium 2 tablet Oral HS Aneta Rodríguez MD    sodium chloride 75 mL/hr Intravenous Continuous Otoniel Mooney MD Last Rate: 75 mL/hr (12/01/18 0943)   sodium chloride 2 g Oral TID With Meals Sunil Kaiser PA-C    travoprost 1 drop Both Eyes HS Aneta Rodríguez MD      Continuous Infusions:  sodium chloride 75 mL/hr Last Rate: 75 mL/hr (12/01/18 0943) PRN Meds:    acetaminophen 650 mg Q6H PRN   bisacodyl 10 mg Daily PRN   hydrALAZINE 5 mg Q4H PRN   labetalol 10 mg Q4H PRN   ondansetron 4 mg Q4H PRN     VTE Pharmacologic Prophylaxis: Heparin  VTE Mechanical Prophylaxis: sequential compression device  Invasive lines and devices: Invasive Devices     Peripherally Inserted Central Catheter Line            PICC Line 79/53/31 Right Basilic 3 days                   Assessment and Plan:   Principal Problem:    SAH (subarachnoid hemorrhage) ([facility])  Active Problems:    Glaucoma    S/P coil embolization of cerebral aneurysm    Constipated  Resolved Problems:    Hypophosphatemia    Hypokalemia        Neuro:  Subarachnoid hemorrhage secondary a a COA aneurysm, bleeding day #9  -postop day 6  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  -transcranial Dopplers b i d  For 21 days through 12/17, most recently showing no evidence of vasospasm on 12/01  -pneumonia pain 30 mg q2hr p o   -Keppra was discontinued after yesterday  -monitor electrolytes with goal sodium [id number]  -this morning's BMP still pending, encouraged salt to diet, consider salt tabs  -neuro checks every 2 hours  -PT OT  -Tylenol 650 q 6 hours for mild pain         CV:   -goal systolic blood pressure less than 220, mean arterial pressure greater than 65  -patient has been at goal over the last 24 hours  -Nimodipine 30mg q2 hr    Glaucoma  -continue cosopt and travatan    Syncope on 11/27/2018  -likely hypertensive secondary to nimodipine        Pulm:  No active issues, SpO2 greater than 94% on room air  -encourage incentive spirometry, deep breathing coughing    GI: Constipation  Senokot qhs    :    -Urine output has not been accurately measured over the past 24 hours  -per nursing the patient is the bathroom approximately 4-5 times overnight  -continue I&O monitoring, urinary retention protocol    F/E/N:   Normal saline at 75/hr  BMP from this morning pending      Regular house diet, encouraging salty diet  ID:  No active issues     Heme:  No active issues    Endo:  No active issues     Msk/Skin:  No active issues    Disposition:  Continue ICU care      Code Status: Level 1 - Full Code        Portions of the record may have been created with voice recognition software  Occasional wrong word or "sound a like" substitutions may have occurred due to the inherent limitations of voice recognition software  Read the chart carefully and recognize, using context, where substitutions have occurred          Internal Medicine   PGY-1  12/2/2018 6:10 AM    Dl Oviedo MD

## 2018-12-03 ENCOUNTER — APPOINTMENT (INPATIENT)
Dept: NON INVASIVE DIAGNOSTICS | Facility: HOSPITAL | Age: 78
DRG: 021 | End: 2018-12-03
Payer: MEDICARE

## 2018-12-03 LAB
ANION GAP SERPL CALCULATED.3IONS-SCNC: 7 MMOL/L (ref 4–13)
ATRIAL RATE: 129 BPM
BUN SERPL-MCNC: 6 MG/DL (ref 5–25)
CALCIUM SERPL-MCNC: 8 MG/DL (ref 8.3–10.1)
CHLORIDE SERPL-SCNC: 103 MMOL/L (ref 100–108)
CO2 SERPL-SCNC: 27 MMOL/L (ref 21–32)
CREAT SERPL-MCNC: 0.39 MG/DL (ref 0.6–1.3)
GFR SERPL CREATININE-BSD FRML MDRD: 102 ML/MIN/1.73SQ M
GLUCOSE SERPL-MCNC: 101 MG/DL (ref 65–140)
P AXIS: 72 DEGREES
POTASSIUM SERPL-SCNC: 3.1 MMOL/L (ref 3.5–5.3)
PR INTERVAL: 225 MS
QRS AXIS: 63 DEGREES
QRSD INTERVAL: 92 MS
QT INTERVAL: 292 MS
QTC INTERVAL: 428 MS
SODIUM SERPL-SCNC: 137 MMOL/L (ref 136–145)
T WAVE AXIS: 69 DEGREES
VENTRICULAR RATE: 129 BPM

## 2018-12-03 PROCEDURE — 99024 POSTOP FOLLOW-UP VISIT: CPT | Performed by: PHYSICIAN ASSISTANT

## 2018-12-03 PROCEDURE — 93886 INTRACRANIAL COMPLETE STUDY: CPT | Performed by: SURGERY

## 2018-12-03 PROCEDURE — 80048 BASIC METABOLIC PNL TOTAL CA: CPT | Performed by: INTERNAL MEDICINE

## 2018-12-03 PROCEDURE — 99233 SBSQ HOSP IP/OBS HIGH 50: CPT | Performed by: EMERGENCY MEDICINE

## 2018-12-03 PROCEDURE — 93010 ELECTROCARDIOGRAM REPORT: CPT | Performed by: INTERNAL MEDICINE

## 2018-12-03 PROCEDURE — 93886 INTRACRANIAL COMPLETE STUDY: CPT

## 2018-12-03 RX ORDER — NIMODIPINE 30 MG/1
60 CAPSULE, LIQUID FILLED ORAL
Status: DISCONTINUED | OUTPATIENT
Start: 2018-12-03 | End: 2018-12-10 | Stop reason: HOSPADM

## 2018-12-03 RX ORDER — POTASSIUM CHLORIDE 20 MEQ/1
40 TABLET, EXTENDED RELEASE ORAL 2 TIMES DAILY
Status: COMPLETED | OUTPATIENT
Start: 2018-12-03 | End: 2018-12-03

## 2018-12-03 RX ORDER — NIMODIPINE 30 MG/1
30 CAPSULE, LIQUID FILLED ORAL
Status: DISCONTINUED | OUTPATIENT
Start: 2018-12-03 | End: 2018-12-03

## 2018-12-03 RX ORDER — DOCUSATE SODIUM 100 MG/1
100 CAPSULE, LIQUID FILLED ORAL 2 TIMES DAILY
Status: DISCONTINUED | OUTPATIENT
Start: 2018-12-03 | End: 2018-12-10 | Stop reason: HOSPADM

## 2018-12-03 RX ORDER — NIMODIPINE 30 MG/1
30 CAPSULE, LIQUID FILLED ORAL
Status: DISPENSED | OUTPATIENT
Start: 2018-12-03 | End: 2018-12-03

## 2018-12-03 RX ORDER — POTASSIUM CHLORIDE 20 MEQ/1
40 TABLET, EXTENDED RELEASE ORAL ONCE
Status: DISCONTINUED | OUTPATIENT
Start: 2018-12-03 | End: 2018-12-03

## 2018-12-03 RX ADMIN — NIMODIPINE 30 MG: 30 CAPSULE, LIQUID FILLED ORAL at 16:52

## 2018-12-03 RX ADMIN — SODIUM CHLORIDE TAB 1 GM 3 G: 1 TAB at 16:52

## 2018-12-03 RX ADMIN — HEPARIN SODIUM 5000 UNITS: 5000 INJECTION INTRAVENOUS; SUBCUTANEOUS at 06:03

## 2018-12-03 RX ADMIN — NIMODIPINE 30 MG: 30 CAPSULE, LIQUID FILLED ORAL at 18:48

## 2018-12-03 RX ADMIN — NIMODIPINE 30 MG: 30 CAPSULE, LIQUID FILLED ORAL at 13:58

## 2018-12-03 RX ADMIN — NIMODIPINE 30 MG: 30 CAPSULE, LIQUID FILLED ORAL at 01:42

## 2018-12-03 RX ADMIN — DORZOLAMIDE HYDROCHLORIDE AND TIMOLOL MALEATE 1 DROP: 20; 5 SOLUTION/ DROPS OPHTHALMIC at 23:52

## 2018-12-03 RX ADMIN — NIMODIPINE 30 MG: 30 CAPSULE, LIQUID FILLED ORAL at 12:30

## 2018-12-03 RX ADMIN — HEPARIN SODIUM 5000 UNITS: 5000 INJECTION INTRAVENOUS; SUBCUTANEOUS at 14:00

## 2018-12-03 RX ADMIN — POTASSIUM CHLORIDE 40 MEQ: 1500 TABLET, EXTENDED RELEASE ORAL at 13:56

## 2018-12-03 RX ADMIN — TRAVOPROST OPHTHALMIC SOLUTION 1 DROP: 0.04 SOLUTION OPHTHALMIC at 21:31

## 2018-12-03 RX ADMIN — SODIUM CHLORIDE TAB 1 GM 3 G: 1 TAB at 12:30

## 2018-12-03 RX ADMIN — POTASSIUM CHLORIDE 40 MEQ: 1500 TABLET, EXTENDED RELEASE ORAL at 08:51

## 2018-12-03 RX ADMIN — DORZOLAMIDE HYDROCHLORIDE AND TIMOLOL MALEATE 1 DROP: 20; 5 SOLUTION/ DROPS OPHTHALMIC at 00:05

## 2018-12-03 RX ADMIN — NIMODIPINE 30 MG: 30 CAPSULE, LIQUID FILLED ORAL at 08:48

## 2018-12-03 RX ADMIN — SODIUM CHLORIDE TAB 1 GM 3 G: 1 TAB at 08:48

## 2018-12-03 RX ADMIN — NIMODIPINE 30 MG: 30 CAPSULE, LIQUID FILLED ORAL at 06:03

## 2018-12-03 RX ADMIN — NIMODIPINE 30 MG: 30 CAPSULE, LIQUID FILLED ORAL at 04:32

## 2018-12-03 RX ADMIN — NIMODIPINE 30 MG: 30 CAPSULE, LIQUID FILLED ORAL at 10:40

## 2018-12-03 RX ADMIN — FLUDROCORTISONE ACETATE 0.1 MG: 0.1 TABLET ORAL at 08:51

## 2018-12-03 RX ADMIN — HEPARIN SODIUM 5000 UNITS: 5000 INJECTION INTRAVENOUS; SUBCUTANEOUS at 21:31

## 2018-12-03 RX ADMIN — NIMODIPINE 60 MG: 30 CAPSULE, LIQUID FILLED ORAL at 23:50

## 2018-12-03 RX ADMIN — SODIUM CHLORIDE 75 ML/HR: 0.9 INJECTION, SOLUTION INTRAVENOUS at 04:49

## 2018-12-03 RX ADMIN — DORZOLAMIDE HYDROCHLORIDE AND TIMOLOL MALEATE 1 DROP: 20; 5 SOLUTION/ DROPS OPHTHALMIC at 12:39

## 2018-12-03 RX ADMIN — NIMODIPINE 30 MG: 30 CAPSULE, LIQUID FILLED ORAL at 00:05

## 2018-12-03 RX ADMIN — NIMODIPINE 60 MG: 30 CAPSULE, LIQUID FILLED ORAL at 21:00

## 2018-12-03 NOTE — UTILIZATION REVIEW
Continued Stay Review    Date: 12-2-18       Vital Signs:  Blood pressure 109/65, pulse 92, temperature 99 7 °F (37 6 °C), temperature source Oral, resp  rate 16, height 5' 4" (1 626 m), weight 61 7 kg (136 lb 0 4 oz), SpO2 96 %, not currently breastfeeding  ,Body mass index is 23 35 kg/m²  Medications:     Current Facility-Administered Medications:  acetaminophen 650 mg Oral Q6H PRN   bisacodyl 10 mg Rectal Daily PRN   dorzolamide-timolol 1 drop Both Eyes BID   fludrocortisone 0 1 mg Oral Daily   heparin (porcine) 5,000 Units Subcutaneous Q8H [address]   hydrALAZINE 5 mg Intravenous Q4H PRN   labetalol 10 mg Intravenous Q4H PRN   niMODipine 30 mg Oral Q2H   ondansetron 4 mg Intravenous Q4H PRN   senna-docusate sodium 2 tablet Oral HS   sodium chloride 75 mL/hr Intravenous Continuous   sodium chloride 2 g Oral TID With Meals   travoprost 1 drop Both Eyes HS           Age/Sex: 69 y o  female     Assessment/Plan:     Continue critical care    Subarachnoid hemorrhage   Related to aneurysm     While walking to the bathroom patient had a syncopal episode  PCA assisted patient back to bed, patient did not fall or hit her head  CCM team was made aware and patient is receiving fluid bolus  Will continue to monitor  ? Continuie close neuro monitoring, Vasospasm watch and encourage fluid intake      Discharge Plan:   To be determined

## 2018-12-03 NOTE — PROGRESS NOTES
Progress Note - Critical Care   Lilia Dhaliwal 69 y o  female MRN: [id number]  Unit/Bed#: [address] [facility]  Encounter: [id number]    Attending Physician: Gage Montez MD    _____________________________________________________________________  ______________________________________________________________________    Chief Complaint: I feel tired    24 Hour Events: Patient had an episode of presyncope yesterday afternoon  She states that this occurred immediately after a profuse loose bowel movement  She also had tachycardia later that day which was asymptomatic  This morning she complains of significant bilateral lower leg cramping, which she says also started yesterday afternoon  Noted TMax of 100 3 overnight  Pt denies feeling febrile  Review of Systems   Constitutional: Negative for chills and fever  HENT: Negative  Eyes: Negative  Respiratory: Negative for cough, chest tightness and shortness of breath  Cardiovascular: Negative for chest pain, palpitations and leg swelling  Gastrointestinal: Positive for diarrhea  Negative for abdominal pain, nausea and vomiting  Endocrine: Negative  Genitourinary: Negative for difficulty urinating and dysuria  Musculoskeletal: Positive for myalgias and neck stiffness  Skin: Negative  Allergic/Immunologic: Negative  Neurological: Positive for light-headedness  Negative for dizziness, speech difficulty, weakness and headaches  Hematological: Negative      Psychiatric/Behavioral: Negative       ______________________________________________________________________  Vitals:    18 0300 18 0400 18 0500 18 0600   BP: 123/65 129/72 120/64 127/77   Pulse: 88 84 86 86   Resp: 17 (!) 33 17 15   Temp:  99 2 °F (37 3 °C)     TempSrc:  Oral     SpO2: 95% 94% 92% 94%   Weight:       Height:           Temperature:   Temp (24hrs), Av 7 °F (37 1 °C), Min:97 °F (36 1 °C), Max:100 3 °F (37 9 °C)    Current Temperature: 99 2 °F (37 3 °C)  Weights:   IBW: 54 7 kg    Body mass index is 23 35 kg/m²  Weight (last 2 days)     None                Physical Exam:     Constitutional: She is oriented to person, place, and time  She appears well-developed and well-nourished  HENT:   Head: Normocephalic and atraumatic  Eyes: Pupils are equal, round, and reactive to light  Conjunctivae are normal  Right eye exhibits no discharge  Left eye exhibits no discharge  Cardiovascular: Normal rate and regular rhythm  Pulmonary/Chest: Effort normal  No respiratory distress  Bibasilar crackles  Abdominal: Soft  She exhibits no distension  There is no tenderness  There is no rebound  Musculoskeletal: She exhibits no edema, tenderness or deformity  Homans sign negative bilaterally  Neurological: She is alert and oriented to person, place, and time  Cranial nerves 2-12 grossly intact  5/5 strength in upper and lower extremities bilaterally  Skin: Skin is warm and dry  No erythema  Psychiatric: She has a normal mood and affect  Her behavior is normal    ______________________________________________________________________  Hemodynamic Monitoring:  N/A     Non-Invasive/Invasive Ventilation Settings:  Respiratory    Lab Data (Last 4 hours)    None         O2/Vent Data (Last 4 hours)    None              No results found for: PHART, RYD3PCQ, PO2ART, MJS8UCD, Z3AMKJOB, BEART, SOURCE  SpO2: SpO2: 94 %  Intake and Outputs:  I/O       12/01 0701 - 12/02 0700 12/02 0701 - 12/03 0700    P  O  420 2520    I V  (mL/kg) 1527 5 (24 8) 1793 8 (29 1)    IV Piggyback  1000    Total Intake(mL/kg) 1947 5 (31 6) 5313 8 (86 1)    Urine (mL/kg/hr) 450 (0 3) 4325 (2 9)    Total Output 450 4325    Net +1497 5 +988  8          Unmeasured Urine Occurrence 6 x 2 x    Unmeasured Stool Occurrence 2 x         UOP: 2 9 ml/kg/hr   Nutrition:        Diet Orders            Start     Ordered    12/01/18 1156  Diet Regular; Regular House  Diet effective now     Question Answer Comment Diet Type Regular    Regular Regular House    RD to adjust diet per protocol? No        12/01/18 1155          Labs:     Results from last 7 days  Lab Units 12/02/18  0605 12/01/18  0538 11/30/18  0452 11/28/18  0451 11/27/18  0440   WBC Thousand/uL 7 83 7 68 7 86 8 51 7 91   HEMOGLOBIN g/dL 10 6* 10 6* 11 5 11 9 12 0   HEMATOCRIT % 33 0* 33 2* 36 0 37 2 37 1   PLATELETS Thousands/uL 219 201 220 215 203   NEUTROS PCT % 72 68 67 83* 76*   MONOS PCT % 11 11 10 6 8       Results from last 7 days  Lab Units 12/03/18  0432 12/02/18  0605 12/01/18  1218 12/01/18  0538 11/30/18  0452 11/29/18  0450 11/28/18  0451   SODIUM mmol/L 137 134* 138 131* 136 137 139   POTASSIUM mmol/L 3 1* 3 6 3 5 3 6 3 9 4 0 3 6   CHLORIDE mmol/L 103 101 104 99* 102 104 106   CO2 mmol/L 27 26 27 28 28 28 27   ANION GAP mmol/L 7 7 7 4 6 5 6   BUN mg/dL 6 7 10 9 10 7 8   CREATININE mg/dL 0 39* 0 47* 0 45* 0 47* 0 56* 0 49* 0 35*   CALCIUM mg/dL 8 0* 8 4 8 2* 9 0 8 2* 8 3 8 1*   ALT U/L  --   --   --   --   --  25  --    AST U/L  --   --   --   --   --  25  --    ALK PHOS U/L  --   --   --   --   --  50  --    ALBUMIN g/dL  --   --   --   --   --  3 3*  --    TOTAL BILIRUBIN mg/dL  --   --   --   --   --  0 49  --        Results from last 7 days  Lab Units 12/02/18  0605 12/01/18  0538 11/30/18 0452 11/29/18 0450 11/28/18  0451 11/27/18  0440   MAGNESIUM mg/dL 2 2 2 1 2 2 2 3 2 2 2 0   PHOSPHORUS mg/dL 2 4 3 0 4 0 3 1 2 9 2 2*                  ABG:No results found for: PHART, SUL4TJH, PO2ART, CZG6BDT, Z8HDQLND, BEART, SOURCE  VBG:        No results found for: [facility]   Imaging: TCDs with no vasospasm  I have personally reviewed pertinent reports  EKG: Sinus tachycardia  NC interval 220  Micro: Allergies:    Allergies   Allergen Reactions    Codeine GI Intolerance     Medications:   Scheduled Meds:  Current Facility-Administered Medications:  acetaminophen 650 mg Oral Q6H PRN Prem Ford MD    bisacodyl 10 mg Rectal Daily PRN [facility] Mariano Guerrero MD    dorzolamide-timolol 1 drop Both Eyes BID Harmony Dobson, DO    fludrocortisone 0 1 mg Oral Daily Ester Tinsley MD    heparin (porcine) 5,000 Units Subcutaneous [facility] Danial Rincon, DO    hydrALAZINE 5 mg Intravenous Q4H PRN Ester Tinsley MD    labetalol 10 mg Intravenous Q4H PRN Jacqueline Mario Dobson, DO    niMODipine 30 mg Oral Q2H Richard Rai MD    ondansetron 4 mg Intravenous Q4H PRN Ester Tinsley MD    potassium chloride 40 mEq Oral BID Aron Delgado, DO    senna-docusate sodium 2 tablet Oral HS Ester Tinsley MD    sodium chloride 75 mL/hr Intravenous Continuous Richard Rai MD Last Rate: 75 mL/hr (12/03/18 0449)   sodium chloride 3 g Oral TID With Meals Drake Dumont MD    travoprost 1 drop Both Eyes HS Ester Tinsley MD      Continuous Infusions:  sodium chloride 75 mL/hr Last Rate: 75 mL/hr (12/03/18 0449)     PRN Meds:    acetaminophen 650 mg Q6H PRN   bisacodyl 10 mg Daily PRN   hydrALAZINE 5 mg Q4H PRN   labetalol 10 mg Q4H PRN   ondansetron 4 mg Q4H PRN     VTE Pharmacologic Prophylaxis: Heparin  VTE Mechanical Prophylaxis: sequential compression device  Invasive lines and devices:   Invasive Devices     Peripherally Inserted Central Catheter Line            PICC Line 39/32/28 Right Basilic 4 days                   Assessment and Plan:   Principal Problem:    SAH (subarachnoid hemorrhage) (HCC)  Active Problems:    Glaucoma    S/P coil embolization of cerebral aneurysm    Constipated  Resolved Problems:    Hypophosphatemia    Hypokalemia        Neuro:  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  - Bleeding day 10, s/p coiling; POD 7  - Completed 7 days of Keppra  - TCD bid for 21 days (12/17)  - Nimodipine 30mg q2hr  - Continue vasospasm precautions, monitoring  - PT/OT, OOB - pt has not been OOB since presyncopal episode yesterday    CV: Episode of sinus tachycardia overnight, asymptomatic  Goal SBP <220, MAP >65; pt has been at goal on nimodipine 30mg q2hr    Pulm: No active issues  - Encouraged IS for bibasilar crackles on exam    GI: Constipation: senokot  Pt had loose BM yesterday  May hold senokot tonight  : Urine output of 4 3 L yesterday, 2 9ml/kg/hr  Continue to monitor    F/E/N:   Hyponatremia improving today at 137   - NS at 75cc/hr  - 3g sodium TID  Hypokalemia at 3 1: will replete with 40mEq PO twice today for a total of 80  ID: Noted mild elevated temperatures overnight with Tmax of 100 3  No acetaminophen at that time  - WBC normal  - Continue to monitor    Heme: DVT PPX with heparin SC    Endo: No acute issues     Msk/Skin: No acute issues    Disposition: Continue intensive care      Code Status: Level 1 - Full Code        Portions of the record may have been created with voice recognition software  Occasional wrong word or "sound a like" substitutions may have occurred due to the inherent limitations of voice recognition software  Read the chart carefully and recognize, using context, where substitutions have occurred          12/3/2018 6:20 AM    Aron Delgado DO

## 2018-12-03 NOTE — PROGRESS NOTES
Progress Note - Neurosurgery   Karin Aguilar 69 y o  female MRN: [id number]  Unit/Bed#:  -01 Encounter: [id number]      Assessment:  1  Status post coil embolization for subarachnoid hemorrhage (11/25/2018)  2  HH 1, F3 SAH (11/24/2018)  3  BD ( 11/23/2018)  4  ACOM   5  Dizziness and Light headedness resolved        Plan:   § Exam: A&OX3, EOMI, speech and motor strengths normal and without focal/lateralizing signs  § Images:  CTA done on 11/24/2018 demonstrates diffuse subarachnoid hemorrhage throughout the basal cisterns, sylvian fissures and over the convexities without evidence of mass effect   Anterior communicating artery 5 mm aneurysm noted  § CTA or 11/27/2018 demonstrates status post coil embolization of anterior communicating artery and the postop day 2 resolving diffuse subarachnoid hemorrhage without evidence of vasospasm   Also area of decreased attenuation in the right basal ganglia compatible with age-indeterminate lacunar infraction  ? Pain control:  Primary team  ? DVT ppx:   § SCDs  § Continue heparin subcu  ? Seizure ppx:  Continue Keppra  ? Activity:  As tolerated  ? PT/OT evaluation & treatment  ? Medical Mx:  Per Primary team  ? Seizure prophylaxis:  continue Keppra  ? Neurocheck:  Q 1 H  CT head if GCS drops 2POINTS/1h  ? Continue Vasospasm watch  ? HOB>30-45 degree  ? Gd=590  ? IO/OP=negative balance  ? TCD:   § Right =1 46  § Left = 0 8  ? SBP<220, currently 120s/70s  ? MAP>65, currently 80s-90  ? Patient complains of  Laxatives-related  diarrhea over the weekend, and some cramps in the legs, otherwise, denies any headache,N/V, dizziness or light headedness  Neurologically non focal  Her TCD appears WNL  Na =137, MAPs within acceptable range  Continue monitoring for vasospasm      Subjective/Objective   Chief Complaint: '"I am doing fine over night, had diarrhea over weekends" progress note    Subjective: Patient claims laxatives related diarrhea over the weekends, but today she has no complaints  Ellis headache, diplopia, blurry vision, dizziness or light headedness  Denies paresthesia or weakness in the extremities  Denies fever, chills, rigors, or cough  Appetite is good, denies urinary issues  Objective: Patient is supine in bed, in no pain distress  I/O       12/01 0701 - 12/02 0700 12/02 0701 - 12/03 0700 12/03 0701 - 12/04 0700    P  O  420 2520     I V  (mL/kg) 1527 5 (24 8) 1793 8 (29 1)     IV Piggyback  1000     Total Intake(mL/kg) 1947 5 (31 6) 5313 8 (86 1)     Urine (mL/kg/hr) 450 (0 3) 4325 (2 9) 600 (5 6)    Total Output 450 4325 600    Net +1497 5 +988 8 -600           Unmeasured Urine Occurrence 6 x 2 x     Unmeasured Stool Occurrence 2 x            Invasive Devices     Peripherally Inserted Central Catheter Line            PICC Line 69/53/32 Right Basilic 4 days                Physical Exam:  Vitals: Blood pressure 128/69, pulse 94, temperature 99 2 °F (37 3 °C), temperature source Oral, resp  rate 18, height 5' 4" (1 626 m), weight 61 7 kg (136 lb 0 4 oz), SpO2 97 %, not currently breastfeeding  ,Body mass index is 23 35 kg/m²  Hemodynamic Monitoring: MAP: Arterial Line MAP (mmHg): 84 mmHg    General appearance: alert, appears stated age, cooperative and no distress  Head: Normocephalic, without obvious abnormality, atraumatic  Eyes: EOM, 2 mm conjugate  Neck: supple, symmetrical, trachea midline and NT  Lungs: non labored breathing  Heart: regular heart rate  Neurologic:   Mental status: Alert, orientedx3, thought content appropriate  Cranial nerves: grossly intact (Cranial nerves II-XII)  Sensory: normal to LT throughout  Motor: moving all extremities without focal weakness, strength 5/5 bilaterally  Reflexes: 3+ and symmetric  No clonus and Babinski is negative bilaterally    Coordination: finger to nose normal bilaterally, no drift bilaterally      Lab Results:    Results from last 7 days  Lab Units 12/02/18  0605 12/01/18  0538 11/30/18  0452   WBC Thousand/uL 7  83 7 68 7 86   HEMOGLOBIN g/dL 10 6* 10 6* 11 5   HEMATOCRIT % 33 0* 33 2* 36 0   PLATELETS Thousands/uL 219 201 220   NEUTROS PCT % 72 68 67   MONOS PCT % 11 11 10       Results from last 7 days  Lab Units 12/03/18  0432 12/02/18  0605 12/01/18  1218  11/29/18  0450   POTASSIUM mmol/L 3 1* 3 6 3 5  < > 4 0   CHLORIDE mmol/L 103 101 104  < > 104   CO2 mmol/L 27 26 27  < > 28   BUN mg/dL 6 7 10  < > 7   CREATININE mg/dL 0 39* 0 47* 0 45*  < > 0 49*   CALCIUM mg/dL 8 0* 8 4 8 2*  < > 8 3   ALK PHOS U/L  --   --   --   --  50   ALT U/L  --   --   --   --  25   AST U/L  --   --   --   --  25   < > = values in this interval not displayed  Results from last 7 days  Lab Units 12/02/18  0605 12/01/18  0538 11/30/18  0452   MAGNESIUM mg/dL 2 2 2 1 2 2       Results from last 7 days  Lab Units 12/02/18  0605 12/01/18  0538 11/30/18  0452   PHOSPHORUS mg/dL 2 4 3 0 4 0         No results found for: [facility]OPONINT  ABG:No results found for: PHART, VRR3PME, PO2ART, HJF8CEC, T8PBJTYH, BEART, SOURCE    Imaging Studies: I have personally reviewed pertinent reports  and I have personally reviewed pertinent films in PACS    EKG, Pathology, and Other Studies: I have personally reviewed pertinent reports        VTE Pharmacologic Prophylaxis: Heparin SQ    VTE Mechanical Prophylaxis: sequential compression device

## 2018-12-04 ENCOUNTER — APPOINTMENT (INPATIENT)
Dept: NON INVASIVE DIAGNOSTICS | Facility: HOSPITAL | Age: 78
DRG: 021 | End: 2018-12-04
Payer: MEDICARE

## 2018-12-04 PROBLEM — K59.00 CONSTIPATED: Status: RESOLVED | Noted: 2018-11-30 | Resolved: 2018-12-04

## 2018-12-04 LAB
ALBUMIN SERPL BCP-MCNC: 3.5 G/DL (ref 3.5–5)
ALP SERPL-CCNC: 61 U/L (ref 46–116)
ALT SERPL W P-5'-P-CCNC: 77 U/L (ref 12–78)
ANION GAP SERPL CALCULATED.3IONS-SCNC: 11 MMOL/L (ref 4–13)
ANION GAP SERPL CALCULATED.3IONS-SCNC: 6 MMOL/L (ref 4–13)
AST SERPL W P-5'-P-CCNC: 38 U/L (ref 5–45)
BILIRUB SERPL-MCNC: 0.5 MG/DL (ref 0.2–1)
BUN SERPL-MCNC: 14 MG/DL (ref 5–25)
BUN SERPL-MCNC: 9 MG/DL (ref 5–25)
CALCIUM SERPL-MCNC: 8.9 MG/DL (ref 8.3–10.1)
CALCIUM SERPL-MCNC: 9.4 MG/DL (ref 8.3–10.1)
CHLORIDE SERPL-SCNC: 100 MMOL/L (ref 100–108)
CHLORIDE SERPL-SCNC: 101 MMOL/L (ref 100–108)
CO2 SERPL-SCNC: 24 MMOL/L (ref 21–32)
CO2 SERPL-SCNC: 26 MMOL/L (ref 21–32)
CREAT SERPL-MCNC: 0.52 MG/DL (ref 0.6–1.3)
CREAT SERPL-MCNC: 0.66 MG/DL (ref 0.6–1.3)
GFR SERPL CREATININE-BSD FRML MDRD: 85 ML/MIN/1.73SQ M
GFR SERPL CREATININE-BSD FRML MDRD: 92 ML/MIN/1.73SQ M
GLUCOSE SERPL-MCNC: 109 MG/DL (ref 65–140)
GLUCOSE SERPL-MCNC: 113 MG/DL (ref 65–140)
POTASSIUM SERPL-SCNC: 3.7 MMOL/L (ref 3.5–5.3)
POTASSIUM SERPL-SCNC: 3.7 MMOL/L (ref 3.5–5.3)
PROT SERPL-MCNC: 7.1 G/DL (ref 6.4–8.2)
SODIUM SERPL-SCNC: 133 MMOL/L (ref 136–145)
SODIUM SERPL-SCNC: 135 MMOL/L (ref 136–145)

## 2018-12-04 PROCEDURE — 80053 COMPREHEN METABOLIC PANEL: CPT | Performed by: INTERNAL MEDICINE

## 2018-12-04 PROCEDURE — 93886 INTRACRANIAL COMPLETE STUDY: CPT

## 2018-12-04 PROCEDURE — 99233 SBSQ HOSP IP/OBS HIGH 50: CPT | Performed by: EMERGENCY MEDICINE

## 2018-12-04 PROCEDURE — 97110 THERAPEUTIC EXERCISES: CPT

## 2018-12-04 PROCEDURE — 99024 POSTOP FOLLOW-UP VISIT: CPT | Performed by: PHYSICIAN ASSISTANT

## 2018-12-04 PROCEDURE — 80048 BASIC METABOLIC PNL TOTAL CA: CPT | Performed by: INTERNAL MEDICINE

## 2018-12-04 PROCEDURE — 93886 INTRACRANIAL COMPLETE STUDY: CPT | Performed by: SURGERY

## 2018-12-04 PROCEDURE — 97535 SELF CARE MNGMENT TRAINING: CPT

## 2018-12-04 RX ORDER — POTASSIUM CHLORIDE 20 MEQ/1
20 TABLET, EXTENDED RELEASE ORAL ONCE
Status: COMPLETED | OUTPATIENT
Start: 2018-12-04 | End: 2018-12-04

## 2018-12-04 RX ORDER — SODIUM CHLORIDE 9 MG/ML
75 INJECTION, SOLUTION INTRAVENOUS CONTINUOUS
Status: DISPENSED | OUTPATIENT
Start: 2018-12-04 | End: 2018-12-05

## 2018-12-04 RX ADMIN — SODIUM CHLORIDE TAB 1 GM 3 G: 1 TAB at 08:29

## 2018-12-04 RX ADMIN — FLUDROCORTISONE ACETATE 0.1 MG: 0.1 TABLET ORAL at 08:29

## 2018-12-04 RX ADMIN — DORZOLAMIDE HYDROCHLORIDE AND TIMOLOL MALEATE 1 DROP: 20; 5 SOLUTION/ DROPS OPHTHALMIC at 11:39

## 2018-12-04 RX ADMIN — SODIUM CHLORIDE 75 ML/HR: 0.9 INJECTION, SOLUTION INTRAVENOUS at 22:13

## 2018-12-04 RX ADMIN — NIMODIPINE 60 MG: 30 CAPSULE, LIQUID FILLED ORAL at 17:00

## 2018-12-04 RX ADMIN — HEPARIN SODIUM 5000 UNITS: 5000 INJECTION INTRAVENOUS; SUBCUTANEOUS at 06:37

## 2018-12-04 RX ADMIN — SODIUM CHLORIDE TAB 1 GM 3 G: 1 TAB at 17:00

## 2018-12-04 RX ADMIN — SODIUM CHLORIDE TAB 1 GM 3 G: 1 TAB at 11:37

## 2018-12-04 RX ADMIN — HEPARIN SODIUM 5000 UNITS: 5000 INJECTION INTRAVENOUS; SUBCUTANEOUS at 14:26

## 2018-12-04 RX ADMIN — TRAVOPROST OPHTHALMIC SOLUTION 1 DROP: 0.04 SOLUTION OPHTHALMIC at 21:02

## 2018-12-04 RX ADMIN — NIMODIPINE 60 MG: 30 CAPSULE, LIQUID FILLED ORAL at 20:54

## 2018-12-04 RX ADMIN — ONDANSETRON 4 MG: 2 INJECTION INTRAMUSCULAR; INTRAVENOUS at 12:16

## 2018-12-04 RX ADMIN — POTASSIUM CHLORIDE 20 MEQ: 1500 TABLET, EXTENDED RELEASE ORAL at 08:29

## 2018-12-04 RX ADMIN — HEPARIN SODIUM 5000 UNITS: 5000 INJECTION INTRAVENOUS; SUBCUTANEOUS at 21:02

## 2018-12-04 RX ADMIN — NIMODIPINE 60 MG: 30 CAPSULE, LIQUID FILLED ORAL at 08:31

## 2018-12-04 RX ADMIN — DOCUSATE SODIUM 100 MG: 100 CAPSULE, LIQUID FILLED ORAL at 20:53

## 2018-12-04 RX ADMIN — NIMODIPINE 60 MG: 30 CAPSULE, LIQUID FILLED ORAL at 11:37

## 2018-12-04 RX ADMIN — ACETAMINOPHEN 650 MG: 325 TABLET, FILM COATED ORAL at 15:00

## 2018-12-04 RX ADMIN — NIMODIPINE 60 MG: 30 CAPSULE, LIQUID FILLED ORAL at 04:30

## 2018-12-04 RX ADMIN — ONDANSETRON 4 MG: 2 INJECTION INTRAMUSCULAR; INTRAVENOUS at 08:59

## 2018-12-04 NOTE — PHYSICAL THERAPY NOTE
Physical Therapy Cancellation Note    Pt attempted to see pt, pt reports severe nausea at this time  Agreeable to attempt later if nausea improves  Will attempt back as time permits     Malathi Medellin, PT

## 2018-12-04 NOTE — OCCUPATIONAL THERAPY NOTE
Occupational Therapy Treatment Note      Leda Dela Cruz    12/4/2018    Patient Active Problem List   Diagnosis    [facility]oid [facility] ([facility] [address] )    Glaucoma    S/P coil embolization of cerebral aneurysm       Past Medical History:   Diagnosis Date    Fracture of arm     Glaucoma     Hypotension        Past Surgical History:   Procedure Laterality Date    BUNIONECTOMY      BUNIONECTOMY      CATARACT EXTRACTION      IR CEREBRAL ANGIOGRAPHY / INTERVENTION  11/25/2018    TONSILLECTOMY        12/04/18 1824   Restrictions/Precautions   Weight Bearing Precautions Per Order No   Other Precautions Aspiration; Seizure; Fall Risk;Multiple lines;Telemetry;Pain   Lifestyle   Autonomy Pt reports being I w/ ADLS, IADLS, transfers and functional mobility PTA   Reciprocal Relationships Pt lives w/ spouse; has son who lives nearby   Service to Others Pt is retired   Intrinsic Gratification Pt enjoys being active   Pain Assessment   Pain Assessment 0-10   Pain Score Worst Possible Pain   Pain Type Acute pain   Pain Location Leg   Pain Orientation Right   Pain Descriptors Aching;Discomfort;Cramping   Pain Frequency Intermittent   Pain Onset Sudden   Clinical Progression Gradually improving   Patient's Stated Pain Goal No pain   Hospital Pain Intervention(s) Repositioned; Ambulation/increased activity; Emotional support   Response to Interventions tolerated   ADL   Eating Assistance 5  Supervision/Setup   Eating Deficit Setup   Eating Comments pt setup w/ dinner post session on tray table while seated in chair   Grooming Assistance 5  Supervision/Setup   Grooming Deficit Brushing hair   Grooming Comments pt completed grooming while seated in chair  Combed hair after setup   LB Dressing Assistance 4  Minimal Assistance   LB Dressing Deficit Setup;Steadying;Verbal cueing; Increased time to complete;Supervision/safety; Don/doff R sock; Don/doff L sock; Thread RLE into pants; Thread LLE into pants;Pull up over hips   LB Dressing Comments pt completed LB dressing while seated EOB  pt able to don socks; assist to doff  pt able to don pants w/ CTG for standing to pull up over waist   Bed Mobility   Supine to Sit 5  Supervision   Additional items Increased time required; Bedrails;Verbal cues   Sit to Supine Unable to assess   Additional Comments Pt went from supine to sit w/ S for safety and increased time  Sat EOB w/ S for trunk control/sitting balance   Transfers   Sit to Stand 5  Supervision   Additional items Increased time required;Verbal cues   Stand to Sit 5  Supervision   Additional items Increased time required;Verbal cues   Additional Comments pt performed sit-stand from EOB w/ S for safety and increased time 2* to pain/nausea   Functional Mobility   Functional Mobility 4  Minimal assistance   Additional Comments pt ambulated short household distance w/ CTG for safety/balance  No use of AD/DME   Cognition   Overall Cognitive Status WFL   Arousal/Participation Responsive; Cooperative   Attention Within functional limits   Orientation Level Oriented X4   Memory Within functional limits   Following Commands Follows all commands and directions without difficulty   Comments Pt is pleasant and cooperative   Activity Tolerance   Activity Tolerance Patient tolerated treatment well;Patient limited by pain   Medical Staff Made Aware PTSue and RN   Assessment   Assessment Patient participated in Skilled OT session 12/4/18 with interventions consisting of ADL re training with the use of correct body mechnaics, Energy Conservation techniques, deep breathing technique, safety awareness and fall prevention techniques, therapeutic exercise to: increase functional use of BUEs, increase BUE muscle strength ,  therapeutic activities to: increase activity tolerance, increase dynamic sit/ stand balance during functional activity , increase postural control, increase trunk control and increase OOB/ sitting tolerance    Patient agreeable to OT treatment session, upon arrival patient was found supine in bed  In comparison to previous session, patient with improvements in transfers,activity tolerance, functional mobility, LB dressing and grooming  Patient requiring verbal cues for safety and verbal cues for correct technique  Patient continues to be functioning below baseline level, occupational performance remains limited secondary to factors listed above and increased risk for falls and injury  From OT standpoint, recommendation at time of d/c would be Home with family support when medically stable  Patient to benefit from continued Occupational Therapy treatment while in the hospital to address deficits as defined above and maximize level of functional independence with ADLs and functional mobility  Plan   Treatment Interventions ADL retraining;Functional transfer training;UE strengthening/ROM; Endurance training;Patient/family training;Equipment evaluation/education; Compensatory technique education;Continued evaluation; Energy conservation; Activityengagement   Goal Expiration Date 12/07/18   Treatment Day 1   OT Frequency 3-5x/wk   Recommendation   OT Discharge Recommendation Home with family support   OT - OK to Discharge Yes  (when medically stable)   Barthel Index   Feeding 10   Bathing 0   Grooming Score 5   Dressing Score 5   Bladder Score 10   Bowels Score 10   Toilet Use Score 5   Transfers (Bed/Chair) Score 15   Mobility (Level Surface) Score 10   Stairs Score 0   Barthel Index Score 70   Modified Aman Scale   Modified Aman Scale 3       Kelly Araiza MS, OTR/L

## 2018-12-04 NOTE — PROGRESS NOTES
Progress Note - Critical Care   Arya Early 69 y o  female MRN: [id number]  Unit/Bed#: [address] [facility]  Encounter: [id number]    Attending Physician: Antony Romeo MD    _____________________________________________________________________  ______________________________________________________________________    Chief Complaint: Feeling better    24 Hour Events: Patient reports that she slept better with the change of medication/neuro checks to every 4 hours instead of every 2  She denies dizziness/lightheadedness  She denies further episodes of loose stool  She says that her bowel movements have been normal     She complains of continued leg cramps and says she uses tonic water for these at home  Review of Systems   Constitutional: Negative for chills and fever  HENT: Negative  Eyes: Negative  Respiratory: Negative for cough, chest tightness and shortness of breath  Cardiovascular: Negative for chest pain, palpitations and leg swelling  Gastrointestinal: Negative for abdominal pain, diarrhea, nausea and vomiting  Endocrine: Negative  Genitourinary: Negative for difficulty urinating and dysuria  Musculoskeletal: Positive for myalgias  Negative for neck stiffness  Skin: Negative  Allergic/Immunologic: Negative  Neurological: Negative for dizziness, weakness, light-headedness and headaches  Hematological: Negative  Psychiatric/Behavioral: Negative       ______________________________________________________________________  Vitals:    18 0100 18 0200 18 0300 18 0400   BP: 116/75 130/70 131/72 136/76   Pulse: 80 70 86 72   Resp: (!) 30 (!) 28 (!) 23 15   Temp:       TempSrc:       SpO2: 97% 96% 97% 97%   Weight:       Height:           Temperature:   Temp (24hrs), Av 4 °F (37 4 °C), Min:98 3 °F (36 8 °C), Max:100 °F (37 8 °C)    Current Temperature: 99 °F (37 2 °C)  Weights:   IBW: 54 7 kg    Body mass index is 23 35 kg/m²    Weight (last 2 days) None                Physical Exam:     Physical Exam   Constitutional: She is oriented to person, place, and time  She appears well-developed and well-nourished  No distress  HENT:   Head: Normocephalic and atraumatic  Mouth/Throat: Oropharynx is clear and moist    Eyes: Conjunctivae and EOM are normal  No scleral icterus  Cardiovascular: Normal rate, regular rhythm and normal heart sounds  No murmur heard  Pulmonary/Chest: Effort normal and breath sounds normal  She has no wheezes  She has no rales  Abdominal: Soft  Bowel sounds are normal  She exhibits no distension  There is no tenderness  There is no guarding  Musculoskeletal: She exhibits no edema, tenderness or deformity  Neurological: She is alert and oriented to person, place, and time  No cranial nerve deficit  Skin: Skin is warm and dry  No erythema  Psychiatric: She has a normal mood and affect  Her behavior is normal      ______________________________________________________________________  Hemodynamic Monitoring:  N/A     Non-Invasive/Invasive Ventilation Settings:  Respiratory    Lab Data (Last 4 hours)    None         O2/Vent Data (Last 4 hours)    None              No results found for: PHART, JMK5MWX, PO2ART, GYR8XBD, B0WJTSVU, BEART, SOURCE  SpO2: SpO2: 97 %  Intake and Outputs:  I/O       12/01 0701 - 12/02 0700 12/02 0701 - 12/03 0700    P  O  420 2520    I V  (mL/kg) 1527 5 (24 8) 1793 8 (29 1)    IV Piggyback  1000    Total Intake(mL/kg) 1947 5 (31 6) 5313 8 (86 1)    Urine (mL/kg/hr) 450 (0 3) 4325 (2 9)    Total Output 450 4325    Net +1497 5 +988  8          Unmeasured Urine Occurrence 6 x 2 x    Unmeasured Stool Occurrence 2 x         UOP: 2 9 ml/kg/hr   Nutrition:        Diet Orders            Start     Ordered    12/01/18 1156  Diet Regular; Regular House  Diet effective now     Question Answer Comment   Diet Type Regular    Regular Regular House    RD to adjust diet per protocol?  No        12/01/18 6501 Labs:     Results from last 7 days  Lab Units 18  0605 18  0538 18  0452 18  0451   WBC Thousand/uL 7 83 7 68 7 86 8 51   HEMOGLOBIN g/dL 10 6* 10 6* 11 5 11 9   HEMATOCRIT % 33 0* 33 2* 36 0 37 2   PLATELETS Thousands/uL 219 201 220 215   NEUTROS PCT % 72 68 67 83*   MONOS PCT % 11 11 10 6       Results from last 7 days  Lab Units 18  0432 18  0605 18  1218 18  0538 18  0452 18  0450 18  0451   SODIUM mmol/L 137 134* 138 131* 136 137 139   POTASSIUM mmol/L 3 1* 3 6 3 5 3 6 3 9 4 0 3 6   CHLORIDE mmol/L 103 101 104 99* 102 104 106   CO2 mmol/L 27 26 27 28 28 28 27   ANION GAP mmol/L 7 7 7 4 6 5 6   BUN mg/dL 6 7 10 9 10 7 8   CREATININE mg/dL 0 39* 0 47* 0 45* 0 47* 0 56* 0 49* 0 35*   CALCIUM mg/dL 8 0* 8 4 8 2* 9 0 8 2* 8 3 8 1*   ALT U/L  --   --   --   --   --  25  --    AST U/L  --   --   --   --   --  25  --    ALK PHOS U/L  --   --   --   --   --  50  --    ALBUMIN g/dL  --   --   --   --   --  3 3*  --    TOTAL BILIRUBIN mg/dL  --   --   --   --   --  0 49  --        Results from last 7 days  Lab Units 18  0605 18  0538 18  0452 18  0451   MAGNESIUM mg/dL 2 2 2 1 2 2 2 3 2 2   PHOSPHORUS mg/dL 2 4 3 0 4 0 3 1 2 9                  ABG:No results found for: PHART, FBF2WTS, PO2ART, QUI0HGN, N9PQFNVJ, BEART, SOURCE  VBG:        No results found for: VANCOTROUGH   Imaging: TCDs with no vasospasm  I have personally reviewed pertinent reports  EK/3 - Sinus tachycardia  IL interval 220  Micro: Allergies:    Allergies   Allergen Reactions    Codeine GI Intolerance     Medications:   Scheduled Meds:    Current Facility-Administered Medications:  acetaminophen 650 mg Oral Q6H PRN Ana Dan MD   bisacodyl 10 mg Rectal Daily PRN Ana Dan MD   docusate sodium 100 mg Oral BID Kip Snare, DO   dorzolamide-timolol 1 drop Both Eyes BID Tao Dobson,    fludrocortisone 0 1 mg Oral Daily Ladimilton Landeros Tamara Puri MD   heparin (porcine) 5,000 Units Subcutaneous [facility] Coletta Chiara, DO   hydrALAZINE 5 mg Intravenous Q4H PRN Chetan Alarcon MD   labetalol 10 mg Intravenous Q4H PRN Manny Dobson, DO   niMODipine 60 mg Oral Q4H [address] Quentin Gold, DO   ondansetron 4 mg Intravenous Q4H PRN Chetan Alarcon MD   sodium chloride 3 g Oral TID With Meals Eden Chamberlain MD   travoprost 1 drop Both Eyes HS Chetan Alarcon MD     Continuous Infusions:   PRN Meds:    acetaminophen 650 mg Q6H PRN   bisacodyl 10 mg Daily PRN   hydrALAZINE 5 mg Q4H PRN   labetalol 10 mg Q4H PRN   ondansetron 4 mg Q4H PRN     VTE Pharmacologic Prophylaxis: Heparin  VTE Mechanical Prophylaxis: sequential compression device  Invasive lines and devices: Invasive Devices     Peripherally Inserted Central Catheter Line            PICC Line 60/99/46 Right Basilic 5 days                   Assessment and Plan:   Principal Problem:    SAH [facility]subarachnoid hemorrhage) ([facility])  Active Problems:    Glaucoma    S/P coil embolization of cerebral aneurysm    Constipated  Resolved Problems:    Hypophosphatemia    Hypokalemia        Neuro:  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  - Bleeding day 11, s/p coiling; POD 8  - Completed 7 days of Keppra  - TCD bid for 21 days (12/17)  - Nimodipine 60mg q4hr  - Continue vasospasm precautions, monitoring  - Neuro checks q4hr  - PT/OT, OOB    CV: No further tachycardia  No documented hypotension overnight  Goal SBP <220, MAP >65; pt has been at goal on nimodipine 60mg q4hr    Pulm: No active issues  - Encouraged IS for bibasilar crackles on exam    GI: Constipation: docusate  Pt refused yesterday given loose stools  : Urine output of 4 3 L yesterday, 2 9ml/kg/hr  Continue to monitor    F/E/N:   Hyponatremia stable today at 135   - 3g sodium TID  Hypokalemia yesterday, resolved to 3 7  Will give 20mEq to normalize and potentially help with leg cramping      ID: No acute issues    Heme: DVT PPX with heparin SC    Endo: No acute issues     Msk/Skin: No acute issues    Disposition: Continue intensive care      Code Status: Level 1 - Full Code        Portions of the record may have been created with voice recognition software  Occasional wrong word or "sound a like" substitutions may have occurred due to the inherent limitations of voice recognition software  Read the chart carefully and recognize, using context, where substitutions have occurred          12/4/2018 6:19 AM    Yuri Spangler DO

## 2018-12-04 NOTE — PLAN OF CARE
Problem: OCCUPATIONAL THERAPY ADULT  Goal: Performs self-care activities at highest level of function for planned discharge setting  See evaluation for individualized goals  Treatment Interventions: ADL retraining, Functional transfer training, Endurance training, Patient/family training, Equipment evaluation/education, Continued evaluation, Energy conservation, Activityengagement          See flowsheet documentation for full assessment, interventions and recommendations  Outcome: Progressing  Limitation: Decreased ADL status, Decreased endurance, Decreased self-care trans, Decreased high-level ADLs  Prognosis: Fair  Assessment: Patient participated in Skilled OT session 12/4/18 with interventions consisting of ADL re training with the use of correct body mechnaics, Energy Conservation techniques, deep breathing technique, safety awareness and fall prevention techniques, therapeutic exercise to: increase functional use of BUEs, increase BUE muscle strength ,  therapeutic activities to: increase activity tolerance, increase dynamic sit/ stand balance during functional activity , increase postural control, increase trunk control and increase OOB/ sitting tolerance   Patient agreeable to OT treatment session, upon arrival patient was found supine in bed  In comparison to previous session, patient with improvements in transfers,activity tolerance, functional mobility, LB dressing and grooming  Patient requiring verbal cues for safety and verbal cues for correct technique  Patient continues to be functioning below baseline level, occupational performance remains limited secondary to factors listed above and increased risk for falls and injury  From OT standpoint, recommendation at time of d/c would be Home with family support when medically stable     Patient to benefit from continued Occupational Therapy treatment while in the hospital to address deficits as defined above and maximize level of functional independence with ADLs and functional mobility        OT Discharge Recommendation: Home with family support  OT - OK to Discharge: Yes (when medically stable)      Comments: Kelly Araiza MS, OTR/L

## 2018-12-04 NOTE — PHYSICAL THERAPY NOTE
PHYSICAL THERAPY NOTE          Patient Name: Ynes Disla  XGHDI'[id number] Date: 12/4/2018 12/04/18 4000   Pain Assessment   Pain Assessment 0-10   Pain Score Worst Possible Pain   Pain Type Acute pain   Pain Location Leg   Pain Orientation Right   Effect of Pain on Daily Activities Limits mobility   Patient's Stated Pain Goal No pain   Hospital Pain Intervention(s) Ambulation/increased activity;Repositioned; Emotional support   Response to Interventions Tolerated   Restrictions/Precautions   Weight Bearing Precautions Per Order No   Other Precautions Telemetry; Fall Risk;Pain   General   Chart Reviewed Yes   Family/Caregiver Present No   Cognition   Overall Cognitive Status WFL   Arousal/Participation Alert   Attention Within functional limits   Orientation Level Oriented X4   Memory Within functional limits   Following Commands Follows all commands and directions without difficulty   Subjective   Subjective "I am feeling better now"   Bed Mobility   Supine to Sit 5  Supervision   Sit to Supine 5  Supervision   Transfers   Sit to Stand 5  Supervision   Additional items Increased time required   Stand to Sit 5  Supervision   Additional items Increased time required   Ambulation/Elevation   Gait pattern Short stride; Excessively slow;Narrow DONTE   Gait Assistance 5  Supervision   Assistive Device None   Distance 500 ft   Balance   Static Sitting Good   Dynamic Sitting Fair +   Static Standing Fair   Dynamic Standing Fair   Ambulatory Fair   Endurance Deficit   Endurance Deficit No   Activity Tolerance   Activity Tolerance Patient tolerated treatment well   Nurse Made Aware Yes, Nirali   Exercises   Glute Sets Standing;15 reps;AROM; Bilateral   Knee AROM Long Arc Quad Sitting;15 reps;AROM; Bilateral   Marching Standing;20 reps;AROM; Bilateral   Neuro re-ed Pt performed standing balance in tandem stance, x2 trials   Pt maintained tandem stance for 15-20 seconds each trial   Assessment   Prognosis Good   Problem List Impaired balance;Pain   Assessment Pt seen for physical therapy treatment focused on therapeutic exercise and gait training  Pt is supine at start of session and agreeable to therapy  Pt transferred supine <> sit with supervision  Pt transferred sit <> stand with supervision  Pt ambulated 500 ft with supervision and no AD  Pt presents with slowed kee and narrow DONTE, no LOB  Pt performed standing/seated ther-ex as noted above  Pt remained in bedside chair with all needs within reach at end of session  PT recommendation for OPPT upon D/C  PT to follow up  Goals   Patient Goals To feel better   Treatment Day 2   Plan   Treatment/Interventions Functional transfer training;LE strengthening/ROM; Therapeutic exercise; Endurance training;Bed mobility;Gait training;Spoke to nursing;OT   Progress Progressing toward goals   PT Frequency (3-5x/wk)   Recommendation   Recommendation Outpatient PT; Home with family support   Equipment Recommended (Continue to assess)   PT - OK to Discharge No  (Pending stair trial)     Nasrin Seen, PT, DPT

## 2018-12-04 NOTE — PROGRESS NOTES
Progress Note - Neurosurgery   Lilia Dhaliwal 69 y o  female MRN: [id number]  Unit/Bed#:  -01 Encounter: [id number]        Assessment:  1  Status post coil embolization for subarachnoid hemorrhage (11/25/2018)  2  HH 1, F3 SAH (11/24/2018)  3  BD ( 11/23/2018)  4  ACOM   5  Dizziness and Light headedness resolved        Plan:   § Exam: GCS 15, EOMI, 2mm, conjugate, SF, BECKMAN, finger to nose normal and without drift bilaterally  Sensation to light touch is normal bilaterally  Motor strength 5/5  DTR brisk throughout  Julito Benjamin done on 11/24/2018 demonstrates diffuse subarachnoid hemorrhage throughout the basal cisterns, sylvian fissures and over the convexities without evidence of mass effect   Anterior communicating artery 5 mm aneurysm noted  § CTA or 11/27/2018 demonstrates status post coil embolization of anterior communicating artery and the postop day 2 resolving diffuse subarachnoid hemorrhage without evidence of vasospasm   Also area of decreased attenuation in the right basal ganglia compatible with age-indeterminate lacunar infraction  ? Pain control:  Primary team  ? DVT ppx:   § SCDs  § Continue heparin SQ  ? Seizure ppx:  Continue Keppra  ? Activity:  As tolerated  ? PT/OT evaluation & treatment  ? Medical Mx:  Per Primary team  ? Seizure prophylaxis:  continue Keppra  ? Neurocheck:  Q 1 H  CT head if GCS drops 2POINTS/1h  ? Continue Vasospasm watch  ? HOB>30-45 degree  ? Hw=856-Og Nacl tabs 3 gm PO TID  ? IO/OP=negative balance-encourage eating and drinking  ? TCD:   § Right =1 54  § Left = 0 98  ? SBP<220, currently in the 130s/60-70s  ? MAP>65, currently  In the 90s  ? Patient had some nausea this morning after she ingested table salt on empty stomach  But currently improved after she was given Ondansetron  Otherwise, has no complaints  Exam non focal  Continue with table salt  Monitor her serum sodium  Continue monitoring vasospasm-so far TCD appears stable    Call for concern or problem  Subjective/Objective   Chief Complaint: " I have some feeling of nausea this morning after I ingested table salt on empty stomach"/progress note    Subjective:  Patient reports feeling nauseated this morning after she ingested table salt on an empty stomach  However, currently she feels better after she was given Ondansetron  Denies any headache, dizziness or lightheadedness  Denies any blurry vision or diplopia  She has been up and walking around, denies any extremity weakness, numbness, paresthesia or gait instability  Denies fever, chills, rigors, cough or chest pain  Denies any vomiting or diarrhea, without urinary complaint  Objective:  Patient is supine in bed sleeping, but arousal to verbal      I/O       12/02 0701 - 12/03 0700 12/03 0701 - 12/04 0700 12/04 0701 - 12/05 0700    P  O  2520 1720     I V  (mL/kg) 1793 8 (29 1) 345 (5 6)     IV Piggyback 1000      Total Intake(mL/kg) 5313 8 (86 1) 2065 (33 5)     Urine (mL/kg/hr) 4325 (2 9) 4700 (3 2) 300 (1 2)    Total Output 4325 4700 300    Net +988 8 -2635 -300           Unmeasured Urine Occurrence 2 x      Unmeasured Stool Occurrence  1 x           Invasive Devices     Peripherally Inserted Central Catheter Line            PICC Line 65/90/32 Right Basilic 5 days                Physical Exam:  Vitals: Blood pressure 131/70, pulse 84, temperature 98 4 °F (36 9 °C), resp  rate (!) 28, height 5' 4" (1 626 m), weight 61 7 kg (136 lb 0 4 oz), SpO2 99 %, not currently breastfeeding  ,Body mass index is 23 35 kg/m²      Hemodynamic Monitoring: MAP: Arterial Line MAP (mmHg): 84 mmHg    General appearance: alert, appears stated age, cooperative and no distress  Head: Normocephalic, without obvious abnormality, atraumatic  Eyes: EOMI, 2 mm conjugate  Neck: supple, symmetrical, trachea midline and NT  Lungs: non labored breathing  Heart: regular heart rate  Neurologic:   Mental status: Alert, oriented x3, thought content appropriate  Cranial nerves: grossly intact (Cranial nerves II-XII)  Sensory: normal to light touch throughout  Motor: moving all extremities without focal weakness, strength is 5/5 bilaterally  Reflexes:  3+ and symmetric  No clonus and Babinski negative bilaterally  Coordination: finger to nose normal bilaterally, no drift bilaterally      Lab Results:    Results from last 7 days  Lab Units 12/02/18  0605 12/01/18  0538 11/30/18  0452   WBC Thousand/uL 7 83 7 68 7 86   HEMOGLOBIN g/dL 10 6* 10 6* 11 5   HEMATOCRIT % 33 0* 33 2* 36 0   PLATELETS Thousands/uL 219 201 220   NEUTROS PCT % 72 68 67   MONOS PCT % 11 11 10       Results from last 7 days  Lab Units 12/04/18  0629 12/03/18  0432 12/02/18  0605  11/29/18  0450   POTASSIUM mmol/L 3 7 3 1* 3 6  < > 4 0   CHLORIDE mmol/L 100 103 101  < > 104   CO2 mmol/L 24 27 26  < > 28   BUN mg/dL 9 6 7  < > 7   CREATININE mg/dL 0 52* 0 39* 0 47*  < > 0 49*   CALCIUM mg/dL 8 9 8 0* 8 4  < > 8 3   ALK PHOS U/L 61  --   --   --  50   ALT U/L 77  --   --   --  25   AST U/L 38  --   --   --  25   < > = values in this interval not displayed  Results from last 7 days  Lab Units 12/02/18  0605 12/01/18  0538 11/30/18  0452   MAGNESIUM mg/dL 2 2 2 1 2 2       Results from last 7 days  Lab Units 12/02/18  0605 12/01/18  0538 11/30/18  0452   PHOSPHORUS mg/dL 2 4 3 0 4 0         No results found for: TROPONINT  ABG:No results found for: PHART, LBC5AEA, PO2ART, OCX1SOP, P2LIDQZI, BEART, SOURCE    Imaging Studies: I have personally reviewed pertinent reports  and I have personally reviewed pertinent films in PACS    EKG, Pathology, and Other Studies: I have personally reviewed pertinent reports        VTE Pharmacologic Prophylaxis: Heparin SQ    VTE Mechanical Prophylaxis: sequential compression device

## 2018-12-05 ENCOUNTER — APPOINTMENT (INPATIENT)
Dept: NON INVASIVE DIAGNOSTICS | Facility: HOSPITAL | Age: 78
DRG: 021 | End: 2018-12-05
Payer: MEDICARE

## 2018-12-05 PROBLEM — E87.1 HYPONATREMIA: Status: ACTIVE | Noted: 2018-12-05

## 2018-12-05 LAB
ANION GAP SERPL CALCULATED.3IONS-SCNC: 9 MMOL/L (ref 4–13)
BUN SERPL-MCNC: 13 MG/DL (ref 5–25)
CALCIUM SERPL-MCNC: 8.8 MG/DL (ref 8.3–10.1)
CHLORIDE SERPL-SCNC: 104 MMOL/L (ref 100–108)
CO2 SERPL-SCNC: 26 MMOL/L (ref 21–32)
CREAT SERPL-MCNC: 0.57 MG/DL (ref 0.6–1.3)
ERYTHROCYTE [DISTWIDTH] IN BLOOD BY AUTOMATED COUNT: 13.8 % (ref 11.6–15.1)
GFR SERPL CREATININE-BSD FRML MDRD: 90 ML/MIN/1.73SQ M
GLUCOSE SERPL-MCNC: 111 MG/DL (ref 65–140)
HCT VFR BLD AUTO: 33.9 % (ref 34.8–46.1)
HGB BLD-MCNC: 10.8 G/DL (ref 11.5–15.4)
MAGNESIUM SERPL-MCNC: 2.3 MG/DL (ref 1.6–2.6)
MCH RBC QN AUTO: 30.9 PG (ref 26.8–34.3)
MCHC RBC AUTO-ENTMCNC: 31.9 G/DL (ref 31.4–37.4)
MCV RBC AUTO: 97 FL (ref 82–98)
OSMOLALITY UR/SERPL-RTO: 287 MMOL/KG (ref 282–298)
PHOSPHATE SERPL-MCNC: 3.1 MG/DL (ref 2.3–4.1)
PLATELET # BLD AUTO: 315 THOUSANDS/UL (ref 149–390)
PMV BLD AUTO: 9.9 FL (ref 8.9–12.7)
POTASSIUM SERPL-SCNC: 3.7 MMOL/L (ref 3.5–5.3)
RBC # BLD AUTO: 3.49 MILLION/UL (ref 3.81–5.12)
SODIUM SERPL-SCNC: 139 MMOL/L (ref 136–145)
WBC # BLD AUTO: 8.55 THOUSAND/UL (ref 4.31–10.16)

## 2018-12-05 PROCEDURE — 83735 ASSAY OF MAGNESIUM: CPT | Performed by: INTERNAL MEDICINE

## 2018-12-05 PROCEDURE — 93886 INTRACRANIAL COMPLETE STUDY: CPT

## 2018-12-05 PROCEDURE — 80048 BASIC METABOLIC PNL TOTAL CA: CPT | Performed by: INTERNAL MEDICINE

## 2018-12-05 PROCEDURE — 83930 ASSAY OF BLOOD OSMOLALITY: CPT | Performed by: INTERNAL MEDICINE

## 2018-12-05 PROCEDURE — 84100 ASSAY OF PHOSPHORUS: CPT | Performed by: INTERNAL MEDICINE

## 2018-12-05 PROCEDURE — 99024 POSTOP FOLLOW-UP VISIT: CPT | Performed by: PHYSICIAN ASSISTANT

## 2018-12-05 PROCEDURE — 99233 SBSQ HOSP IP/OBS HIGH 50: CPT | Performed by: EMERGENCY MEDICINE

## 2018-12-05 PROCEDURE — 85027 COMPLETE CBC AUTOMATED: CPT | Performed by: INTERNAL MEDICINE

## 2018-12-05 PROCEDURE — 93886 INTRACRANIAL COMPLETE STUDY: CPT | Performed by: SURGERY

## 2018-12-05 RX ORDER — MUSCLE RUB CREAM 100; 150 MG/G; MG/G
CREAM TOPICAL 2 TIMES DAILY PRN
Status: DISCONTINUED | OUTPATIENT
Start: 2018-12-05 | End: 2018-12-10 | Stop reason: HOSPADM

## 2018-12-05 RX ORDER — FLUDROCORTISONE ACETATE 0.1 MG/1
0.1 TABLET ORAL EVERY 12 HOURS
Status: DISCONTINUED | OUTPATIENT
Start: 2018-12-05 | End: 2018-12-06

## 2018-12-05 RX ORDER — POTASSIUM CHLORIDE 20 MEQ/1
20 TABLET, EXTENDED RELEASE ORAL DAILY
Status: DISCONTINUED | OUTPATIENT
Start: 2018-12-05 | End: 2018-12-06

## 2018-12-05 RX ORDER — ALBUMIN, HUMAN INJ 5% 5 %
12.5 SOLUTION INTRAVENOUS ONCE
Status: COMPLETED | OUTPATIENT
Start: 2018-12-05 | End: 2018-12-05

## 2018-12-05 RX ADMIN — DOCUSATE SODIUM 100 MG: 100 CAPSULE, LIQUID FILLED ORAL at 08:00

## 2018-12-05 RX ADMIN — DORZOLAMIDE HYDROCHLORIDE AND TIMOLOL MALEATE 1 DROP: 20; 5 SOLUTION/ DROPS OPHTHALMIC at 00:07

## 2018-12-05 RX ADMIN — FLUDROCORTISONE ACETATE 0.1 MG: 0.1 TABLET ORAL at 00:28

## 2018-12-05 RX ADMIN — NIMODIPINE 60 MG: 30 CAPSULE, LIQUID FILLED ORAL at 17:00

## 2018-12-05 RX ADMIN — FLUDROCORTISONE ACETATE 0.1 MG: 0.1 TABLET ORAL at 11:37

## 2018-12-05 RX ADMIN — ACETAMINOPHEN 650 MG: 325 TABLET, FILM COATED ORAL at 00:29

## 2018-12-05 RX ADMIN — SODIUM CHLORIDE TAB 1 GM 3 G: 1 TAB at 07:53

## 2018-12-05 RX ADMIN — HEPARIN SODIUM 5000 UNITS: 5000 INJECTION INTRAVENOUS; SUBCUTANEOUS at 05:24

## 2018-12-05 RX ADMIN — DORZOLAMIDE HYDROCHLORIDE AND TIMOLOL MALEATE 1 DROP: 20; 5 SOLUTION/ DROPS OPHTHALMIC at 11:39

## 2018-12-05 RX ADMIN — DORZOLAMIDE HYDROCHLORIDE AND TIMOLOL MALEATE 1 DROP: 20; 5 SOLUTION/ DROPS OPHTHALMIC at 23:57

## 2018-12-05 RX ADMIN — ACETAMINOPHEN 650 MG: 325 TABLET, FILM COATED ORAL at 12:16

## 2018-12-05 RX ADMIN — ACETAMINOPHEN 650 MG: 325 TABLET, FILM COATED ORAL at 21:32

## 2018-12-05 RX ADMIN — NIMODIPINE 60 MG: 30 CAPSULE, LIQUID FILLED ORAL at 07:53

## 2018-12-05 RX ADMIN — NIMODIPINE 60 MG: 30 CAPSULE, LIQUID FILLED ORAL at 23:57

## 2018-12-05 RX ADMIN — NIMODIPINE 60 MG: 30 CAPSULE, LIQUID FILLED ORAL at 11:37

## 2018-12-05 RX ADMIN — ALBUMIN HUMAN 12.5 G: 0.05 INJECTION, SOLUTION INTRAVENOUS at 00:28

## 2018-12-05 RX ADMIN — HEPARIN SODIUM 5000 UNITS: 5000 INJECTION INTRAVENOUS; SUBCUTANEOUS at 21:32

## 2018-12-05 RX ADMIN — SODIUM CHLORIDE TAB 1 GM 3 G: 1 TAB at 11:37

## 2018-12-05 RX ADMIN — DOCUSATE SODIUM 100 MG: 100 CAPSULE, LIQUID FILLED ORAL at 17:05

## 2018-12-05 RX ADMIN — SODIUM CHLORIDE TAB 1 GM 3 G: 1 TAB at 17:04

## 2018-12-05 RX ADMIN — FLUDROCORTISONE ACETATE 0.1 MG: 0.1 TABLET ORAL at 23:55

## 2018-12-05 RX ADMIN — HEPARIN SODIUM 5000 UNITS: 5000 INJECTION INTRAVENOUS; SUBCUTANEOUS at 13:47

## 2018-12-05 RX ADMIN — NIMODIPINE 60 MG: 30 CAPSULE, LIQUID FILLED ORAL at 20:20

## 2018-12-05 RX ADMIN — ACETAMINOPHEN 650 MG: 325 TABLET, FILM COATED ORAL at 06:40

## 2018-12-05 RX ADMIN — NIMODIPINE 60 MG: 30 CAPSULE, LIQUID FILLED ORAL at 04:26

## 2018-12-05 RX ADMIN — POTASSIUM CHLORIDE 20 MEQ: 1500 TABLET, EXTENDED RELEASE ORAL at 08:01

## 2018-12-05 RX ADMIN — TRAVOPROST OPHTHALMIC SOLUTION 1 DROP: 0.04 SOLUTION OPHTHALMIC at 21:33

## 2018-12-05 RX ADMIN — NIMODIPINE 60 MG: 30 CAPSULE, LIQUID FILLED ORAL at 00:02

## 2018-12-05 NOTE — PROGRESS NOTES
Progress Note - Neurosurgery   Carline Condon 69 y o  female MRN: [id number]  Unit/Bed#: CW -01 Encounter: [id number]    Assessment:  1  Status post coil embolization for subarachnoid hemorrhage (11/25/2018)  2  HH 1, F3 SAH (11/24/2018)  3  BD ( 11/23/2018)  4  ACOM   5  Dizziness and Light headedness resolved  6  Intermittent LEs cramps        Plan:   § Exam: GCS 15, EOMI, conjugate, SF, BECKMAN, finger to nose normal and without drift bilaterally  Sensation to light touch is normal bilaterally  Motor strength 5/5  DTR brisk throughout  N clonus and Babinski is negative symmetrically  Carrolyn Gander done on 11/24/2018 demonstrates diffuse subarachnoid hemorrhage throughout the basal cisterns, sylvian fissures and over the convexities without evidence of mass effect   Anterior communicating artery 5 mm aneurysm noted  § CTA or 11/27/2018 demonstrates status post coil embolization of anterior communicating artery and the postop day 2 resolving diffuse subarachnoid hemorrhage without evidence of vasospasm   Also area of decreased attenuation in the right basal ganglia compatible with age-indeterminate lacunar infraction  ? Pain control:  Primary team  ? DVT ppx:   § SCDs  § Continue heparin SQ  ? Seizure ppx:  Continue Keppra  ? Activity:  As tolerated  ? PT/OT evaluation & treatment  ? Medical Mx:  Per Primary team  ? Seizure prophylaxis:  None  ? Neurocheck:  Q 4H,  CT head if GCS drops 2 pts/1h  ? Continue Vasospasm watch  ? HOB>30-45 degree  ? Sj=074-Dd:  § Nacl tabs 3 gm PO TID,   § 0 9% NS, IV infusion  § Fludrocortisone 0 1 mg po Q12H  ? IO/OP=negative balance-encourage eating and drinking  ? TCD:   § Right =1 59  § Left = 0 71  ? SBP<220, currently in the 130-150s/60-70s  ? MAP>65, currently  In the 80-90s  ? Patient is doing fine, except occasional LEs cramps  Neurologically non focal Continue vasospasm watch  Call for concern or problem      Subjective/Objective   Chief Complaint: " I don't have problem, except occasional legs cramps'/ Post op progress note    Subjective: Patient claims she has intermittent cramps in the legs, has Hx of chronic bilateral LE cramps  She denies headache, dizziness, light headedness, nausea or vomiting  Denies fever, chills, rigors, cough, or chest pain  Denies numbness, weakness or paresthesia in the extremities  She is wearing SCDs, using ISP  Doing PT and walking by the help of PT  Objective: PATIENT IS SUPINE IN BED, IN NON PAIN DISTRESS    I/O       12/03 0701 - 12/04 0700 12/04 0701 - 12/05 0700 12/05 0701 - 12/06 0700    P  O  1720 1240 240    I V  (mL/kg) 345 (5 6) 583 8 (9 5) 300 (4 9)    IV Piggyback  250     Total Intake(mL/kg) 2065 (33 5) 2073 8 (33 6) 540 (8 8)    Urine (mL/kg/hr) 4700 (3 2) 2850 (1 9) 375 (1 3)    Total Output 4700 2850 375    Net -2635 -776 3 +165           Unmeasured Stool Occurrence 1 x            Invasive Devices     Peripherally Inserted Central Catheter Line            PICC Line 94/56/14 Right Basilic 6 days                Physical Exam:  Vitals: Blood pressure 110/62, pulse 94, temperature 98 5 °F (36 9 °C), temperature source Oral, resp  rate (!) 43, height 5' 4" (1 626 m), weight 61 7 kg (136 lb 0 4 oz), SpO2 96 %, not currently breastfeeding  ,Body mass index is 23 35 kg/m²      Hemodynamic Monitoring: MAP: Arterial Line MAP (mmHg): 84 mmHg    General appearance: alert, appears stated age, cooperative and no distress  Head: Normocephalic, without obvious abnormality, atraumatic  Eyes: EOMI, conjugate  Neck: supple, symmetrical, trachea midline and NT  Lungs: non labored breathing  Heart: regular heart rate  Neurologic:   Mental status: Alert, orientedx3, thought content appropriate  Cranial nerves: grossly intact (Cranial nerves II-XII)  Sensory: normal to LT throughout  Motor: moving all extremities without focal weakness, strength 5/5 bilaterally  Reflexes: 3+ and symmetric, without clonus and Babinski is negative bilaterally  Coordination: finger to nose normal bilaterally, no drift bilaterally      Lab Results:    Results from last 7 days  Lab Units 12/05/18  0428 12/02/18  0605 12/01/18  0538 11/30/18  0452   WBC Thousand/uL 8 55 7 83 7 68 7 86   HEMOGLOBIN g/dL 10 8* 10 6* 10 6* 11 5   HEMATOCRIT % 33 9* 33 0* 33 2* 36 0   PLATELETS Thousands/uL 315 219 201 220   NEUTROS PCT %  --  72 68 67   MONOS PCT %  --  11 11 10       Results from last 7 days  Lab Units 12/05/18  0428 12/04/18  1731 12/04/18  0629  11/29/18  0450   POTASSIUM mmol/L 3 7 3 7 3 7  < > 4 0   CHLORIDE mmol/L 104 101 100  < > 104   CO2 mmol/L 26 26 24  < > 28   BUN mg/dL 13 14 9  < > 7   CREATININE mg/dL 0 57* 0 66 0 52*  < > 0 49*   CALCIUM mg/dL 8 8 9 4 8 9  < > 8 3   ALK PHOS U/L  --   --  61  --  50   ALT U/L  --   --  77  --  25   AST U/L  --   --  38  --  25   < > = values in this interval not displayed  Results from last 7 days  Lab Units 12/05/18  0651 12/02/18  0605 12/01/18  0538   MAGNESIUM mg/dL 2 3 2 2 2 1       Results from last 7 days  Lab Units 12/05/18  0651 12/02/18  0605 12/01/18  0538   PHOSPHORUS mg/dL 3 1 2 4 3 0         No results found for: TROPONINT  ABG:No results found for: PHART, YGE4KVC, PO2ART, RBU2QXB, W0PNGKTZ, BEART, SOURCE    Imaging Studies: I have personally reviewed pertinent reports  and I have personally reviewed pertinent films in PACS    EKG, Pathology, and Other Studies: I have personally reviewed pertinent reports        VTE Pharmacologic Prophylaxis: Heparin SQ    VTE Mechanical Prophylaxis: sequential compression device

## 2018-12-05 NOTE — PROGRESS NOTES
Progress Note - Critical Care   Leesa Olea 69 y o  female MRN: [id number]  Unit/Bed#: 3150 [address] -01 Encounter: [id number]    Attending Physician: Matt Torres MD    _____________________________________________________________________  ______________________________________________________________________    Chief Complaint: Headache    24 Hour Events: Patient given 12 5g albumin overnight for fluid status  She was restarted on NS for hyponatremia to 133  Nausea yesterday but no vomiting, controlled with PRN zofran - was able to tolerate salt tabs when she "buries" them in a meal    She was also having issues with leg cramping so BenGay was ordered, has not received yet  She is asking if it is ok to have Tylenol in advance of PT, because the cramps are disabling when they get bad  Reports 2 loose stools yesterday  Complains of a mild frontal headache which she says is normal for her in the morning, which she attributes to "low blood sugar" and she says generally resolves with breakfast     Review of Systems   Constitutional: Negative for chills and fever  HENT: Negative  Eyes: Negative  Respiratory: Negative for cough, chest tightness and shortness of breath  Cardiovascular: Negative for chest pain, palpitations and leg swelling  Gastrointestinal: Negative for abdominal pain, diarrhea, nausea and vomiting  Endocrine: Positive for cold intolerance  Genitourinary: Negative for difficulty urinating, dysuria and frequency  Musculoskeletal: Positive for myalgias  Negative for neck stiffness  Skin: Negative  Allergic/Immunologic: Negative  Neurological: Positive for headaches  Negative for dizziness, weakness and light-headedness  Hematological: Negative      Psychiatric/Behavioral: Negative       ______________________________________________________________________  Vitals:    12/05/18 0330 12/05/18 0455 12/05/18 0500 12/05/18 0628   BP: 124/65 102/56  132/59   Pulse: 78 86 82 80   Resp: 17 20 17 20   Temp:  98 9 °F (37 2 °C)     TempSrc:  Oral     SpO2: 98% 99% 99% 98%   Weight:       Height:           Temperature:   Temp (24hrs), Av 6 °F (37 °C), Min:97 9 °F (36 6 °C), Max:98 9 °F (37 2 °C)    Current Temperature: 98 9 °F (37 2 °C)  Weights:   IBW: 54 7 kg    Body mass index is 23 35 kg/m²  Weight (last 2 days)     None                Physical Exam:     Physical Exam   Constitutional: She is oriented to person, place, and time  She appears well-developed and well-nourished  No distress  HENT:   Head: Normocephalic and atraumatic  Mouth/Throat: Oropharynx is clear and moist    Eyes: Conjunctivae and EOM are normal  No scleral icterus  Cardiovascular: Normal rate and normal heart sounds  No murmur heard  Tachycardic   Pulmonary/Chest: Effort normal and breath sounds normal  She has no wheezes  She has no rales  Abdominal: Soft  Bowel sounds are normal  She exhibits no distension  There is no tenderness  There is no guarding  Musculoskeletal: She exhibits no edema, tenderness or deformity  Neurological: She is alert and oriented to person, place, and time  No cranial nerve deficit  Skin: Skin is warm and dry  No erythema  Psychiatric: She has a normal mood and affect  Her behavior is normal      ______________________________________________________________________  Hemodynamic Monitoring:  N/A     Non-Invasive/Invasive Ventilation Settings:  Respiratory    Lab Data (Last 4 hours)    None         O2/Vent Data (Last 4 hours)    None              No results found for: PHART, ZBZ2TZL, PO2ART, PAL0MMO, F1BALQSB, BEART, SOURCE  SpO2: SpO2: 98 %  Intake and Outputs:  I/O        07 -  0700  07 -  0700    P  O  420 2520    I V  (mL/kg) 1527 5 (24 8) 1793 8 (29 1)    IV Piggyback  1000    Total Intake(mL/kg) 1947 5 (31 6) 5313 8 (86 1)    Urine (mL/kg/hr) 450 (0 3) 4325 (2 9)    Total Output 450 4325    Net +1497 5 +988  8          Unmeasured Urine Occurrence 6 x 2 x    Unmeasured Stool Occurrence 2 x         UOP: 2 9 ml/kg/hr   Nutrition:        Diet Orders            Start     Ordered    12/01/18 1156  Diet Regular; Regular House  Diet effective now     Question Answer Comment   Diet Type Regular    Regular Regular House    RD to adjust diet per protocol? No        12/01/18 1155          Labs:     Results from last 7 days  Lab Units 12/05/18  0428 12/02/18  0605 12/01/18  0538 11/30/18  0452   WBC Thousand/uL 8 55 7 83 7 68 7 86   HEMOGLOBIN g/dL 10 8* 10 6* 10 6* 11 5   HEMATOCRIT % 33 9* 33 0* 33 2* 36 0   PLATELETS Thousands/uL 315 219 201 220   NEUTROS PCT %  --  72 68 67   MONOS PCT %  --  11 11 10       Results from last 7 days  Lab Units 12/05/18  0428 12/04/18  1731 12/04/18  0629 12/03/18  0432 12/02/18  0605 12/01/18  1218 12/01/18  0538  11/29/18  0450   SODIUM mmol/L 139 133* 135* 137 134* 138 131*  < > 137   POTASSIUM mmol/L 3 7 3 7 3 7 3 1* 3 6 3 5 3 6  < > 4 0   CHLORIDE mmol/L 104 101 100 103 101 104 99*  < > 104   CO2 mmol/L 26 26 24 27 26 27 28  < > 28   ANION GAP mmol/L 9 6 11 7 7 7 4  < > 5   BUN mg/dL 13 14 9 6 7 10 9  < > 7   CREATININE mg/dL 0 57* 0 66 0 52* 0 39* 0 47* 0 45* 0 47*  < > 0 49*   CALCIUM mg/dL 8 8 9 4 8 9 8 0* 8 4 8 2* 9 0  < > 8 3   ALT U/L  --   --  77  --   --   --   --   --  25   AST U/L  --   --  38  --   --   --   --   --  25   ALK PHOS U/L  --   --  61  --   --   --   --   --  50   ALBUMIN g/dL  --   --  3 5  --   --   --   --   --  3 3*   TOTAL BILIRUBIN mg/dL  --   --  0 50  --   --   --   --   --  0 49   < > = values in this interval not displayed  Results from last 7 days  Lab Units 12/02/18  0605 12/01/18  0538 11/30/18  0452 11/29/18  0450   MAGNESIUM mg/dL 2 2 2 1 2 2 2 3   PHOSPHORUS mg/dL 2 4 3 0 4 0 3 1                  ABG:No results found for: PHART, ZKE2RUA, PO2ART, OBB7LAB, I6QCVOLA, BEART, SOURCE  VBG:        No results found for: [facility]   Imaging: TCDs with no vasospasm   I have personally reviewed pertinent reports  EK/3 - Sinus tachycardia  MI interval 220  Micro: Allergies: Allergies   Allergen Reactions    Codeine GI Intolerance     Medications:   Scheduled Meds:    Current Facility-Administered Medications:  acetaminophen 650 mg Oral Q6H PRN Laura Valentino MD    bisacodyl 10 mg Rectal Daily PRN Laura Valentino MD    docusate sodium 100 mg Oral BID Karyn Roundhill, DO    dorzolamide-timolol 1 drop Both Eyes BID Tao Dobson, DO    fludrocortisone 0 1 mg Oral Q12H Bozena Knox, DO    heparin (porcine) 5,000 Units Subcutaneous [facility] Havery Buhler, DO    hydrALAZINE 5 mg Intravenous Q4H PRN Laura Valentino MD    labetalol 10 mg Intravenous Q4H PRN Nichole Kidney, DO    menthol-methyl salicylate  Apply externally BID PRN Havery Buhler, DO    niMODipine 60 mg Oral Q4H [facility], DO    ondansetron 4 mg Intravenous Q4H PRN Laura Valentino MD    potassium chloride 20 mEq Oral Daily Karyn Roundhill, DO    sodium chloride 75 mL/hr Intravenous Continuous Havery Buhler, DO Last Rate: 75 mL/hr (18)   sodium chloride 3 g Oral TID With Meals America Wadsworth MD    travoprost 1 drop Both Eyes HS Laura Valentino MD      Continuous Infusions:    sodium chloride 75 mL/hr Last Rate: 75 mL/hr (18)     PRN Meds:    acetaminophen 650 mg Q6H PRN   bisacodyl 10 mg Daily PRN   hydrALAZINE 5 mg Q4H PRN   labetalol 10 mg Q4H PRN   menthol-methyl salicylate  BID PRN   ondansetron 4 mg Q4H PRN     VTE Pharmacologic Prophylaxis: Heparin  VTE Mechanical Prophylaxis: sequential compression device  Invasive lines and devices:   Invasive Devices     Peripherally Inserted Central Catheter Line            PICC Line  Right Basilic 6 days                   Assessment and Plan:   Principal Problem:    SAH (subarachnoid hemorrhage) (HCC)  Active Problems:    Glaucoma    S/P coil embolization of cerebral aneurysm    Hyponatremia  Resolved Problems:    Hypophosphatemia    Hypokalemia Constipated        Neuro:  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  - Bleeding day 12, s/p coiling; POD 9  - Completed 7 days of Keppra  - TCD bid for 21 days (12/17)  - Nimodipine 60mg q4hr  - Continue vasospasm precautions, monitoring  - Neuro checks q4hr  - PT/OT, OOB    CV: No further tachycardia  No documented hypotension overnight  Goal SBP <220, MAP >65; pt has been at goal on nimodipine 60mg q4hr    Pulm: No active issues  - Encouraged IS for bibasilar crackles on exam    GI: Constipation: docusate  Pt refused yesterday given loose stools  Reports 2 loose stools  - Consider fiber supplementation; loose stools may worsen electrolyte abnormalities    Nausea: secondary to sodium tabs  Pt should take these with meals/food  : Urine output of 4 3 L yesterday, 2 9ml/kg/hr  - Patient needs to remain euvolemic; currently net negative 8L (in the setting of known inaccurate I/O earlier in admission)  - Continue to monitor    F/E/N:   Hyponatremia overnight to 133, restarted NS at 75/hr   - Will check Mg, Phos, and osmolality to better characterize hyponatremia  - 3g sodium TID  - Florinef was increased to 0 1 BID  Hypokalemia resolved and stable at 3 7  Will give 20mEq daily to support/normalize and potentially help with leg cramping  Fluid status: pt with significant urine output in the setting of hyponatremia  Was given 12 5g albumin overnight and florinef increased as above  For now will continue NS to support sodium and monitor on new regimen  ID: No acute issues    Heme: DVT PPX with heparin SC    Endo: No acute issues     Msk/Skin: Bilateral lower leg cramping; chronic issue for patient  At home she uses tonic water and it works very well    - Encourage OOB  - Bengay PRN  - Pt may have tylenol before PT    Disposition: Continue intensive care      Code Status: Level 1 - Full Code        Portions of the record may have been created with voice recognition software  Occasional wrong word or "sound a like" substitutions may have occurred due to the inherent limitations of voice recognition software  Read the chart carefully and recognize, using context, where substitutions have occurred          12/5/2018 6:41 AM    Charanjit Paris DO

## 2018-12-06 ENCOUNTER — APPOINTMENT (INPATIENT)
Dept: NON INVASIVE DIAGNOSTICS | Facility: HOSPITAL | Age: 78
DRG: 021 | End: 2018-12-06
Payer: MEDICARE

## 2018-12-06 LAB
ANION GAP SERPL CALCULATED.3IONS-SCNC: 8 MMOL/L (ref 4–13)
ANION GAP SERPL CALCULATED.3IONS-SCNC: 8 MMOL/L (ref 4–13)
BUN SERPL-MCNC: 7 MG/DL (ref 5–25)
BUN SERPL-MCNC: 9 MG/DL (ref 5–25)
CALCIUM SERPL-MCNC: 8.2 MG/DL (ref 8.3–10.1)
CALCIUM SERPL-MCNC: 8.6 MG/DL (ref 8.3–10.1)
CHLORIDE SERPL-SCNC: 102 MMOL/L (ref 100–108)
CHLORIDE SERPL-SCNC: 102 MMOL/L (ref 100–108)
CO2 SERPL-SCNC: 26 MMOL/L (ref 21–32)
CO2 SERPL-SCNC: 27 MMOL/L (ref 21–32)
CREAT SERPL-MCNC: 0.4 MG/DL (ref 0.6–1.3)
CREAT SERPL-MCNC: 0.61 MG/DL (ref 0.6–1.3)
GFR SERPL CREATININE-BSD FRML MDRD: 101 ML/MIN/1.73SQ M
GFR SERPL CREATININE-BSD FRML MDRD: 88 ML/MIN/1.73SQ M
GLUCOSE SERPL-MCNC: 112 MG/DL (ref 65–140)
GLUCOSE SERPL-MCNC: 147 MG/DL (ref 65–140)
MAGNESIUM SERPL-MCNC: 2.8 MG/DL (ref 1.6–2.6)
POTASSIUM SERPL-SCNC: 2.8 MMOL/L (ref 3.5–5.3)
POTASSIUM SERPL-SCNC: 3.2 MMOL/L (ref 3.5–5.3)
SODIUM SERPL-SCNC: 136 MMOL/L (ref 136–145)
SODIUM SERPL-SCNC: 137 MMOL/L (ref 136–145)

## 2018-12-06 PROCEDURE — 99024 POSTOP FOLLOW-UP VISIT: CPT | Performed by: PHYSICIAN ASSISTANT

## 2018-12-06 PROCEDURE — 80048 BASIC METABOLIC PNL TOTAL CA: CPT | Performed by: INTERNAL MEDICINE

## 2018-12-06 PROCEDURE — 99231 SBSQ HOSP IP/OBS SF/LOW 25: CPT | Performed by: INTERNAL MEDICINE

## 2018-12-06 PROCEDURE — 83735 ASSAY OF MAGNESIUM: CPT | Performed by: INTERNAL MEDICINE

## 2018-12-06 PROCEDURE — 97116 GAIT TRAINING THERAPY: CPT

## 2018-12-06 PROCEDURE — 93886 INTRACRANIAL COMPLETE STUDY: CPT

## 2018-12-06 PROCEDURE — 80048 BASIC METABOLIC PNL TOTAL CA: CPT | Performed by: PHYSICIAN ASSISTANT

## 2018-12-06 PROCEDURE — 93886 INTRACRANIAL COMPLETE STUDY: CPT | Performed by: SURGERY

## 2018-12-06 PROCEDURE — 97530 THERAPEUTIC ACTIVITIES: CPT

## 2018-12-06 RX ORDER — MAGNESIUM SULFATE HEPTAHYDRATE 40 MG/ML
2 INJECTION, SOLUTION INTRAVENOUS ONCE
Status: COMPLETED | OUTPATIENT
Start: 2018-12-06 | End: 2018-12-06

## 2018-12-06 RX ORDER — ALBUMIN, HUMAN INJ 5% 5 %
25 SOLUTION INTRAVENOUS ONCE
Status: COMPLETED | OUTPATIENT
Start: 2018-12-06 | End: 2018-12-06

## 2018-12-06 RX ORDER — POTASSIUM CHLORIDE 29.8 MG/ML
40 INJECTION INTRAVENOUS ONCE
Status: COMPLETED | OUTPATIENT
Start: 2018-12-06 | End: 2018-12-06

## 2018-12-06 RX ORDER — POTASSIUM CHLORIDE 20 MEQ/1
40 TABLET, EXTENDED RELEASE ORAL DAILY
Status: DISCONTINUED | OUTPATIENT
Start: 2018-12-06 | End: 2018-12-10 | Stop reason: HOSPADM

## 2018-12-06 RX ORDER — POTASSIUM CHLORIDE 20 MEQ/1
40 TABLET, EXTENDED RELEASE ORAL ONCE
Status: COMPLETED | OUTPATIENT
Start: 2018-12-06 | End: 2018-12-06

## 2018-12-06 RX ORDER — ACETAMINOPHEN 325 MG/1
650 TABLET ORAL EVERY 4 HOURS
Status: DISCONTINUED | OUTPATIENT
Start: 2018-12-06 | End: 2018-12-08

## 2018-12-06 RX ORDER — ALBUMIN, HUMAN INJ 5% 5 %
SOLUTION INTRAVENOUS
Status: DISPENSED
Start: 2018-12-06 | End: 2018-12-07

## 2018-12-06 RX ORDER — FLUDROCORTISONE ACETATE 0.1 MG/1
0.2 TABLET ORAL DAILY
Status: DISCONTINUED | OUTPATIENT
Start: 2018-12-06 | End: 2018-12-08

## 2018-12-06 RX ADMIN — ACETAMINOPHEN 650 MG: 325 TABLET, FILM COATED ORAL at 12:26

## 2018-12-06 RX ADMIN — POTASSIUM CHLORIDE 40 MEQ: 1500 TABLET, EXTENDED RELEASE ORAL at 08:46

## 2018-12-06 RX ADMIN — SODIUM CHLORIDE TAB 1 GM 3 G: 1 TAB at 08:54

## 2018-12-06 RX ADMIN — NIMODIPINE 60 MG: 30 CAPSULE, LIQUID FILLED ORAL at 04:23

## 2018-12-06 RX ADMIN — POTASSIUM CHLORIDE 40 MEQ: 400 INJECTION, SOLUTION INTRAVENOUS at 07:48

## 2018-12-06 RX ADMIN — NIMODIPINE 60 MG: 30 CAPSULE, LIQUID FILLED ORAL at 20:20

## 2018-12-06 RX ADMIN — POTASSIUM CHLORIDE 40 MEQ: 1500 TABLET, EXTENDED RELEASE ORAL at 18:13

## 2018-12-06 RX ADMIN — MENTHOL, METHYL SALICYLATE: 10; 15 CREAM TOPICAL at 05:42

## 2018-12-06 RX ADMIN — MAGNESIUM SULFATE IN WATER 2 G: 40 INJECTION, SOLUTION INTRAVENOUS at 12:17

## 2018-12-06 RX ADMIN — ACETAMINOPHEN 650 MG: 325 TABLET, FILM COATED ORAL at 16:36

## 2018-12-06 RX ADMIN — ALBUMIN HUMAN 25 G: 0.05 INJECTION, SOLUTION INTRAVENOUS at 12:19

## 2018-12-06 RX ADMIN — NIMODIPINE 60 MG: 30 CAPSULE, LIQUID FILLED ORAL at 08:29

## 2018-12-06 RX ADMIN — ACETAMINOPHEN 650 MG: 325 TABLET, FILM COATED ORAL at 04:23

## 2018-12-06 RX ADMIN — FLUDROCORTISONE ACETATE 0.2 MG: 0.1 TABLET ORAL at 12:27

## 2018-12-06 RX ADMIN — NIMODIPINE 60 MG: 30 CAPSULE, LIQUID FILLED ORAL at 16:37

## 2018-12-06 RX ADMIN — SODIUM CHLORIDE TAB 1 GM 3 G: 1 TAB at 12:27

## 2018-12-06 RX ADMIN — HEPARIN SODIUM 5000 UNITS: 5000 INJECTION INTRAVENOUS; SUBCUTANEOUS at 15:10

## 2018-12-06 RX ADMIN — DOCUSATE SODIUM 100 MG: 100 CAPSULE, LIQUID FILLED ORAL at 08:46

## 2018-12-06 RX ADMIN — POTASSIUM CHLORIDE 40 MEQ: 400 INJECTION, SOLUTION INTRAVENOUS at 18:11

## 2018-12-06 RX ADMIN — HEPARIN SODIUM 5000 UNITS: 5000 INJECTION INTRAVENOUS; SUBCUTANEOUS at 21:38

## 2018-12-06 RX ADMIN — DORZOLAMIDE HYDROCHLORIDE AND TIMOLOL MALEATE 1 DROP: 20; 5 SOLUTION/ DROPS OPHTHALMIC at 12:28

## 2018-12-06 RX ADMIN — SODIUM CHLORIDE TAB 1 GM 3 G: 1 TAB at 18:13

## 2018-12-06 RX ADMIN — HEPARIN SODIUM 5000 UNITS: 5000 INJECTION INTRAVENOUS; SUBCUTANEOUS at 05:32

## 2018-12-06 RX ADMIN — NIMODIPINE 60 MG: 30 CAPSULE, LIQUID FILLED ORAL at 12:27

## 2018-12-06 RX ADMIN — ACETAMINOPHEN 650 MG: 325 TABLET, FILM COATED ORAL at 20:20

## 2018-12-06 RX ADMIN — TRAVOPROST OPHTHALMIC SOLUTION 1 DROP: 0.04 SOLUTION OPHTHALMIC at 21:38

## 2018-12-06 RX ADMIN — DOCUSATE SODIUM 100 MG: 100 CAPSULE, LIQUID FILLED ORAL at 18:12

## 2018-12-06 NOTE — PROGRESS NOTES
Progress Note - Neurosurgery   Beatriz Goddard 69 y o  female MRN: [id number]  Unit/Bed#:  -01 Encounter: [id number]      Assessment:  1  Status post coil embolization for subarachnoid hemorrhage (11/25/2018)  2  HH 1, F3 SAH (11/24/2018)  3  BD ( 11/23/2018)  4  ACOM   5  Dizziness and Light headedness resolved  6  Intermittent LEs cramps        Plan:   § Exam: GCS 15, EOMI, conjugate, SF, BECKMAN, finger to nose normal and without drift bilaterally   Sensation to light touch is normal bilaterally  Motor strength 5/5  DTR brisk throughout  No clonus and Babinski is negative symmetrically  Arloa Los done on 11/24/2018 demonstrates diffuse subarachnoid hemorrhage throughout the basal cisterns, sylvian fissures and over the convexities without evidence of mass effect   Anterior communicating artery 5 mm aneurysm noted  § CTA or 11/27/2018 demonstrates status post coil embolization of anterior communicating artery and the postop day 2 resolving diffuse subarachnoid hemorrhage without evidence of vasospasm   Also area of decreased attenuation in the right basal ganglia compatible with age-indeterminate lacunar infraction  ? Pain control:  Primary team  ? DVT ppx:   § SCDs  § Continue heparin SQ  ? Seizure ppx:  Continue Keppra  ? Activity:  As tolerated  ? PT/OT evaluation & treatment  ? Medical Mx:  Per Primary team  ? Seizure prophylaxis:  None  ? Neurocheck:  Q 4H,  CT head if GCS drops 2 pts/1h  ? Continue Vasospasm watch  ? HOB>30-45 degree  ? [id number]:  § Nacl tabs 3 gm PO TID  § Fludrocortisone 0 2 mg po Q12H  ? IO/OP=negative balance-encourage eating and drinking  ? TCD:   § Right =1 3  § Left = 0 85  ? SBP<220, currently in the 130-140s/60-70s  ? MAP>65, currently  In the 77-90s  ? Patient is doing fine, had crampy legs pain -treated with IV Kcl and got improved this morning, currently on oral potassium chloride  She denies any headache, N/V or vision issues   Neuro non focal  Continue spasm watch and neuromonitoring  Consider stepping down on Saturday  Subjective/Objective   Chief Complaint: " My legs crampy pain improved after IV KCL"/ post op progress note    Subjective:  Patient noticed some improvement with her crampy lower extremity pain after she got IV potassium chloride  She was out of bed and doing PT  Denies any numbness weakness or paresthesia and extremities  Denies headache, nausea, vomiting, diplopia or blurry vision  She eats good denies any bowel or bladder issues  Denies fever, chills, rigors, cough or chest pain  Objective:  Patient is supine in bed in no pain distress    I/O       12/04 0701 - 12/05 0700 12/05 0701 - 12/06 0700 12/06 0701 - 12/07 0700    P  O  1240 1420     I V  (mL/kg) 583 8 (9 5) 1551 3 (25 1)     IV Piggyback 250      Total Intake(mL/kg) 2073 8 (33 6) 2971 3 (48 2)     Urine (mL/kg/hr) 2850 (1 9) 3675 (2 5) 400 (2 1)    Total Output 2850 3675 400    Net -776 3 -703 8 -400                 Invasive Devices     Peripherally Inserted Central Catheter Line            PICC Line 85/84/32 Right Basilic 7 days                Physical Exam:  Vitals: Blood pressure 137/76, pulse 78, temperature 97 5 °F (36 4 °C), temperature source Oral, resp  rate 18, height 5' 4" (1 626 m), weight 61 7 kg (136 lb 0 4 oz), SpO2 98 %, not currently breastfeeding  ,Body mass index is 23 35 kg/m²      Hemodynamic Monitoring: MAP: Arterial Line MAP (mmHg): 84 mmHg    General appearance: alert, appears stated age, cooperative and no distress  Head: Normocephalic, without obvious abnormality, atraumatic  Eyes: EOMI, 2 mm conjugate  Neck: supple, symmetrical, trachea midline and NT  Back: no kyphosis present, no tenderness to percussion or palpation  Lungs: non labored breathing  Heart: regular heart rate  Neurologic:   Mental status: Alert, oriented x3, thought content appropriate  Cranial nerves: grossly intact (Cranial nerves II-XII)  Sensory: normal to light touch throughout  Motor: moving all extremities without focal weakness, strength  5/5 bilaterally  Reflexes: 3+ and symmetric  No clonus and Babinski negative symmetrical  Coordination: finger to nose normal bilaterally, no drift bilaterally      Lab Results:    Results from last 7 days  Lab Units 12/05/18  0428 12/02/18  0605 12/01/18  0538 11/30/18  0452   WBC Thousand/uL 8 55 7 83 7 68 7 86   HEMOGLOBIN g/dL 10 8* 10 6* 10 6* 11 5   HEMATOCRIT % 33 9* 33 0* 33 2* 36 0   PLATELETS Thousands/uL 315 219 201 220   NEUTROS PCT %  --  72 68 67   MONOS PCT %  --  11 11 10       Results from last 7 days  Lab Units 12/06/18  0428 12/05/18  0428 12/04/18  1731 12/04/18  0629   POTASSIUM mmol/L 2 8* 3 7 3 7 3 7   CHLORIDE mmol/L 102 104 101 100   CO2 mmol/L 26 26 26 24   BUN mg/dL 7 13 14 9   CREATININE mg/dL 0 40* 0 57* 0 66 0 52*   CALCIUM mg/dL 8 6 8 8 9 4 8 9   ALK PHOS U/L  --   --   --  61   ALT U/L  --   --   --  77   AST U/L  --   --   --  38       Results from last 7 days  Lab Units 12/05/18  0651 12/02/18  0605 12/01/18  0538   MAGNESIUM mg/dL 2 3 2 2 2 1       Results from last 7 days  Lab Units 12/05/18  0651 12/02/18  0605 12/01/18  0538   PHOSPHORUS mg/dL 3 1 2 4 3 0         No results found for: TROPONINT  ABG:No results found for: PHART, JSM2QNP, PO2ART, XWR3JDI, G0OQPDOF, BEART, SOURCE    Imaging Studies: I have personally reviewed pertinent reports  and I have personally reviewed pertinent films in PACS    EKG, Pathology, and Other Studies: I have personally reviewed pertinent reports        VTE Pharmacologic Prophylaxis: Heparin SQ    VTE Mechanical Prophylaxis: sequential compression device

## 2018-12-06 NOTE — PLAN OF CARE
Problem: PHYSICAL THERAPY ADULT  Goal: Performs mobility at highest level of function for planned discharge setting  See evaluation for individualized goals  Treatment/Interventions: Functional transfer training, LE strengthening/ROM, Therapeutic exercise, Endurance training, Cognitive reorientation, Patient/family training, Bed mobility, Gait training, Equipment eval/education, Spoke to nursing, Continued evaluation, Compensatory technique education  Equipment Recommended: Duy Downing       See flowsheet documentation for full assessment, interventions and recommendations  Outcome: Progressing  Prognosis: Good  Problem List: Decreased strength, Decreased endurance, Impaired balance, Decreased range of motion, Decreased mobility, Decreased safety awareness, Decreased coordination, Pain ((LE cramping))  Assessment: Pt seen for PT tx today for focus on education, ambulation, and dynamic balance  Pt was supine in bed at start of session, she was agreeable to participate in PT  PT reported increased frequency LE pain that has been preventing her from getting adequate sleep  Pt reported sitting in chair was worse than lying in bed and that craming was alleviated by repositioning  Pt was educated on stretching techniques for self managment  Pt was (S) for transfers and able to ambulate 500' with IV pole (S)  She was able to perform dynamic head turns during gait with minimal path deviation but low baseline ambulation speed makes it difficult to note gait speed changes  Slight decrease in gait speed noted and pt had to be cued to turn her head greater than 10 degrees to either side  Pt reported be very tired 2* to not getting sleep due to intermittent LE cramping  She performed dynamic balance exercises as described above, demonstrated intact and effective balance reactions specifically ankle strategy   Pt continues to present with deficis that are a regression from baseline and would continue to benefit from skilled therapy prior to DC  Pt recommendation continues to be OPPT  Barriers to Discharge: Inaccessible home environment  Barriers to Discharge Comments: Pt needs to demonstrate stair competence  Recommendation: Home with family support, Outpatient PT (Home with family support; OPPT pending progress)     PT - OK to Discharge: No (Pending stairs)    See flowsheet documentation for full assessment

## 2018-12-06 NOTE — UTILIZATION REVIEW
Continued Stay Review    Date: 12/6/2018    Vital Signs: /61 (BP Location: Left arm)   Pulse 82   Temp 98 4 °F (36 9 °C) (Oral)   Resp 14   Ht 5' 4" (1 626 m)   Wt 61 7 kg (136 lb 0 4 oz)   LMP  (LMP Unknown)   SpO2 98%   Breastfeeding? No   BMI 23 35 kg/m²     Medications:   Scheduled Meds:   Current Facility-Administered Medications:  acetaminophen 650 mg Oral Q4H   docusate sodium 100 mg Oral BID   dorzolamide-timolol 1 drop Both Eyes BID   fludrocortisone 0 2 mg Oral Daily   heparin (porcine) 5,000 Units Subcutaneous Q8H [address]   magnesium sulfate 2 g Intravenous Once   niMODipine 60 mg Oral Q4H [address]   potassium chloride 40 mEq Oral Daily   sodium chloride 3 g Oral TID With Meals   travoprost 1 drop Both Eyes HS     PRN Meds: bisacodyl    hydrALAZINE    labetalol    menthol-methyl salicylate    ondansetron    Abnormal Labs/Diagnostic Results: K 2 8    Age/Sex: 69 y o  female     Assessment/Plan:     Principal Problem:    SAH (subarachnoid hemorrhage) (HCC)  Active Problems:    Glaucoma    S/P coil embolization of cerebral aneurysm    Hyponatremia  Resolved Problems:    Hypophosphatemia    Hypokalemia    Constipated           Neuro:  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  - Bleeding day 13, s/p coiling; POD 12  - Completed 7 days of Keppra  - TCD bid for 21 days (12/17)  - Nimodipine 60mg q4hr  - Continue vasospasm precautions, monitoring  - Neuro checks q4hr  - PT/OT, OOB     CV: No further tachycardia  No documented hypotension overnight  Goal SBP <220, MAP >65; pt has been at goal on nimodipine 60mg q4hr     Pulm: No active issues  - Encouraged IS     GI: Constipation: docusate  Pt refused yesterday given loose stools  Reports 2 loose stools  - Consider fiber supplementation; loose stools may worsen electrolyte abnormalities     Nausea: secondary to sodium tabs   Pt should take these with meals/food      : Urine output of 3 6 L yesterday, 2 3ml/kg/hr  - Patient needs to remain euvolemic; currently net negative 8L (in the setting of known inaccurate I/O earlier in admission)  - Continue to monitor     F/E/N:   Hyponatremia improving to 136 today  - 3g sodium TID  - Florinef 0 1 BID  - NS 75/hr held overnight     Hypokalemia worsened today to 2 8  Had been on 20mEq daily  Changed to 40mEq and will give 40 via PICC      Fluid status: pt with significant urine output in the setting of hyponatremia  Possible cerebral salt wasting  Can resume NS today      ID: No acute issues     Heme: DVT PPX with heparin SC     Endo: No acute issues                Msk/Skin: Bilateral lower leg cramping; chronic issue for patient  At home she uses tonic water and it works very well    - Encourage OOB  - Bengay PRN  - Pt may have tylenol before PT     Disposition: Continue intensive care

## 2018-12-06 NOTE — PROGRESS NOTES
Progress Note - Critical Care   Ivory Velasco 69 y o  female MRN: [id number]  Unit/Bed#: [facility] [facility]  Encounter: [id number]    Attending Physician: Jocelyn Godoy MD    _____________________________________________________________________  ______________________________________________________________________    Chief Complaint: "I had a rough night"    24 Hour Events: Patient reports that she had a difficult night overnight due to worsening cramping  She got up a number of times to try to have a bowel movement and was unable  Notes no BM yesterday  Acute drop in potassium 3 7-->2 8 noted  Review of Systems   Constitutional: Positive for fatigue  Negative for chills and fever  HENT: Negative  Eyes: Negative  Respiratory: Negative for cough, chest tightness and shortness of breath  Cardiovascular: Negative for chest pain, palpitations and leg swelling  Gastrointestinal: Negative for abdominal pain, diarrhea, nausea and vomiting  Genitourinary: Negative for difficulty urinating, dysuria and frequency  Musculoskeletal: Positive for myalgias  Negative for neck stiffness  Skin: Negative  Allergic/Immunologic: Negative  Neurological: Negative for dizziness, weakness and light-headedness  Hematological: Negative  Psychiatric/Behavioral: Negative       ______________________________________________________________________  Vitals:    18 0230 18 0330 18 0430 18 0530   BP: 127/74 137/73 147/66 122/61   Pulse: 82 84 72 84   Resp: 21 20 20 20   Temp:  98 5 °F (36 9 °C)     TempSrc:  Oral     SpO2: 97% 98% 96% 96%   Weight:       Height:           Temperature:   Temp (24hrs), Av 6 °F (37 °C), Min:98 °F (36 7 °C), Max:99 °F (37 2 °C)    Current Temperature: 98 5 °F (36 9 °C)  Weights:   IBW: 54 7 kg    Body mass index is 23 35 kg/m²    Weight (last 2 days)     None                Physical Exam:     Physical Exam   Constitutional: She is oriented to person, place, and time  She appears well-developed and well-nourished  No distress  Appears fatigued   HENT:   Head: Normocephalic and atraumatic  Mouth/Throat: Oropharynx is clear and moist    Eyes: Conjunctivae and EOM are normal  No scleral icterus  Cardiovascular: Normal rate and normal heart sounds  No murmur heard  Pulmonary/Chest: Effort normal and breath sounds normal  She has no wheezes  She has no rales  Abdominal: Soft  Bowel sounds are normal  She exhibits no distension  There is no tenderness  There is no guarding  Musculoskeletal: She exhibits no edema, tenderness or deformity  Neurological: She is alert and oriented to person, place, and time  No cranial nerve deficit  Response latency noted   Skin: Skin is warm and dry  No erythema  Psychiatric: She has a normal mood and affect  Her behavior is normal      ______________________________________________________________________  Hemodynamic Monitoring:  N/A     Non-Invasive/Invasive Ventilation Settings:  Respiratory    Lab Data (Last 4 hours)    None         O2/Vent Data (Last 4 hours)    None              No results found for: PHART, JDZ3IRS, PO2ART, OOM5VRG, R1BIQQOR, BEART, SOURCE  SpO2: SpO2: 96 %  Intake and Outputs:  I/O       12/01 0701 - 12/02 0700 12/02 0701 - 12/03 0700    P  O  420 2520    I V  (mL/kg) 1527 5 (24 8) 1793 8 (29 1)    IV Piggyback  1000    Total Intake(mL/kg) 1947 5 (31 6) 5313 8 (86 1)    Urine (mL/kg/hr) 450 (0 3) 4325 (2 9)    Total Output 450 4325    Net +1497 5 +988  8          Unmeasured Urine Occurrence 6 x 2 x    Unmeasured Stool Occurrence 2 x         UOP: 2 9 ml/kg/hr   Nutrition:        Diet Orders            Start     Ordered    12/01/18 1156  Diet Regular; Regular House  Diet effective now     Question Answer Comment   Diet Type Regular    Regular Regular House    RD to adjust diet per protocol?  No        12/01/18 1155          Labs:     Results from last 7 days  Lab Units 12/05/18  0428 12/02/18  5310 18  0538 18  0452   WBC Thousand/uL 8 55 7 83 7 68 7 86   HEMOGLOBIN g/dL 10 8* 10 6* 10 6* 11 5   HEMATOCRIT % 33 9* 33 0* 33 2* 36 0   PLATELETS Thousands/uL 315 219 201 220   NEUTROS PCT %  --  72 68 67   MONOS PCT %  --  11 11 10       Results from last 7 days  Lab Units 18  0428 18  0428 18  1731 18  0629 18  0432 18  0605 18  1218   SODIUM mmol/L 136 139 133* 135* 137 134* 138   POTASSIUM mmol/L 2 8* 3 7 3 7 3 7 3 1* 3 6 3 5   CHLORIDE mmol/L 102 104 101 100 103 101 104   CO2 mmol/L 26 26 26 24 27 26 27   ANION GAP mmol/L 8 9 6 11 7 7 7   BUN mg/dL 7 13 14 9 6 7 10   CREATININE mg/dL 0 40* 0 57* 0 66 0 52* 0 39* 0 47* 0 45*   CALCIUM mg/dL 8 6 8 8 9 4 8 9 8 0* 8 4 8 2*   ALT U/L  --   --   --  77  --   --   --    AST U/L  --   --   --  38  --   --   --    ALK PHOS U/L  --   --   --  61  --   --   --    ALBUMIN g/dL  --   --   --  3 5  --   --   --    TOTAL BILIRUBIN mg/dL  --   --   --  0 50  --   --   --        Results from last 7 days  Lab Units 18  0651 18  0605 18  0538 18  0452   MAGNESIUM mg/dL 2 3 2 2 2 1 2 2   PHOSPHORUS mg/dL 3 1 2 4 3 0 4 0                  ABG:No results found for: PHART, EDH8UHX, PO2ART, PBI0ZBG, N1QEDQZB, BEART, SOURCE  VBG:        No results found for: VANCOTROUGH   Imaging: TCDs with no vasospasm  I have personally reviewed pertinent reports  EK/3 - Sinus tachycardia  HI interval 220  Micro: Allergies:    Allergies   Allergen Reactions    Codeine GI Intolerance     Medications:   Scheduled Meds:    Current Facility-Administered Medications:  acetaminophen 650 mg Oral Q6H PRN Sonali Counts, MD   bisacodyl 10 mg Rectal Daily PRN Sonali Mehta MD   docusate sodium 100 mg Oral BID Pepe Mckoy DO   dorzolamide-timolol 1 drop Both Eyes BID Tao Dobson DO   fludrocortisone 0 1 mg Oral Q12H Bozena Knox,    heparin (porcine) 5,000 Units Subcutaneous [facility] Inez Olsen, DO hydrALAZINE 5 mg Intravenous Q4H PRN Drake Welch MD   labetalol 10 mg Intravenous Q4H PRN Berdariel Banda, DO   menthol-methyl salicylate  Apply externally BID PRN Luckirsten Ca, DO   niMODipine 60 mg Oral Q4H [address] Estefani Dianuder, DO   ondansetron 4 mg Intravenous Q4H PRN Drake Welch MD   potassium chloride 40 mEq Oral Daily Wonda Cochit[place], DO   potassium chloride 40 mEq Intravenous Once Wonda [place], DO   sodium chloride 3 g Oral TID With Meals Antonio Mcintyre MD   travoprost 1 drop Both Eyes HS Drake Welch MD     Continuous Infusions:     PRN Meds:    acetaminophen 650 mg Q6H PRN   bisacodyl 10 mg Daily PRN   hydrALAZINE 5 mg Q4H PRN   labetalol 10 mg Q4H PRN   menthol-methyl salicylate  BID PRN   ondansetron 4 mg Q4H PRN     VTE Pharmacologic Prophylaxis: Heparin  VTE Mechanical Prophylaxis: sequential compression device  Invasive lines and devices: Invasive Devices     Peripherally Inserted Central Catheter Line            PICC Line 07/31/45 Right Basilic 7 days                   Assessment and Plan:   Principal Problem:    SAH (subarachnoid hemorrhage) (HCC)  Active Problems:    Glaucoma    S/P coil embolization of cerebral aneurysm    Hyponatremia  Resolved Problems:    Hypophosphatemia    Hypokalemia    Constipated        Neuro:  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  - Bleeding day 13, s/p coiling; POD 12  - Completed 7 days of Keppra  - TCD bid for 21 days (12/17)  - Nimodipine 60mg q4hr  - Continue vasospasm precautions, monitoring  - Neuro checks q4hr  - PT/OT, OOB    CV: No further tachycardia  No documented hypotension overnight  Goal SBP <220, MAP >65; pt has been at goal on nimodipine 60mg q4hr    Pulm: No active issues  - Encouraged IS    GI: Constipation: docusate  Pt refused yesterday given loose stools  Reports 2 loose stools  - Consider fiber supplementation; loose stools may worsen electrolyte abnormalities    Nausea: secondary to sodium tabs   Pt should take these with meals/food  : Urine output of 3 6 L yesterday, 2 3ml/kg/hr  - Patient needs to remain euvolemic; currently net negative 8L (in the setting of known inaccurate I/O earlier in admission)  - Continue to monitor    F/E/N:   Hyponatremia improving to 136 today  - 3g sodium TID  - Florinef 0 1 BID  - NS 75/hr held overnight    Hypokalemia worsened today to 2 8  Had been on 20mEq daily  Changed to 40mEq and will give 40 via PICC  Fluid status: pt with significant urine output in the setting of hyponatremia  Possible cerebral salt wasting  Can resume NS today  ID: No acute issues    Heme: DVT PPX with heparin SC    Endo: No acute issues     Msk/Skin: Bilateral lower leg cramping; chronic issue for patient  At home she uses tonic water and it works very well  - Encourage OOB  - Bengay PRN  - Pt may have tylenol before PT    Disposition: Continue intensive care      Code Status: Level 1 - Full Code        Portions of the record may have been created with voice recognition software  Occasional wrong word or "sound a like" substitutions may have occurred due to the inherent limitations of voice recognition software  Read the chart carefully and recognize, using context, where substitutions have occurred          12/6/2018 7:05 AM    Quinton Schuster DO

## 2018-12-06 NOTE — PHYSICAL THERAPY NOTE
PHYSICAL THERAPY NOTE    Patient Name: Johanna Canchola  WOAEV'U Date: 12/6/2018 12/06/18 0948   Pain Assessment   Pain Assessment 0-10   Pain Score No Pain  (Pt reports discomfort, cramping increased generally)   Restrictions/Precautions   Weight Bearing Precautions Per Order No   Other Precautions Seizure;Multiple lines;Telemetry; Fall Risk;Pain   General   Chart Reviewed Yes   Additional Pertinent History Status post coil embolization for subarachnoid hemorrhage    Response to Previous Treatment Patient with no complaints from previous session  Family/Caregiver Present No   Cognition   Overall Cognitive Status WFL   Arousal/Participation Cooperative   Attention Within functional limits   Orientation Level Oriented X4   Memory Within functional limits   Following Commands Follows all commands and directions without difficulty   Comments Pt is pleasant and cooperative   Subjective   Subjective Pt reports increased LE cramping overall, especially while sitting in recliner  She describes no pain at rest but a persistent feeling like she is about to cramp   Bed Mobility   Supine to Sit 5  Supervision   Additional items Increased time required   Transfers   Sit to Stand 5  Supervision   Additional items Increased time required   Stand to Sit 5  Supervision   Additional items Increased time required   Stand pivot 5  Supervision   Additional items Increased time required   Toilet transfer 5  Supervision   Additional items Increased time required;Standard toilet   Ambulation/Elevation   Gait pattern Short stride; Excessively slow;Decreased foot clearance   Gait Assistance 5  Supervision   Additional items Verbal cues   Assistive Device (IV pole)   Distance 500'   Balance   Static Sitting Good   Dynamic Sitting Fair +   Static Standing Fair   Dynamic Standing Fair   Ambulatory Fair   Higher level balance (Close stance, eyes open, eyes closed)   Higher level balance rep range 10 reps  (10 with dynamic head turns eyes closed)   Endurance Deficit   Endurance Deficit Yes   Endurance Deficit Description fatigue, LE discomfort   Activity Tolerance   Activity Tolerance Patient limited by fatigue;Treatment limited secondary to medical complications (Comment)  (LE discomfort, cramping)   Nurse Made Aware LEEANN Crespo   Exercises   Balance training  Dynamic head turns during gait (L and R x 10 ea)   Assessment   Prognosis Good   Problem List Decreased strength;Decreased endurance; Impaired balance;Decreased range of motion;Decreased mobility; Decreased safety awareness;Decreased coordination;Pain  ((LE cramping))   Assessment Pt seen for PT tx today for focus on education, ambulation, and dynamic balance  Pt was supine in bed at start of session, she was agreeable to participate in PT  PT reported increased frequency LE pain that has been preventing her from getting adequate sleep  Pt reported sitting in chair was worse than lying in bed and that craming was alleviated by repositioning  Pt was educated on stretching techniques for self managment  Pt was (S) for transfers and able to ambulate 500' with IV pole (S)  She was able to perform dynamic head turns during gait with minimal path deviation but low baseline ambulation speed makes it difficult to note gait speed changes  Slight decrease in gait speed noted and pt had to be cued to turn her head greater than 10 degrees to either side  Pt reported be very tired 2* to not getting sleep due to intermittent LE cramping  She performed dynamic balance exercises as described above, demonstrated intact and effective balance reactions specifically ankle strategy  Pt continues to present with deficis that are a regression from baseline and would continue to benefit from skilled therapy prior to DC  Pt recommendation continues to be OPPT     Barriers to Discharge Inaccessible home environment   Barriers to Discharge Comments Pt needs to demonstrate stair competence   Goals   Patient Goals To go home   STG Expiration Date 12/16/18   Short Term Goal #1 1  Pt will amb greater than 600' supervision w least restrictive device for community and household distances  2  Pt will ascend/descend 2 steps (S) for access to home  3  Pt will improve standing dynamic and ambulatory balance by one grade for improved safety during mobility  4  Pt will demonstrate minimal gait deviations with dynamic head turns during gait for safe ambulation  5  Pt will participate in strength, endurance, balance, and transfer training for improved activity tolerance  Treatment Day 3   Plan   Treatment/Interventions Functional transfer training;LE strengthening/ROM; Therapeutic exercise; Endurance training;Equipment eval/education; Bed mobility;Gait training; Compensatory technique education;OT   Progress Progressing toward goals   PT Frequency Other (Comment)  (3-5x/wk)   Recommendation   Recommendation Home with family support; Outpatient PT  (Home with family support; OPPT pending progress)   Equipment Recommended (Continue to assess)   PT - OK to Discharge No  (Pending stairs)            Felipe Jerome, SPT

## 2018-12-07 ENCOUNTER — APPOINTMENT (INPATIENT)
Dept: NON INVASIVE DIAGNOSTICS | Facility: HOSPITAL | Age: 78
DRG: 021 | End: 2018-12-07
Payer: MEDICARE

## 2018-12-07 LAB
ANION GAP SERPL CALCULATED.3IONS-SCNC: 5 MMOL/L (ref 4–13)
ANION GAP SERPL CALCULATED.3IONS-SCNC: 9 MMOL/L (ref 4–13)
BASOPHILS # BLD AUTO: 0.05 THOUSANDS/ΜL (ref 0–0.1)
BASOPHILS NFR BLD AUTO: 1 % (ref 0–1)
BUN SERPL-MCNC: 14 MG/DL (ref 5–25)
BUN SERPL-MCNC: 9 MG/DL (ref 5–25)
CA-I BLD-SCNC: 1.18 MMOL/L (ref 1.12–1.32)
CALCIUM SERPL-MCNC: 8.7 MG/DL (ref 8.3–10.1)
CALCIUM SERPL-MCNC: 9 MG/DL (ref 8.3–10.1)
CHLORIDE SERPL-SCNC: 104 MMOL/L (ref 100–108)
CHLORIDE SERPL-SCNC: 106 MMOL/L (ref 100–108)
CO2 SERPL-SCNC: 25 MMOL/L (ref 21–32)
CO2 SERPL-SCNC: 28 MMOL/L (ref 21–32)
CREAT SERPL-MCNC: 0.5 MG/DL (ref 0.6–1.3)
CREAT SERPL-MCNC: 0.57 MG/DL (ref 0.6–1.3)
EOSINOPHIL # BLD AUTO: 0.1 THOUSAND/ΜL (ref 0–0.61)
EOSINOPHIL NFR BLD AUTO: 1 % (ref 0–6)
ERYTHROCYTE [DISTWIDTH] IN BLOOD BY AUTOMATED COUNT: 14 % (ref 11.6–15.1)
GFR SERPL CREATININE-BSD FRML MDRD: 90 ML/MIN/1.73SQ M
GFR SERPL CREATININE-BSD FRML MDRD: 94 ML/MIN/1.73SQ M
GLUCOSE SERPL-MCNC: 108 MG/DL (ref 65–140)
GLUCOSE SERPL-MCNC: 119 MG/DL (ref 65–140)
HCT VFR BLD AUTO: 34.4 % (ref 34.8–46.1)
HGB BLD-MCNC: 10.9 G/DL (ref 11.5–15.4)
IMM GRANULOCYTES # BLD AUTO: 0.05 THOUSAND/UL (ref 0–0.2)
IMM GRANULOCYTES NFR BLD AUTO: 1 % (ref 0–2)
LYMPHOCYTES # BLD AUTO: 1.17 THOUSANDS/ΜL (ref 0.6–4.47)
LYMPHOCYTES NFR BLD AUTO: 13 % (ref 14–44)
MAGNESIUM SERPL-MCNC: 2.6 MG/DL (ref 1.6–2.6)
MCH RBC QN AUTO: 31.3 PG (ref 26.8–34.3)
MCHC RBC AUTO-ENTMCNC: 31.7 G/DL (ref 31.4–37.4)
MCV RBC AUTO: 99 FL (ref 82–98)
MONOCYTES # BLD AUTO: 0.65 THOUSAND/ΜL (ref 0.17–1.22)
MONOCYTES NFR BLD AUTO: 7 % (ref 4–12)
NEUTROPHILS # BLD AUTO: 6.9 THOUSANDS/ΜL (ref 1.85–7.62)
NEUTS SEG NFR BLD AUTO: 77 % (ref 43–75)
NRBC BLD AUTO-RTO: 0 /100 WBCS
PLATELET # BLD AUTO: 365 THOUSANDS/UL (ref 149–390)
PMV BLD AUTO: 9.4 FL (ref 8.9–12.7)
POTASSIUM SERPL-SCNC: 3.9 MMOL/L (ref 3.5–5.3)
POTASSIUM SERPL-SCNC: 4.2 MMOL/L (ref 3.5–5.3)
RBC # BLD AUTO: 3.48 MILLION/UL (ref 3.81–5.12)
SODIUM SERPL-SCNC: 138 MMOL/L (ref 136–145)
SODIUM SERPL-SCNC: 139 MMOL/L (ref 136–145)
WBC # BLD AUTO: 8.92 THOUSAND/UL (ref 4.31–10.16)

## 2018-12-07 PROCEDURE — 82330 ASSAY OF CALCIUM: CPT | Performed by: INTERNAL MEDICINE

## 2018-12-07 PROCEDURE — 85025 COMPLETE CBC W/AUTO DIFF WBC: CPT | Performed by: INTERNAL MEDICINE

## 2018-12-07 PROCEDURE — 97112 NEUROMUSCULAR REEDUCATION: CPT

## 2018-12-07 PROCEDURE — 97116 GAIT TRAINING THERAPY: CPT

## 2018-12-07 PROCEDURE — 80048 BASIC METABOLIC PNL TOTAL CA: CPT | Performed by: INTERNAL MEDICINE

## 2018-12-07 PROCEDURE — 93886 INTRACRANIAL COMPLETE STUDY: CPT

## 2018-12-07 PROCEDURE — 83735 ASSAY OF MAGNESIUM: CPT | Performed by: INTERNAL MEDICINE

## 2018-12-07 PROCEDURE — 99232 SBSQ HOSP IP/OBS MODERATE 35: CPT | Performed by: INTERNAL MEDICINE

## 2018-12-07 PROCEDURE — 99024 POSTOP FOLLOW-UP VISIT: CPT | Performed by: PHYSICIAN ASSISTANT

## 2018-12-07 RX ORDER — POLYETHYLENE GLYCOL 3350 17 G/17G
17 POWDER, FOR SOLUTION ORAL DAILY
Status: DISCONTINUED | OUTPATIENT
Start: 2018-12-07 | End: 2018-12-08

## 2018-12-07 RX ORDER — ALBUMIN, HUMAN INJ 5% 5 %
25 SOLUTION INTRAVENOUS ONCE
Status: COMPLETED | OUTPATIENT
Start: 2018-12-07 | End: 2018-12-07

## 2018-12-07 RX ORDER — LANOLIN ALCOHOL/MO/W.PET/CERES
3 CREAM (GRAM) TOPICAL
Status: DISCONTINUED | OUTPATIENT
Start: 2018-12-07 | End: 2018-12-10 | Stop reason: HOSPADM

## 2018-12-07 RX ADMIN — ALBUMIN HUMAN 25 G: 0.05 INJECTION, SOLUTION INTRAVENOUS at 17:43

## 2018-12-07 RX ADMIN — NIMODIPINE 60 MG: 30 CAPSULE, LIQUID FILLED ORAL at 11:35

## 2018-12-07 RX ADMIN — DOCUSATE SODIUM 100 MG: 100 CAPSULE, LIQUID FILLED ORAL at 08:20

## 2018-12-07 RX ADMIN — NIMODIPINE 60 MG: 30 CAPSULE, LIQUID FILLED ORAL at 03:57

## 2018-12-07 RX ADMIN — ACETAMINOPHEN 650 MG: 325 TABLET, FILM COATED ORAL at 00:10

## 2018-12-07 RX ADMIN — NIMODIPINE 60 MG: 30 CAPSULE, LIQUID FILLED ORAL at 16:25

## 2018-12-07 RX ADMIN — ACETAMINOPHEN 650 MG: 325 TABLET, FILM COATED ORAL at 03:57

## 2018-12-07 RX ADMIN — ENOXAPARIN SODIUM 40 MG: 40 INJECTION SUBCUTANEOUS at 11:32

## 2018-12-07 RX ADMIN — MELATONIN 3 MG: at 22:23

## 2018-12-07 RX ADMIN — NIMODIPINE 60 MG: 30 CAPSULE, LIQUID FILLED ORAL at 00:10

## 2018-12-07 RX ADMIN — ACETAMINOPHEN 650 MG: 325 TABLET, FILM COATED ORAL at 16:24

## 2018-12-07 RX ADMIN — TRAVOPROST OPHTHALMIC SOLUTION 1 DROP: 0.04 SOLUTION OPHTHALMIC at 22:23

## 2018-12-07 RX ADMIN — SODIUM CHLORIDE TAB 1 GM 3 G: 1 TAB at 08:21

## 2018-12-07 RX ADMIN — NIMODIPINE 60 MG: 30 CAPSULE, LIQUID FILLED ORAL at 19:51

## 2018-12-07 RX ADMIN — NIMODIPINE 60 MG: 30 CAPSULE, LIQUID FILLED ORAL at 08:21

## 2018-12-07 RX ADMIN — FLUDROCORTISONE ACETATE 0.2 MG: 0.1 TABLET ORAL at 08:20

## 2018-12-07 RX ADMIN — DORZOLAMIDE HYDROCHLORIDE AND TIMOLOL MALEATE 1 DROP: 20; 5 SOLUTION/ DROPS OPHTHALMIC at 00:12

## 2018-12-07 RX ADMIN — ACETAMINOPHEN 650 MG: 325 TABLET, FILM COATED ORAL at 08:21

## 2018-12-07 RX ADMIN — ACETAMINOPHEN 650 MG: 325 TABLET, FILM COATED ORAL at 12:59

## 2018-12-07 RX ADMIN — SODIUM CHLORIDE TAB 1 GM 3 G: 1 TAB at 12:54

## 2018-12-07 RX ADMIN — SODIUM CHLORIDE TAB 1 GM 3 G: 1 TAB at 16:25

## 2018-12-07 RX ADMIN — DORZOLAMIDE HYDROCHLORIDE AND TIMOLOL MALEATE 1 DROP: 20; 5 SOLUTION/ DROPS OPHTHALMIC at 12:55

## 2018-12-07 RX ADMIN — ALBUMIN HUMAN 25 G: 0.05 INJECTION, SOLUTION INTRAVENOUS at 08:22

## 2018-12-07 RX ADMIN — POTASSIUM CHLORIDE 40 MEQ: 1500 TABLET, EXTENDED RELEASE ORAL at 08:21

## 2018-12-07 RX ADMIN — HEPARIN SODIUM 5000 UNITS: 5000 INJECTION INTRAVENOUS; SUBCUTANEOUS at 05:12

## 2018-12-07 RX ADMIN — POLYETHYLENE GLYCOL 3350 17 G: 17 POWDER, FOR SOLUTION ORAL at 08:20

## 2018-12-07 NOTE — PLAN OF CARE
Problem: Neurological Deficit  Goal: Neurological status is stable or improving  Interventions:  - Monitor and assess patient's level of consciousness, motor function, sensory function, and level of assistance needed for ADLs  - Monitor and report changes from baseline  Collaborate with interdisciplinary team to initiate plan and implement interventions as ordered  - Provide and maintain a safe environment  - Utilize seizure precautions  - Utilize fall precautions  - Utilize aspiration precautions  - Utilize bleeding precautions  Outcome: Progressing      Problem: Activity Intolerance/Impaired Mobility  Goal: Mobility/activity is maintained at optimum level for patient  Interventions:  - Assess and monitor patient  barriers to mobility and need for assistive/adaptive devices  - Assess patient's emotional response to limitations  - Collaborate with interdisciplinary team and initiate plans and interventions as ordered  - Encourage independent activity per ability   - Maintain proper body alignment  - Perform active/passive rom as tolerated/ordered  - Plan activities to conserve energy   - Turn patient   Outcome: Progressing      Problem: Communication Impairment  Goal: Ability to express needs and understand communication  Assess patient's communication skills and ability to understand information  Patient will demonstrate use of effective communication techniques, alternative methods of communication and understanding even if not able to speak  - Encourage communication and provide alternate methods of communication as needed  - Collaborate with case management/ for discharge needs  - Include patient/family/caregiver in decisions related to communication  Outcome: Progressing      Problem: Potential for Aspiration  Goal: Non-ventilated patient's risk of aspiration is minimized  Assess and monitor vital signs, respiratory status, and labs (WBC)    Monitor for signs of aspiration (tachypnea, cough, rales, wheezing, cyanosis, fever)  - Assess and monitor patient's ability to swallow  - Place patient up in chair to eat if possible  - HOB up at 90 degrees to eat if unable to get patient up into chair   - Supervise patient during oral intake  - Instruct patient to take small bites  - Instruct patient to take small single sips when taking liquids  - Follow patient-specific strategies generated by speech pathologist    Outcome: Progressing      Problem: Nutrition  Goal: Nutrition/Hydration status is improving  Monitor and assess patient's nutrition/hydration status for malnutrition (ex- brittle hair, bruises, dry skin, pale skin and conjunctiva, muscle wasting, smooth red tongue, and disorientation)  Collaborate with interdisciplinary team and initiate plan and interventions as ordered  Monitor patient's weight and dietary intake as ordered or per policy  Utilize nutrition screening tool and intervene per policy  Determine patient's food preferences and provide high-protein, high-caloric foods as appropriate  - Assist patient with eating   - Allow adequate time for meals   - Encourage patient to take dietary supplement as ordered  - Collaborate with clinical nutritionist   - Include patient/family/caregiver in decisions related to nutrition     Outcome: Progressing      Problem: PAIN - ADULT  Goal: Verbalizes/displays adequate comfort level or baseline comfort level  Interventions:  - Encourage patient to monitor pain and request assistance  - Assess pain using appropriate pain scale  - Administer analgesics based on type and severity of pain and evaluate response  - Implement non-pharmacological measures as appropriate and evaluate response  - Consider cultural and social influences on pain and pain management  - Notify physician/advanced practitioner if interventions unsuccessful or patient reports new pain   Outcome: Progressing      Problem: INFECTION - ADULT  Goal: Absence or prevention of progression during hospitalization  INTERVENTIONS:  - Assess and monitor for signs and symptoms of infection  - Monitor lab/diagnostic results  - Monitor all insertion sites, i e  indwelling lines, tubes, and drains  - Monitor endotracheal (as able) and nasal secretions for changes in amount and color  - Chancellor appropriate cooling/warming therapies per order  - Administer medications as ordered  - Instruct and encourage patient and family to use good hand hygiene technique  - Identify and instruct in appropriate isolation precautions for identified infection/condition   Outcome: Progressing    Goal: Absence of fever/infection during neutropenic period  INTERVENTIONS:  - Monitor WBC  - Implement neutropenic guidelines   Outcome: Progressing      Problem: SAFETY ADULT  Goal: Patient will remain free of falls  INTERVENTIONS:  - Assess patient frequently for physical needs  -  Identify cognitive and physical deficits and behaviors that affect risk of falls    -  Chancellor fall precautions as indicated by assessment   - Educate patient/family on patient safety including physical limitations  - Instruct patient to call for assistance with activity based on assessment  - Modify environment to reduce risk of injury  - Consider OT/PT consult to assist with strengthening/mobility    Outcome: Progressing    Goal: Maintain or return to baseline ADL function  INTERVENTIONS:  -  Assess patient's ability to carry out ADLs; assess patient's baseline for ADL function and identify physical deficits which impact ability to perform ADLs (bathing, care of mouth/teeth, toileting, grooming, dressing, etc )  - Assess/evaluate cause of self-care deficits   - Assess range of motion  - Assess patient's mobility; develop plan if impaired  - Assess patient's need for assistive devices and provide as appropriate  - Encourage maximum independence but intervene and supervise when necessary  ¯ Involve family in performance of ADLs  ¯ Assess for home care needs following discharge   ¯ Request OT consult to assist with ADL evaluation and planning for discharge  ¯ Provide patient education as appropriate   Outcome: Progressing    Goal: Maintain or return mobility status to optimal level  INTERVENTIONS:  - Assess patient's baseline mobility status (ambulation, transfers, stairs, etc )    - Identify cognitive and physical deficits and behaviors that affect mobility  - Identify mobility aids required to assist with transfers and/or ambulation (gait belt, sit-to-stand, lift, walker, cane, etc )  - Foley fall precautions as indicated by assessment  - Record patient progress and toleration of activity level on Mobility SBAR; progress patient to next Phase/Stage  - Instruct patient to call for assistance with activity based on assessment  - Request Rehabilitation consult to assist with strengthening/weightbearing, etc    Outcome: Progressing      Problem: DISCHARGE PLANNING  Goal: Discharge to home or other facility with appropriate resources  INTERVENTIONS:  - Identify barriers to discharge w/patient and caregiver  - Arrange for needed discharge resources and transportation as appropriate  - Identify discharge learning needs (meds, wound care, etc )  - Arrange for interpretive services to assist at discharge as needed  - Refer to Case Management Department for coordinating discharge planning if the patient needs post-hospital services based on physician/advanced practitioner order or complex needs related to functional status, cognitive ability, or social support system   Outcome: Progressing      Problem: Knowledge Deficit  Goal: Patient/family/caregiver demonstrates understanding of disease process, treatment plan, medications, and discharge instructions  Complete learning assessment and assess knowledge base    Interventions:  - Provide teaching at level of understanding  - Provide teaching via preferred learning methods   Outcome: Progressing      Problem: NEUROSENSORY - ADULT  Goal: Achieves stable or improved neurological status  INTERVENTIONS  - Monitor and report changes in neurological status  - Initiate measures to prevent increased intracranial pressure  - Maintain blood pressure and fluid volume within ordered parameters to optimize cerebral perfusion  - Monitor temperature, glucose, and sodium or any other associated labs   Initiate appropriate interventions as ordered  - Monitor for seizure activity   - Administer anti-seizure medications as ordered   Outcome: Progressing    Goal: Absence of seizures  INTERVENTIONS  - Monitor for seizure activity  - Administer anti-seizure medications as ordered  - Monitor neurological status   Outcome: Progressing    Goal: Remains free of injury related to seizures activity  INTERVENTIONS  - Maintain airway, patient safety  and administer oxygen as ordered  - Monitor patient for seizure activity, document and report duration and description of seizure to physician/advanced practitioner  - If seizure occurs,  ensure patient safety during seizure  - Reorient patient post seizure  - Seizure pads on all 4 side rails  - Instruct patient/family to notify RN of any seizure activity including if an aura is experienced  - Instruct patient/family to call for assistance with activity based on nursing assessment  - Administer anti-seizure medications as ordered  - Monitor fetal well being   Outcome: Progressing    Goal: Achieves maximal functionality and self care  INTERVENTIONS  - Monitor swallowing and airway patency with patient fatigue and changes in neurological status  - Encourage and assist patient to increase activity and self care with guidance from rehab services  - Encourage visually impaired, hearing impaired and aphasic patients to use assistive/communication devices   Outcome: Progressing      Problem: MUSCULOSKELETAL - ADULT  Goal: Maintain or return mobility to safest level of function  INTERVENTIONS:  - Assess patient's ability to carry out ADLs; assess patient's baseline for ADL function and identify physical deficits which impact ability to perform ADLs (bathing, care of mouth/teeth, toileting, grooming, dressing, etc )  - Assess/evaluate cause of self-care deficits   - Assess range of motion  - Assess patient's mobility; develop plan if impaired  - Assess patient's need for assistive devices and provide as appropriate  - Encourage maximum independence but intervene and supervise when necessary  - Involve family in performance of ADLs  - Assess for home care needs following discharge   - Request OT consult to assist with ADL evaluation and planning for discharge  - Provide patient education as appropriate   Outcome: Progressing    Goal: Maintain proper alignment of affected body part  INTERVENTIONS:  - Support, maintain and protect limb and body alignment  - Provide pt/fam with appropriate education   Outcome: Progressing      Problem: Prexisting or High Potential for Compromised Skin Integrity  Goal: Skin integrity is maintained or improved  INTERVENTIONS:  - Identify patients at risk for skin breakdown  - Assess and monitor skin integrity  - Assess and monitor nutrition and hydration status  - Monitor labs (i e  albumin)  - Assess for incontinence   - Turn and reposition patient  - Assist with mobility/ambulation  - Relieve pressure over bony prominences  - Avoid friction and shearing  - Provide appropriate hygiene as needed including keeping skin clean and dry  - Evaluate need for skin moisturizer/barrier cream  - Collaborate with interdisciplinary team (i e  Nutrition, Rehabilitation, etc )   - Patient/family teaching   Outcome: Progressing      Problem: Nutrition/Hydration-ADULT  Goal: Nutrient/Hydration intake appropriate for improving, restoring or maintaining nutritional needs  Monitor and assess patient's nutrition/hydration status for malnutrition (ex- brittle hair, bruises, dry skin, pale skin and conjunctiva, muscle wasting, smooth red tongue, and disorientation)  Collaborate with interdisciplinary team and initiate plan and interventions as ordered  Monitor patient's weight and dietary intake as ordered or per policy  Utilize nutrition screening tool and intervene per policy  Determine patient's food preferences and provide high-protein, high-caloric foods as appropriate  INTERVENTIONS:  - Monitor oral intake, urinary output, labs, and treatment plans  - Assess nutrition and hydration status and recommend course of action  - Evaluate amount of meals eaten  - Assist patient with eating if necessary   - Allow adequate time for meals  - Recommend/ encourage appropriate diets, oral nutritional supplements, and vitamin/mineral supplements  - Order, calculate, and assess calorie counts as needed  - Recommend, monitor, and adjust tube feedings and TPN/PPN based on assessed needs  - Assess need for intravenous fluids  - Provide specific nutrition/hydration education as appropriate  - Include patient/family/caregiver in decisions related to nutrition   Outcome: Progressing      Problem: Potential for Falls  Goal: Patient will remain free of falls  INTERVENTIONS:  - Assess patient frequently for physical needs  -  Identify cognitive and physical deficits and behaviors that affect risk of falls    -  [place] fall precautions as indicated by assessment   - Educate patient/family on patient safety including physical limitations  - Instruct patient to call for assistance with activity based on assessment  - Modify environment to reduce risk of injury  - Consider OT/PT consult to assist with strengthening/mobility    Outcome: Progressing      Problem: DISCHARGE PLANNING - CARE MANAGEMENT  Goal: Discharge to post-acute care or home with appropriate resources  INTERVENTIONS:  - Conduct assessment to determine patient/family and health care team treatment goals, and need for post-acute services based on payer coverage, community resources, and patient preferences, and barriers to discharge  - Address psychosocial, clinical, and financial barriers to discharge as identified in assessment in conjunction with the patient/family and health care team  - Arrange appropriate level of post-acute services according to patient's   needs and preference and payer coverage in collaboration with the physician and health care team  - Communicate with and update the patient/family, physician, and health care team regarding progress on the discharge plan  - Arrange appropriate transportation to post-acute venues  - Pt to d/c with appropriate resources when medically stable     Outcome: Progressing      Problem: GASTROINTESTINAL - ADULT  Goal: Maintains or returns to baseline bowel function  INTERVENTIONS:  - Assess bowel function  - Encourage oral fluids to ensure adequate hydration  - Administer IV fluids as ordered to ensure adequate hydration  - Administer ordered medications as needed  - Encourage mobilization and activity  - Nutrition services referral to assist patient with appropriate food choices   Outcome: Progressing    Goal: Maintains adequate nutritional intake  INTERVENTIONS:  - Monitor percentage of each meal consumed  - Identify factors contributing to decreased intake, treat as appropriate  - Assist with meals as needed  - Monitor I&O, WT and lab values  - Obtain nutrition services referral as needed   Outcome: Progressing      Problem: METABOLIC, FLUID AND ELECTROLYTES - ADULT  Goal: Electrolytes maintained within normal limits  INTERVENTIONS:  - Monitor labs and assess patient for signs and symptoms of electrolyte imbalances  - Administer electrolyte replacement as ordered  - Monitor response to electrolyte replacements, including repeat lab results as appropriate  - Instruct patient on fluid and nutrition as appropriate   Outcome: Progressing    Goal: Fluid balance maintained  INTERVENTIONS:  - Monitor labs and assess for signs and symptoms of volume excess or deficit  - Monitor I/O and WT  - Instruct patient on fluid and nutrition as appropriate   Outcome: Progressing

## 2018-12-07 NOTE — PHYSICIAN ADVISOR
Current patient class: Inpatient  The patient is currently on Hospital Day: 14      The patient was admitted to the hospital at 2014 on 11/24/18 for the following diagnosis:  SAH (subarachnoid hemorrhage) ([address] ) [I60 [id number]]     CMS OUTLIER STAY REVIEW    After review of the relevant documentation, labs, vital signs and test results, the patient is appropriate for CONTINUED INPATIENT ADMISSION  The patient continues to remain hospitalized receiving acute medical care  The patient has surpassed the expected duration of stay, however given the clinical condition, need for further acute care management, the patient is appropriate to remain in an inpatient status  The patient still being actively managed, and does have unresolved medical issues requiring further hospitalization  This review is conducted at 12 day intervals, to help satisfy the requirements for significant outlier stay review as per CMS  Given the current condition of this patient, the patient satisfies this review was determination for continued inpatient stay  Rationale is as follows: The patient is a 72-year-old female who presented to the hospital on November 24, 2018  She underwent coil embolization of ACOM aneurysm  The patient has been having persistent leg cramps, hypokalemia, and is on "vasospasm watch"  She is on nimodipine 60 mg every 4 hours  Sodium levels are also being closely monitored, she has had hyponatremia  She is currently on sodium chloride tablets 3 g 3 times daily  The patient remains in the hospital receiving acute care  Neurosurgery in the critical care team are managing her care  The patient remains appropriate for inpatient level care      The patients vitals on arrival were ED Triage Vitals   Temperature Pulse Respirations Blood Pressure SpO2   11/24/18 2015 11/24/18 2015 11/24/18 2015 11/24/18 2015 11/24/18 2015   99 7 °F (37 6 °C) 86 20 120/72 97 %      Temp Source Heart Rate Source Patient Position - Orthostatic VS BP Location FiO2 (%)   11/24/18 2015 11/24/18 2015 11/25/18 0800 11/24/18 2015 --   Oral Monitor Lying Left arm       Pain Score       11/24/18 2015       No Pain           Past Medical History:   Diagnosis Date    Fracture of arm     Glaucoma     Hypotension      Past Surgical History:   Procedure Laterality Date    BUNIONECTOMY      BUNIONECTOMY      CATARACT EXTRACTION      IR CEREBRAL ANGIOGRAPHY / INTERVENTION  11/25/2018    TONSILLECTOMY             Consults have been placed to:   IP CONSULT TO CASE MANAGEMENT  IP CONSULT TO NUTRITION SERVICES  IP CONSULT TO NEUROSURGERY  IP CONSULT TO PHYSICAL MEDICINE REHAB  IP CONSULT TO PICC TEAM    Vitals:    12/07/18 1200 12/07/18 1310 12/07/18 1330 12/07/18 1400   BP: (!) 138/102  123/68 119/64   BP Location: Left arm      Pulse: 90 104 84 82   Resp: 20 18 18 19   Temp: 98 3 °F (36 8 °C)      TempSrc: Oral      SpO2: 99% 100% 97% 99%   Weight:       Height:           Most recent labs:    Recent Labs      12/07/18   0512   WBC  8 92   HGB  10 9*   HCT  34 4*   PLT  365   K  3 9   CALCIUM  9 0   BUN  9   CREATININE  0 57*       Scheduled Meds:  Current Facility-Administered Medications:  acetaminophen 650 mg Oral Q4H Estefani Akbar, DO   bisacodyl 10 mg Rectal Daily PRN Flores Szymanski MD   docusate sodium 100 mg Oral BID Fernanda Hollins, DO   dorzolamide-timolol 1 drop Both Eyes BID Tao Dobson DO   enoxaparin 40 mg Subcutaneous Q24H [address] Estefani Akbar, DO   fludrocortisone 0 2 mg Oral Daily Jada Celis PA-C   hydrALAZINE 5 mg Intravenous Q4H PRN Flores Szymanski MD   labetalol 10 mg Intravenous Q4H PRN George Dobson, DO   melatonin 3 mg Oral HS Fernanda Hollins, DO   menthol-methyl salicylate  Apply externally BID PRN Laura Search, DO   niMODipine 60 mg Oral Q4H [address] Estefani Akbar, DO   ondansetron 4 mg Intravenous Q4H PRN Flores Szymanski MD   polyethylene glycol 17 g Oral Daily Estefani Akbar, DO   potassium chloride 40 mEq Oral Daily Fernanda Hollins, DO sodium chloride 3 g Oral TID With Meals Eden Chamberlain MD   travoprost 1 drop Both Eyes HS Chetan Alarcon MD     Continuous Infusions:   PRN Meds: bisacodyl    hydrALAZINE    labetalol    menthol-methyl salicylate    ondansetron    Surgical procedures (if appropriate):

## 2018-12-07 NOTE — PROGRESS NOTES
Progress Note - Neurosurgery   Neda Shipman 69 y o  female MRN: [id number]  Unit/Bed#:  -01 Encounter: [id number]        Assessment:  1  Status post coil embolization for subarachnoid hemorrhage (11/25/2018)  2  HH 1, F3 SAH (11/24/2018)  3  BD ( 11/23/2018)  4  ACOM   5  Dizziness and Light headedness resolved  6  Chronic Intermittent LEs cramps        Plan:   § Exam: GCS 15, EOMI, conjugate, SF, BECKMAN, finger to nose normal and without drift bilaterally   Sensation to light touch is normal bilaterally  Motor strength 5/5  DTR brisk throughout  No clonus and Babinski is negative symmetrically  Tarri Hoose done on 11/24/2018 demonstrates diffuse subarachnoid hemorrhage throughout the basal cisterns, sylvian fissures and over the convexities without evidence of mass effect   Anterior communicating artery 5 mm aneurysm noted  § CTA or 11/27/2018 demonstrates status post coil embolization of anterior communicating artery and the postop day 2 resolving diffuse subarachnoid hemorrhage without evidence of vasospasm   Also area of decreased attenuation in the right basal ganglia compatible with age-indeterminate lacunar infraction  ? Pain control:  per Primary team  ? DVT ppx:   § SCDs  § Continue heparin SQ  ? Seizure ppx:  Continue Keppra  ? Activity:  As tolerated  ? PT/OT evaluation & treatment  ? Medical Mx:  Per Primary team  ? Seizure prophylaxis:  None  ? Neurocheck:  Q 4H,  CT head if GCS drops 2 pts/1h  ? Continue Vasospasm watch  ? HOB>30-45 degree  ? Uv=545-Aa:  § Nacl tabs 3 gm PO TID  § Fludrocortisone 0 2 mg po Q12H  ? IO/OP=negative balance-encourage eating and drinking  ? TCD:   § Right =1 12  § Left = 1 76  ? SBP<220, currently in the 109-160s/60-70s  ? MAP>65, currently  In the 80s-100s  ? Patient is neurologically stable, except occasional intermittent  bilateral thighs cramps  Denies any headache, N/V, Dizziness/light headedness or numbness, weakness or weakness in the extremities   Exam non focal  TCD with in normal range  Continue spasm watch  Patient can step down tomorrow  Call for concern or problem  Subjective/Objective   Chief Complaint: " Occasional thighs cramps"/post op progress  note    Subjective: Patient reports occasional intermittent bilateral thighs cramps, otherwise denies headache, vision issues, N/V  Denies weakness, numbness or paresthesia in the extremities  Denies fever, chills, rigors, cough or chest pain  She is doing PT, walking with out gait issues  Objective: Supine in bed, without pain distress    I/O       12/05 0701 - 12/06 0700 12/06 0701 - 12/07 0700 12/07 0701 - 12/08 0700    P  O  1420 2262     I V  (mL/kg) 1551 3 (25 1) 50 (0 8)     IV Piggyback  750     Total Intake(mL/kg) 2971 3 (48 2) 3062 (49 6)     Urine (mL/kg/hr) 3675 (2 5) 3625 (2 4)     Total Output 3675 3625      Net -703 8 -563                   Invasive Devices     Peripherally Inserted Central Catheter Line            PICC Line 08/83/96 Right Basilic 8 days                Physical Exam:  Vitals: Blood pressure 120/70, pulse 74, temperature 98 °F (36 7 °C), resp  rate 14, height 5' 4" (1 626 m), weight 61 7 kg (136 lb 0 4 oz), SpO2 97 %, not currently breastfeeding  ,Body mass index is 23 35 kg/m²      Hemodynamic Monitoring: MAP: Arterial Line MAP (mmHg): 84 mmHg    General appearance: alert, appears stated age, cooperative and no distress  Head: Normocephalic, without obvious abnormality, atraumatic  Eyes: EOMI, conjugate  Neck: supple, symmetrical, trachea midline and NT  Lungs: non labored breathing  Heart: regular heart rate  Neurologic:   Mental status: Alert, oriented x3, thought content appropriate  Cranial nerves: grossly intact (Cranial nerves II-XII)  Sensory: normal to LT throughout  Motor: moving all extremities without focal weakness; strength 5/5 throughout  Reflexes: 3+ and symmetric, without clonus and Babinski is negative bilaterally  Coordination: finger to nose normal bilaterally, no drift bilaterally      Lab Results:    Results from last 7 days  Lab Units 12/07/18  0512 12/05/18  0428 12/02/18  0605 12/01/18  0538   WBC Thousand/uL 8 92 8 55 7 83 7 68   HEMOGLOBIN g/dL 10 9* 10 8* 10 6* 10 6*   HEMATOCRIT % 34 4* 33 9* 33 0* 33 2*   PLATELETS Thousands/uL 365 315 219 201   NEUTROS PCT % 77*  --  72 68   MONOS PCT % 7  --  11 11       Results from last 7 days  Lab Units 12/07/18  0512 12/06/18  1651 12/06/18  0428  12/04/18  0629   POTASSIUM mmol/L 3 9 3 2* 2 8*  < > 3 7   CHLORIDE mmol/L 104 102 102  < > 100   CO2 mmol/L 25 27 26  < > 24   BUN mg/dL 9 9 7  < > 9   CREATININE mg/dL 0 57* 0 61 0 40*  < > 0 52*   CALCIUM mg/dL 9 0 8 2* 8 6  < > 8 9   ALK PHOS U/L  --   --   --   --  61   ALT U/L  --   --   --   --  77   AST U/L  --   --   --   --  38   < > = values in this interval not displayed  Results from last 7 days  Lab Units 12/07/18  0512 12/06/18  1651 12/05/18  0651   MAGNESIUM mg/dL 2 6 2 8* 2 3       Results from last 7 days  Lab Units 12/05/18  0651 12/02/18  0605 12/01/18  0538   PHOSPHORUS mg/dL 3 1 2 4 3 0         No results found for: TROPONINT  ABG:No results found for: PHART, XBE7WIA, PO2ART, JFB0GIA, A0ODEMUW, BEART, SOURCE    Imaging Studies: I have personally reviewed pertinent reports  and I have personally reviewed pertinent films in PACS    EKG, Pathology, and Other Studies: I have personally reviewed pertinent reports        VTE Pharmacologic Prophylaxis: Enoxaparin (Lovenox)    VTE Mechanical Prophylaxis: sequential compression device

## 2018-12-07 NOTE — PLAN OF CARE
Problem: PHYSICAL THERAPY ADULT  Goal: Performs mobility at highest level of function for planned discharge setting  See evaluation for individualized goals  Treatment/Interventions: Functional transfer training, LE strengthening/ROM, Therapeutic exercise, Endurance training, Cognitive reorientation, Patient/family training, Bed mobility, Gait training, Equipment eval/education, Spoke to nursing, Continued evaluation, Compensatory technique education  Equipment Recommended: Wale Winetr       See flowsheet documentation for full assessment, interventions and recommendations  Outcome: Progressing  Prognosis: Good  Problem List: Decreased strength, Decreased endurance, Impaired balance, Decreased mobility, Pain (Intermitttent cramping)  Assessment: Pt seen for PT tx session for focus on ambulation and balance training  Pt was agreeable to participate in PT and was assisted with toilet transfer at start of the session  She was (S) for all transfers throughout and was able to ambulate (S) without the use of AD and with minimal gait deviations  She was cued to avoid shuffling gait which she attributed to her footwear  When she removed her slippers she no longer presented with shuffling gait  Pt performed dynamic head turns during gait that caused minimal gait deviations and did not significantly reduce gait speed  Pt performed above described balance exercises in room and demonstrated improved static and dynamic balance with reduced DOTNE and with visual input eliminated (eyes closed)  Pt continues to improve, progress towards goals and is ready to attempt stair navigation next session  Pt was educated on stair navigation technique but did not complete this session  PT will continue to follow pt to address deficits and PT recommendation is home with family support and OPPT    Barriers to Discharge: Inaccessible home environment  Barriers to Discharge Comments: Pt needs to demonstrate stair competence  Recommendation: Home with family support, Outpatient PT     PT - OK to Discharge: No (Pending stair competence)    See flowsheet documentation for full assessment

## 2018-12-07 NOTE — PHYSICAL THERAPY NOTE
PHYSICAL THERAPY NOTE      Patient Name: Ofelia Garcia  RLKRZ'D Date: 12/7/2018 12/07/18 1042   Pain Assessment   Pain Assessment 0-10   Pain Score No Pain   Restrictions/Precautions   Weight Bearing Precautions Per Order No   Other Precautions Seizure; Fall Risk;Pain;Telemetry   General   Chart Reviewed Yes   Additional Pertinent History s/p coil emobolization for ACOM aneurysm   Response to Previous Treatment Patient with no complaints from previous session  Family/Caregiver Present No   Cognition   Overall Cognitive Status WFL   Arousal/Participation Cooperative   Attention Within functional limits   Orientation Level Oriented X4   Memory Within functional limits   Following Commands Follows all commands and directions without difficulty   Comments Pt is pleasant and cooperative   Subjective   Subjective Pt reports feeling much better and is willing to participate in therapy but she reports being tired due to a lack of sleep   Bed Mobility   Additional Comments OOB in chair at start of session   Transfers   Sit to Stand 5  Supervision   Additional items Increased time required;Armrests   Stand to Sit 5  Supervision   Additional items Increased time required;Armrests   Stand pivot 5  Supervision   Additional items Increased time required   Toilet transfer 5  Supervision   Additional items Increased time required;Standard toilet   Ambulation/Elevation   Gait pattern Shuffling; Short stride  (Normal gait, shuffling eliminated with slipper removal)   Gait Assistance 5  Supervision   Assistive Device (None)   Distance 500'   Balance   Static Sitting Good   Dynamic Sitting Fair +   Static Standing Fair +   Dynamic Standing Fair +   Ambulatory Fair   Higher level balance Tandem   Higher level balance rep range (tandem stance 30s x5, eyes open, eyes closed)   Endurance Deficit   Endurance Deficit Yes   Endurance Deficit Description cramping, fatigue   Activity Tolerance   Activity Tolerance Patient tolerated treatment well   Nurse Made Aware okay to see per RN   Exercises   Balance Training Bilateral;Standing   Balance training  Dynamic head turns on command during gait, narrow DONTE stance with perturbations eyes open/closed x 4 sets  Tandem stance eyes open/closed   Assessment   Prognosis Good   Problem List Decreased strength;Decreased endurance; Impaired balance;Decreased mobility;Pain  (Intermitttent cramping)   Assessment Pt seen for PT tx session for focus on ambulation and balance training  Pt was agreeable to participate in PT and was assisted with toilet transfer at start of the session  She was (S) for all transfers throughout and was able to ambulate (S) without the use of AD and with minimal gait deviations  She was cued to avoid shuffling gait which she attributed to her footwear  When she removed her slippers she no longer presented with shuffling gait  Pt performed dynamic head turns during gait that caused minimal gait deviations and did not significantly reduce gait speed  Pt performed above described balance exercises in room and demonstrated improved static and dynamic balance with reduced DONTE and with visual input eliminated (eyes closed)  Pt continues to improve, progress towards goals and is ready to attempt stair navigation next session  Pt was educated on stair navigation technique but did not complete this session  PT will continue to follow pt to address deficits and PT recommendation is home with family support and OPPT  Barriers to Discharge Inaccessible home environment   Barriers to Discharge Comments Pt needs to demonstrate stair competence   Goals   Patient Goals To go home   STG Expiration Date 12/16/18   Short Term Goal #1 1  Pt will amb greater than 600' supervision w least restrictive device for community and household distances  2  Pt will ascend/descend 2 steps (S) for access to home   3  Pt will improve standing dynamic and ambulatory balance by one grade for improved safety during mobility  4  Pt will demonstrate minimal gait deviations with dynamic head turns during gait for safe ambulation  5  Pt will participate in strength, endurance, balance, and transfer training for improved activity tolerance  Treatment Day 4   Plan   Treatment/Interventions Functional transfer training;LE strengthening/ROM; Therapeutic exercise; Endurance training;Equipment eval/education;Gait training; Compensatory technique education;Spoke to nursing   Progress Progressing toward goals   PT Frequency Other (Comment)  (3-5x/wk)   Recommendation   Recommendation Home with family support; Outpatient PT   Equipment Recommended (none)   PT - OK to Discharge No  (Pending stair competence)            Nancy Peoples, SPT

## 2018-12-07 NOTE — PROGRESS NOTES
Progress Note - Critical Care   Per Left 69 y o  female MRN: [id number]  Unit/Bed#: [facility]  Encounter: [id number]    Attending Physician: Wyatt Velasco MD    _____________________________________________________________________  ______________________________________________________________________    Chief Complaint: Constipated    24 Hour Events: Reports still no BM yesterday  Does not want to try senna again but willing to try miralax  Feels tired - has not slept well at all this admission and feels it may be catching up with her  Review of Systems   Constitutional: Positive for fatigue  Negative for chills and fever  HENT: Negative  Eyes: Negative  Respiratory: Negative for cough, chest tightness and shortness of breath  Cardiovascular: Negative for chest pain, palpitations and leg swelling  Gastrointestinal: Positive for constipation  Negative for abdominal pain, diarrhea, nausea and vomiting  Genitourinary: Negative for difficulty urinating and dysuria  Musculoskeletal: Positive for myalgias  Negative for neck stiffness  Skin: Negative  Allergic/Immunologic: Negative  Neurological: Negative for dizziness, weakness, light-headedness and headaches  Hematological: Negative  Psychiatric/Behavioral: Negative       ______________________________________________________________________  Vitals:    18 0300 18 0404 18 0500 18 0600   BP:  153/85 123/65 120/70   Pulse: 66  78 74   Resp: 15  17 14   Temp:  98 °F (36 7 °C)     TempSrc:       SpO2: 98%  97% 97%   Weight:       Height:           Temperature:   Temp (24hrs), Av 1 °F (36 7 °C), Min:97 5 °F (36 4 °C), Max:98 6 °F (37 °C)    Current Temperature: 98 °F (36 7 °C)  Weights:   IBW: 54 7 kg    Body mass index is 23 35 kg/m²  Weight (last 2 days)     None                Physical Exam:     Physical Exam   Constitutional: She is oriented to person, place, and time   She appears well-developed and well-nourished  No distress  Appears fatigued   HENT:   Head: Normocephalic and atraumatic  Slightly dry mucous membranes   Eyes: Conjunctivae and EOM are normal  No scleral icterus  Cardiovascular: Normal rate and normal heart sounds  No murmur heard  Pulmonary/Chest: Effort normal and breath sounds normal  No respiratory distress  Abdominal: Soft  Bowel sounds are normal  She exhibits no distension  There is no tenderness  There is no guarding  Musculoskeletal: She exhibits no edema, tenderness or deformity  Neurological: She is alert and oriented to person, place, and time  No cranial nerve deficit  Skin: Skin is warm and dry  No erythema  Psychiatric: She has a normal mood and affect  Her behavior is normal      ______________________________________________________________________  Hemodynamic Monitoring:  N/A     Non-Invasive/Invasive Ventilation Settings:  Respiratory    Lab Data (Last 4 hours)    None         O2/Vent Data (Last 4 hours)    None              No results found for: PHART, JTH4QRX, PO2ART, FHA8SFR, O5DYLZOE, BEART, SOURCE  SpO2: SpO2: 97 %  Intake and Outputs:  I/O       12/01 0701 - 12/02 0700 12/02 0701 - 12/03 0700    P  O  420 2520    I V  (mL/kg) 1527 5 (24 8) 1793 8 (29 1)    IV Piggyback  1000    Total Intake(mL/kg) 1947 5 (31 6) 5313 8 (86 1)    Urine (mL/kg/hr) 450 (0 3) 4325 (2 9)    Total Output 450 4325    Net +1497 5 +988  8          Unmeasured Urine Occurrence 6 x 2 x    Unmeasured Stool Occurrence 2 x         UOP: 2 9 ml/kg/hr   Nutrition:        Diet Orders            Start     Ordered    12/01/18 1156  Diet Regular; Regular House  Diet effective now     Question Answer Comment   Diet Type Regular    Regular Regular House    RD to adjust diet per protocol?  No        12/01/18 1155          Labs:     Results from last 7 days  Lab Units 12/07/18  0512 12/05/18  0428 12/02/18  0605 12/01/18  0538   WBC Thousand/uL 8 92 8 55 7 83 7 68   HEMOGLOBIN g/dL 10 9* 10 8* 10 6* 10 6*   HEMATOCRIT % 34 4* 33 9* 33 0* 33 2*   PLATELETS Thousands/uL 365 315 219 201   NEUTROS PCT % 77*  --  72 68   MONOS PCT % 7  --  11 11       Results from last 7 days  Lab Units 18  0512 18  1651 18  0428 18  0428 18  1731 18  0629 18  0432   SODIUM mmol/L 138 137 136 139 133* 135* 137   POTASSIUM mmol/L 3 9 3 2* 2 8* 3 7 3 7 3 7 3 1*   CHLORIDE mmol/L 104 102 102 104 101 100 103   CO2 mmol/L 25 27 26 26 26 24 27   ANION GAP mmol/L 9 8 8 9 6 11 7   BUN mg/dL 9 9 7 13 14 9 6   CREATININE mg/dL 0 57* 0 61 0 40* 0 57* 0 66 0 52* 0 39*   CALCIUM mg/dL 9 0 8 2* 8 6 8 8 9 4 8 9 8 0*   ALT U/L  --   --   --   --   --  77  --    AST U/L  --   --   --   --   --  38  --    ALK PHOS U/L  --   --   --   --   --  61  --    ALBUMIN g/dL  --   --   --   --   --  3 5  --    TOTAL BILIRUBIN mg/dL  --   --   --   --   --  0 50  --        Results from last 7 days  Lab Units 18  0512 18  1651 18  0651 18  0605 18  0538   MAGNESIUM mg/dL 2 6 2 8* 2 3 2 2 2 1   PHOSPHORUS mg/dL  --   --  3 1 2 4 3 0                  ABG:No results found for: PHART, QEO8HFH, PO2ART, SYT0XDZ, F0OBWDIC, BEART, SOURCE  VBG:        No results found for: VANCOTROUGH   Imaging: TCDs with no vasospasm  I have personally reviewed pertinent reports  EK/3 - Sinus tachycardia  RI interval 220  Micro: Allergies:    Allergies   Allergen Reactions    Codeine GI Intolerance     Medications:   Scheduled Meds:    Current Facility-Administered Medications:  acetaminophen 650 mg Oral Q4H Estefani Akbar DO   bisacodyl 10 mg Rectal Daily PRN [place] Sylvester, MD   docusate sodium 100 mg Oral BID Rosalia Nissen, DO   dorzolamide-timolol 1 drop Both Eyes BID Tao Dobson, DO   fludrocortisone 0 2 mg Oral Daily Mike Medina PA-C   heparin (porcine) 5,000 Units Subcutaneous [facility] Ceira Terrazas, DO   hydrALAZINE 5 mg Intravenous Q4H PRN [place] MD Sylvester   labetalol 10 mg Intravenous Q4H PRN Caroline Friedman, DO   menthol-methyl salicylate  Apply externally BID PRN Lovely Corey, DO   niMODipine 60 mg Oral Q4H [address] Quinton Schuster, DO   ondansetron 4 mg Intravenous Q4H PRN Aneta Rodríguez MD   potassium chloride 40 mEq Oral Daily [place] Schuster, DO   sodium chloride 3 g Oral TID With Meals Miguel Gresham MD   travoprost 1 drop Both Eyes HS Aneta Rodríguez MD     Continuous Infusions:     PRN Meds:    bisacodyl 10 mg Daily PRN   hydrALAZINE 5 mg Q4H PRN   labetalol 10 mg Q4H PRN   menthol-methyl salicylate  BID PRN   ondansetron 4 mg Q4H PRN     VTE Pharmacologic Prophylaxis: Heparin  VTE Mechanical Prophylaxis: sequential compression device  Invasive lines and devices: Invasive Devices     Peripherally Inserted Central Catheter Line            PICC Line 94/81/35 Right Basilic 8 days                   Assessment and Plan:   Principal Problem:    SAH (subarachnoid hemorrhage) (HCC)  Active Problems:    Glaucoma    S/P coil embolization of cerebral aneurysm    Hyponatremia  Resolved Problems:    Hypophosphatemia    Hypokalemia    Constipated        Neuro:  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  - Bleeding day 14, s/p coiling; POD 13  - Completed 7 days of Keppra  - TCD bid for 21 days (12/17)  - Nimodipine 60mg q4hr  - Continue vasospasm precautions, monitoring  - Neuro checks q4hr  - PT/OT, OOB  - Melatonin for sleep    CV: No further tachycardia  No documented hypotension overnight  Goal SBP <220, MAP >65; pt has been at goal on nimodipine 60mg q4hr    Pulm: No active issues  - Encouraged IS    GI: Constipation: pt getting docusate, will add Miralax today for no BM in 3 days  Nausea: secondary to sodium tabs  Pt should take these with meals/food      : Urine output of 3 6 L yesterday, 2 4ml/kg/hr  - Patient needs to remain euvolemic; currently net negative 9L (in the setting of known inaccurate I/O earlier in admission)  - Continue to monitor    F/E/N:   Hyponatremia improving to 138 today  - 3g sodium TID  - Florinef 0 2 daily  - Will give another 500ml of 25g albumin    Fluid status: pt with significant urine output in the setting of hyponatremia  Possible cerebral salt wasting  Pt may have PO fluids as desired  ID: No acute issues    Heme: DVT PPX with heparin SC    Endo: No acute issues     Msk/Skin: Bilateral lower leg cramping; chronic issue for patient  At home she uses tonic water and it works very well  - Encourage OOB  - Bengay PRN  - Scheduled tylenol 650mg q4 hours    Disposition: Continue intensive care      Code Status: Level 1 - Full Code        Portions of the record may have been created with voice recognition software  Occasional wrong word or "sound a like" substitutions may have occurred due to the inherent limitations of voice recognition software  Read the chart carefully and recognize, using context, where substitutions have occurred          12/7/2018 7:21 AM    Danial Heredia DO

## 2018-12-08 ENCOUNTER — APPOINTMENT (INPATIENT)
Dept: NON INVASIVE DIAGNOSTICS | Facility: HOSPITAL | Age: 78
DRG: 021 | End: 2018-12-08
Payer: MEDICARE

## 2018-12-08 LAB
ANION GAP SERPL CALCULATED.3IONS-SCNC: 6 MMOL/L (ref 4–13)
BACTERIA UR QL AUTO: ABNORMAL /HPF
BILIRUB UR QL STRIP: NEGATIVE
BUN SERPL-MCNC: 8 MG/DL (ref 5–25)
CALCIUM SERPL-MCNC: 9.7 MG/DL (ref 8.3–10.1)
CHLORIDE SERPL-SCNC: 102 MMOL/L (ref 100–108)
CLARITY UR: ABNORMAL
CO2 SERPL-SCNC: 29 MMOL/L (ref 21–32)
COLOR UR: YELLOW
CREAT SERPL-MCNC: 0.41 MG/DL (ref 0.6–1.3)
GFR SERPL CREATININE-BSD FRML MDRD: 100 ML/MIN/1.73SQ M
GLUCOSE SERPL-MCNC: 98 MG/DL (ref 65–140)
GLUCOSE UR STRIP-MCNC: NEGATIVE MG/DL
HGB UR QL STRIP.AUTO: ABNORMAL
HYALINE CASTS #/AREA URNS LPF: ABNORMAL /LPF
KETONES UR STRIP-MCNC: NEGATIVE MG/DL
LEUKOCYTE ESTERASE UR QL STRIP: ABNORMAL
NITRITE UR QL STRIP: NEGATIVE
NON-SQ EPI CELLS URNS QL MICRO: ABNORMAL /HPF
PH UR STRIP.AUTO: 6.5 [PH] (ref 4.5–8)
POTASSIUM SERPL-SCNC: 3.3 MMOL/L (ref 3.5–5.3)
PROT UR STRIP-MCNC: ABNORMAL MG/DL
RBC #/AREA URNS AUTO: ABNORMAL /HPF
SODIUM SERPL-SCNC: 137 MMOL/L (ref 136–145)
SP GR UR STRIP.AUTO: 1.02 (ref 1–1.03)
UROBILINOGEN UR QL STRIP.AUTO: 4 E.U./DL
WBC #/AREA URNS AUTO: ABNORMAL /HPF

## 2018-12-08 PROCEDURE — 99232 SBSQ HOSP IP/OBS MODERATE 35: CPT | Performed by: NEUROLOGICAL SURGERY

## 2018-12-08 PROCEDURE — 93886 INTRACRANIAL COMPLETE STUDY: CPT

## 2018-12-08 PROCEDURE — 81001 URINALYSIS AUTO W/SCOPE: CPT | Performed by: INTERNAL MEDICINE

## 2018-12-08 PROCEDURE — 99231 SBSQ HOSP IP/OBS SF/LOW 25: CPT | Performed by: INTERNAL MEDICINE

## 2018-12-08 PROCEDURE — 93886 INTRACRANIAL COMPLETE STUDY: CPT | Performed by: SURGERY

## 2018-12-08 PROCEDURE — 80048 BASIC METABOLIC PNL TOTAL CA: CPT | Performed by: PHYSICIAN ASSISTANT

## 2018-12-08 RX ORDER — ACETAMINOPHEN 325 MG/1
650 TABLET ORAL EVERY 4 HOURS PRN
Status: DISCONTINUED | OUTPATIENT
Start: 2018-12-08 | End: 2018-12-10 | Stop reason: HOSPADM

## 2018-12-08 RX ORDER — POLYETHYLENE GLYCOL 3350 17 G/17G
17 POWDER, FOR SOLUTION ORAL DAILY PRN
Status: DISCONTINUED | OUTPATIENT
Start: 2018-12-08 | End: 2018-12-10 | Stop reason: HOSPADM

## 2018-12-08 RX ORDER — BISACODYL 10 MG
10 SUPPOSITORY, RECTAL RECTAL DAILY PRN
Status: DISCONTINUED | OUTPATIENT
Start: 2018-12-08 | End: 2018-12-10 | Stop reason: HOSPADM

## 2018-12-08 RX ORDER — SODIUM CHLORIDE 1000 MG
2 TABLET, SOLUBLE MISCELLANEOUS
Status: DISCONTINUED | OUTPATIENT
Start: 2018-12-08 | End: 2018-12-09

## 2018-12-08 RX ORDER — POTASSIUM CHLORIDE 29.8 MG/ML
40 INJECTION INTRAVENOUS ONCE
Status: COMPLETED | OUTPATIENT
Start: 2018-12-08 | End: 2018-12-08

## 2018-12-08 RX ORDER — HYDRALAZINE HYDROCHLORIDE 20 MG/ML
5 INJECTION INTRAMUSCULAR; INTRAVENOUS EVERY 4 HOURS PRN
Status: DISCONTINUED | OUTPATIENT
Start: 2018-12-08 | End: 2018-12-10 | Stop reason: HOSPADM

## 2018-12-08 RX ORDER — FLUDROCORTISONE ACETATE 0.1 MG/1
0.1 TABLET ORAL DAILY
Status: DISCONTINUED | OUTPATIENT
Start: 2018-12-08 | End: 2018-12-09

## 2018-12-08 RX ORDER — NITROFURANTOIN 25; 75 MG/1; MG/1
100 CAPSULE ORAL 2 TIMES DAILY WITH MEALS
Status: DISCONTINUED | OUTPATIENT
Start: 2018-12-08 | End: 2018-12-10 | Stop reason: HOSPADM

## 2018-12-08 RX ORDER — LABETALOL HYDROCHLORIDE 5 MG/ML
10 INJECTION, SOLUTION INTRAVENOUS EVERY 4 HOURS PRN
Status: DISCONTINUED | OUTPATIENT
Start: 2018-12-08 | End: 2018-12-10 | Stop reason: HOSPADM

## 2018-12-08 RX ADMIN — FLUDROCORTISONE ACETATE 0.2 MG: 0.1 TABLET ORAL at 09:07

## 2018-12-08 RX ADMIN — SODIUM CHLORIDE TAB 1 GM 3 G: 1 TAB at 07:58

## 2018-12-08 RX ADMIN — NITROFURANTOIN MONOHYDRATE/MACROCRYSTALLINE 100 MG: 25; 75 CAPSULE ORAL at 18:24

## 2018-12-08 RX ADMIN — NIMODIPINE 60 MG: 30 CAPSULE, LIQUID FILLED ORAL at 08:57

## 2018-12-08 RX ADMIN — MELATONIN 3 MG: at 21:35

## 2018-12-08 RX ADMIN — DORZOLAMIDE HYDROCHLORIDE AND TIMOLOL MALEATE 1 DROP: 20; 5 SOLUTION/ DROPS OPHTHALMIC at 12:17

## 2018-12-08 RX ADMIN — NIMODIPINE 60 MG: 30 CAPSULE, LIQUID FILLED ORAL at 21:35

## 2018-12-08 RX ADMIN — DORZOLAMIDE HYDROCHLORIDE AND TIMOLOL MALEATE 1 DROP: 20; 5 SOLUTION/ DROPS OPHTHALMIC at 00:01

## 2018-12-08 RX ADMIN — NIMODIPINE 60 MG: 30 CAPSULE, LIQUID FILLED ORAL at 15:52

## 2018-12-08 RX ADMIN — POTASSIUM CHLORIDE 40 MEQ: 1500 TABLET, EXTENDED RELEASE ORAL at 09:07

## 2018-12-08 RX ADMIN — POTASSIUM CHLORIDE 40 MEQ: 400 INJECTION, SOLUTION INTRAVENOUS at 09:08

## 2018-12-08 RX ADMIN — NIMODIPINE 60 MG: 30 CAPSULE, LIQUID FILLED ORAL at 12:18

## 2018-12-08 RX ADMIN — DOCUSATE SODIUM 100 MG: 100 CAPSULE, LIQUID FILLED ORAL at 09:15

## 2018-12-08 RX ADMIN — ENOXAPARIN SODIUM 40 MG: 40 INJECTION SUBCUTANEOUS at 09:08

## 2018-12-08 RX ADMIN — SODIUM CHLORIDE TAB 1 GM 2 G: 1 TAB at 15:52

## 2018-12-08 RX ADMIN — NIMODIPINE 60 MG: 30 CAPSULE, LIQUID FILLED ORAL at 00:02

## 2018-12-08 RX ADMIN — NIMODIPINE 60 MG: 30 CAPSULE, LIQUID FILLED ORAL at 04:58

## 2018-12-08 RX ADMIN — TRAVOPROST OPHTHALMIC SOLUTION 1 DROP: 0.04 SOLUTION OPHTHALMIC at 21:35

## 2018-12-08 RX ADMIN — SODIUM CHLORIDE TAB 1 GM 2 G: 1 TAB at 12:18

## 2018-12-08 NOTE — PROGRESS NOTES
Interval progress note: per RN, patient complaining of some burning with urination and itching in her genital area  Patient reports a history of UTIs, most recently this past winter (at least 8 months ago)  She gets them once or twice a year and recognizes the discomfort at the beginning, which is what she is currently experiencing  Patient is afebrile  She has not had a Pulido catheter this admission  UA with reflex microscopy ordered; positive for blood and RBCs, leukocytes, innumerable WBCs, moderate bacteria, no epithelial cells  Patient reports tolerating Keflex previously and having "a problem" with Bactrim  Will start with nitrofurantoin 100mg BID for 5 days for good coverage of UTI organisms

## 2018-12-08 NOTE — PROGRESS NOTES
Progress Note - ICU Transfer to [place]/MS silvia Taylor 69 y o  female MRN: [id number]  [address] [facility]   Unit/Bed#[facility] -01 Encounter: [id number]    Code Status: Level 1 - Full Code    Reason for ICU admission:  Subarachnoid hemorrhage    Active problems: Principal Problem:    SAH (subarachnoid hemorrhage) ([address] )  Active Problems:    Glaucoma    S/P coil embolization of cerebral aneurysm    Hyponatremia      Consultants:   · Neurosurgery  · Physical medicine rehabilitation    History of Present Illness/Summary of clinical course:   Ms Sherri Stack a 78-year-old lady with a past history of glaucoma who presented on in November 24th with acute onset of severe headache and was found to have subarachnoid hemorrhage from 5 mm anterior communicating artery aneurysm  She was transferred to [place] for angiogram and coiling which took place on the 25th  Since that time, she has been monitored with daily TCDs which have demonstrated no vasospasm  Hospital course has been complicated by mild hyponatremia requiring 3 g sodium supplementation t i d  And Florinef that was increased from 0 1 mg daily to 0 2 mg  She has also recently developed hypokalemia requiring daily supplementation of 40 mEq  This is noted to have worsened after the increase of Florinef  She also deals with bilateral lower leg cramping at baseline, for which she takes tonic water at home  During hospitalization, she had intermittent tachycardia which was asymptomatic and sinus on EKG  She is also noted to have high urine output requiring close management of her electrolytes  This is likely a component of cerebral salt wasting  Please refer to today's progress note for further clinical details       Recent or scheduled procedures:   November 25th:  Coil embolization of anterior communicating artery aneurysm    Outstanding/pending diagnostics:  None       Mobilization Plan:  Out of bed, PT, OT    Nutrition Plan:  Regular diet    Home medications ordered:  Cosopt and Travatan eyedrops     Specific Diagnosis Plan:    Subarachnoid hemorrhage  - daily TCDs until December 17th (21 days)  - maintain euvolemic  - continue nimodipine q 4 hours    Constipation  Patient did not tolerate senna as this lead to diarrhea which precipitated a presyncopal event  She is now receiving Colace daily and MiraLax p r n  Glaucoma  Home Cosopt and Travatan    Lower extremity cramping  Patient reports that p r n  Tylenol has helped and that the tonic water completely resolved these which has been brought in from home  They had been interfering with her sleep and melatonin improve this as well  Spoke with Dr Holger Valle regarding transfer  Patient accepted to her service  Please call the medical critical care attending with any questions  Portions of the record may have been created with voice recognition software  Occasional wrong word or "sound a like" substitutions may have occurred due to the inherent limitations of voice recognition software  Read the chart carefully and recognize, using context, where substitutions have occurred      Pepe Mckoy DO

## 2018-12-08 NOTE — PROGRESS NOTES
Progress Note - Critical Care   Karin Aguilar 69 y o  female MRN: [id number]  Unit/Bed#: [facility] [facility]  Encounter: [id number]    Attending Physician: Su Taylor MD    _____________________________________________________________________  _____________________________________________________________________    Chief Complaint: Feeling better    24 Hour Events: Family brought in tonic water overnight which helped cramping significantly  Patient slept better with this and with melatonin and wants to continue both  Per Dr Reyna Moder patient may go to the floors today  Review of Systems   Constitutional: Negative for chills and fever  HENT: Negative  Eyes: Negative  Respiratory: Negative for cough, chest tightness and shortness of breath  Cardiovascular: Negative for chest pain, palpitations and leg swelling  Gastrointestinal: Negative for abdominal pain, diarrhea, nausea and vomiting  Genitourinary: Negative for difficulty urinating and dysuria  Musculoskeletal: Negative for neck stiffness  Skin: Negative  Allergic/Immunologic: Negative  Neurological: Negative for dizziness, weakness, light-headedness and headaches  Hematological: Negative  Psychiatric/Behavioral: Negative       ______________________________________________________________________  Vitals:    18 0400 18 0500 18 0600 18 0700   BP: 135/73 103/58 112/66 113/67   Pulse: 74 78 92 78   Resp: 14 16 19 (!) 29   Temp: 99 1 °F (37 3 °C)      TempSrc: Oral      SpO2: 98% 96% 95% 96%   Weight:       Height:           Temperature:   Temp (24hrs), Av 7 °F (37 1 °C), Min:98 3 °F (36 8 °C), Max:99 1 °F (37 3 °C)    Current Temperature: 99 1 °F (37 3 °C)  Weights:   IBW: 54 7 kg    Body mass index is 23 35 kg/m²    Weight (last 2 days)     Date/Time    18 1000    Comment rows:    OBSERV: Pt in bathroom at 18 1000                    Physical Exam:     Physical Exam   Constitutional: She is oriented to person, place, and time  She appears well-developed and well-nourished  No distress  HENT:   Head: Normocephalic and atraumatic  Slightly dry mucous membranes   Eyes: Conjunctivae and EOM are normal  No scleral icterus  Cardiovascular: Normal rate and normal heart sounds  No murmur heard  Pulmonary/Chest: Effort normal and breath sounds normal  No respiratory distress  Abdominal: Soft  Bowel sounds are normal  She exhibits no distension  There is no tenderness  There is no guarding  Musculoskeletal: She exhibits no edema, tenderness or deformity  Neurological: She is alert and oriented to person, place, and time  No cranial nerve deficit  Skin: Skin is warm and dry  No erythema  Psychiatric: She has a normal mood and affect  Her behavior is normal      ______________________________________________________________________  Hemodynamic Monitoring:  N/A     Non-Invasive/Invasive Ventilation Settings:  Respiratory    Lab Data (Last 4 hours)    None         O2/Vent Data (Last 4 hours)    None              No results found for: PHART, RSO3XWT, PO2ART, TTC9VZT, X5KIGOFE, BEART, SOURCE  SpO2: SpO2: 96 %  Intake and Outputs:  I/O       12/01 0701 - 12/02 0700 12/02 0701 - 12/03 0700    P  O  420 2520    I V  (mL/kg) 1527 5 (24 8) 1793 8 (29 1)    IV Piggyback  1000    Total Intake(mL/kg) 1947 5 (31 6) 5313 8 (86 1)    Urine (mL/kg/hr) 450 (0 3) 4325 (2 9)    Total Output 450 4325    Net +1497 5 +988  8          Unmeasured Urine Occurrence 6 x 2 x    Unmeasured Stool Occurrence 2 x         UOP: 2 9 ml/kg/hr   Nutrition:        Diet Orders            Start     Ordered    12/01/18 1156  Diet Regular; Regular House  Diet effective now     Question Answer Comment   Diet Type Regular    Regular Regular House    RD to adjust diet per protocol?  No        12/01/18 1155          Labs:     Results from last 7 days  Lab Units 12/07/18  0512 12/05/18  0428 12/02/18  0605   WBC Thousand/uL 8 92 8 55 7 83 HEMOGLOBIN g/dL 10 9* 10 8* 10 6*   HEMATOCRIT % 34 4* 33 9* 33 0*   PLATELETS Thousands/uL 365 315 219   NEUTROS PCT % 77*  --  72   MONOS PCT % 7  --  11       Results from last 7 days  Lab Units 18  0505 18  1459 18  0512 18  1651 18  0428 18  0428 18  1731 18  0629   SODIUM mmol/L 137 139 138 137 136 139 133* 135*   POTASSIUM mmol/L 3 3* 4 2 3 9 3 2* 2 8* 3 7 3 7 3 7   CHLORIDE mmol/L 102 106 104 102 102 104 101 100   CO2 mmol/L 29 28 25 27 26 26 26 24   ANION GAP mmol/L 6 5 9 8 8 9 6 11   BUN mg/dL 8 14 9 9 7 13 14 9   CREATININE mg/dL 0 41* 0 50* 0 57* 0 61 0 40* 0 57* 0 66 0 52*   CALCIUM mg/dL 9 7 8 7 9 0 8 2* 8 6 8 8 9 4 8 9   ALT U/L  --   --   --   --   --   --   --  77   AST U/L  --   --   --   --   --   --   --  38   ALK PHOS U/L  --   --   --   --   --   --   --  61   ALBUMIN g/dL  --   --   --   --   --   --   --  3 5   TOTAL BILIRUBIN mg/dL  --   --   --   --   --   --   --  0 50       Results from last 7 days  Lab Units 18  0512 18  1651 18  0651 18  0605   MAGNESIUM mg/dL 2 6 2 8* 2 3 2 2   PHOSPHORUS mg/dL  --   --  3 1 2 4                  ABG:No results found for: PHART, NOY6BTH, PO2ART, IQD3QHN, J0CPXWEJ, BEART, SOURCE  VBG:        No results found for: [facility]   Imaging: TCDs with no vasospasm  I have personally reviewed pertinent reports  EK/3 - Sinus tachycardia  CA interval 220  Micro: Allergies:    Allergies   Allergen Reactions    Codeine GI Intolerance     Medications:   Scheduled Meds:    Current Facility-Administered Medications:  acetaminophen 650 mg Oral Q4H Estefani Sluder, DO   bisacodyl 10 mg Rectal Daily PRN Kimani Mireles MD   docusate sodium 100 mg Oral BID Katie Hector, DO   dorzolamide-timolol 1 drop Both Eyes BID Tao Dobson, DO   enoxaparin 40 mg Subcutaneous Q24H [facility] Estefani Akbar, DO   fludrocortisone 0 2 mg Oral Daily Kaci Martinez PA-C   hydrALAZINE 5 mg Intravenous Q4H PRN Susana Marks Inocencio Andrade MD   labetalol 10 mg Intravenous Q4H PRN [place] Dobson, DO   melatonin 3 mg Oral HS Danial Yan, DO   menthol-methyl salicylate  Apply externally BID PRN Nuria Bernheim, DO   niMODipine 60 mg Oral Q4H [address] Estefani Akbar, DO   ondansetron 4 mg Intravenous Q4H PRN Bonnie Villagomez MD   polyethylene glycol 17 g Oral Daily [place] Yan, DO   potassium chloride 40 mEq Oral Daily [place] Yan, DO   potassium chloride 40 mEq Intravenous Once Danial Yan, DO   sodium chloride 3 g Oral TID With Meals Kym Burt MD   travoprost 1 drop Both Eyes HS Bonnie Villagomez MD     Continuous Infusions:     PRN Meds:    bisacodyl 10 mg Daily PRN   hydrALAZINE 5 mg Q4H PRN   labetalol 10 mg Q4H PRN   menthol-methyl salicylate  BID PRN   ondansetron 4 mg Q4H PRN     VTE Pharmacologic Prophylaxis: Heparin  VTE Mechanical Prophylaxis: sequential compression device  Invasive lines and devices: Invasive Devices     Peripherally Inserted Central Catheter Line            PICC Line 69/10/96 Right Basilic 9 days                   Assessment and Plan:   Principal Problem:    SAH (subarachnoid hemorrhage) (HCC)  Active Problems:    Glaucoma    S/P coil embolization of cerebral aneurysm    Hyponatremia  Resolved Problems:    Hypophosphatemia    Hypokalemia    Constipated        Neuro:  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  - Bleeding day 15, s/p coiling; POD 14  - Completed 7 days of Keppra  - TCD daily for 21 days (12/17)  - Nimodipine 60mg q4hr  - Continue vasospasm precautions, monitoring  - Neuro checks q4hr  - PT/OT, OOB  - Melatonin for sleep  - May transfer to floor today    CV: No further tachycardia  No documented hypotension overnight  Goal SBP <220, MAP >65; pt has been at goal on nimodipine 60mg q4hr    Pulm: No active issues  - Encouraged IS    GI: Constipation: Docusate and miralax     Nausea: secondary to sodium tabs  Pt should take these with meals/food      : Urine output of 3 2 L yesterday, 2 2ml/kg/hr  - Patient needs to remain euvolemic; currently net negative 11L (in the setting of known inaccurate I/O earlier in admission)  - Continue to monitor    F/E/N:   Hyponatremia stable since 12/5 on 3g sodium and Florinef   - 3g sodium TID  - Florinef 0 1 daily, decreasing today   - Hypokalemia possibly caused by florinef and occasional loose stools, repleting via PICC    Fluid status: pt with significant urine output in the setting of hyponatremia  Possible cerebral salt wasting  Pt may have PO fluids as desired  ID: No acute issues    Heme: DVT PPX with heparin SC    Endo: No acute issues     Msk/Skin: Bilateral lower leg cramping; chronic issue for patient  At home she uses tonic water and it works very well  - Encourage OOB  - Bengay PRN  - PRN tylenol 650mg q4 hours    Disposition: Discharge to stepdown today      Code Status: Level 1 - Full Code        Portions of the record may have been created with voice recognition software  Occasional wrong word or "sound a like" substitutions may have occurred due to the inherent limitations of voice recognition software  Read the chart carefully and recognize, using context, where substitutions have occurred          12/8/2018 8:06 AM    Stanton Dawson DO

## 2018-12-09 ENCOUNTER — APPOINTMENT (INPATIENT)
Dept: NON INVASIVE DIAGNOSTICS | Facility: HOSPITAL | Age: 78
DRG: 021 | End: 2018-12-09
Payer: MEDICARE

## 2018-12-09 PROBLEM — R30.0 DYSURIA: Status: ACTIVE | Noted: 2018-12-09

## 2018-12-09 LAB
ANION GAP SERPL CALCULATED.3IONS-SCNC: 7 MMOL/L (ref 4–13)
BUN SERPL-MCNC: 12 MG/DL (ref 5–25)
CALCIUM SERPL-MCNC: 8.9 MG/DL (ref 8.3–10.1)
CHLORIDE SERPL-SCNC: 102 MMOL/L (ref 100–108)
CO2 SERPL-SCNC: 29 MMOL/L (ref 21–32)
CREAT SERPL-MCNC: 0.47 MG/DL (ref 0.6–1.3)
GFR SERPL CREATININE-BSD FRML MDRD: 96 ML/MIN/1.73SQ M
GLUCOSE SERPL-MCNC: 94 MG/DL (ref 65–140)
POTASSIUM SERPL-SCNC: 3.6 MMOL/L (ref 3.5–5.3)
SODIUM SERPL-SCNC: 138 MMOL/L (ref 136–145)

## 2018-12-09 PROCEDURE — 80048 BASIC METABOLIC PNL TOTAL CA: CPT | Performed by: INTERNAL MEDICINE

## 2018-12-09 PROCEDURE — 99232 SBSQ HOSP IP/OBS MODERATE 35: CPT | Performed by: FAMILY MEDICINE

## 2018-12-09 PROCEDURE — 93886 INTRACRANIAL COMPLETE STUDY: CPT | Performed by: SURGERY

## 2018-12-09 PROCEDURE — 93886 INTRACRANIAL COMPLETE STUDY: CPT

## 2018-12-09 RX ADMIN — NIMODIPINE 60 MG: 30 CAPSULE, LIQUID FILLED ORAL at 11:23

## 2018-12-09 RX ADMIN — NIMODIPINE 60 MG: 30 CAPSULE, LIQUID FILLED ORAL at 01:00

## 2018-12-09 RX ADMIN — NIMODIPINE 60 MG: 30 CAPSULE, LIQUID FILLED ORAL at 04:36

## 2018-12-09 RX ADMIN — ENOXAPARIN SODIUM 40 MG: 40 INJECTION SUBCUTANEOUS at 07:58

## 2018-12-09 RX ADMIN — DORZOLAMIDE HYDROCHLORIDE AND TIMOLOL MALEATE 1 DROP: 20; 5 SOLUTION/ DROPS OPHTHALMIC at 01:00

## 2018-12-09 RX ADMIN — NIMODIPINE 60 MG: 30 CAPSULE, LIQUID FILLED ORAL at 23:56

## 2018-12-09 RX ADMIN — NIMODIPINE 60 MG: 30 CAPSULE, LIQUID FILLED ORAL at 19:48

## 2018-12-09 RX ADMIN — DORZOLAMIDE HYDROCHLORIDE AND TIMOLOL MALEATE 1 DROP: 20; 5 SOLUTION/ DROPS OPHTHALMIC at 23:56

## 2018-12-09 RX ADMIN — NITROFURANTOIN MONOHYDRATE/MACROCRYSTALLINE 100 MG: 25; 75 CAPSULE ORAL at 16:49

## 2018-12-09 RX ADMIN — POTASSIUM CHLORIDE 40 MEQ: 1500 TABLET, EXTENDED RELEASE ORAL at 07:59

## 2018-12-09 RX ADMIN — NITROFURANTOIN MONOHYDRATE/MACROCRYSTALLINE 100 MG: 25; 75 CAPSULE ORAL at 07:58

## 2018-12-09 RX ADMIN — TRAVOPROST OPHTHALMIC SOLUTION 1 DROP: 0.04 SOLUTION OPHTHALMIC at 21:00

## 2018-12-09 RX ADMIN — MELATONIN 3 MG: at 21:00

## 2018-12-09 RX ADMIN — NIMODIPINE 60 MG: 30 CAPSULE, LIQUID FILLED ORAL at 16:49

## 2018-12-09 RX ADMIN — FLUDROCORTISONE ACETATE 0.1 MG: 0.1 TABLET ORAL at 07:59

## 2018-12-09 RX ADMIN — DORZOLAMIDE HYDROCHLORIDE AND TIMOLOL MALEATE 1 DROP: 20; 5 SOLUTION/ DROPS OPHTHALMIC at 11:23

## 2018-12-09 NOTE — PLAN OF CARE
Activity Intolerance/Impaired Mobility     Mobility/activity is maintained at optimum level for patient Progressing        Communication Impairment     Ability to express needs and understand communication Angella Hurley Discharge to home or other facility with appropriate resources Progressing        DISCHARGE PLANNING - CARE MANAGEMENT     Discharge to post-acute care or home with appropriate resources Progressing        GASTROINTESTINAL - ADULT     Maintains or returns to baseline bowel function Progressing     Maintains adequate nutritional intake Progressing        INFECTION - ADULT     Absence or prevention of progression during hospitalization Progressing     Absence of fever/infection during neutropenic period Progressing        Knowledge Deficit     Patient/family/caregiver demonstrates understanding of disease process, treatment plan, medications, and discharge instructions Progressing        METABOLIC, FLUID AND ELECTROLYTES - ADULT     Electrolytes maintained within normal limits Progressing     Fluid balance maintained Progressing        MUSCULOSKELETAL - ADULT     Maintain or return mobility to safest level of function Progressing     Maintain proper alignment of affected body part Progressing        Neurological Deficit     Neurological status is stable or improving Progressing        NEUROSENSORY - ADULT     Achieves stable or improved neurological status Progressing     Absence of seizures Progressing     Remains free of injury related to seizures activity Progressing     Achieves maximal functionality and self care Progressing        Nutrition     Nutrition/Hydration status is improving Progressing        Nutrition/Hydration-ADULT     Nutrient/Hydration intake appropriate for improving, restoring or maintaining nutritional needs Progressing        PAIN - ADULT     Verbalizes/displays adequate comfort level or baseline comfort level Progressing Potential for Aspiration     Non-ventilated patient's risk of aspiration is minimized Progressing        Potential for Falls     Patient will remain free of falls Progressing        Prexisting or High Potential for Compromised Skin Integrity     Skin integrity is maintained or improved Progressing        SAFETY ADULT     Patient will remain free of falls Progressing     Maintain or return to baseline ADL function Progressing     Maintain or return mobility status to optimal level Progressing

## 2018-12-09 NOTE — ASSESSMENT & PLAN NOTE
· Neurosurgery evaluation appreciated  · Patient is getting b i d  TCD  · Continue with the Florinef and also the nimodipine  · Neurosurgery on board  · Likely DC soon

## 2018-12-09 NOTE — PROGRESS NOTES
bd15 acoa sah coiling  - cont nimotop  - pt ot pmr with dispo planning  Plan for possible dc tomorrow  - stop salt today, last dose of florinef today  - will cont to follow     No events  Temp:  [97 9 °F (36 6 °C)-98 3 °F (36 8 °C)] 98 2 °F (36 8 °C)  HR:  [80-88] 86  Resp:  [12-22] 16  BP: (103-133)/(55-90) 103/60  aao3 fleunt  perrl eomi tml fs  maew no drift      Results from last 7 days  Lab Units 12/07/18  0512 12/05/18  0428   WBC Thousand/uL 8 92 8 55   HEMOGLOBIN g/dL 10 9* 10 8*   HEMATOCRIT % 34 4* 33 9*   PLATELETS Thousands/uL 365 315   NEUTROS PCT % 77*  --    MONOS PCT % 7  --        Results from last 7 days  Lab Units 12/09/18  0442 12/08/18  0505 12/07/18  1459  12/04/18  0629   POTASSIUM mmol/L 3 6 3 3* 4 2  < > 3 7   CHLORIDE mmol/L 102 102 106  < > 100   CO2 mmol/L 29 29 28  < > 24   BUN mg/dL 12 8 14  < > 9   CREATININE mg/dL 0 47* 0 41* 0 50*  < > 0 52*   CALCIUM mg/dL 8 9 9 7 8 7  < > 8 9   ALK PHOS U/L  --   --   --   --  61   ALT U/L  --   --   --   --  77   AST U/L  --   --   --   --  38   < > = values in this interval not displayed      Results from last 7 days  Lab Units 12/07/18  0512 12/06/18  1651 12/05/18  0651   MAGNESIUM mg/dL 2 6 2 8* 2 3       Results from last 7 days  Lab Units 12/05/18  0651   PHOSPHORUS mg/dL 3 1         No results found for: TROPONINT  ABG:No results found for: PHART, GPF8FPI, PO2ART, CWA2LIW, W2TZIIGW, BEART, SOURCE

## 2018-12-10 ENCOUNTER — APPOINTMENT (INPATIENT)
Dept: NON INVASIVE DIAGNOSTICS | Facility: HOSPITAL | Age: 78
DRG: 021 | End: 2018-12-10
Payer: MEDICARE

## 2018-12-10 VITALS
TEMPERATURE: 98.6 F | BODY MASS INDEX: 23.45 KG/M2 | RESPIRATION RATE: 18 BRPM | OXYGEN SATURATION: 95 % | SYSTOLIC BLOOD PRESSURE: 121 MMHG | HEART RATE: 83 BPM | DIASTOLIC BLOOD PRESSURE: 58 MMHG | WEIGHT: 137.35 LBS | HEIGHT: 64 IN

## 2018-12-10 LAB
BACTERIA UR QL AUTO: ABNORMAL /HPF
BILIRUB UR QL STRIP: NEGATIVE
CLARITY UR: CLEAR
COLOR UR: YELLOW
GLUCOSE UR STRIP-MCNC: NEGATIVE MG/DL
HGB UR QL STRIP.AUTO: ABNORMAL
HYALINE CASTS #/AREA URNS LPF: ABNORMAL /LPF
KETONES UR STRIP-MCNC: NEGATIVE MG/DL
LEUKOCYTE ESTERASE UR QL STRIP: ABNORMAL
NITRITE UR QL STRIP: NEGATIVE
NON-SQ EPI CELLS URNS QL MICRO: ABNORMAL /HPF
PH UR STRIP.AUTO: 6 [PH] (ref 4.5–8)
PROT UR STRIP-MCNC: NEGATIVE MG/DL
RBC #/AREA URNS AUTO: ABNORMAL /HPF
SP GR UR STRIP.AUTO: 1.01 (ref 1–1.03)
UROBILINOGEN UR QL STRIP.AUTO: 1 E.U./DL
WBC #/AREA URNS AUTO: ABNORMAL /HPF

## 2018-12-10 PROCEDURE — 81001 URINALYSIS AUTO W/SCOPE: CPT | Performed by: FAMILY MEDICINE

## 2018-12-10 PROCEDURE — 93886 INTRACRANIAL COMPLETE STUDY: CPT

## 2018-12-10 PROCEDURE — 93886 INTRACRANIAL COMPLETE STUDY: CPT | Performed by: SURGERY

## 2018-12-10 PROCEDURE — 99024 POSTOP FOLLOW-UP VISIT: CPT | Performed by: PHYSICIAN ASSISTANT

## 2018-12-10 PROCEDURE — 99239 HOSP IP/OBS DSCHRG MGMT >30: CPT | Performed by: FAMILY MEDICINE

## 2018-12-10 RX ORDER — NITROFURANTOIN 25; 75 MG/1; MG/1
100 CAPSULE ORAL 2 TIMES DAILY WITH MEALS
Qty: 4 CAPSULE | Refills: 0 | Status: SHIPPED | OUTPATIENT
Start: 2018-12-10 | End: 2018-12-24

## 2018-12-10 RX ADMIN — NIMODIPINE 60 MG: 30 CAPSULE, LIQUID FILLED ORAL at 11:43

## 2018-12-10 RX ADMIN — DORZOLAMIDE HYDROCHLORIDE AND TIMOLOL MALEATE 1 DROP: 20; 5 SOLUTION/ DROPS OPHTHALMIC at 11:45

## 2018-12-10 RX ADMIN — ENOXAPARIN SODIUM 40 MG: 40 INJECTION SUBCUTANEOUS at 08:48

## 2018-12-10 RX ADMIN — NITROFURANTOIN MONOHYDRATE/MACROCRYSTALLINE 100 MG: 25; 75 CAPSULE ORAL at 16:20

## 2018-12-10 RX ADMIN — NITROFURANTOIN MONOHYDRATE/MACROCRYSTALLINE 100 MG: 25; 75 CAPSULE ORAL at 08:48

## 2018-12-10 RX ADMIN — DOCUSATE SODIUM 100 MG: 100 CAPSULE, LIQUID FILLED ORAL at 08:48

## 2018-12-10 RX ADMIN — NIMODIPINE 60 MG: 30 CAPSULE, LIQUID FILLED ORAL at 08:48

## 2018-12-10 RX ADMIN — NIMODIPINE 60 MG: 30 CAPSULE, LIQUID FILLED ORAL at 04:24

## 2018-12-10 RX ADMIN — NIMODIPINE 60 MG: 30 CAPSULE, LIQUID FILLED ORAL at 16:20

## 2018-12-10 RX ADMIN — POTASSIUM CHLORIDE 40 MEQ: 1500 TABLET, EXTENDED RELEASE ORAL at 08:53

## 2018-12-10 NOTE — DISCHARGE INSTRUCTIONS
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  Successful coil embolization with trace residual filling at the base  Things to do and not to do:   May walk as tolerated- a few short walks daily   Avoid heavy lifting, pushing or pulling over 10lbs for several weeks   No bending  No running  No athletic activities during this period   Do not drive until cleared by MD/PA  Return to the ER if you experience increased headache, drowsiness, weakness, nausea/vomiting, shortness of breath, pain or swelling in the legs, or seizures**    Today, you underwent a diagnostic cerebral angiogram under the care of Dr Reji Pollard  for evaluation of  [address]   ? The following instructions will help you care for yourself, or be cared for upon your return home today  These are guidelines for your care right after your surgery only  ? Notify Your Doctor or Nurse if you have any of the following:  ? SYMPTOMS OF WOUND INFECTION--   Increased pain in or around the incision   Swelling around the incision  Any drainage from the incision  Incision separates or opens up  Warmth in the tissues around the incision  Redness or tenderness on the skin near the incision   Fever (temperature greater than 101 degrees F)   ? NEUROLOGICAL CHANGES--  Change in alertness  Increased sleepiness   Nausea and vomiting   New onset of numbness or weakness in arms or legs   New problems with your bowels or bladder  New or worse problems with balance or walking  Seizures, new or worsening  ? UNRELIEVED HEADACHE PAIN--  New or increased pain unrelieved with pain medications   Pain associated with nausea and vomiting   Pain associated with other symptoms  ? QUESTIONS OR PROBLEMS--  Any questions or problems that you are unsure about  Wound Care:  Keep Incision Clean and Dry   You may shower daily, but do not soak incision   Pat dry after showering  No tub baths, soaking, swimming for 1 week after angiogram    You do not need to cover the incision  Mild to moderate bruising and tenderness to the site is expected and may last up to 1-2 weeks after your procedure  ?  A closure device was placed at the catheter insertion site  This is MRI compatible  Remove the dressing 24 hours after your procedure  If your groin site is bleeding, apply firm pressure for 10 minutes  Reinforce dressing rather than removing and checking frequently  If continues to bleed through the dressing after 1 hour, contact your neurosurgeon's office  Anticipatory Education:  ? PAIN MED W/ Acetaminophen (Tylenol)  --IF a prescription for pain medicine has been sent home with you:  --Narcotic pain medication may cause constipation  Be sure to take stool softeners or laxatives while you are on narcotic pain medication  --Do not drive after taking prescription pain medicine  ?  If this medicine is too strong, or no longer necessary, or we did NOT recommend/prescribe oral narcotics, you may take:   - Tylenol Extra-strength/Acetaminophen, 2 tablets every 4-6 hours as needed for mild pain  DO NOT TAKE MORE THAN 4000MG PER DAY from combined sources  NOTE: Remember to eat when taking pain medicines in order to avoid nausea  Watch for constipation  Eat plenty of fruits, vegetables, juices, and drink 6-8 glasses of water each day  Constipation: Stay active and drink at least 6-8 cups of fluid each day to prevent constipation  If you need a laxative or stool softener follow the package directions or consult with your local pharmacists if you have questions  ? After anesthesia, rest for 24 hours  Do not drive, drink alcohol beverages or make any important decisions during this time  General anesthesia may cause sore throat, jaw discomfort or muscle aches  These symptoms can last for one or two days    Activity: Please follow these instructions:  Advance your activity as you can tolerate  You may do light house work; nothing strenuous   You may walk all you want  You may go up and down the steps  Use the railing for support  Do not do excessive bending, straining or heavy lifting for 48 hours after your procedure  Do not drive or return to work until you are instructed   It is normal for your energy level and sleep patterns to change after surgery  Get extra sleep at night and take naps during the day to help you feel less tired  Take rest periods during the day  Complete recovery may take several weeks  ?  You may resume driving after 85-16 hours recovery  You may return to work after 48 hours of recovery  ?  Diet:  Your doctor has recommended that you follow these diet instructions at home  Refer to the patient education materials you received during your hospital stay  If you would like more nutrition counseling, ask your doctor about making an appointment with an outpatient dietitian  Resume your home diet  ? Medications:  Please resume your home medications as instructed  ? Home Supplies and Equipment:  none  Additional Contacts:  ? CONTACTS FOR NEUROSURGERY: You may call your neurosurgeons office if you have questions between 8:30 am and 4:30 pm  You may request to speak to the nurse practitioner who is available Monday through Friday  ?  For off hours or the weekend you may call your neurosurgeon's office to leave a message

## 2018-12-10 NOTE — PROGRESS NOTES
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 UTI        Plan:   § Exam:   A&OX3, EOMI, 2mm, conjugate, SF, finger to nose normal and without drift bilaterally  Strength 5/5 throughout  Patient light touch is normal bilaterally  DTR 3+, no clonus and Babinski is negative bilaterally  Nita Solid done on 11/24/2018 demonstrates diffuse subarachnoid hemorrhage throughout the basal cisterns, sylvian fissures and over the convexities without evidence of mass effect   Anterior communicating artery 5 mm aneurysm noted  § CTA on 11/27/2018 demonstrates status post coil embolization of anterior communicating artery and the postop day 2 resolving diffuse subarachnoid hemorrhage without evidence of vasospasm   Also area of decreased attenuation in the right basal ganglia compatible with age-indeterminate lacunar infraction  ? Pain control:  per Primary team  ? DVT ppx:   § SCDs  § Continue Lovenox  ? Activity:  As tolerated  ? PT/OT evaluation & treatment  ? Medical Mx:  Per Primary team  ? Seizure prophylaxis:  None  ? Neurocheck:  Routine  ? Ic=396  ? SBP<220  ? TCD: L=1 23, R=1 58  ? Patient is doing fine  Denies any headache, diplopia, blurred vision, nausea or vomiting  No dizziness or lightheadedness  Neurological a nonfocal   Overall smooth postoperative course  Follow-up in 2 weeks with Dr Louise Garcia  Get  MRA with and without contrast,  two days before your appointment date  Sign off  Call for concern or problem  Subjective/Objective   Chief Complaint: "I am doing fine"/progress note  Subjective:  Patient states she is doing fine, denies headache, lightheadedness or dizziness    Denies blurry vision or diplopia  Denies numbness, weakness, paresthesia and extremities  Getting improvement with her UTI,( on nitrofurantoin) denies any fever, chills, rigors, cough or chest pain  Appetite is good without nausea or vomiting  Objective:  Patient is supine in bed in no pain distress    I/O       12/08 0701 - 12/09 0700 12/09 0701 - 12/10 0700 12/10 0701 - 12/11 0700    P  O  840 180     I V  (mL/kg) 30 (0 5)      IV Piggyback 100      Total Intake(mL/kg) 970 (15 4) 180 (2 9)     Urine (mL/kg/hr) 2450 (1 6) 2400 (1 6)     Total Output 2450 2400      Net -1480 -2220             Unmeasured Stool Occurrence  1 x           Invasive Devices     Peripherally Inserted Central Catheter Line            PICC Line 04/02/69 Right Basilic 11 days                Physical Exam:  Vitals: Blood pressure 121/58, pulse 83, temperature 98 6 °F (37 °C), temperature source Oral, resp  rate 18, height 5' 4" (1 626 m), weight 62 3 kg (137 lb 5 6 oz), SpO2 95 %, not currently breastfeeding  ,Body mass index is 23 58 kg/m²        General appearance: alert, appears stated age, cooperative and no distress  Head: Normocephalic, without obvious abnormality, atraumatic  Eyes: EOMI,2 mm conjugate  Neck: supple, symmetrical, trachea midline and NT  Lungs: non labored breathing  Heart: regular heart rate  Neurologic:   Mental status: Alert, oriented x3, thought content appropriate  Cranial nerves: grossly intact (Cranial nerves II-XII)  Sensory: normal to light touch throughout  Motor: moving all extremities without focal weakness, strength is 5/5 bilaterally  Reflexes: 3+ and symmetric without clonus and Babinski negative bilaterally  Coordination: finger to nose normal bilaterally, no drift bilaterally      Lab Results:    Results from last 7 days  Lab Units 12/07/18  0512 12/05/18  0428   WBC Thousand/uL 8 92 8 55   HEMOGLOBIN g/dL 10 9* 10 8*   HEMATOCRIT % 34 4* 33 9*   PLATELETS Thousands/uL 365 315   NEUTROS PCT % 77*  --    MONOS PCT % 7  --        Results from last 7 days  Lab Units 12/09/18  0442 12/08/18  0505 12/07/18  1459  12/04/18  0629   POTASSIUM mmol/L 3 6 3 3* 4 2  < > 3 7   CHLORIDE mmol/L 102 102 106  < > 100   CO2 mmol/L 29 29 28  < > 24   BUN mg/dL 12 8 14  < > 9   CREATININE mg/dL 0 47* 0 41* 0 50*  < > 0 52*   CALCIUM mg/dL 8 9 9 7 8 7  < > 8 9   ALK PHOS U/L  --   --   --   --  61   ALT U/L  --   --   --   --  77   AST U/L  --   --   --   --  38   < > = values in this interval not displayed  Results from last 7 days  Lab Units 12/07/18  0512 12/06/18  1651 12/05/18  0651   MAGNESIUM mg/dL 2 6 2 8* 2 3       Results from last 7 days  Lab Units 12/05/18  0651   PHOSPHORUS mg/dL 3 1         No results found for: TROPONINT  ABG:No results found for: PHART, LKQ2TQD, PO2ART, RCZ9NBB, M5XTNTJQ, BEART, SOURCE    Imaging Studies: I have personally reviewed pertinent reports  and I have personally reviewed pertinent films in PACS    EKG, Pathology, and Other Studies: I have personally reviewed pertinent reports        VTE Pharmacologic Prophylaxis: Enoxaparin (Lovenox)    VTE Mechanical Prophylaxis: sequential compression device   note

## 2018-12-10 NOTE — ASSESSMENT & PLAN NOTE
· Patient was started on Macrobid after obtaining the urine analysis with microscopy  ·  no culture was sent  · Urine culture  · Continue with the antibiotics

## 2018-12-10 NOTE — SOCIAL WORK
Met with pt's son Arsenio Lr and Jason Stearns to discuss pt's dc needs  Arsenio Lr and Jason Stearns had multiple questions regarding resources and options for patient and her spouse  Non-skilled provider list given  CM also provided information regarding center for positive [facility] and support groups in [place]  All questions addressed  CM discussed [facility] services  Arsenio Stearns are agreeable and prefer [facility]  [facility] referral sent  Pt accepted  CM added [facility] to pt's dc instructions  Pt's son's to transport home at dc

## 2018-12-10 NOTE — UTILIZATION REVIEW
Continued Stay Review    Date: 12-10=18     Vital Signs: /58 (BP Location: Left arm)   Pulse 83   Temp 98 6 °F (37 °C) (Oral)   Resp 18   Ht 5' 4" (1 626 m)   Wt 62 3 kg (137 lb 5 6 oz)   LMP  (LMP Unknown)   SpO2 95%   Breastfeeding? No   BMI 23 58 kg/m²     Medications:       acetaminophen 650 mg Oral Q4H PRN   bisacodyl 10 mg Rectal Daily PRN   docusate sodium 100 mg Oral BID   dorzolamide-timolol 1 drop Both Eyes BID   enoxaparin 40 mg Subcutaneous Q24H JOON   hydrALAZINE 5 mg Intravenous Q4H PRN   labetalol 10 mg Intravenous Q4H PRN   melatonin 3 mg Oral HS   menthol-methyl salicylate  Apply externally BID PRN   niMODipine 60 mg Oral Q4H JOON   nitrofurantoin 100 mg Oral BID With Meals   ondansetron 4 mg Intravenous Q4H PRN   polyethylene glycol 17 g Oral Daily PRN   potassium chloride 40 mEq Oral Daily   travoprost 1 drop Both Eyes HS       Abnormal Labs/Diagnostic Results:    Urine culture pending     Age/Sex: 69 y o  female     Assessment/Plan:   Dysuria   Assessment & Plan     · Patient was started on Macrobid after obtaining the urine analysis with microscopy  ·  no culture was sent  · Urine culture  · Continue with the antibiotics        Status post coil embolization for subarachnoid hemorrhage (11/25/2018)  Neuro surgery planned for outpatient follow up at discharge     Discharge Plan:   To be determined

## 2018-12-10 NOTE — SOCIAL WORK
CM met with pt son, Bravo Crawford, with pt permission  Bravo Crawford reports pt  has dementia and the pt normally helps him at home  As per Bravo Crawford pt  is living with Jessika Dawson and they are helping take care of her  However, he does not feel this is a long term solution for him  CM provided family with [facility] [place] area on aging information and referral phone number, [facility], [address] [place] [address] list, and local assisted living  Bravo Crawford did not have additional questions at this time  CM provided Bravo Crawford with CM direct contact information

## 2018-12-10 NOTE — RESTORATIVE TECHNICIAN NOTE
Restorative Specialist Mobility Note       Activity: Ambulate in acevedo, Ambulate in room, Bathroom privileges, Dangle, Stand at bedside (Educated/encouraged pt to ambulate with assistance 3-4 x's/day  Bed alarm on   Pt callbell, phone/tray within reach )     Assistive Device: None    Sofie Griffiths BS, Restorative Technician, [employer] [facility]

## 2018-12-10 NOTE — PROGRESS NOTES
Progress Note - Johanna Canchola 1940, 69 y o  female MRN: [id number]    Unit/Bed#: [facility] 719-01 Encounter: [id number]    Primary Care Provider: Laney Patel MD   Date and time admitted to hospital: 2018  8:14 PM        Dysuria   Assessment & Plan    · Patient was started on Macrobid after obtaining the urine analysis with microscopy  ·  no culture was sent  · Urine culture  · Continue with the antibiotics     Hyponatremia   Assessment & Plan    · Resolved     S/P coil embolization of cerebral aneurysm   Assessment & Plan    · Neurosurgery on board     Glaucoma   Assessment & Plan    · Continue with the eye drops     * SAH (subarachnoid hemorrhage) ([address] )   Assessment & Plan    · Neurosurgery evaluation appreciated  · Patient is getting b i d  TCD  · Continue with the Florinef and also the nimodipine  · Neurosurgery on board  · Likely DC soon         VTE Pharmacologic Prophylaxis:   Pharmacologic: Enoxaparin (Lovenox)  Mechanical VTE Prophylaxis in Place: Yes    Patient Centered Rounds: I have performed bedside rounds with nursing staff today  Discussions with Specialists or Other Care Team Provider:     Education and Discussions with Family / Patient:  Updated patient    Time Spent for Care: 30 minutes  More than 50% of total time spent on counseling and coordination of care as described above  Current Length of Stay: 15 day(s)    Current Patient Status: Inpatient   Certification Statement: The patient will continue to require additional inpatient hospital stay due to [address]    Discharge Plan:     Code Status: Level 1 - Full Code      Subjective:   Patient seen and examined  Denies any specific complaints    Patient reported that she did not have any weakness and she feels like she is ready to go home    Objective:     Vitals:   Temp (24hrs), Av 3 °F (36 8 °C), Min:97 9 °F (36 6 °C), Max:98 9 °F (37 2 °C)    Temp:  [97 9 °F (36 6 °C)-98 9 °F (37 2 °C)] 98 9 °F (37 2 °C)  HR:  [80-91] 91  Resp:  [16-18] 16  BP: (103-138)/(59-90) 119/59  SpO2:  [96 %-99 %] 98 %  Body mass index is 23 84 kg/m²  Input and Output Summary (last 24 hours): Intake/Output Summary (Last 24 hours) at 12/09/18 2015  Last data filed at 12/09/18 1538   Gross per 24 hour   Intake              180 ml   Output             2500 ml   Net            -2320 ml       Physical Exam:     Physical Exam   Constitutional: She appears well-developed and well-nourished  HENT:   Head: Normocephalic and atraumatic  Eyes: Pupils are equal, round, and reactive to light  EOM are normal    Neck: Normal range of motion  Neck supple  Cardiovascular: Normal rate and regular rhythm  No murmur heard  Pulmonary/Chest: Effort normal and breath sounds normal  No respiratory distress  Abdominal: Soft  Bowel sounds are normal  She exhibits no distension  There is no tenderness  Musculoskeletal: Normal range of motion  She exhibits no edema  Neurological: She is alert  No cranial nerve deficit  Skin: Skin is warm and dry  No erythema  Additional Data:     Labs:      Results from last 7 days  Lab Units 12/07/18  0512   WBC Thousand/uL 8 92   HEMOGLOBIN g/dL 10 9*   HEMATOCRIT % 34 4*   PLATELETS Thousands/uL 365   NEUTROS PCT % 77*   LYMPHS PCT % 13*   MONOS PCT % 7   EOS PCT % 1       Results from last 7 days  Lab Units 12/09/18  0442  12/04/18  0629   POTASSIUM mmol/L 3 6  < > 3 7   CHLORIDE mmol/L 102  < > 100   CO2 mmol/L 29  < > 24   BUN mg/dL 12  < > 9   CREATININE mg/dL 0 47*  < > 0 52*   CALCIUM mg/dL 8 9  < > 8 9   ALK PHOS U/L  --   --  61   ALT U/L  --   --  77   AST U/L  --   --  38   < > = values in this interval not displayed  * I Have Reviewed All Lab Data Listed Above  * Additional Pertinent Lab Tests Reviewed:  [address] Admission Reviewed    Imaging:    Imaging Reports Reviewed Today Include:   Imaging Personally Reviewed by Myself Includes:      Recent Cultures (last 7 days): Last 24 Hours Medication List:     Current Facility-Administered Medications:  acetaminophen 650 mg Oral Q4H PRN Sharp Speck, DO   bisacodyl 10 mg Rectal Daily PRN Ester Tinsley MD   docusate sodium 100 mg Oral BID Sharp Speck, DO   dorzolamide-timolol 1 drop Both Eyes BID Tao Dobson, DO   enoxaparin 40 mg Subcutaneous Q24H [address] Estefani Sluder, DO   fludrocortisone 0 1 mg Oral Daily Sharp Speck, DO   hydrALAZINE 5 mg Intravenous Q4H PRN Sharp Speck, DO   labetalol 10 mg Intravenous Q4H PRN Sharp Speck, DO   melatonin 3 mg Oral HS Sharp Speck, DO   menthol-methyl salicylate  Apply externally BID PRN Danial Rincon, DO   niMODipine 60 mg Oral Q4H [address] Estefani Sluder, DO   nitrofurantoin 100 mg Oral BID With Meals Aron Delgado, DO   ondansetron 4 mg Intravenous Q4H PRN Ester Tinsley MD   polyethylene glycol 17 g Oral Daily PRN Sharp Speck, DO   potassium chloride 40 mEq Oral Daily Sharp Speck, DO   travoprost 1 drop Both Eyes HS Ester Tinsley MD        Today, Patient Was Seen By: Brandy Gregory MD    ** Please Note: Dictation voice to text software may have been used in the creation of this document   **

## 2018-12-11 ENCOUNTER — TELEPHONE (OUTPATIENT)
Dept: NEUROSURGERY | Facility: CLINIC | Age: 78
End: 2018-12-11

## 2018-12-11 NOTE — DISCHARGE SUMMARY
Discharge Summary - [facility] Internal Medicine    Patient Information: Sridevi Pfeiffer 69 y o  female MRN: [id number]  Unit/Bed#: [facility] 991-98 Encounter: [id number]    Discharging Physician / Practitioner: Brittani Blancas MD  PCP: René Smith MD  Admission Date: 11/24/2018  Discharge Date: 12/10/18    Disposition:     Home with VNA Services (Reminder: Complete face to face encounter)    Reason for Admission:  Intracranial hemorrhage    Discharge Diagnoses:     Principal Problem:    SAH (subarachnoid hemorrhage) ([address] )  Active Problems:    Glaucoma    S/P coil embolization of cerebral aneurysm    Hyponatremia    Dysuria  Resolved Problems:    Hypophosphatemia    Hypokalemia    Constipated      Consultations During Hospital Stay:  Neurosurgery  PMR  Procedures Performed:   Multiple transcranial Dopplers-no signs of vasospasm  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    Significant Findings / Test Results:   VAS transcranial doppler, complete study   Final Result by Betti Essex, DO (12/10 1129)      VAS transcranial doppler, complete study   Final Result by Sagar Funk MD (12/09 1552)      VAS transcranial doppler, complete study   Final Result by Sagar Funk MD (12/08 1815)      VAS transcranial doppler, complete study   Final Result by Emelia Pool MD (12/08 1814)      VAS transcranial doppler, complete study   Final Result by Logan Pemberton MD (12/06 1320)      VAS transcranial doppler, complete study   Final Result by Mike Harden MD (12/05 0920)      VAS transcranial doppler, complete study   Final Result by Micaela Ochoa MD (12/04 1515)      VAS transcranial doppler, complete study   Final Result by Emelia Pool MD (12/03 1003)      VAS transcranial doppler, complete study   Final Result by Fermin Loo MD (12/02 1821)      VAS transcranial doppler, complete study   Final Result by Fermin Loo MD (12/01 1642)      VAS transcranial doppler, complete study Final Result by Mackenzie Gregory MD (11/30 1046)      VAS transcranial doppler, complete study   Final Result by Sherry Funk MD (11/29 1308)      VAS transcranial doppler, complete study   Final Result by Qing Cohen DO (11/28 1007)      VAS transcranial doppler, complete study   Final Result by Aiden Pitts MD (11/27 1136)      IR cerebral angiography / intervention   Final Result by Gutierrez Thapa MD (11/27 1014)      4 75 mm left A1-ACOM aneurysm as described above  Successful coil embolization of the aneurysm with minimal residual filling at the base  Workstation performed: [id number]         CTA head and neck with and without contrast   Final Result by Alma Delia Mcleod DO (11/27 5047)   1  Status post 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, post procedure day #2  Resolving diffuse subarachnoid hemorrhage  No evidence of vasospasm  2   Area of decreased attenuation right basal ganglia, compatible with age-indeterminate lacunar infarction  In retrospect, this may have been present on the prior examination, however obscured due to the subarachnoid hemorrhage in the right sylvian    fissure as well as the mild mass effect on the right temporal lobe  Consider follow-up MRI of the brain with diffusion-weighted imaging if clinically warranted  3   Cerebral atrophy with chronic small vessel ischemic change  The study was marked in [facility] for immediate notification        Workstation performed: [id number]         VAS transcranial doppler, complete study   Final Result by Qing Cohen DO (11/26 1011)      MRA head w wo contrast    (Results Pending)       Incidental Findings:   · NONE    Test Results Pending at Discharge (will require follow up):   ·      Outpatient Tests Requested:    Complications:  none    Hospital Course:     Lisandro Gilliam is a 69 y o  female patient who originally presented to the hospital on 11/24/2018 due to acute onset of headache  Patient went to [facility] [place] at that time patient noted to have subarachnoid hemorrhage and she was brought to the hospital   It appears that patient had an aneurysm of the anterior communicating artery and felt to be the culprit for the subarachnoid hemorrhage  Patient was admitted to the intensive care unit and neurosurgery evaluated the patient patient had coiling of the embolization done  Postprocedure her CT scan remained stable and she was monitored in the critical care unit  Patient had multiple TCD showing no evidence of vasospasm  Patient was also on pressors temporarily due to hypertension  Patient was also on Florinef which was weaned of by Neurosurgery  Patient was on Stacey Lob for seizure show precautions which was discontinued  Patient was also on pneumo D pain which was discontinued while she is in the hospital   This patient had a 17 day hospital stay  Patient also complaining of dysuria and had a urine culture done  UA showed innumerable bacteria and started on nitrofurantoin with improvement in her symptoms  Unfortunately there was no culture sent and on to PT yet there was no evidence of any bacteria likely the nitrofurantoin i treated the infection  Plan is to finish the course for 5 days  Patient was continued on her outpatient medication  Patient need a repeat MRI in about 2 weeks as an outpatient with Neurosurgery follow-up  This patient had a prolonged and complicated hospital stay for details refer to the chart  Patient also had a concern for cerebral salt wasting which required cord is management of her electrolyte while in the intensive care unit    Condition at Discharge: good     Discharge Day Visit / Exam:     Subjective:  Patient seen and examined  No specific complaints offered  Patient is agreeable to go home    No dysuria currently only mild itching  Vitals: Blood Pressure: 121/58 (12/10/18 0705)  Pulse: 83 (12/10/18 0705)  Temperature: 98 6 °F (37 °C) (12/10/18 0705)  Temp Source: Oral (12/10/18 0705)  Respirations: 18 (12/10/18 0705)  Height: 5' 4" (162 6 cm) (12/08/18 2024)  Weight - Scale: 62 3 kg (137 lb 5 6 oz) (12/10/18 0600)  SpO2: 95 % (12/10/18 0705)  Exam:   Physical Exam   Constitutional: She is oriented to person, place, and time  She appears well-developed and well-nourished  HENT:   Head: Normocephalic and atraumatic  Eyes: Pupils are equal, round, and reactive to light  EOM are normal    Neck: Normal range of motion  Neck supple  Cardiovascular: Normal rate and regular rhythm  No murmur heard  Pulmonary/Chest: Effort normal and breath sounds normal  No respiratory distress  She has no wheezes  Abdominal: Soft  Bowel sounds are normal  She exhibits no distension  There is no tenderness  Musculoskeletal: Normal range of motion  She exhibits no edema  Neurological: She is alert and oriented to person, place, and time  No cranial nerve deficit  Skin: Skin is warm and dry  No erythema  Discussion with Family:  Son updated in the room    Discharge instructions/Information to patient and family:   See after visit summary for information provided to patient and family  Provisions for Follow-Up Care:  See after visit summary for information related to follow-up care and any pertinent home health orders  Planned Readmission:none     Discharge Statement:  I spent 45 minutes discharging the patient  This time was spent on the day of discharge  I had direct contact with the patient on the day of discharge  Greater than 50% of the total time was spent examining patient, answering all patient questions, arranging and discussing plan of care with patient as well as directly providing post-discharge instructions  Additional time then spent on discharge activities  Discharge Medications:  See after visit summary for reconciled discharge medications provided to patient and family        ** Please Note: This note has been constructed using a voice recognition system **

## 2018-12-11 NOTE — TELEPHONE ENCOUNTER
[facility]dard at preferred contact number spoke to both her and her son Zaida Miguel  The patient denies any pain, swelling, drainage, fevers, or numbness/tingling/weakness in her leg  Explained to contact the office if she should experience any pain, swelling, or drainage from the site, and report to ER or call 911 if she experiences disorientation, confusion, word finding issues, n/v, visual disturbance, WHOL  Reminded of post procedure follow up scheduled with 12/24/2018  Patient appreciative of call

## 2018-12-13 ENCOUNTER — TELEPHONE (OUTPATIENT)
Dept: NEUROSURGERY | Facility: CLINIC | Age: 78
End: 2018-12-13

## 2018-12-13 NOTE — TELEPHONE ENCOUNTER
Meliza Mcrae directly to discuss her symptoms she said the pain was in both legs and described as a stiffness when she first gets up which resolves after she is mobile for a period of time  Denies tenderness, numbness, tingle, swelling in the leg used for angio, and do not believe this to be related to her procedure  They will being seeing PCPs office tomorrow  I believe this is appropriate to address this complaint

## 2018-12-13 NOTE — TELEPHONE ENCOUNTER
Received call from Tito Lam reporting that his mother began having pain in her leg and he was concerned about possible complications  Attempted a call back to him however got a vm left message for callback to direct line

## 2018-12-18 ENCOUNTER — TELEPHONE (OUTPATIENT)
Dept: NEUROSURGERY | Facility: CLINIC | Age: 78
End: 2018-12-18

## 2018-12-18 NOTE — TELEPHONE ENCOUNTER
Pt left a message stating she was ordered a med for UTI and yeast infections  She does not want to start until she is sure it will not interfere with her upcoming procedure    Returned call with no answer, and message left requesting a call back

## 2018-12-21 ENCOUNTER — LAB REQUISITION (OUTPATIENT)
Dept: LAB | Facility: HOSPITAL | Age: 78
End: 2018-12-21
Payer: MEDICARE

## 2018-12-21 DIAGNOSIS — R53.82 CHRONIC FATIGUE: ICD-10-CM

## 2018-12-21 DIAGNOSIS — I60.9 NONTRAUMATIC SUBARACHNOID HEMORRHAGE (HCC): ICD-10-CM

## 2018-12-21 DIAGNOSIS — Z48.812 ENCOUNTER FOR SURGICAL AFTERCARE FOLLOWING SURGERY OF CIRCULATORY SYSTEM: ICD-10-CM

## 2018-12-21 LAB
ALBUMIN SERPL BCP-MCNC: 3.9 G/DL (ref 3.5–5)
ALP SERPL-CCNC: 55 U/L (ref 46–116)
ALT SERPL W P-5'-P-CCNC: 34 U/L (ref 12–78)
ANION GAP SERPL CALCULATED.3IONS-SCNC: 9 MMOL/L (ref 4–13)
AST SERPL W P-5'-P-CCNC: 16 U/L (ref 5–45)
BASOPHILS # BLD AUTO: 0.03 THOUSANDS/ΜL (ref 0–0.1)
BASOPHILS NFR BLD AUTO: 1 % (ref 0–1)
BILIRUB SERPL-MCNC: 0.4 MG/DL (ref 0.2–1)
BUN SERPL-MCNC: 15 MG/DL (ref 5–25)
CALCIUM SERPL-MCNC: 9.2 MG/DL (ref 8.3–10.1)
CHLORIDE SERPL-SCNC: 103 MMOL/L (ref 100–108)
CO2 SERPL-SCNC: 31 MMOL/L (ref 21–32)
CREAT SERPL-MCNC: 0.56 MG/DL (ref 0.6–1.3)
EOSINOPHIL # BLD AUTO: 0.1 THOUSAND/ΜL (ref 0–0.61)
EOSINOPHIL NFR BLD AUTO: 2 % (ref 0–6)
ERYTHROCYTE [DISTWIDTH] IN BLOOD BY AUTOMATED COUNT: 14.3 % (ref 11.6–15.1)
GFR SERPL CREATININE-BSD FRML MDRD: 90 ML/MIN/1.73SQ M
GLUCOSE SERPL-MCNC: 98 MG/DL (ref 65–140)
HCT VFR BLD AUTO: 35.5 % (ref 34.8–46.1)
HGB BLD-MCNC: 11.1 G/DL (ref 11.5–15.4)
IMM GRANULOCYTES # BLD AUTO: 0.02 THOUSAND/UL (ref 0–0.2)
IMM GRANULOCYTES NFR BLD AUTO: 0 % (ref 0–2)
LYMPHOCYTES # BLD AUTO: 0.75 THOUSANDS/ΜL (ref 0.6–4.47)
LYMPHOCYTES NFR BLD AUTO: 15 % (ref 14–44)
MCH RBC QN AUTO: 31.5 PG (ref 26.8–34.3)
MCHC RBC AUTO-ENTMCNC: 31.3 G/DL (ref 31.4–37.4)
MCV RBC AUTO: 101 FL (ref 82–98)
MONOCYTES # BLD AUTO: 0.38 THOUSAND/ΜL (ref 0.17–1.22)
MONOCYTES NFR BLD AUTO: 8 % (ref 4–12)
NEUTROPHILS # BLD AUTO: 3.65 THOUSANDS/ΜL (ref 1.85–7.62)
NEUTS SEG NFR BLD AUTO: 74 % (ref 43–75)
NRBC BLD AUTO-RTO: 0 /100 WBCS
PLATELET # BLD AUTO: 238 THOUSANDS/UL (ref 149–390)
PMV BLD AUTO: 9.4 FL (ref 8.9–12.7)
POTASSIUM SERPL-SCNC: 4.1 MMOL/L (ref 3.5–5.3)
PROT SERPL-MCNC: 7.1 G/DL (ref 6.4–8.2)
RBC # BLD AUTO: 3.52 MILLION/UL (ref 3.81–5.12)
SODIUM SERPL-SCNC: 143 MMOL/L (ref 136–145)
WBC # BLD AUTO: 4.93 THOUSAND/UL (ref 4.31–10.16)

## 2018-12-21 PROCEDURE — 80053 COMPREHEN METABOLIC PANEL: CPT | Performed by: INTERNAL MEDICINE

## 2018-12-21 PROCEDURE — 85025 COMPLETE CBC W/AUTO DIFF WBC: CPT | Performed by: INTERNAL MEDICINE

## 2018-12-22 ENCOUNTER — HOSPITAL ENCOUNTER (OUTPATIENT)
Dept: MRI IMAGING | Facility: HOSPITAL | Age: 78
Discharge: HOME | End: 2018-12-22
Payer: MEDICARE

## 2018-12-22 DIAGNOSIS — Z98.890 S/P COIL EMBOLIZATION OF CEREBRAL ANEURYSM: ICD-10-CM

## 2018-12-22 DIAGNOSIS — I60.9 SAH (SUBARACHNOID HEMORRHAGE) (HCC): ICD-10-CM

## 2018-12-22 PROCEDURE — 70546 MR ANGIOGRAPH HEAD W/O&W/DYE: CPT

## 2018-12-22 PROCEDURE — A9585 GADOBUTROL INJECTION: HCPCS | Performed by: RADIOLOGY

## 2018-12-22 RX ADMIN — GADOBUTROL 6 ML: 604.72 INJECTION INTRAVENOUS at 11:47

## 2018-12-24 ENCOUNTER — OFFICE VISIT (OUTPATIENT)
Dept: NEUROSURGERY | Facility: CLINIC | Age: 78
End: 2018-12-24
Payer: MEDICARE

## 2018-12-24 ENCOUNTER — TRANSCRIBE ORDERS (OUTPATIENT)
Dept: NEUROSURGERY | Facility: CLINIC | Age: 78
End: 2018-12-24

## 2018-12-24 VITALS
DIASTOLIC BLOOD PRESSURE: 73 MMHG | HEART RATE: 75 BPM | HEIGHT: 64 IN | TEMPERATURE: 98.6 F | SYSTOLIC BLOOD PRESSURE: 127 MMHG | WEIGHT: 140 LBS | BODY MASS INDEX: 23.9 KG/M2 | RESPIRATION RATE: 16 BRPM

## 2018-12-24 DIAGNOSIS — G47.01 INSOMNIA DUE TO MEDICAL CONDITION: ICD-10-CM

## 2018-12-24 DIAGNOSIS — Z01.818 PRE-PROCEDURAL EXAMINATION: Primary | ICD-10-CM

## 2018-12-24 DIAGNOSIS — I60.2 NONTRAUMATIC SUBARACHNOID HEMORRHAGE FROM ANTERIOR COMMUNICATING ARTERY (HCC): ICD-10-CM

## 2018-12-24 DIAGNOSIS — I60.9 SAH (SUBARACHNOID HEMORRHAGE) (HCC): Primary | ICD-10-CM

## 2018-12-24 DIAGNOSIS — Z98.890 S/P COIL EMBOLIZATION OF CEREBRAL ANEURYSM: ICD-10-CM

## 2018-12-24 PROCEDURE — 99214 OFFICE O/P EST MOD 30 MIN: CPT | Performed by: RADIOLOGY

## 2018-12-24 RX ORDER — CHOLECALCIFEROL (VITAMIN D3) 125 MCG
5 CAPSULE ORAL
Qty: 30 TABLET | Refills: 0 | Status: SHIPPED | OUTPATIENT
Start: 2018-12-24 | End: 2019-01-23

## 2018-12-24 NOTE — LETTER
December 24, 2018     Mic Rosario MD  [address]    Patient: Cindy Chen   YOB: 1940   Date of Visit: 12/24/2018       Dear Dr Rudolph Bones: Thank you for referring Cindy Chen to me for evaluation  Below are my notes for this consultation  If you have questions, please do not hesitate to call me  I look forward to following your patient along with you  Sincerely,        Yogi Ko MD        CC: No Recipients  Yogi Ko MD  12/24/2018  1:39 PM  Sign at close encounter  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)   -     MRA head w wo contrast; Future    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 condition  -     Melatonin 5 MG TABS; Take 1 tablet (5 mg total) by mouth daily at bedtime for 30 days            Discussion Summary:   Mrs Gamaliel Hodge is doing very well following SAH and coiling of her ACOM aneurysm  Her main complaint thus far is fatigue and insomnia  I believe this is multifactorial, largely it seems from stress and anxiety at home with her recent hospitalization and care of her  rather than primary sleep disturbance from a [address]  I believe there may be a component of depression as well  I have prescribed her melatonin for bedtime  I urged her to speak with her PCP about further management if she doesn't not progress  We will obtain a follow-up MRA in 2 months, which is the 3-month esau  At that time, we will determine if there is significant residual aneurysm filling that requires treatment with a stent and further coils  AS of her most recent MRA, the aneurysm is considered secure and there is no risk of repeat bleeding and there are no restrictions           The patient, patient's family was counseled regarding diagnostic results, instructions for management  Total time of encounter was *** minutes and *** minutes was spent counseling  Chief Complaint: Follow-up (2 week angiogram follow up)      Patient ID: Danny Irvin is a 69 y o  female    HPI     Mrs Abbey Roper presents for follow-up of her SAH and following ACOM aneurysm coiling  Her main complaint is fatigue and difficulty staying asleep at night  Her longest stretch is 2-3 hours  Has significant daytime sleepiness  She believes much of her sleep disturbance is from recovery from her [facility] and difficulty at home with care of her  with dementia and Parkinson's  She denies headaches, vision difficulty, or any other stroke-like symptoms  She recently began outpatient PT/OT and is working on increasing her stamina  Review of Systems   Constitutional: Positive for fatigue  Negative for activity change, appetite change, chills, diaphoresis, fever and unexpected weight change  HENT: Negative  Eyes: Negative  Respiratory: Negative  Cardiovascular: Negative  Gastrointestinal: Negative  Endocrine: Negative  Genitourinary: Negative  Musculoskeletal: Negative  Skin: Negative  Allergic/Immunologic: Negative  Neurological: Negative  Hematological: Negative  Psychiatric/Behavioral: Negative            The following portions of the patient's history were reviewed and updated as appropriate: allergies, current medications, past family history, past medical history, past social history, past surgical history and problem list       Active Ambulatory Problems     Diagnosis Date Noted    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 ([facility] ) 11/24/2018    Glaucoma 11/24/2018    S/P coil embolization of cerebral aneurysm 11/26/2018    Hyponatremia 12/05/2018    Dysuria 12/09/2018    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 condition 12/24/2018     Resolved Ambulatory Problems     Diagnosis Date Noted    Hypophosphatemia 11/27/2018    Hypokalemia 11/28/2018    Constipated 11/30/2018     Past Medical History:   Diagnosis Date    Fracture of arm     Glaucoma     Hypotension        Past Surgical History:   Procedure Laterality Date    BUNIONECTOMY      BUNIONECTOMY      CATARACT EXTRACTION      IR CEREBRAL ANGIOGRAPHY / INTERVENTION  11/25/2018    TONSILLECTOMY           Vitals:    12/24/18 1149   BP: 127/73   Pulse: 75   Resp: 16   Temp: 98 6 °F (37 °C)         Objective:    Physical Exam   Constitutional: She is oriented to person, place, and time  She appears well-developed and well-nourished  HENT:   Head: Normocephalic and atraumatic  Eyes: Pupils are equal, round, and reactive to light  Conjunctivae and EOM are normal    Cardiovascular: Normal rate, regular rhythm, normal heart sounds and intact distal pulses  Musculoskeletal: Normal range of motion  She exhibits no edema or deformity  Neurological: She is alert and oriented to person, place, and time  No cranial nerve deficit  Coordination normal    Skin: Skin is warm and dry  Psychiatric: She has a normal mood and affect  Her behavior is normal  Judgment and thought content normal      Neurologic Exam     Mental Status   Oriented to person, place, and time  Cranial Nerves     CN III, IV, VI   Pupils are equal, round, and reactive to light  Extraocular motions are normal        Results/Data:  I have reviewed the results and images from the MRA in detail with the patient

## 2018-12-24 NOTE — LETTER
December 24, 2018     Irish Pineda MD  [address]    Patient: Arya Early   YOB: 1940   Date of Visit: 12/24/2018       Dear Dr Miguel Henry: Thank you for referring Arya Early to me for evaluation  Below are my notes for this consultation  If you have questions, please do not hesitate to call me  I look forward to following your patient along with you  Sincerely,        Sumit Friedman MD        CC: No Recipients  Sumit Friedman MD  12/24/2018  1:39 PM  Sign at close encounter  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)   -     MRA head w wo contrast; Future    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 condition  -     Melatonin 5 MG TABS; Take 1 tablet (5 mg total) by mouth daily at bedtime for 30 days            Discussion Summary:   Mrs Edin Mcclendon is doing very well following SAH and coiling of her ACOM aneurysm  Her main complaint thus far is fatigue and insomnia  I believe this is multifactorial, largely it seems from stress and anxiety at home with her recent hospitalization and care of her  rather than primary sleep disturbance from a [facility]  I believe there may be a component of depression as well  I have prescribed her melatonin for bedtime  I urged her to speak with her PCP about further management if she doesn't not progress  We will obtain a follow-up MRA in 2 months, which is the 3-month esau  At that time, we will determine if there is significant residual aneurysm filling that requires treatment with a stent and further coils  AS of her most recent MRA, the aneurysm is considered secure and there is no risk of repeat bleeding and there are no restrictions             Chief Complaint: Follow-up (2 week angiogram follow up)      Patient ID: Tharon Sandhoff is a 69 y o  female    HPI     Mrs Kirby Vargas presents for follow-up of her SAH and following ACOM aneurysm coiling  Her main complaint is fatigue and difficulty staying asleep at night  Her longest stretch is 2-3 hours  Has significant daytime sleepiness  She believes much of her sleep disturbance is from recovery from her [facility] and difficulty at home with care of her  with dementia and Parkinson's  She denies headaches, vision difficulty, or any other stroke-like symptoms  She recently began outpatient PT/OT and is working on increasing her stamina  Review of Systems   Constitutional: Positive for fatigue  Negative for activity change, appetite change, chills, diaphoresis, fever and unexpected weight change  HENT: Negative  Eyes: Negative  Respiratory: Negative  Cardiovascular: Negative  Gastrointestinal: Negative  Endocrine: Negative  Genitourinary: Negative  Musculoskeletal: Negative  Skin: Negative  Allergic/Immunologic: Negative  Neurological: Negative  Hematological: Negative  Psychiatric/Behavioral: Negative            The following portions of the patient's history were reviewed and updated as appropriate: allergies, current medications, past family history, past medical history, past social history, past surgical history and problem list       Active Ambulatory Problems     Diagnosis Date Noted    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 ([facility] [address] ) 11/24/2018    Glaucoma 11/24/2018    S/P coil embolization of cerebral aneurysm 11/26/2018    Hyponatremia 12/05/2018    Dysuria 12/09/2018    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 condition 12/24/2018     Resolved Ambulatory Problems     Diagnosis Date Noted    Hypophosphatemia 11/27/2018    Hypokalemia 11/28/2018    Constipated 11/30/2018     Past Medical History:   Diagnosis Date    Fracture of arm     Glaucoma     Hypotension        Past Surgical History:   Procedure Laterality Date    BUNIONECTOMY      BUNIONECTOMY      CATARACT EXTRACTION      IR CEREBRAL ANGIOGRAPHY / INTERVENTION  11/25/2018    TONSILLECTOMY           Vitals:    12/24/18 1149   BP: 127/73   Pulse: 75   Resp: 16   Temp: 98 6 °F (37 °C)         Objective:    Physical Exam   Constitutional: She is oriented to person, place, and time  She appears well-developed and well-nourished  HENT:   Head: Normocephalic and atraumatic  Eyes: Pupils are equal, round, and reactive to light  Conjunctivae and EOM are normal    Cardiovascular: Normal rate, regular rhythm, normal heart sounds and intact distal pulses  Musculoskeletal: Normal range of motion  She exhibits no edema or deformity  Neurological: She is alert and oriented to person, place, and time  No cranial nerve deficit  Coordination normal    Skin: Skin is warm and dry  Psychiatric: She has a normal mood and affect  Her behavior is normal  Judgment and thought content normal      Neurologic Exam     Mental Status   Oriented to person, place, and time  Cranial Nerves     CN III, IV, VI   Pupils are equal, round, and reactive to light  Extraocular motions are normal        Results/Data:  I have reviewed the results and images from the MRA in detail with the patient

## 2018-12-24 NOTE — PROGRESS NOTES
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)   -     MRA head w wo contrast; Future    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 condition  -     Melatonin 5 MG TABS; Take 1 tablet (5 mg total) by mouth daily at bedtime for 30 days            Discussion Summary:   Mrs Eduar Novak is doing very well following SAH and coiling of her ACOM aneurysm  Her main complaint thus far is fatigue and insomnia  I believe this is multifactorial, largely it seems from stress and anxiety at home with her recent hospitalization and care of her  rather than primary sleep disturbance from a [facility]  I believe there may be a component of depression as well  I have prescribed her melatonin for bedtime  I urged her to speak with her PCP about further management if she doesn't not progress  We will obtain a follow-up MRA in 2 months, which is the 3-month esau  At that time, we will determine if there is significant residual aneurysm filling that requires treatment with a stent and further coils  AS of her most recent MRA, the aneurysm is considered secure and there is no risk of repeat bleeding and there are no restrictions  Chief Complaint: Follow-up (2 week angiogram follow up)      Patient ID: Pilo Chu is a 69 y o  female    HPI     Mrs Eduar Novak presents for follow-up of her SAH and following ACOM aneurysm coiling  Her main complaint is fatigue and difficulty staying asleep at night  Her longest stretch is 2-3 hours  Has significant daytime sleepiness  She believes much of her sleep disturbance is from recovery from her [facility] and difficulty at home with care of her  with dementia and Parkinson's   She denies headaches, vision difficulty, or any other stroke-like symptoms  She recently began outpatient PT/OT and is working on increasing her stamina  Review of Systems   Constitutional: Positive for fatigue  Negative for activity change, appetite change, chills, diaphoresis, fever and unexpected weight change  HENT: Negative  Eyes: Negative  Respiratory: Negative  Cardiovascular: Negative  Gastrointestinal: Negative  Endocrine: Negative  Genitourinary: Negative  Musculoskeletal: Negative  Skin: Negative  Allergic/Immunologic: Negative  Neurological: Negative  Hematological: Negative  Psychiatric/Behavioral: Negative  The following portions of the patient's history were reviewed and updated as appropriate: allergies, current medications, past family history, past medical history, past social history, past surgical history and problem list       Active Ambulatory Problems     Diagnosis Date Noted    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 ([facility] [address] ) 11/24/2018    Glaucoma 11/24/2018    S/P coil embolization of cerebral aneurysm 11/26/2018    Hyponatremia 12/05/2018    Dysuria 12/09/2018    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 condition 12/24/2018     Resolved Ambulatory Problems     Diagnosis Date Noted    Hypophosphatemia 11/27/2018    Hypokalemia 11/28/2018    Constipated 11/30/2018     Past Medical History:   Diagnosis Date    Fracture of arm     Glaucoma     Hypotension        Past Surgical History:   Procedure Laterality Date    BUNIONECTOMY      BUNIONECTOMY      CATARACT EXTRACTION      IR CEREBRAL ANGIOGRAPHY / INTERVENTION  11/25/2018    TONSILLECTOMY           Vitals:    12/24/18 1149   BP: 127/73   Pulse: 75   Resp: 16   Temp: 98 6 °F (37 °C)         Objective:    Physical Exam   Constitutional: She is oriented to person, place, and time  She appears well-developed and well-nourished  HENT:   Head: Normocephalic and atraumatic     Eyes: Pupils are equal, round, and reactive to light  Conjunctivae and EOM are normal    Cardiovascular: Normal rate, regular rhythm, normal heart sounds and intact distal pulses  Musculoskeletal: Normal range of motion  She exhibits no edema or deformity  Neurological: She is alert and oriented to person, place, and time  No cranial nerve deficit  Coordination normal    Skin: Skin is warm and dry  Psychiatric: She has a normal mood and affect  Her behavior is normal  Judgment and thought content normal      Neurologic Exam     Mental Status   Oriented to person, place, and time  Cranial Nerves     CN III, IV, VI   Pupils are equal, round, and reactive to light  Extraocular motions are normal        Results/Data:  I have reviewed the results and images from the MRA in detail with the patient

## 2019-02-06 ENCOUNTER — APPOINTMENT (OUTPATIENT)
Dept: LAB | Facility: CLINIC | Age: 79
End: 2019-02-06
Payer: COMMERCIAL

## 2019-02-06 ENCOUNTER — TRANSCRIBE ORDERS (OUTPATIENT)
Dept: LAB | Facility: CLINIC | Age: 79
End: 2019-02-06

## 2019-02-06 DIAGNOSIS — Z01.818 PRE-PROCEDURAL EXAMINATION: ICD-10-CM

## 2019-02-06 LAB
BUN SERPL-MCNC: 15 MG/DL (ref 5–25)
CREAT SERPL-MCNC: 0.4 MG/DL (ref 0.6–1.3)
GFR SERPL CREATININE-BSD FRML MDRD: 101 ML/MIN/1.73SQ M

## 2019-02-06 PROCEDURE — 84520 ASSAY OF UREA NITROGEN: CPT

## 2019-02-06 PROCEDURE — 36415 COLL VENOUS BLD VENIPUNCTURE: CPT

## 2019-02-06 PROCEDURE — 82565 ASSAY OF CREATININE: CPT

## 2019-02-22 ENCOUNTER — HOSPITAL ENCOUNTER (OUTPATIENT)
Dept: MRI IMAGING | Facility: HOSPITAL | Age: 79
Discharge: HOME | End: 2019-02-22
Attending: RADIOLOGY
Payer: COMMERCIAL

## 2019-02-22 DIAGNOSIS — I60.2 NONTRAUMATIC SUBARACHNOID HEMORRHAGE FROM ANTERIOR COMMUNICATING ARTERY (HCC): ICD-10-CM

## 2019-02-22 DIAGNOSIS — I60.9 SAH (SUBARACHNOID HEMORRHAGE) (HCC): ICD-10-CM

## 2019-02-22 DIAGNOSIS — Z98.890 S/P COIL EMBOLIZATION OF CEREBRAL ANEURYSM: ICD-10-CM

## 2019-02-22 PROCEDURE — A9585 GADOBUTROL INJECTION: HCPCS | Performed by: RADIOLOGY

## 2019-02-22 PROCEDURE — 70546 MR ANGIOGRAPH HEAD W/O&W/DYE: CPT

## 2019-02-22 RX ADMIN — GADOBUTROL 7 ML: 604.72 INJECTION INTRAVENOUS at 12:36

## 2019-03-01 ENCOUNTER — OFFICE VISIT (OUTPATIENT)
Dept: NEUROSURGERY | Facility: CLINIC | Age: 79
End: 2019-03-01
Payer: COMMERCIAL

## 2019-03-01 VITALS
TEMPERATURE: 98.3 F | SYSTOLIC BLOOD PRESSURE: 77 MMHG | WEIGHT: 144 LBS | RESPIRATION RATE: 16 BRPM | HEIGHT: 64 IN | BODY MASS INDEX: 24.59 KG/M2 | HEART RATE: 78 BPM | DIASTOLIC BLOOD PRESSURE: 63 MMHG

## 2019-03-01 DIAGNOSIS — Z98.890 S/P COIL EMBOLIZATION OF CEREBRAL ANEURYSM: Primary | ICD-10-CM

## 2019-03-01 DIAGNOSIS — I60.2 NONTRAUMATIC SUBARACHNOID HEMORRHAGE FROM ANTERIOR COMMUNICATING ARTERY (HCC): ICD-10-CM

## 2019-03-01 PROCEDURE — 99214 OFFICE O/P EST MOD 30 MIN: CPT | Performed by: RADIOLOGY

## 2019-03-01 NOTE — LETTER
To whom this may concern,     Ms Jason Garcia is under my care for a cerebral aneurysm  She carries a significant family risk of cerebral aneurysms and her siblings and first cousins should be screened every 5 years  Please obtain an MRA Brain wo contrast and forward the results to my office       Thank you,        Isabel Chauhan

## 2019-03-01 NOTE — PROGRESS NOTES
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 [facility])            Discussion Summary:     Ms Angela Cardoso is doing well  MRA reveals no significant residual aneurysm filling  We will meet in the office again in 2 months to schedule 6-month cerebral angiography  With her news of her cousin likely suffering from a ruptured cerebral aneurysm, it substantially increases the chances there is a familial disposition  I have recommended her siblings and cousins be screened for aneurysms  At this time, her children do not meet criteria for screening  The patient, patient's family was counseled regarding diagnostic results, instructions for management  Total time of encounter was 80 minutes and 40 minutes was spent counseling  Chief Complaint: Follow-up (2 month follow up)      Patient ID: Adelaide Curtis is a 69 y o  female    HPI  Ms Angela Cardoso is here for follow-up of her cerebral aneurysm  She is now 3 months status post coil embolization of her ACOM aneurysm and SAH hospitalization  She reports feeling well and that her energy level has improved  Sleep is much better and did not require any therapies other than the melatonin  She completed physical therapy but does home exercise on her own now  She has reconnected with family recently and it seems one cousin has suffered from a cerebral aneurysm and another cousin has a lower extremity aneurysm  Review of Systems   Constitutional: Negative  HENT: Negative  Eyes: Negative  Respiratory: Negative  Cardiovascular: Negative  Gastrointestinal: Negative  Endocrine: Negative  Genitourinary: Negative  Musculoskeletal: Negative  Skin: Negative  Allergic/Immunologic: Negative  Neurological: Negative  Hematological: Negative  Psychiatric/Behavioral: Negative            The following portions of the patient's history were reviewed and updated as appropriate: allergies, current medications, past family history, past medical history, past social history, past surgical history and problem list       Active Ambulatory Problems     Diagnosis Date Noted    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 ([address] ) 11/24/2018    Glaucoma 11/24/2018    S/P coil embolization of cerebral aneurysm 11/26/2018    Hyponatremia 12/05/2018    Dysuria 12/09/2018    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 condition 12/24/2018     Resolved Ambulatory Problems     Diagnosis Date Noted    Hypophosphatemia 11/27/2018    Hypokalemia 11/28/2018    Constipated 11/30/2018     Past Medical History:   Diagnosis Date    Fracture of arm     Glaucoma     Hypotension        Past Surgical History:   Procedure Laterality Date    BUNIONECTOMY      BUNIONECTOMY      CATARACT EXTRACTION      IR CEREBRAL ANGIOGRAPHY / INTERVENTION  11/25/2018    TONSILLECTOMY           Vitals:    03/01/19 0958   BP: (!) 77/63   Pulse: 78   Resp: 16   Temp: 98 3 °F (36 8 °C)         Objective:    Physical Exam   Constitutional: She is oriented to person, place, and time  She appears well-developed and well-nourished  HENT:   Head: Normocephalic and atraumatic  Eyes: Pupils are equal, round, and reactive to light  EOM are normal    Cardiovascular: Normal rate, regular rhythm, normal heart sounds and intact distal pulses  Pulmonary/Chest: Effort normal and breath sounds normal    Abdominal: Soft  Bowel sounds are normal    Musculoskeletal: Normal range of motion  Neurological: She is alert and oriented to person, place, and time  No cranial nerve deficit or sensory deficit  She exhibits normal muscle tone  Coordination normal    Skin: Skin is warm and dry  Psychiatric: She has a normal mood and affect   Her behavior is normal  Judgment and thought content normal      Neurologic Exam     Mental Status   Oriented to person, place, and time  Cranial Nerves     CN III, IV, VI   Pupils are equal, round, and reactive to light  Extraocular motions are normal        Results/Data:  I have reviewed the results and images from the MRA in detail with the patient

## 2019-03-06 ENCOUNTER — TELEPHONE (OUTPATIENT)
Dept: NEUROSURGERY | Facility: CLINIC | Age: 79
End: 2019-03-06

## 2019-03-06 NOTE — TELEPHONE ENCOUNTER
Received call from [facility] requesting assistance with placing order for colonoscopy   Advised this would be completed by her PCP, directed her there for assistance with referral

## 2019-03-07 NOTE — TELEPHONE ENCOUNTER
Received another call from Kayla Shaffer wanting to know if it was ok with Dr Nacho Ng if she had colonoscopy  Discussed this with him and he has no objections  Advised her of this  She was appreciative of the call

## 2019-03-07 NOTE — PROGRESS NOTES
Pt presents to ED c/o right flank pain radiating to RLQ that began this morning. Pt reports being in ED x1 week ago with similar symptoms and was dx with x2 kidney stones and one in bladder. LMP 2/18/19 db14 acoa sah coiling  - can transfer out of icu today  - cont nimotop  - pt ot pmr with dispo planning  - cotn salt 2g tid and will wean florinef tomorrow  - will cont to follow     No events  Temp:  [98 3 °F (36 8 °C)-99 1 °F (37 3 °C)] 99 1 °F (37 3 °C)  HR:  [] 78  Resp:  [14-52] 29  BP: (103-184)/() 113/67  aao3 fleunt  perrl eomi tml fs  maew no drift      Results from last 7 days  Lab Units 12/07/18  0512 12/05/18  0428 12/02/18  0605   WBC Thousand/uL 8 92 8 55 7 83   HEMOGLOBIN g/dL 10 9* 10 8* 10 6*   HEMATOCRIT % 34 4* 33 9* 33 0*   PLATELETS Thousands/uL 365 315 219   NEUTROS PCT % 77*  --  72   MONOS PCT % 7  --  11       Results from last 7 days  Lab Units 12/08/18  0505 12/07/18  1459 12/07/18  0512  12/04/18  0629   POTASSIUM mmol/L 3 3* 4 2 3 9  < > 3 7   CHLORIDE mmol/L 102 106 104  < > 100   CO2 mmol/L 29 28 25  < > 24   BUN mg/dL 8 14 9  < > 9   CREATININE mg/dL 0 41* 0 50* 0 57*  < > 0 52*   CALCIUM mg/dL 9 7 8 7 9 0  < > 8 9   ALK PHOS U/L  --   --   --   --  61   ALT U/L  --   --   --   --  77   AST U/L  --   --   --   --  38   < > = values in this interval not displayed      Results from last 7 days  Lab Units 12/07/18  0512 12/06/18  1651 12/05/18  0651   MAGNESIUM mg/dL 2 6 2 8* 2 3       Results from last 7 days  Lab Units 12/05/18  0651 12/02/18  0605   PHOSPHORUS mg/dL 3 1 2 4         No results found for: TROPONINT  ABG:No results found for: PHART, YZI1TVM, PO2ART, XNL1AFD, R6SYTLKH, BEART, SOURCE

## 2019-03-18 PROBLEM — Z80.0 FAMILY HISTORY OF MALIGNANT NEOPLASM OF COLON: Status: ACTIVE | Noted: 2019-03-18

## 2019-03-18 PROBLEM — Z83.71 FAMILY HISTORY OF COLONIC POLYPS: Status: ACTIVE | Noted: 2019-03-18

## 2019-03-18 PROBLEM — Z86.0100 HISTORY OF COLON POLYPS: Status: ACTIVE | Noted: 2019-03-18

## 2019-03-18 PROBLEM — Z83.719 FAMILY HISTORY OF COLONIC POLYPS: Status: ACTIVE | Noted: 2019-03-18

## 2019-03-18 PROBLEM — Z86.010 HISTORY OF COLON POLYPS: Status: ACTIVE | Noted: 2019-03-18

## 2019-03-18 PROBLEM — Z80.0 FAMILY HX OF COLON CANCER: Status: ACTIVE | Noted: 2019-03-18

## 2019-05-06 ENCOUNTER — OFFICE VISIT (OUTPATIENT)
Dept: NEUROSURGERY | Facility: CLINIC | Age: 79
End: 2019-05-06
Payer: COMMERCIAL

## 2019-05-06 VITALS
BODY MASS INDEX: 23.9 KG/M2 | WEIGHT: 140 LBS | HEIGHT: 64 IN | DIASTOLIC BLOOD PRESSURE: 76 MMHG | TEMPERATURE: 98.9 F | SYSTOLIC BLOOD PRESSURE: 134 MMHG | RESPIRATION RATE: 16 BRPM | HEART RATE: 80 BPM

## 2019-05-06 DIAGNOSIS — I60.2 NONTRAUMATIC SUBARACHNOID HEMORRHAGE FROM ANTERIOR COMMUNICATING ARTERY (HCC): Primary | ICD-10-CM

## 2019-05-06 PROCEDURE — 99215 OFFICE O/P EST HI 40 MIN: CPT | Performed by: RADIOLOGY

## 2019-05-09 DIAGNOSIS — I60.2 NONTRAUMATIC SUBARACHNOID HEMORRHAGE FROM ANTERIOR COMMUNICATING ARTERY (HCC): Primary | ICD-10-CM

## 2019-05-13 ENCOUNTER — ANESTHESIA EVENT (OUTPATIENT)
Dept: GASTROENTEROLOGY | Facility: AMBULARY SURGERY CENTER | Age: 79
End: 2019-05-13

## 2019-05-24 ENCOUNTER — HOSPITAL ENCOUNTER (OUTPATIENT)
Dept: GASTROENTEROLOGY | Facility: AMBULARY SURGERY CENTER | Age: 79
Setting detail: OUTPATIENT SURGERY
Discharge: HOME | End: 2019-05-24
Admitting: COLON & RECTAL SURGERY
Payer: COMMERCIAL

## 2019-05-24 ENCOUNTER — ANESTHESIA (OUTPATIENT)
Dept: GASTROENTEROLOGY | Facility: AMBULARY SURGERY CENTER | Age: 79
End: 2019-05-24

## 2019-05-24 VITALS
BODY MASS INDEX: 22.88 KG/M2 | DIASTOLIC BLOOD PRESSURE: 57 MMHG | RESPIRATION RATE: 18 BRPM | HEIGHT: 64 IN | HEART RATE: 74 BPM | OXYGEN SATURATION: 96 % | SYSTOLIC BLOOD PRESSURE: 109 MMHG | WEIGHT: 134 LBS | TEMPERATURE: 97.1 F

## 2019-05-24 DIAGNOSIS — Z80.0 FAMILY HISTORY OF MALIGNANT NEOPLASM OF DIGESTIVE ORGAN: ICD-10-CM

## 2019-05-24 PROCEDURE — 88305 TISSUE EXAM BY PATHOLOGIST: CPT | Performed by: PATHOLOGY

## 2019-05-24 RX ORDER — SODIUM CHLORIDE, SODIUM LACTATE, POTASSIUM CHLORIDE, CALCIUM CHLORIDE 600; 310; 30; 20 MG/100ML; MG/100ML; MG/100ML; MG/100ML
125 INJECTION, SOLUTION INTRAVENOUS CONTINUOUS
Status: CANCELLED | OUTPATIENT
Start: 2019-05-24

## 2019-05-24 RX ORDER — PROPOFOL 10 MG/ML
INJECTION, EMULSION INTRAVENOUS AS NEEDED
Status: DISCONTINUED | OUTPATIENT
Start: 2019-05-24 | End: 2019-05-24 | Stop reason: SURG

## 2019-05-24 RX ORDER — LIDOCAINE HYDROCHLORIDE 10 MG/ML
INJECTION, SOLUTION INFILTRATION; PERINEURAL AS NEEDED
Status: DISCONTINUED | OUTPATIENT
Start: 2019-05-24 | End: 2019-05-24 | Stop reason: SURG

## 2019-05-24 RX ORDER — SODIUM CHLORIDE, SODIUM LACTATE, POTASSIUM CHLORIDE, CALCIUM CHLORIDE 600; 310; 30; 20 MG/100ML; MG/100ML; MG/100ML; MG/100ML
INJECTION, SOLUTION INTRAVENOUS CONTINUOUS PRN
Status: DISCONTINUED | OUTPATIENT
Start: 2019-05-24 | End: 2019-05-24 | Stop reason: SURG

## 2019-05-24 RX ADMIN — SODIUM CHLORIDE, SODIUM LACTATE, POTASSIUM CHLORIDE, AND CALCIUM CHLORIDE: .6; .31; .03; .02 INJECTION, SOLUTION INTRAVENOUS at 07:45

## 2019-05-24 RX ADMIN — PROPOFOL 30 MG: 10 INJECTION, EMULSION INTRAVENOUS at 09:30

## 2019-05-24 RX ADMIN — PROPOFOL 50 MG: 10 INJECTION, EMULSION INTRAVENOUS at 09:24

## 2019-05-24 RX ADMIN — PROPOFOL 50 MG: 10 INJECTION, EMULSION INTRAVENOUS at 09:07

## 2019-05-24 RX ADMIN — PROPOFOL 50 MG: 10 INJECTION, EMULSION INTRAVENOUS at 09:12

## 2019-05-24 RX ADMIN — PROPOFOL 30 MG: 10 INJECTION, EMULSION INTRAVENOUS at 09:35

## 2019-05-24 RX ADMIN — PROPOFOL 50 MG: 10 INJECTION, EMULSION INTRAVENOUS at 09:09

## 2019-05-24 RX ADMIN — PROPOFOL 20 MG: 10 INJECTION, EMULSION INTRAVENOUS at 09:19

## 2019-05-24 RX ADMIN — PROPOFOL 50 MG: 10 INJECTION, EMULSION INTRAVENOUS at 09:04

## 2019-05-24 RX ADMIN — LIDOCAINE HYDROCHLORIDE ANHYDROUS 50 MG: 10 INJECTION, SOLUTION INFILTRATION at 09:04

## 2019-06-12 ENCOUNTER — APPOINTMENT (OUTPATIENT)
Dept: LAB | Facility: CLINIC | Age: 79
End: 2019-06-12
Payer: COMMERCIAL

## 2019-06-12 DIAGNOSIS — I60.2 NONTRAUMATIC SUBARACHNOID HEMORRHAGE FROM ANTERIOR COMMUNICATING ARTERY (HCC): ICD-10-CM

## 2019-06-12 LAB
ANION GAP SERPL CALCULATED.3IONS-SCNC: 7 MMOL/L (ref 4–13)
APTT PPP: 31 SECONDS (ref 26–38)
BASOPHILS # BLD AUTO: 0.05 THOUSANDS/ΜL (ref 0–0.1)
BASOPHILS NFR BLD AUTO: 1 % (ref 0–1)
BUN SERPL-MCNC: 12 MG/DL (ref 5–25)
CALCIUM SERPL-MCNC: 8.9 MG/DL (ref 8.3–10.1)
CHLORIDE SERPL-SCNC: 105 MMOL/L (ref 100–108)
CO2 SERPL-SCNC: 30 MMOL/L (ref 21–32)
CREAT SERPL-MCNC: 0.55 MG/DL (ref 0.6–1.3)
EOSINOPHIL # BLD AUTO: 0.11 THOUSAND/ΜL (ref 0–0.61)
EOSINOPHIL NFR BLD AUTO: 3 % (ref 0–6)
ERYTHROCYTE [DISTWIDTH] IN BLOOD BY AUTOMATED COUNT: 14.6 % (ref 11.6–15.1)
EST. AVERAGE GLUCOSE BLD GHB EST-MCNC: 97 MG/DL
GFR SERPL CREATININE-BSD FRML MDRD: 90 ML/MIN/1.73SQ M
GLUCOSE P FAST SERPL-MCNC: 86 MG/DL (ref 65–99)
HBA1C MFR BLD: 5 % (ref 4.2–6.3)
HCT VFR BLD AUTO: 41.3 % (ref 34.8–46.1)
HGB BLD-MCNC: 12.7 G/DL (ref 11.5–15.4)
IMM GRANULOCYTES # BLD AUTO: 0.02 THOUSAND/UL (ref 0–0.2)
IMM GRANULOCYTES NFR BLD AUTO: 1 % (ref 0–2)
INR PPP: 1 (ref 0.86–1.17)
LYMPHOCYTES # BLD AUTO: 1.31 THOUSANDS/ΜL (ref 0.6–4.47)
LYMPHOCYTES NFR BLD AUTO: 30 % (ref 14–44)
MCH RBC QN AUTO: 30.5 PG (ref 26.8–34.3)
MCHC RBC AUTO-ENTMCNC: 30.8 G/DL (ref 31.4–37.4)
MCV RBC AUTO: 99 FL (ref 82–98)
MONOCYTES # BLD AUTO: 0.38 THOUSAND/ΜL (ref 0.17–1.22)
MONOCYTES NFR BLD AUTO: 9 % (ref 4–12)
NEUTROPHILS # BLD AUTO: 2.49 THOUSANDS/ΜL (ref 1.85–7.62)
NEUTS SEG NFR BLD AUTO: 56 % (ref 43–75)
NRBC BLD AUTO-RTO: 0 /100 WBCS
PLATELET # BLD AUTO: 211 THOUSANDS/UL (ref 149–390)
PMV BLD AUTO: 10.5 FL (ref 8.9–12.7)
POTASSIUM SERPL-SCNC: 3.4 MMOL/L (ref 3.5–5.3)
PROTHROMBIN TIME: 12.9 SECONDS (ref 11.8–14.2)
RBC # BLD AUTO: 4.17 MILLION/UL (ref 3.81–5.12)
SODIUM SERPL-SCNC: 142 MMOL/L (ref 136–145)
WBC # BLD AUTO: 4.36 THOUSAND/UL (ref 4.31–10.16)

## 2019-06-12 PROCEDURE — 83036 HEMOGLOBIN GLYCOSYLATED A1C: CPT

## 2019-06-12 PROCEDURE — 85730 THROMBOPLASTIN TIME PARTIAL: CPT

## 2019-06-12 PROCEDURE — 80048 BASIC METABOLIC PNL TOTAL CA: CPT

## 2019-06-12 PROCEDURE — 36415 COLL VENOUS BLD VENIPUNCTURE: CPT

## 2019-06-12 PROCEDURE — 85025 COMPLETE CBC W/AUTO DIFF WBC: CPT

## 2019-06-12 PROCEDURE — 85610 PROTHROMBIN TIME: CPT

## 2019-06-21 ENCOUNTER — TELEPHONE (OUTPATIENT)
Dept: RADIOLOGY | Facility: HOSPITAL | Age: 79
End: 2019-06-21

## 2019-06-21 RX ORDER — SODIUM CHLORIDE 9 MG/ML
75 INJECTION, SOLUTION INTRAVENOUS CONTINUOUS
Status: CANCELLED | OUTPATIENT
Start: 2019-06-21

## 2019-06-25 ENCOUNTER — TELEPHONE (OUTPATIENT)
Dept: SURGERY | Facility: HOSPITAL | Age: 79
End: 2019-06-25

## 2019-06-26 ENCOUNTER — HOSPITAL ENCOUNTER (OUTPATIENT)
Dept: RADIOLOGY | Facility: HOSPITAL | Age: 79
Discharge: HOME | End: 2019-06-26
Attending: RADIOLOGY | Admitting: RADIOLOGY
Payer: COMMERCIAL

## 2019-06-26 ENCOUNTER — ANESTHESIA (OUTPATIENT)
Dept: RADIOLOGY | Facility: HOSPITAL | Age: 79
End: 2019-06-26
Payer: COMMERCIAL

## 2019-06-26 ENCOUNTER — ANESTHESIA EVENT (OUTPATIENT)
Dept: RADIOLOGY | Facility: HOSPITAL | Age: 79
End: 2019-06-26
Payer: COMMERCIAL

## 2019-06-26 VITALS
HEIGHT: 64 IN | OXYGEN SATURATION: 98 % | SYSTOLIC BLOOD PRESSURE: 123 MMHG | DIASTOLIC BLOOD PRESSURE: 61 MMHG | HEART RATE: 76 BPM | RESPIRATION RATE: 18 BRPM | TEMPERATURE: 97.9 F | BODY MASS INDEX: 23.39 KG/M2 | WEIGHT: 137 LBS

## 2019-06-26 DIAGNOSIS — I60.2 NONTRAUMATIC SUBARACHNOID HEMORRHAGE FROM ANTERIOR COMMUNICATING ARTERY (HCC): ICD-10-CM

## 2019-06-26 PROCEDURE — C1769 GUIDE WIRE: HCPCS

## 2019-06-26 PROCEDURE — C1894 INTRO/SHEATH, NON-LASER: HCPCS

## 2019-06-26 PROCEDURE — 76937 US GUIDE VASCULAR ACCESS: CPT | Performed by: RADIOLOGY

## 2019-06-26 PROCEDURE — NC001 PR NO CHARGE: Performed by: RADIOLOGY

## 2019-06-26 PROCEDURE — C1887 CATHETER, GUIDING: HCPCS

## 2019-06-26 PROCEDURE — C1760 CLOSURE DEV, VASC: HCPCS

## 2019-06-26 PROCEDURE — 36223 PLACE CATH CAROTID/INOM ART: CPT

## 2019-06-26 PROCEDURE — 36223 PLACE CATH CAROTID/INOM ART: CPT | Performed by: RADIOLOGY

## 2019-06-26 PROCEDURE — 76377 3D RENDER W/INTRP POSTPROCES: CPT | Performed by: RADIOLOGY

## 2019-06-26 PROCEDURE — 76937 US GUIDE VASCULAR ACCESS: CPT

## 2019-06-26 RX ORDER — PROPOFOL 10 MG/ML
INJECTION, EMULSION INTRAVENOUS CONTINUOUS PRN
Status: DISCONTINUED | OUTPATIENT
Start: 2019-06-26 | End: 2019-06-26 | Stop reason: SURG

## 2019-06-26 RX ORDER — SODIUM CHLORIDE 9 MG/ML
75 INJECTION, SOLUTION INTRAVENOUS CONTINUOUS
Status: DISCONTINUED | OUTPATIENT
Start: 2019-06-26 | End: 2019-06-26 | Stop reason: HOSPADM

## 2019-06-26 RX ORDER — LIDOCAINE HYDROCHLORIDE 10 MG/ML
INJECTION, SOLUTION INFILTRATION; PERINEURAL AS NEEDED
Status: DISCONTINUED | OUTPATIENT
Start: 2019-06-26 | End: 2019-06-26 | Stop reason: SURG

## 2019-06-26 RX ORDER — PROPOFOL 10 MG/ML
INJECTION, EMULSION INTRAVENOUS AS NEEDED
Status: DISCONTINUED | OUTPATIENT
Start: 2019-06-26 | End: 2019-06-26 | Stop reason: SURG

## 2019-06-26 RX ADMIN — LIDOCAINE HYDROCHLORIDE 100 MG: 10 INJECTION, SOLUTION INFILTRATION; PERINEURAL at 08:15

## 2019-06-26 RX ADMIN — PROPOFOL 70 MG: 10 INJECTION, EMULSION INTRAVENOUS at 08:15

## 2019-06-26 RX ADMIN — IODIXANOL 45 ML: 320 INJECTION, SOLUTION INTRAVASCULAR at 09:10

## 2019-06-26 RX ADMIN — SODIUM CHLORIDE 75 ML/HR: 0.9 INJECTION, SOLUTION INTRAVENOUS at 07:16

## 2019-06-26 RX ADMIN — PROPOFOL 100 MCG/KG/MIN: 10 INJECTION, EMULSION INTRAVENOUS at 08:19

## 2019-06-26 NOTE — DISCHARGE INSTRUCTIONS
Today, you underwent a diagnostic cerebral angiogram under the care of Dr Prabhjot Johnson for evaluation of ACOM aneurysm surveillance  ? The following instructions will help you care for yourself, or be cared for upon your return home today  These are guidelines for your care right after your surgery only  ? Notify Your Doctor or Nurse if you have any of the following:  ? SYMPTOMS OF WOUND INFECTION--   Increased pain in or around the incision   Swelling around the incision  Any drainage from the incision  Incision separates or opens up  Warmth in the tissues around the incision  Redness or tenderness on the skin near the incision   Fever (temperature greater than 101 degrees F)   ? NEUROLOGICAL CHANGES--  Change in alertness  Increased sleepiness   Nausea and vomiting   New onset of numbness or weakness in arms or legs   New problems with your bowels or bladder  New or worse problems with balance or walking  Seizures, new or worsening  ? UNRELIEVED HEADACHE PAIN--  New or increased pain unrelieved with pain medications   Pain associated with nausea and vomiting   Pain associated with other symptoms  ? QUESTIONS OR PROBLEMS--  Any questions or problems that you are unsure about  Wound Care:  Keep Incision Clean and Dry   You may shower daily, but do not soak incision  Pat dry after showering  No tub baths, soaking, swimming for 1 week after angiogram    You do not need to cover the incision  Mild to moderate bruising and tenderness to the site is expected and may last up to 1-2 weeks after your procedure  ?  A closure device was placed at the catheter insertion site  This is MRI compatible  Remove the dressing 24 hours after your procedure  If your groin site is bleeding, apply firm pressure for 10 minutes  Reinforce dressing rather than removing and checking frequently  If continues to bleed through the dressing after 1 hour, contact your neurosurgeon's office  Anticipatory Education:  ?   PAIN MED W/ Acetaminophen (Tylenol)  --IF a prescription for pain medicine has been sent home with you:  --Narcotic pain medication may cause constipation  Be sure to take stool softeners or laxatives while you are on narcotic pain medication  --Do not drive after taking prescription pain medicine  ?  If this medicine is too strong, or no longer necessary, or we did NOT recommend/prescribe oral narcotics, you may take:   - Tylenol Extra-strength/Acetaminophen, 2 tablets every 4-6 hours as needed for mild pain  DO NOT TAKE MORE THAN 4000MG PER DAY from combined sources  NOTE: Remember to eat when taking pain medicines in order to avoid nausea  Watch for constipation  Eat plenty of fruits, vegetables, juices, and drink 6-8 glasses of water each day  Constipation: Stay active and drink at least 6-8 cups of fluid each day to prevent constipation  If you need a laxative or stool softener follow the package directions or consult with your local pharmacists if you have questions  ? After anesthesia, rest for 24 hours  Do not drive, drink alcohol beverages or make any important decisions during this time  General anesthesia may cause sore throat, jaw discomfort or muscle aches  These symptoms can last for one or two days  Activity: Please follow these instructions:  Advance your activity as you can tolerate  You may do light house work; nothing strenuous   You may walk all you want  You may go up and down the steps  Use the railing for support  Do not do excessive bending, straining or heavy lifting for 48 hours after your procedure  Do not drive or return to work until you are instructed   It is normal for your energy level and sleep patterns to change after surgery  Get extra sleep at night and take naps during the day to help you feel less tired  Take rest periods during the day  Complete recovery may take several weeks  ?  You may resume driving after 73-23 hours recovery     You may return to work after 48 hours of recovery  ?  Diet:  Your doctor has recommended that you follow these diet instructions at home  Refer to the patient education materials you received during your hospital stay  If you would like more nutrition counseling, ask your doctor about making an appointment with an outpatient dietitian  Resume your home diet  ? Medications:  Please resume your home medications as instructed  ? Home Supplies and Equipment:  none  Additional Contacts:  ? CONTACTS FOR NEUROSURGERY: You may call your neurosurgeons office if you have questions between 8:30 am and 4:30 pm  You may request to speak to the nurse practitioner who is available Monday through Friday  ?  For off hours or the weekend you may call your neurosurgeon's office to leave a message

## 2019-06-26 NOTE — BRIEF OP NOTE (RAD/CATH)
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

## 2019-06-27 ENCOUNTER — TELEPHONE (OUTPATIENT)
Dept: NEUROSURGERY | Facility: CLINIC | Age: 79
End: 2019-06-27

## 2019-07-08 ENCOUNTER — OFFICE VISIT (OUTPATIENT)
Dept: NEUROSURGERY | Facility: CLINIC | Age: 79
End: 2019-07-08
Payer: COMMERCIAL

## 2019-07-08 ENCOUNTER — TRANSCRIBE ORDERS (OUTPATIENT)
Dept: NEUROSURGERY | Facility: CLINIC | Age: 79
End: 2019-07-08

## 2019-07-08 VITALS
HEIGHT: 64 IN | BODY MASS INDEX: 24.59 KG/M2 | RESPIRATION RATE: 18 BRPM | SYSTOLIC BLOOD PRESSURE: 118 MMHG | HEART RATE: 76 BPM | DIASTOLIC BLOOD PRESSURE: 79 MMHG | TEMPERATURE: 98.5 F | WEIGHT: 144 LBS

## 2019-07-08 DIAGNOSIS — I60.2 NONTRAUMATIC SUBARACHNOID HEMORRHAGE FROM ANTERIOR COMMUNICATING ARTERY (HCC): ICD-10-CM

## 2019-07-08 DIAGNOSIS — Z01.818 PRE-PROCEDURAL EXAMINATION: Primary | ICD-10-CM

## 2019-07-08 DIAGNOSIS — Z98.890 S/P COIL EMBOLIZATION OF CEREBRAL ANEURYSM: Primary | ICD-10-CM

## 2019-07-08 PROCEDURE — 99213 OFFICE O/P EST LOW 20 MIN: CPT | Performed by: RADIOLOGY

## 2019-07-08 NOTE — PROGRESS NOTES
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)  -     MRA head w wo contrast; Future            Discussion Summary:   Cerebral angiography revealed a 2 5mm residual at the base of the previously coiled aneurysm, however this is not significantly changes from prior  We will continue to monitor this via MRA in 6 months and another angiogram 12 months from now  Chief Complaint: Follow-up (2 week angiogram follow up )      Patient ID: Alex Pollack is a 67 y o  female    HPI  Ms Vaughn Long presents for follow up of her cerebral aneurysm  She is approximately 2 weeks status post cerebral angiography  She denies headaches, vision changes, arm/leg weakness or numbness  Essentially no new stroke-like symptoms  Denies groin pain, bruising, swelling or draining  No fevers  Review of Systems   Constitutional: Negative  HENT: Negative  Eyes: Negative  Respiratory: Negative  Cardiovascular: Negative  Gastrointestinal: Negative  Endocrine: Negative  Genitourinary: Negative  Musculoskeletal: Positive for back pain  Negative for gait problem, myalgias, neck pain and neck stiffness  Skin: Negative  Allergic/Immunologic: Negative  Neurological: Negative  Hematological: Negative  Psychiatric/Behavioral: Negative  I have personally reviewed the MA's review of systems and made changes as necessary      The following portions of the patient's history were reviewed and updated as appropriate: allergies, current medications, past family history, past medical history, past social history, past surgical history and problem list       Active Ambulatory Problems     Diagnosis Date Noted    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 [address] ) 11/24/2018    Glaucoma 11/24/2018  S/P coil embolization of cerebral aneurysm 11/26/2018    Hyponatremia 12/05/2018    Dysuria 12/09/2018    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 condition 12/24/2018    History of colon polyps 03/18/2019    Family history of malignant neoplasm of colon 03/18/2019    Family history of colonic polyps 03/18/2019    Family hx of colon cancer 03/18/2019     Resolved Ambulatory Problems     Diagnosis Date Noted    Hypophosphatemia 11/27/2018    Hypokalemia 11/28/2018    Constipated 11/30/2018     Past Medical History:   Diagnosis Date    Colon polyp     Fracture of arm     Hypotension        Past Surgical History:   Procedure Laterality Date    BUNIONECTOMY      BUNIONECTOMY      CATARACT EXTRACTION      IR CEREBRAL ANGIOGRAPHY  6/26/2019    IR CEREBRAL ANGIOGRAPHY / INTERVENTION  11/25/2018    TONSILLECTOMY           Vitals:    07/08/19 0849   BP: 118/79   Pulse: 76   Resp: 18   Temp: 98 5 °F (36 9 °C)         Objective:    Physical Exam   Constitutional: She is oriented to person, place, and time  She appears well-developed and well-nourished  HENT:   Head: Normocephalic and atraumatic  Eyes: Pupils are equal, round, and reactive to light  EOM are normal    Cardiovascular: Normal rate, regular rhythm and intact distal pulses  Musculoskeletal: Normal range of motion  She exhibits no edema, tenderness or deformity  Neurological: She is alert and oriented to person, place, and time  She has normal strength  No cranial nerve deficit or sensory deficit  She exhibits normal muscle tone  Coordination normal    Skin: Skin is warm and dry  Psychiatric: She has a normal mood and affect  Her behavior is normal  Judgment and thought content normal      Neurologic Exam     Mental Status   Oriented to person, place, and time  Cranial Nerves     CN III, IV, VI   Pupils are equal, round, and reactive to light  Extraocular motions are normal      Motor Exam     Strength   Strength 5/5 throughout  Results/Data:  I have reviewed the results and images from the angiogram in detail with the patient

## 2019-07-22 ENCOUNTER — APPOINTMENT (OUTPATIENT)
Dept: LAB | Facility: CLINIC | Age: 79
End: 2019-07-22
Payer: COMMERCIAL

## 2019-07-22 ENCOUNTER — TRANSCRIBE ORDERS (OUTPATIENT)
Dept: LAB | Facility: CLINIC | Age: 79
End: 2019-07-22

## 2019-07-22 DIAGNOSIS — I61.9 NONTRAUMATIC INTRACEREBRAL HEMORRHAGE, UNSPECIFIED CEREBRAL LOCATION, UNSPECIFIED LATERALITY (HCC): ICD-10-CM

## 2019-07-22 DIAGNOSIS — M81.8 IDIOPATHIC OSTEOPOROSIS: ICD-10-CM

## 2019-07-22 DIAGNOSIS — E78.5 HYPERLIPIDEMIA, UNSPECIFIED HYPERLIPIDEMIA TYPE: ICD-10-CM

## 2019-07-22 DIAGNOSIS — E78.5 HYPERLIPIDEMIA, UNSPECIFIED HYPERLIPIDEMIA TYPE: Primary | ICD-10-CM

## 2019-07-22 LAB
ALBUMIN SERPL BCP-MCNC: 3.4 G/DL (ref 3.5–5)
ALP SERPL-CCNC: 59 U/L (ref 46–116)
ALT SERPL W P-5'-P-CCNC: 22 U/L (ref 12–78)
ANION GAP SERPL CALCULATED.3IONS-SCNC: 7 MMOL/L (ref 4–13)
AST SERPL W P-5'-P-CCNC: 18 U/L (ref 5–45)
BILIRUB SERPL-MCNC: 0.4 MG/DL (ref 0.2–1)
BUN SERPL-MCNC: 14 MG/DL (ref 5–25)
CALCIUM SERPL-MCNC: 8.5 MG/DL (ref 8.3–10.1)
CHLORIDE SERPL-SCNC: 105 MMOL/L (ref 100–108)
CHOLEST SERPL-MCNC: 225 MG/DL (ref 50–200)
CO2 SERPL-SCNC: 30 MMOL/L (ref 21–32)
CREAT SERPL-MCNC: 0.57 MG/DL (ref 0.6–1.3)
EST. AVERAGE GLUCOSE BLD GHB EST-MCNC: 100 MG/DL
GFR SERPL CREATININE-BSD FRML MDRD: 89 ML/MIN/1.73SQ M
GLUCOSE P FAST SERPL-MCNC: 85 MG/DL (ref 65–99)
HBA1C MFR BLD: 5.1 % (ref 4.2–6.3)
HDLC SERPL-MCNC: 78 MG/DL (ref 40–60)
LDLC SERPL CALC-MCNC: 137 MG/DL (ref 0–100)
NONHDLC SERPL-MCNC: 147 MG/DL
POTASSIUM SERPL-SCNC: 3.4 MMOL/L (ref 3.5–5.3)
PROT SERPL-MCNC: 6.9 G/DL (ref 6.4–8.2)
SODIUM SERPL-SCNC: 142 MMOL/L (ref 136–145)
TRIGL SERPL-MCNC: 50 MG/DL

## 2019-07-22 PROCEDURE — 80053 COMPREHEN METABOLIC PANEL: CPT

## 2019-07-22 PROCEDURE — 80061 LIPID PANEL: CPT

## 2019-07-22 PROCEDURE — 83036 HEMOGLOBIN GLYCOSYLATED A1C: CPT | Performed by: INTERNAL MEDICINE

## 2019-07-22 PROCEDURE — 36415 COLL VENOUS BLD VENIPUNCTURE: CPT | Performed by: INTERNAL MEDICINE

## 2019-08-22 ENCOUNTER — TRANSCRIBE ORDERS (OUTPATIENT)
Dept: ADMINISTRATIVE | Facility: HOSPITAL | Age: 79
End: 2019-08-22

## 2019-08-22 DIAGNOSIS — Z12.39 BREAST SCREENING: Primary | ICD-10-CM

## 2019-08-23 ENCOUNTER — TRANSCRIBE ORDERS (OUTPATIENT)
Dept: LAB | Facility: CLINIC | Age: 79
End: 2019-08-23

## 2019-08-23 ENCOUNTER — APPOINTMENT (OUTPATIENT)
Dept: LAB | Facility: CLINIC | Age: 79
End: 2019-08-23
Payer: COMMERCIAL

## 2019-08-23 DIAGNOSIS — E87.6 HYPOPOTASSEMIA: Primary | ICD-10-CM

## 2019-08-23 DIAGNOSIS — E87.6 HYPOPOTASSEMIA: ICD-10-CM

## 2019-08-23 LAB
ANION GAP SERPL CALCULATED.3IONS-SCNC: 4 MMOL/L (ref 4–13)
BUN SERPL-MCNC: 12 MG/DL (ref 5–25)
CALCIUM SERPL-MCNC: 8.9 MG/DL (ref 8.3–10.1)
CHLORIDE SERPL-SCNC: 106 MMOL/L (ref 100–108)
CO2 SERPL-SCNC: 33 MMOL/L (ref 21–32)
CREAT SERPL-MCNC: 0.62 MG/DL (ref 0.6–1.3)
GFR SERPL CREATININE-BSD FRML MDRD: 87 ML/MIN/1.73SQ M
GLUCOSE P FAST SERPL-MCNC: 90 MG/DL (ref 65–99)
POTASSIUM SERPL-SCNC: 3.9 MMOL/L (ref 3.5–5.3)
SODIUM SERPL-SCNC: 143 MMOL/L (ref 136–145)

## 2019-08-23 PROCEDURE — 36415 COLL VENOUS BLD VENIPUNCTURE: CPT

## 2019-08-23 PROCEDURE — 80048 BASIC METABOLIC PNL TOTAL CA: CPT

## 2019-09-13 ENCOUNTER — HOSPITAL ENCOUNTER (OUTPATIENT)
Dept: RADIOLOGY | Age: 79
Discharge: HOME | End: 2019-09-13
Payer: COMMERCIAL

## 2019-09-13 VITALS — BODY MASS INDEX: 23.39 KG/M2 | HEIGHT: 64 IN | WEIGHT: 137 LBS

## 2019-09-13 DIAGNOSIS — Z12.39 BREAST SCREENING: ICD-10-CM

## 2019-09-13 PROCEDURE — 77067 SCR MAMMO BI INCL CAD: CPT

## 2019-09-13 PROCEDURE — 77063 BREAST TOMOSYNTHESIS BI: CPT

## 2019-12-20 ENCOUNTER — TELEPHONE (OUTPATIENT)
Dept: NEUROSURGERY | Facility: CLINIC | Age: 79
End: 2019-12-20

## 2019-12-20 ENCOUNTER — APPOINTMENT (OUTPATIENT)
Dept: LAB | Facility: CLINIC | Age: 79
End: 2019-12-20
Payer: COMMERCIAL

## 2019-12-20 DIAGNOSIS — Z01.818 PRE-PROCEDURAL EXAMINATION: Primary | ICD-10-CM

## 2019-12-20 LAB
BUN SERPL-MCNC: 16 MG/DL (ref 5–25)
CREAT SERPL-MCNC: 0.54 MG/DL (ref 0.6–1.3)
GFR SERPL CREATININE-BSD FRML MDRD: 91 ML/MIN/1.73SQ M

## 2019-12-20 PROCEDURE — 84520 ASSAY OF UREA NITROGEN: CPT

## 2019-12-20 PROCEDURE — 36415 COLL VENOUS BLD VENIPUNCTURE: CPT

## 2019-12-20 PROCEDURE — 82565 ASSAY OF CREATININE: CPT

## 2020-01-04 ENCOUNTER — HOSPITAL ENCOUNTER (OUTPATIENT)
Dept: MRI IMAGING | Facility: HOSPITAL | Age: 80
Discharge: HOME | End: 2020-01-04
Attending: RADIOLOGY
Payer: COMMERCIAL

## 2020-01-04 DIAGNOSIS — Z98.890 S/P COIL EMBOLIZATION OF CEREBRAL ANEURYSM: ICD-10-CM

## 2020-01-04 DIAGNOSIS — I60.2 NONTRAUMATIC SUBARACHNOID HEMORRHAGE FROM ANTERIOR COMMUNICATING ARTERY (HCC): ICD-10-CM

## 2020-01-04 PROCEDURE — 70546 MR ANGIOGRAPH HEAD W/O&W/DYE: CPT

## 2020-01-04 PROCEDURE — A9585 GADOBUTROL INJECTION: HCPCS | Performed by: RADIOLOGY

## 2020-01-04 RX ADMIN — GADOBUTROL 6 ML: 604.72 INJECTION INTRAVENOUS at 11:08

## 2020-01-06 ENCOUNTER — OFFICE VISIT (OUTPATIENT)
Dept: NEUROSURGERY | Facility: CLINIC | Age: 80
End: 2020-01-06
Payer: COMMERCIAL

## 2020-01-06 VITALS
SYSTOLIC BLOOD PRESSURE: 140 MMHG | TEMPERATURE: 98.3 F | WEIGHT: 142 LBS | RESPIRATION RATE: 18 BRPM | DIASTOLIC BLOOD PRESSURE: 86 MMHG | HEART RATE: 66 BPM | BODY MASS INDEX: 24.24 KG/M2 | HEIGHT: 64 IN

## 2020-01-06 DIAGNOSIS — I60.2 NONTRAUMATIC SUBARACHNOID HEMORRHAGE FROM ANTERIOR COMMUNICATING ARTERY (HCC): Primary | ICD-10-CM

## 2020-01-06 PROCEDURE — 99213 OFFICE O/P EST LOW 20 MIN: CPT | Performed by: RADIOLOGY

## 2020-01-06 NOTE — PROGRESS NOTES
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 [facility])        Discussion Summary:   Ms Moris Gallo has a stable ACOM aneurysm remnant  She will return to clinic in approximately 5 months to discuss planned surveillance cerebral angiography  Chief Complaint: Follow-up (6 months follow up)      Patient ID: Steve Guadarrama is a 67 y o  female    HPI   Ms Moris Gallo returns for follow-up of her cerebral aneurysm  She has been in her usual state of health and denies headaches or any new stroke-like symptoms  She does however complain of significant physical and mental burden due to the care of her  who has progressive dementia  Review of Systems   Constitutional: Positive for fatigue  Negative for chills and fever  HENT: Negative  Eyes: Negative  Respiratory: Negative  Cardiovascular: Negative  Gastrointestinal: Negative  Endocrine: Negative  Genitourinary: Negative  Musculoskeletal: Positive for back pain (sometimes)  Negative for gait problem, myalgias, neck pain and neck stiffness  Skin: Negative  Allergic/Immunologic: Negative  Neurological: Negative  Hematological: Negative  Psychiatric/Behavioral: Negative  I have personally reviewed the MA's review of systems and made changes as necessary      The following portions of the patient's history were reviewed and updated as appropriate: allergies, current medications, past family history, past medical history, past social history, past surgical history and problem list       Active Ambulatory Problems     Diagnosis Date Noted    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 ([address] ) 11/24/2018    Glaucoma 11/24/2018    S/P coil embolization of cerebral aneurysm 11/26/2018    Hyponatremia 12/05/2018    Dysuria 12/09/2018    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 condition 12/24/2018    History of colon polyps 03/18/2019    Family history of malignant neoplasm of colon 03/18/2019    Family history of colonic polyps 03/18/2019    Family hx of colon cancer 03/18/2019     Resolved Ambulatory Problems     Diagnosis Date Noted    Hypophosphatemia 11/27/2018    Hypokalemia 11/28/2018    Constipated 11/30/2018     Past Medical History:   Diagnosis Date    Colon polyp     Fracture of arm     Hypotension        Past Surgical History:   Procedure Laterality Date    BUNIONECTOMY      BUNIONECTOMY      CATARACT EXTRACTION      IR CEREBRAL ANGIOGRAPHY  6/26/2019    IR CEREBRAL ANGIOGRAPHY / INTERVENTION  11/25/2018    TONSILLECTOMY           Vitals:    01/06/20 1302   BP: 140/86   Pulse: 66   Resp: 18   Temp: 98 3 °F (36 8 °C)         Objective:    Physical Exam  Neurologic Exam    Results/Data:  I have reviewed the results and images from the MRA in detail with the patient

## 2020-02-05 ENCOUNTER — TRANSCRIBE ORDERS (OUTPATIENT)
Dept: LAB | Facility: CLINIC | Age: 80
End: 2020-02-05

## 2020-02-05 ENCOUNTER — APPOINTMENT (OUTPATIENT)
Dept: LAB | Facility: CLINIC | Age: 80
End: 2020-02-05
Payer: COMMERCIAL

## 2020-02-05 DIAGNOSIS — E78.5 HYPERLIPIDEMIA, UNSPECIFIED HYPERLIPIDEMIA TYPE: ICD-10-CM

## 2020-02-05 DIAGNOSIS — E55.9 AVITAMINOSIS D: ICD-10-CM

## 2020-02-05 DIAGNOSIS — E78.5 HYPERLIPIDEMIA, UNSPECIFIED HYPERLIPIDEMIA TYPE: Primary | ICD-10-CM

## 2020-02-05 LAB
25(OH)D3 SERPL-MCNC: 37.3 NG/ML (ref 30–100)
ALBUMIN SERPL BCP-MCNC: 3.6 G/DL (ref 3.5–5)
ALP SERPL-CCNC: 71 U/L (ref 46–116)
ALT SERPL W P-5'-P-CCNC: 26 U/L (ref 12–78)
ANION GAP SERPL CALCULATED.3IONS-SCNC: 5 MMOL/L (ref 4–13)
AST SERPL W P-5'-P-CCNC: 21 U/L (ref 5–45)
BASOPHILS # BLD AUTO: 0.03 THOUSANDS/ΜL (ref 0–0.1)
BASOPHILS NFR BLD AUTO: 1 % (ref 0–1)
BILIRUB SERPL-MCNC: 0.44 MG/DL (ref 0.2–1)
BUN SERPL-MCNC: 11 MG/DL (ref 5–25)
CALCIUM SERPL-MCNC: 8.6 MG/DL (ref 8.3–10.1)
CHLORIDE SERPL-SCNC: 104 MMOL/L (ref 100–108)
CHOLEST SERPL-MCNC: 227 MG/DL (ref 50–200)
CO2 SERPL-SCNC: 33 MMOL/L (ref 21–32)
CREAT SERPL-MCNC: 0.63 MG/DL (ref 0.6–1.3)
EOSINOPHIL # BLD AUTO: 0.15 THOUSAND/ΜL (ref 0–0.61)
EOSINOPHIL NFR BLD AUTO: 3 % (ref 0–6)
ERYTHROCYTE [DISTWIDTH] IN BLOOD BY AUTOMATED COUNT: 14.9 % (ref 11.6–15.1)
GFR SERPL CREATININE-BSD FRML MDRD: 86 ML/MIN/1.73SQ M
GLUCOSE P FAST SERPL-MCNC: 85 MG/DL (ref 65–99)
HCT VFR BLD AUTO: 43 % (ref 34.8–46.1)
HDLC SERPL-MCNC: 88 MG/DL
HGB BLD-MCNC: 13 G/DL (ref 11.5–15.4)
IMM GRANULOCYTES # BLD AUTO: 0.02 THOUSAND/UL (ref 0–0.2)
IMM GRANULOCYTES NFR BLD AUTO: 0 % (ref 0–2)
LDLC SERPL CALC-MCNC: 132 MG/DL (ref 0–100)
LYMPHOCYTES # BLD AUTO: 0.64 THOUSANDS/ΜL (ref 0.6–4.47)
LYMPHOCYTES NFR BLD AUTO: 14 % (ref 14–44)
MCH RBC QN AUTO: 29.4 PG (ref 26.8–34.3)
MCHC RBC AUTO-ENTMCNC: 30.2 G/DL (ref 31.4–37.4)
MCV RBC AUTO: 97 FL (ref 82–98)
MONOCYTES # BLD AUTO: 0.38 THOUSAND/ΜL (ref 0.17–1.22)
MONOCYTES NFR BLD AUTO: 8 % (ref 4–12)
NEUTROPHILS # BLD AUTO: 3.36 THOUSANDS/ΜL (ref 1.85–7.62)
NEUTS SEG NFR BLD AUTO: 74 % (ref 43–75)
NONHDLC SERPL-MCNC: 139 MG/DL
NRBC BLD AUTO-RTO: 0 /100 WBCS
PLATELET # BLD AUTO: 195 THOUSANDS/UL (ref 149–390)
PMV BLD AUTO: 10 FL (ref 8.9–12.7)
POTASSIUM SERPL-SCNC: 3.8 MMOL/L (ref 3.5–5.3)
PROT SERPL-MCNC: 7.1 G/DL (ref 6.4–8.2)
RBC # BLD AUTO: 4.42 MILLION/UL (ref 3.81–5.12)
SODIUM SERPL-SCNC: 142 MMOL/L (ref 136–145)
TRIGL SERPL-MCNC: 36 MG/DL
TSH SERPL DL<=0.05 MIU/L-ACNC: 1.07 UIU/ML (ref 0.36–3.74)
WBC # BLD AUTO: 4.58 THOUSAND/UL (ref 4.31–10.16)

## 2020-02-05 PROCEDURE — 36415 COLL VENOUS BLD VENIPUNCTURE: CPT

## 2020-02-05 PROCEDURE — 82306 VITAMIN D 25 HYDROXY: CPT

## 2020-02-05 PROCEDURE — 80061 LIPID PANEL: CPT

## 2020-02-05 PROCEDURE — 84443 ASSAY THYROID STIM HORMONE: CPT

## 2020-02-05 PROCEDURE — 80053 COMPREHEN METABOLIC PANEL: CPT

## 2020-02-05 PROCEDURE — 85025 COMPLETE CBC W/AUTO DIFF WBC: CPT

## 2020-05-27 ENCOUNTER — OFFICE VISIT (OUTPATIENT)
Dept: OBGYN CLINIC | Facility: MEDICAL CENTER | Age: 80
End: 2020-05-27
Payer: COMMERCIAL

## 2020-05-27 VITALS
WEIGHT: 142.3 LBS | HEIGHT: 64 IN | SYSTOLIC BLOOD PRESSURE: 112 MMHG | DIASTOLIC BLOOD PRESSURE: 72 MMHG | BODY MASS INDEX: 24.3 KG/M2

## 2020-05-27 DIAGNOSIS — N76.0 ACUTE VAGINITIS: ICD-10-CM

## 2020-05-27 DIAGNOSIS — Z78.0 ENCOUNTER FOR OSTEOPOROSIS SCREENING IN ASYMPTOMATIC POSTMENOPAUSAL PATIENT: ICD-10-CM

## 2020-05-27 DIAGNOSIS — Z01.419 ENCOUNTER FOR GYNECOLOGICAL EXAMINATION (GENERAL) (ROUTINE) WITHOUT ABNORMAL FINDINGS: Primary | ICD-10-CM

## 2020-05-27 DIAGNOSIS — N95.2 ATROPHIC VAGINITIS: ICD-10-CM

## 2020-05-27 DIAGNOSIS — Z13.820 ENCOUNTER FOR OSTEOPOROSIS SCREENING IN ASYMPTOMATIC POSTMENOPAUSAL PATIENT: ICD-10-CM

## 2020-05-27 DIAGNOSIS — R30.0 DYSURIA: ICD-10-CM

## 2020-05-27 PROCEDURE — 87660 TRICHOMONAS VAGIN DIR PROBE: CPT | Performed by: OBSTETRICS & GYNECOLOGY

## 2020-05-27 PROCEDURE — G0101 CA SCREEN;PELVIC/BREAST EXAM: HCPCS | Performed by: OBSTETRICS & GYNECOLOGY

## 2020-05-27 PROCEDURE — 87510 GARDNER VAG DNA DIR PROBE: CPT | Performed by: OBSTETRICS & GYNECOLOGY

## 2020-05-27 PROCEDURE — 87480 CANDIDA DNA DIR PROBE: CPT | Performed by: OBSTETRICS & GYNECOLOGY

## 2020-05-27 PROCEDURE — 87086 URINE CULTURE/COLONY COUNT: CPT | Performed by: OBSTETRICS & GYNECOLOGY

## 2020-05-27 RX ORDER — MELATONIN
1000 DAILY
COMMUNITY

## 2020-05-28 LAB
BACTERIA UR CULT: NORMAL
CANDIDA RRNA VAG QL PROBE: NEGATIVE
G VAGINALIS RRNA GENITAL QL PROBE: POSITIVE
T VAGINALIS RRNA GENITAL QL PROBE: NEGATIVE

## 2020-05-29 DIAGNOSIS — N76.0 BACTERIAL VAGINOSIS: Primary | ICD-10-CM

## 2020-05-29 DIAGNOSIS — B96.89 BACTERIAL VAGINOSIS: Primary | ICD-10-CM

## 2020-05-29 RX ORDER — METRONIDAZOLE 500 MG/1
500 TABLET ORAL EVERY 12 HOURS SCHEDULED
Qty: 14 TABLET | Refills: 0 | Status: SHIPPED | OUTPATIENT
Start: 2020-05-29 | End: 2020-06-05

## 2020-06-02 ENCOUNTER — TELEPHONE (OUTPATIENT)
Dept: OBGYN CLINIC | Facility: MEDICAL CENTER | Age: 80
End: 2020-06-02

## 2020-06-05 ENCOUNTER — TELEMEDICINE (OUTPATIENT)
Dept: NEUROSURGERY | Facility: CLINIC | Age: 80
End: 2020-06-05
Payer: COMMERCIAL

## 2020-06-05 DIAGNOSIS — I60.2 NONTRAUMATIC SUBARACHNOID HEMORRHAGE FROM ANTERIOR COMMUNICATING ARTERY (HCC): ICD-10-CM

## 2020-06-05 DIAGNOSIS — Z98.890 S/P COIL EMBOLIZATION OF CEREBRAL ANEURYSM: Primary | ICD-10-CM

## 2020-06-05 PROCEDURE — 99443 PR PHYS/QHP TELEPHONE EVALUATION 21-30 MIN: CPT | Performed by: RADIOLOGY

## 2020-07-24 ENCOUNTER — ANESTHESIA (OUTPATIENT)
Dept: ANESTHESIOLOGY | Facility: HOSPITAL | Age: 80
End: 2020-07-24

## 2020-07-24 ENCOUNTER — ANESTHESIA EVENT (OUTPATIENT)
Dept: ANESTHESIOLOGY | Facility: HOSPITAL | Age: 80
End: 2020-07-24

## 2020-07-27 ENCOUNTER — TELEPHONE (OUTPATIENT)
Dept: OBGYN CLINIC | Facility: MEDICAL CENTER | Age: 80
End: 2020-07-27

## 2020-07-27 DIAGNOSIS — N95.2 ATROPHIC VAGINITIS: Primary | ICD-10-CM

## 2020-07-27 RX ORDER — ESTRADIOL 10 UG/1
1 INSERT VAGINAL 2 TIMES WEEKLY
Qty: 8 TABLET | Refills: 6 | Status: SHIPPED | OUTPATIENT
Start: 2020-07-27 | End: 2021-01-26

## 2020-07-27 NOTE — TELEPHONE ENCOUNTER
Would like to know if it is possible to switch to the vaginal inserts instead of the cream  States premarin cream is too messy

## 2020-07-31 DIAGNOSIS — Z86.010 HISTORY OF COLON POLYPS: ICD-10-CM

## 2020-07-31 PROCEDURE — U0003 INFECTIOUS AGENT DETECTION BY NUCLEIC ACID (DNA OR RNA); SEVERE ACUTE RESPIRATORY SYNDROME CORONAVIRUS 2 (SARS-COV-2) (CORONAVIRUS DISEASE [COVID-19]), AMPLIFIED PROBE TECHNIQUE, MAKING USE OF HIGH THROUGHPUT TECHNOLOGIES AS DESCRIBED BY CMS-2020-01-R: HCPCS

## 2020-08-02 LAB — SARS-COV-2 RNA SPEC QL NAA+PROBE: NOT DETECTED

## 2020-08-07 ENCOUNTER — HOSPITAL ENCOUNTER (OUTPATIENT)
Dept: GASTROENTEROLOGY | Facility: AMBULARY SURGERY CENTER | Age: 80
Setting detail: OUTPATIENT SURGERY
Discharge: HOME | End: 2020-08-07
Attending: COLON & RECTAL SURGERY
Payer: COMMERCIAL

## 2020-08-07 ENCOUNTER — ANESTHESIA EVENT (OUTPATIENT)
Dept: GASTROENTEROLOGY | Facility: AMBULARY SURGERY CENTER | Age: 80
End: 2020-08-07

## 2020-08-07 ENCOUNTER — ANESTHESIA (OUTPATIENT)
Dept: GASTROENTEROLOGY | Facility: AMBULARY SURGERY CENTER | Age: 80
End: 2020-08-07

## 2020-08-07 VITALS
RESPIRATION RATE: 16 BRPM | HEIGHT: 64 IN | HEART RATE: 66 BPM | OXYGEN SATURATION: 99 % | SYSTOLIC BLOOD PRESSURE: 119 MMHG | DIASTOLIC BLOOD PRESSURE: 69 MMHG | BODY MASS INDEX: 23.05 KG/M2 | WEIGHT: 135 LBS | TEMPERATURE: 97.4 F

## 2020-08-07 DIAGNOSIS — Z86.010 HISTORY OF COLON POLYPS: ICD-10-CM

## 2020-08-07 PROCEDURE — 88305 TISSUE EXAM BY PATHOLOGIST: CPT | Performed by: PATHOLOGY

## 2020-08-07 PROCEDURE — 99213 OFFICE O/P EST LOW 20 MIN: CPT | Performed by: COLON & RECTAL SURGERY

## 2020-08-07 PROCEDURE — 45385 COLONOSCOPY W/LESION REMOVAL: CPT | Performed by: COLON & RECTAL SURGERY

## 2020-08-07 RX ORDER — LIDOCAINE HYDROCHLORIDE 10 MG/ML
INJECTION, SOLUTION EPIDURAL; INFILTRATION; INTRACAUDAL; PERINEURAL AS NEEDED
Status: DISCONTINUED | OUTPATIENT
Start: 2020-08-07 | End: 2020-08-07

## 2020-08-07 RX ORDER — PROPOFOL 10 MG/ML
INJECTION, EMULSION INTRAVENOUS CONTINUOUS PRN
Status: DISCONTINUED | OUTPATIENT
Start: 2020-08-07 | End: 2020-08-07

## 2020-08-07 RX ORDER — SODIUM CHLORIDE, SODIUM LACTATE, POTASSIUM CHLORIDE, CALCIUM CHLORIDE 600; 310; 30; 20 MG/100ML; MG/100ML; MG/100ML; MG/100ML
125 INJECTION, SOLUTION INTRAVENOUS CONTINUOUS
Status: DISCONTINUED | OUTPATIENT
Start: 2020-08-07 | End: 2020-08-11 | Stop reason: HOSPADM

## 2020-08-07 RX ORDER — SODIUM CHLORIDE, SODIUM LACTATE, POTASSIUM CHLORIDE, CALCIUM CHLORIDE 600; 310; 30; 20 MG/100ML; MG/100ML; MG/100ML; MG/100ML
INJECTION, SOLUTION INTRAVENOUS CONTINUOUS PRN
Status: DISCONTINUED | OUTPATIENT
Start: 2020-08-07 | End: 2020-08-07

## 2020-08-07 RX ORDER — PROPOFOL 10 MG/ML
INJECTION, EMULSION INTRAVENOUS AS NEEDED
Status: DISCONTINUED | OUTPATIENT
Start: 2020-08-07 | End: 2020-08-07

## 2020-08-07 RX ADMIN — PROPOFOL 120 MCG/KG/MIN: 10 INJECTION, EMULSION INTRAVENOUS at 07:40

## 2020-08-07 RX ADMIN — SODIUM CHLORIDE, SODIUM LACTATE, POTASSIUM CHLORIDE, AND CALCIUM CHLORIDE 125 ML/HR: .6; .31; .03; .02 INJECTION, SOLUTION INTRAVENOUS at 07:17

## 2020-08-07 RX ADMIN — LIDOCAINE HYDROCHLORIDE 50 MG: 10 INJECTION, SOLUTION EPIDURAL; INFILTRATION; INTRACAUDAL; PERINEURAL at 07:40

## 2020-08-07 RX ADMIN — PROPOFOL 60 MG: 10 INJECTION, EMULSION INTRAVENOUS at 07:40

## 2020-08-07 RX ADMIN — SODIUM CHLORIDE, SODIUM LACTATE, POTASSIUM CHLORIDE, AND CALCIUM CHLORIDE: .6; .31; .03; .02 INJECTION, SOLUTION INTRAVENOUS at 07:39

## 2020-08-07 NOTE — ANESTHESIA POSTPROCEDURE EVALUATION
Post-Op Assessment Note    CV Status:  Stable  Pain Score: 0    Pain management: adequate     Mental Status:  Sleepy   Hydration Status:  Euvolemic   PONV Controlled:  Controlled   Airway Patency:  Patent      Post Op Vitals Reviewed: Yes      Staff: CRNA         No complications documented      /73 (08/07/20 0811)    Temp (!) 97 4 °F (36 3 °C) (08/07/20 0811)    Pulse 63 (08/07/20 0811)   Resp 16 (08/07/20 0811)    SpO2 100 % (08/07/20 0811)

## 2020-08-07 NOTE — H&P
History and Physical   Colon and Rectal Surgery   Herrera Zaman 79 y o  female MRN: [id number]  Unit/Bed#:  Encounter: [id number]  08/07/20   7:25 AM      CC:  History of sessile serrated adenomas  History of Present Illness   HPI:  Herrera Zaman is a 79 y o  female with no GI symptoms      Historical Information   Past Medical History:   Diagnosis Date    Colon polyp     Fracture of arm     Glaucoma     Hypotension      Past Surgical History:   Procedure Laterality Date    BUNIONECTOMY      BUNIONECTOMY      CATARACT EXTRACTION      ELBOW SURGERY Right     IR CEREBRAL ANGIOGRAPHY  6/26/2019    IR CEREBRAL ANGIOGRAPHY / INTERVENTION  11/25/2018    TONSILLECTOMY         Meds/Allergies     (Not in a hospital admission)        Current Outpatient Medications:     dorzolamide-timolol (COSOPT) 22 3-6 8 MG/ML ophthalmic solution, Administer 1 drop to both eyes 2 (two) times a day, Disp: , Rfl:     travoprost (TRAVATAN-Z) 0 004 % ophthalmic solution, 1 drop daily at bedtime, Disp: , Rfl:     cholecalciferol (VITAMIN D3) 1,000 units tablet, Take 1,000 Units by mouth daily, Disp: , Rfl:     estradiol (Yuvafem) 10 MCG TABS vaginal tablet, Insert 1 tablet (10 mcg total) into the vagina 2 (two) times a week, Disp: 8 tablet, Rfl: 6    multivitamin (THERAGRAN) TABS, Take 1 tablet by mouth daily, Disp: , Rfl:     Current Facility-Administered Medications:     lactated ringers infusion, 125 mL/hr, Intravenous, Continuous, Magalys Anton MD, Last Rate: 125 mL/hr at 08/07/20 0717, 125 mL/hr at 08/07/20 0717    Allergies   Allergen Reactions    Codeine GI Intolerance    Metronidazole      Headaches/dizzy/swelling of tongue         Social History   Social History     Substance and Sexual Activity   Alcohol Use Not Currently    Alcohol/week: 2 0 standard drinks    Types: 2 Glasses of wine per week    Comment: rare use, not using due to her condition     Social History     Substance and Sexual Activity Drug Use No     Social History     Tobacco Use   Smoking Status Never Smoker   Smokeless Tobacco Never Used         Family History:   Family History   Problem Relation Age of Onset    Cancer Maternal Grandfather     Colon cancer Maternal Grandfather 36    No Known Problems Mother     No Known Problems Father     No Known Problems Sister     No Known Problems Daughter     No Known Problems Maternal Grandmother     No Known Problems Paternal Grandmother     No Known Problems Paternal Grandfather     No Known Problems Maternal Aunt     No Known Problems Paternal Aunt     No Known Problems Paternal Aunt          Objective     Current Vitals:   Blood Pressure: 138/62 (08/07/20 0715)  Pulse: 73 (08/07/20 0715)  Temperature: 98 1 °F (36 7 °C) (08/07/20 0715)  Temp Source: Temporal (08/07/20 0715)  Respirations: 20 (08/07/20 0715)  Height: 5' 4" (162 6 cm) (08/07/20 0715)  Weight - Scale: 61 2 kg (135 lb) (08/07/20 0715)  SpO2: 99 % (08/07/20 0715)  No intake or output data in the 24 hours ending 08/07/20 0725    Physical Exam:  General:  Well nourished, no distress  Neuro: Alert and oriented  Eyes:Sclera anicteric, conjunctiva pink  Pulm: Clear to auscultation bilaterally  No respiratory Distress  CV:  Regular rate and rhythm  No murmurs  Abdomen:  Soft, flat, non-tender, without masses or hepatosplenomegaly  Lab Results:       ASSESSMENT:  Cosmo Ny is a 79 y o  female for surveillance  PLAN:  Colonoscopy  Risks , including, but not limited to, bleeding, perforation, missed lesions, and potential need for surgery, were reviewed  Alternatives to colonoscopy were discussed    Al Archer MD

## 2020-08-07 NOTE — ANESTHESIA PREPROCEDURE EVALUATION
Procedure:  COLONOSCOPY    Relevant Problems   NEURO/PSYCH   (+) History of colon polyps      Other   (+) Glaucoma   (+) S/P coil embolization of cerebral aneurysm             Anesthesia Plan  ASA Score- 2     Anesthesia Type- IV sedation with anesthesia with ASA Monitors  Additional Monitors:   Airway Plan:           Plan Factors-Exercise tolerance (METS): >4 METS  Chart reviewed  Patient summary reviewed  Patient is not a current smoker  Induction-     Postoperative Plan-     Informed Consent- Anesthetic plan and risks discussed with patient  I personally reviewed this patient with the CRNA  Discussed and agreed on the Anesthesia Plan with the CRNA  Evie Vu

## 2020-08-11 DIAGNOSIS — Z01.812 ENCOUNTER FOR PREPROCEDURAL LABORATORY EXAMINATION: Primary | ICD-10-CM

## 2020-08-28 ENCOUNTER — TELEPHONE (OUTPATIENT)
Dept: NEUROSURGERY | Facility: CLINIC | Age: 80
End: 2020-08-28

## 2020-08-28 NOTE — TELEPHONE ENCOUNTER
Spoke with pt who was confused about upcoming lab required for procedure  Reviewed lab, not fasting and may get approx 1 week prior to study   She stated an understanding

## 2020-09-01 ENCOUNTER — TRANSCRIBE ORDERS (OUTPATIENT)
Dept: LAB | Facility: CLINIC | Age: 80
End: 2020-09-01

## 2020-09-01 ENCOUNTER — APPOINTMENT (OUTPATIENT)
Dept: LAB | Facility: CLINIC | Age: 80
End: 2020-09-01
Payer: COMMERCIAL

## 2020-09-01 DIAGNOSIS — Z01.812 ENCOUNTER FOR PREPROCEDURAL LABORATORY EXAMINATION: ICD-10-CM

## 2020-09-01 LAB
BUN SERPL-MCNC: 13 MG/DL (ref 5–25)
CREAT SERPL-MCNC: 0.58 MG/DL (ref 0.6–1.3)
GFR SERPL CREATININE-BSD FRML MDRD: 88 ML/MIN/1.73SQ M

## 2020-09-01 PROCEDURE — 82565 ASSAY OF CREATININE: CPT

## 2020-09-01 PROCEDURE — 36415 COLL VENOUS BLD VENIPUNCTURE: CPT

## 2020-09-01 PROCEDURE — 84520 ASSAY OF UREA NITROGEN: CPT

## 2020-09-08 ENCOUNTER — HOSPITAL ENCOUNTER (OUTPATIENT)
Dept: MRI IMAGING | Facility: HOSPITAL | Age: 80
Discharge: HOME | End: 2020-09-08
Attending: RADIOLOGY
Payer: COMMERCIAL

## 2020-09-08 DIAGNOSIS — Z98.890 S/P COIL EMBOLIZATION OF CEREBRAL ANEURYSM: ICD-10-CM

## 2020-09-08 PROCEDURE — A9585 GADOBUTROL INJECTION: HCPCS | Performed by: RADIOLOGY

## 2020-09-08 PROCEDURE — 70546 MR ANGIOGRAPH HEAD W/O&W/DYE: CPT

## 2020-09-08 RX ADMIN — GADOBUTROL 6 ML: 604.72 INJECTION INTRAVENOUS at 09:49

## 2020-09-11 ENCOUNTER — OFFICE VISIT (OUTPATIENT)
Dept: NEUROSURGERY | Facility: CLINIC | Age: 80
End: 2020-09-11
Payer: COMMERCIAL

## 2020-09-11 VITALS
RESPIRATION RATE: 16 BRPM | WEIGHT: 137 LBS | HEIGHT: 64 IN | DIASTOLIC BLOOD PRESSURE: 80 MMHG | TEMPERATURE: 97 F | BODY MASS INDEX: 23.39 KG/M2 | SYSTOLIC BLOOD PRESSURE: 124 MMHG | HEART RATE: 75 BPM

## 2020-09-11 DIAGNOSIS — I60.2 NONTRAUMATIC SUBARACHNOID HEMORRHAGE FROM ANTERIOR COMMUNICATING ARTERY (HCC): Primary | ICD-10-CM

## 2020-09-11 DIAGNOSIS — Z98.890 S/P COIL EMBOLIZATION OF CEREBRAL ANEURYSM: ICD-10-CM

## 2020-09-11 PROCEDURE — 99213 OFFICE O/P EST LOW 20 MIN: CPT | Performed by: RADIOLOGY

## 2020-09-11 NOTE — PROGRESS NOTES
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)  -     MRA head w wo contrast; Future    S/P coil embolization of cerebral aneurysm  -     MRA head w wo contrast; Future        Discussion Summary:   Yomaira Sosa has stable mild residual filling of her A-comm aneurysm  Diagnostic angiography is unlikely to  at this time given her age and her reluctance for any further procedures  Therefore we will proceed with MRA follow-up in 6 months  The patient, patient's family was counseled regarding diagnostic results, instructions for management  Total time of encounter was 60 minutes and 35 minutes was spent counseling  Chief Complaint: Follow-up      Patient ID: Tommie Roca is a 79 y o  female    HPI  Ms Jayda Bryant presents for follow-up of her cerebral aneurysm  She is now 22 months status post SAH from her ACOM aneurysm rupture  She denies any headaches or any stroke-like symptoms  no vision changes  She reports feeling well however is still coming to terms with the loss of her   Review of Systems   Constitutional: Negative  HENT: Negative  Eyes: Negative  Respiratory: Negative  Cardiovascular: Negative  Gastrointestinal: Negative  Endocrine: Negative  Genitourinary: Negative  Musculoskeletal: Negative  Skin: Negative  Allergic/Immunologic: Negative  Neurological: Negative  Negative for syncope  Hematological: Negative  Psychiatric/Behavioral: Negative  I have personally reviewed the MA's review of systems and made changes as necessary      The following portions of the patient's history were reviewed and updated as appropriate: allergies, current medications, past family history, past medical history, past social history, past surgical history and problem list       Active Ambulatory Problems     Diagnosis Date Noted    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 ([facility] [address] ) 11/24/2018    Glaucoma 11/24/2018    S/P coil embolization of cerebral aneurysm 11/26/2018    Hyponatremia 12/05/2018    Dysuria 12/09/2018    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 condition 12/24/2018    History of colon polyps 03/18/2019    Family history of malignant neoplasm of colon 03/18/2019    Family history of colonic polyps 03/18/2019    Family hx of colon cancer 03/18/2019     Resolved Ambulatory Problems     Diagnosis Date Noted    Hypophosphatemia 11/27/2018    Hypokalemia 11/28/2018    Constipated 11/30/2018     Past Medical History:   Diagnosis Date    Colon polyp     Fracture of arm     Hypotension        Past Surgical History:   Procedure Laterality Date    BUNIONECTOMY      BUNIONECTOMY      CATARACT EXTRACTION      ELBOW SURGERY Right     IR CEREBRAL ANGIOGRAPHY  6/26/2019    IR CEREBRAL ANGIOGRAPHY / INTERVENTION  11/25/2018    TONSILLECTOMY               Vitals:    09/11/20 0757   BP: 124/80   Pulse: 75   Resp: 16   Temp: (!) 97 °F (36 1 °C)         Objective:    Physical Exam  Constitutional:       Appearance: Normal appearance  HENT:      Head: Normocephalic and atraumatic  Eyes:      Extraocular Movements: Extraocular movements intact  Pupils: Pupils are equal, round, and reactive to light  Cardiovascular:      Rate and Rhythm: Normal rate and regular rhythm  Pulmonary:      Effort: Pulmonary effort is normal    Abdominal:      General: Abdomen is flat  Musculoskeletal: Normal range of motion  Skin:     General: Skin is warm and dry  Neurological:      General: No focal deficit present  Mental Status: She is alert and oriented to person, place, and time  Cranial Nerves: No cranial nerve deficit  Sensory: No sensory deficit  Motor: No weakness        Coordination: Coordination normal       Gait: Gait normal    Psychiatric:         Mood and Affect: Mood normal          Behavior: Behavior normal          Thought Content: Thought content normal          Judgment: Judgment normal        Neurologic Exam     Mental Status   Oriented to person, place, and time  Cranial Nerves     CN III, IV, VI   Pupils are equal, round, and reactive to light  Results/Data:  I have reviewed the results and images from the MRA in detail with the patient

## 2020-09-15 ENCOUNTER — HOSPITAL ENCOUNTER (OUTPATIENT)
Dept: RADIOLOGY | Age: 80
Discharge: HOME | End: 2020-09-15
Payer: COMMERCIAL

## 2020-09-15 VITALS — HEIGHT: 64 IN | WEIGHT: 137 LBS | BODY MASS INDEX: 23.39 KG/M2

## 2020-09-15 DIAGNOSIS — Z12.31 ENCOUNTER FOR SCREENING MAMMOGRAM FOR MALIGNANT NEOPLASM OF BREAST: ICD-10-CM

## 2020-09-15 DIAGNOSIS — Z12.31 ENCOUNTER FOR SCREENING MAMMOGRAM FOR MALIGNANT NEOPLASM OF BREAST: Primary | ICD-10-CM

## 2020-09-15 PROCEDURE — 77063 BREAST TOMOSYNTHESIS BI: CPT

## 2020-09-15 PROCEDURE — 77067 SCR MAMMO BI INCL CAD: CPT

## 2020-09-23 ENCOUNTER — HOSPITAL ENCOUNTER (OUTPATIENT)
Dept: RADIOLOGY | Age: 80
Discharge: HOME | End: 2020-09-23
Payer: COMMERCIAL

## 2020-09-23 DIAGNOSIS — Z13.820 ENCOUNTER FOR OSTEOPOROSIS SCREENING IN ASYMPTOMATIC POSTMENOPAUSAL PATIENT: ICD-10-CM

## 2020-09-23 DIAGNOSIS — Z78.0 ENCOUNTER FOR OSTEOPOROSIS SCREENING IN ASYMPTOMATIC POSTMENOPAUSAL PATIENT: ICD-10-CM

## 2020-09-23 PROCEDURE — 77080 DXA BONE DENSITY AXIAL: CPT

## 2020-09-23 NOTE — RESULT ENCOUNTER NOTE
Please notify pt of abnormal result: +osteoporosis in her left forearm (unclear if this is a new diagnosis since this in the initial exam of the forearm) and osteopenia is spine and hip  A daily intake of at least 1200 mg Calcium and 800 to 1000 IU of Vitamin D, as well as weight bearing and muscle strengthening exercise, fall prevention and avoidance of tobacco and excessive alcohol intake as  basic preventive measures are suggested

## 2021-01-04 DIAGNOSIS — Z01.812 ENCOUNTER FOR PREPROCEDURAL LABORATORY EXAMINATION: Primary | ICD-10-CM

## 2021-01-04 NOTE — PROGRESS NOTES
Received a call from [facility] requesting lab order be placed for BUN/Cr to be completed prior to her MRA  Placed this order and contacted [facility] to advise  She was appreciative

## 2021-01-26 DIAGNOSIS — N95.2 ATROPHIC VAGINITIS: ICD-10-CM

## 2021-01-26 RX ORDER — ESTRADIOL 10 UG/1
TABLET VAGINAL
Qty: 24 TABLET | Refills: 2 | Status: SHIPPED | OUTPATIENT
Start: 2021-01-26 | End: 2022-04-04 | Stop reason: SDUPTHER

## 2021-04-19 ENCOUNTER — TRANSCRIBE ORDERS (OUTPATIENT)
Dept: LAB | Facility: CLINIC | Age: 81
End: 2021-04-19

## 2021-04-19 ENCOUNTER — APPOINTMENT (OUTPATIENT)
Dept: LAB | Facility: CLINIC | Age: 81
End: 2021-04-19
Payer: COMMERCIAL

## 2021-04-19 DIAGNOSIS — Z01.812 ENCOUNTER FOR PREPROCEDURAL LABORATORY EXAMINATION: ICD-10-CM

## 2021-04-19 LAB
BUN SERPL-MCNC: 12 MG/DL (ref 5–25)
CREAT SERPL-MCNC: 0.74 MG/DL (ref 0.6–1.3)
GFR SERPL CREATININE-BSD FRML MDRD: 77 ML/MIN/1.73SQ M

## 2021-04-19 PROCEDURE — 84520 ASSAY OF UREA NITROGEN: CPT

## 2021-04-19 PROCEDURE — 82565 ASSAY OF CREATININE: CPT

## 2021-04-19 PROCEDURE — 36415 COLL VENOUS BLD VENIPUNCTURE: CPT

## 2021-04-26 ENCOUNTER — HOSPITAL ENCOUNTER (OUTPATIENT)
Dept: MRI IMAGING | Facility: HOSPITAL | Age: 81
Discharge: HOME | End: 2021-04-26
Attending: RADIOLOGY
Payer: COMMERCIAL

## 2021-04-26 DIAGNOSIS — Z98.890 S/P COIL EMBOLIZATION OF CEREBRAL ANEURYSM: ICD-10-CM

## 2021-04-26 DIAGNOSIS — I60.2 NONTRAUMATIC SUBARACHNOID HEMORRHAGE FROM ANTERIOR COMMUNICATING ARTERY (HCC): ICD-10-CM

## 2021-04-26 PROCEDURE — 70546 MR ANGIOGRAPH HEAD W/O&W/DYE: CPT

## 2021-04-26 PROCEDURE — G1004 CDSM NDSC: HCPCS

## 2021-04-26 PROCEDURE — A9585 GADOBUTROL INJECTION: HCPCS | Performed by: RADIOLOGY

## 2021-04-26 RX ADMIN — GADOBUTROL 6 ML: 604.72 INJECTION INTRAVENOUS at 12:07

## 2021-04-29 NOTE — SOCIAL WORK
CM met with pt at bedside and introduced self and role with dcp  Pt resides with her , Meghan Robles, in a 2 story town home with 2 [address] and flight of stairs to bedroom/bathroom  Pt reports having half bath on first floor  Pt reports independent with ADLs and ambulation PTA  Pt reports she is retired and able to drive  She reports her  is also retired and able to help her at home  He is also able to drive and assist with transportation  Pt reports she has no hx of VNA, STR, MH, or drug/alcohol abuse  Pharmacy is [facility] on [address]  Pt reports her son's Ruben Vicente and [place] Pacheco are her POA  CM to follow for dcp  CM reviewed d/c planning process including the following: identifying help at home, patient preference for d/c planning needs, Discharge Lounge, [facility] Meds to Bed program, availability of treatment team to discuss questions or concerns patient and/or family may have regarding understanding medications and recognizing signs and symptoms once discharged  CM also encouraged patient to follow up with all recommended appointments after discharge  Patient advised of importance for patient and family to participate in managing patients medical well being  Stable stage IIIa chronic kidney disease.

## 2021-04-30 ENCOUNTER — OFFICE VISIT (OUTPATIENT)
Dept: NEUROSURGERY | Facility: CLINIC | Age: 81
End: 2021-04-30
Payer: COMMERCIAL

## 2021-04-30 VITALS
HEART RATE: 79 BPM | SYSTOLIC BLOOD PRESSURE: 110 MMHG | TEMPERATURE: 98.9 F | WEIGHT: 135 LBS | BODY MASS INDEX: 23.05 KG/M2 | DIASTOLIC BLOOD PRESSURE: 80 MMHG | HEIGHT: 64 IN | RESPIRATION RATE: 16 BRPM

## 2021-04-30 DIAGNOSIS — I60.2 NONTRAUMATIC SUBARACHNOID HEMORRHAGE FROM ANTERIOR COMMUNICATING ARTERY (HCC): Primary | ICD-10-CM

## 2021-04-30 PROCEDURE — 99213 OFFICE O/P EST LOW 20 MIN: CPT | Performed by: RADIOLOGY

## 2021-04-30 NOTE — PROGRESS NOTES
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 ([address] )    Other orders  -     CALCIUM PO; Take 1 tablet by mouth daily        Discussion Summary:   Vianca Santos has been doing very well  Her MRA reveals stable trace residual ACOM aneurysm filling  No further follow-up is required  She will contact the office if she has any concerns or questions  Chief Complaint: Follow-up      Patient ID: Ponce Christine is a [de-identified] y o  female    HPI  Ms  Roxana Quintero returns for follow-up of her ACOM aneurysm  She is now over 2 years status post SAH  She has been very well  Was having sleep difficulties and taking melatonin  However melatonin was giving her headaches and since she stopped, had no further episodes  No stroke-like symptoms  Review of Systems   Constitutional: Negative  HENT: Negative  Eyes: Negative  Respiratory: Negative  Cardiovascular: Negative  Gastrointestinal: Negative  Endocrine: Negative  Genitourinary: Negative  Musculoskeletal: Negative  Skin: Negative  Allergic/Immunologic: Negative  Neurological: Negative  Negative for syncope  Hematological: Negative  Psychiatric/Behavioral: Negative  I have personally reviewed the MA's review of systems and made changes as necessary      The following portions of the patient's history were reviewed and updated as appropriate: allergies, current medications, past family history, past medical history, past social history, past surgical history and problem list       Active Ambulatory Problems     Diagnosis Date Noted    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 ([facility] [address] ) 11/24/2018    Glaucoma 11/24/2018    S/P coil embolization of cerebral aneurysm 11/26/2018    Hyponatremia 12/05/2018    Dysuria 12/09/2018    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 condition 12/24/2018    History of colon polyps 03/18/2019    Family history of malignant neoplasm of colon 03/18/2019    Family history of colonic polyps 03/18/2019    Family hx of colon cancer 03/18/2019     Resolved Ambulatory Problems     Diagnosis Date Noted    Hypophosphatemia 11/27/2018    Hypokalemia 11/28/2018    Constipated 11/30/2018     Past Medical History:   Diagnosis Date    Colon polyp     Fracture of arm     Hypotension        Past Surgical History:   Procedure Laterality Date    BUNIONECTOMY      BUNIONECTOMY      CATARACT EXTRACTION      ELBOW SURGERY Right     IR CEREBRAL ANGIOGRAPHY  6/26/2019    IR CEREBRAL ANGIOGRAPHY / INTERVENTION  11/25/2018    TONSILLECTOMY               Vitals:    04/30/21 1020   BP: 110/80   Pulse: 79   Resp: 16   Temp: 98 9 °F (37 2 °C)         Objective:    Physical Exam  Eyes:      Extraocular Movements: Extraocular movements intact  Pupils: Pupils are equal, round, and reactive to light  Pulmonary:      Effort: Pulmonary effort is normal    Musculoskeletal: Normal range of motion  Skin:     General: Skin is warm and dry  Neurological:      General: No focal deficit present  Mental Status: She is oriented to person, place, and time  Cranial Nerves: No cranial nerve deficit  Motor: No weakness  Gait: Gait normal    Psychiatric:         Mood and Affect: Mood normal          Behavior: Behavior normal          Thought Content: Thought content normal          Judgment: Judgment normal        Neurologic Exam     Mental Status   Oriented to person, place, and time  Cranial Nerves     CN III, IV, VI   Pupils are equal, round, and reactive to light  Results/Data:  I have reviewed the results and images from the MRA in detail with the patient

## 2021-05-03 NOTE — PROGRESS NOTES
H&P and labs reviewed  /71 (BP Location: Left arm)   Pulse 75   Temp 97 9 °F (36 6 °C) (Oral)   Resp 18   Ht 5' 4" (1 626 m)   Wt 62 1 kg (137 lb)   LMP  (LMP Unknown)   SpO2 98%   BMI 23 52 kg/m²    After examining the patient I find no changes in the patients condition since the H&P had been written  Plan is for cerebral angiogram  Risks and benefits have been discussed and they have elected to proceed  Abdomen soft, non-tender, no guarding. Interpolation Flap Text: A decision was made to reconstruct the defect utilizing an interpolation axial flap and a staged reconstruction.  A telfa template was made of the defect.  This telfa template was then used to outline the interpolation flap.  The donor area for the pedicle flap was then injected with anesthesia.  The flap was excised through the skin and subcutaneous tissue down to the layer of the underlying musculature.  The interpolation flap was carefully excised within this deep plane to maintain its blood supply.  The edges of the donor site were undermined.   The donor site was closed in a primary fashion.  The pedicle was then rotated into position and sutured.  Once the tube was sutured into place, adequate blood supply was confirmed with blanching and refill.  The pedicle was then wrapped with xeroform gauze and dressed appropriately with a telfa and gauze bandage to ensure continued blood supply and protect the attached pedicle.

## 2021-06-02 ENCOUNTER — ANNUAL EXAM (OUTPATIENT)
Dept: OBGYN CLINIC | Facility: MEDICAL CENTER | Age: 81
End: 2021-06-02
Payer: COMMERCIAL

## 2021-06-02 VITALS
BODY MASS INDEX: 23.22 KG/M2 | DIASTOLIC BLOOD PRESSURE: 80 MMHG | WEIGHT: 136 LBS | SYSTOLIC BLOOD PRESSURE: 120 MMHG | HEIGHT: 64 IN

## 2021-06-02 DIAGNOSIS — Z12.39 ENCOUNTER FOR SCREENING FOR MALIGNANT NEOPLASM OF BREAST, UNSPECIFIED SCREENING MODALITY: ICD-10-CM

## 2021-06-02 DIAGNOSIS — Z01.419 ENCOUNTER FOR GYNECOLOGICAL EXAMINATION (GENERAL) (ROUTINE) WITHOUT ABNORMAL FINDINGS: Primary | ICD-10-CM

## 2021-06-02 PROCEDURE — G0101 CA SCREEN;PELVIC/BREAST EXAM: HCPCS | Performed by: OBSTETRICS & GYNECOLOGY

## 2021-06-02 NOTE — PROGRESS NOTES
ASSESSMENT & PLAN: Jose Angel Sampson was seen today for gynecologic exam     Diagnoses and all orders for this visit:    Encounter for gynecological examination (general) (routine) without abnormal findings    Encounter for screening for malignant neoplasm of breast, unspecified screening modality  -     Mammo screening bilateral w 3d & cad; Future      1  Routine well woman exam done today  2   Pap and HPV:  Pap and cotesting was not done today  Current ASCCP Guidelines reviewed  3   Mammogram was ordered  4   Colorectal cancer screening is up to date  5   DEXA is up to date  DEXA was not ordered today  6  The following were reviewed in today's visit: adequate intake of calcium and vitamin D, exercise and healthy diet  7   F/u 1-2 years for next routine gyn exam   8    Patient may consider changing her vaginal estrogen preparation to Imvexxy to see if there is less of a reaction  Pt will notify us  CC:  Annual Gynecologic Examination    HPI: Antione Pena is a [de-identified] y o  who presents for annual gynecologic examination  She has the following concerns:  Pt is using the vagifem and thinks it causes occ itching  Doesn't want to go back on estrogen cream thinks it is too messy  Lost 10 lbs in past year  Health Maintenance:    Patient describes her health as good  Patient does not have weight concerns  She exercises 7 days per week with walking at least 30 minutes  She does wear her seatbelt routinely  She does perform regular monthly self breast exams  She does feel safe at home  Patients does follow a healthy diet  Patient gets 1 servings of dairy or calcium rich foods daily        Last pap: 3 years ago  Last mammogram:   Last colorectal cancer screenin  Last DEXA:     Patient Active Problem List   Diagnosis    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 ([address] )    Glaucoma    S/P coil embolization of cerebral aneurysm    Hyponatremia    Dysuria    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 condition    History of colon polyps    Family history of malignant neoplasm of colon    Family history of colonic polyps    Family hx of colon cancer       Past Medical History:   Diagnosis Date    Colon polyp     Fracture of arm     Glaucoma     Hypotension        Past Surgical History:   Procedure Laterality Date    BUNIONECTOMY      BUNIONECTOMY      CATARACT EXTRACTION      ELBOW SURGERY Right     IR CEREBRAL ANGIOGRAPHY  6/26/2019    IR CEREBRAL ANGIOGRAPHY / INTERVENTION  11/25/2018    TONSILLECTOMY         Past OB/Gyn History:    Patient is not currently sexually active  heterosexual     Family History   Problem Relation Age of Onset    Cancer Maternal Grandfather     Colon cancer Maternal Grandfather 36    No Known Problems Mother     No Known Problems Father     No Known Problems Sister     No Known Problems Daughter     No Known Problems Maternal Grandmother     No Known Problems Paternal Grandmother     No Known Problems Paternal Grandfather     No Known Problems Maternal Aunt     No Known Problems Paternal Aunt     No Known Problems Paternal Aunt        Social History:   Social History     Socioeconomic History    Marital status:       Spouse name: Not on file    Number of children: Not on file    Years of education: Not on file    Highest education level: Not on file   Occupational History    Not on file   Social Needs    Financial resource strain: Not on file    Food insecurity     Worry: Not on file     Inability: Not on file    Transportation needs     Medical: Not on file     Non-medical: Not on file   Tobacco Use    Smoking status: Never Smoker    Smokeless tobacco: Never Used   Substance and Sexual Activity    Alcohol use: Not Currently     Alcohol/week: 2 0 standard drinks     Types: 2 Glasses of wine per week     Comment: rare use, not using due to her condition    Drug use: No    Sexual activity: Not Currently     Birth control/protection: Post-menopausal   Lifestyle    Physical activity     Days per week: Not on file     Minutes per session: Not on file    Stress: Not on file   Relationships    Social connections     Talks on phone: Not on file     Gets together: Not on file     Attends Jehovah's witness service: Not on file     Active member of club or organization: Not on file     Attends meetings of clubs or organizations: Not on file     Relationship status: Not on file    Intimate partner violence     Fear of current or ex partner: Not on file     Emotionally abused: Not on file     Physically abused: Not on file     Forced sexual activity: Not on file   Other Topics Concern    Not on file   Social History Narrative    Not on file     Presently lives alone  Patient is currently retired  Allergies   Allergen Reactions    Codeine GI Intolerance    Metronidazole      Headaches/dizzy/swelling of tongue         Current Outpatient Medications:     CALCIUM PO, Take 1 tablet by mouth daily, Disp: , Rfl:     cholecalciferol (VITAMIN D3) 1,000 units tablet, Take 1,000 Units by mouth daily, Disp: , Rfl:     dorzolamide-timolol (COSOPT) 22 3-6 8 MG/ML ophthalmic solution, Administer 1 drop to both eyes 2 (two) times a day, Disp: , Rfl:     multivitamin (THERAGRAN) TABS, Take 1 tablet by mouth daily, Disp: , Rfl:     travoprost (TRAVATAN-Z) 0 004 % ophthalmic solution, 1 drop daily at bedtime, Disp: , Rfl:     Yuvafem 10 MCG TABS vaginal tablet, INSERT 1 TABLET (10 MCG TOTAL) INTO THE VAGINA 2 (TWO) TIMES A WEEK, Disp: 24 tablet, Rfl: 2    Review of Systems  Constitutional :no fever, feels well, no tiredness, no recent weight gain or loss  ENT: no ear ache, no loss of hearing, no nosebleeds or nasal discharge, no sore throat or hoarseness  Cardiovascular: no complaints of slow or fast heart beat, no chest pain, no palpitations, no leg claudication or lower extremity edema    Respiratory: no complaints of shortness of shortness of breath, no OLVERA  Breasts:no complaints of breast pain, breast lump, or nipple discharge  Gastrointestinal: no complaints of abdominal pain, constipation, nausea, vomiting, or diarrhea or bloody stools  Genitourinary : no complaints of dysuria, incontinence, pelvic pain, no dysmenorrhea, vaginal discharge or abnormal vaginal bleeding and as noted in HPI  Musculoskeletal: no complaints of arthralgia, no myalgia, no joint swelling or stiffness, no limb pain or swelling  Integumentary: no complaints of skin rash or lesion, itching or dry skin  Neurological: no complaints of headache, no confusion, no numbness or tingling, no dizziness or fainting     Physical Exam:   /80   Ht 5' 4" (1 626 m)   Wt 61 7 kg (136 lb)   LMP  (LMP Unknown)   BMI 23 34 kg/m²     General: appears stated age, cooperative, alert normal mood and affect   Psychiatric oriented to person, place and time  Mood and affect normal   Neck: normal, supple,trachea midline, no masses  Thyroid: normal, no thyromegaly   Heart: regular rate and rhythm, S1, S2 normal, no murmur, click, rub or gallop   Lungs: clear to auscultation bilaterally, no increased work of breathing or signs of respiratory distress   Breasts: normal, no dimpling or skin changes noted, sm f-c changes b/l   Abdomen: soft, non-tender, without masses or organomegaly   Vulva: normal , no lesions   Vagina: normal , no lesions,sig atrophy   Urethra: normal   Urethal meatus normal   Bladder Normal, soft, non-tender and no prolapse or masses appreciated   Cervix: normal, no palpable masses    Uterus: normal , non-tender, not enlarged, no palpable masses   Adnexa: normal, non-tender without fullness or masses   Lymphatic Palpation of lymph nodes in neck, axilla, groin and/or other locations: no lymphadenopathy or masses noted   Skin Normal skin turgor and no rashes    Palpation of skin and subcutaneous tissue normal

## 2021-06-02 NOTE — PATIENT INSTRUCTIONS
Thank you for your confidence in our team    We appreciate you and welcome your feedback  If you receive a survey from us, please take a few moments to let us know how we are doing     Sincerely,   Elio Ibarra MD

## 2021-07-09 PROBLEM — J34.2 NASAL SEPTAL DEVIATION: Status: ACTIVE | Noted: 2021-07-09

## 2021-07-09 PROBLEM — R09.81 NASAL CONGESTION: Status: ACTIVE | Noted: 2021-07-09

## 2021-07-09 PROBLEM — R09.82 POST-NASAL DRIP: Status: ACTIVE | Noted: 2021-07-09

## 2021-07-09 PROBLEM — H90.3 SENSORINEURAL HEARING LOSS (SNHL) OF BOTH EARS: Status: ACTIVE | Noted: 2021-07-09

## 2021-08-13 ENCOUNTER — APPOINTMENT (OUTPATIENT)
Dept: LAB | Facility: CLINIC | Age: 81
End: 2021-08-13
Payer: COMMERCIAL

## 2021-08-13 DIAGNOSIS — Z00.01 ENCOUNTER FOR GENERAL ADULT MEDICAL EXAMINATION WITH ABNORMAL FINDINGS: ICD-10-CM

## 2021-08-13 DIAGNOSIS — I61.9 NONTRAUMATIC INTRACEREBRAL HEMORRHAGE, UNSPECIFIED CEREBRAL LOCATION, UNSPECIFIED LATERALITY (HCC): ICD-10-CM

## 2021-08-13 DIAGNOSIS — E78.5 HYPERLIPIDEMIA, UNSPECIFIED HYPERLIPIDEMIA TYPE: ICD-10-CM

## 2021-08-13 DIAGNOSIS — M81.8 IDIOPATHIC OSTEOPOROSIS: ICD-10-CM

## 2021-08-13 LAB
25(OH)D3 SERPL-MCNC: 43.3 NG/ML (ref 30–100)
ALBUMIN SERPL BCP-MCNC: 3.4 G/DL (ref 3.5–5)
ALP SERPL-CCNC: 56 U/L (ref 46–116)
ALT SERPL W P-5'-P-CCNC: 19 U/L (ref 12–78)
ANION GAP SERPL CALCULATED.3IONS-SCNC: 7 MMOL/L (ref 4–13)
AST SERPL W P-5'-P-CCNC: 15 U/L (ref 5–45)
BASOPHILS # BLD AUTO: 0.04 THOUSANDS/ΜL (ref 0–0.1)
BASOPHILS NFR BLD AUTO: 1 % (ref 0–1)
BILIRUB SERPL-MCNC: 0.41 MG/DL (ref 0.2–1)
BUN SERPL-MCNC: 15 MG/DL (ref 5–25)
CALCIUM ALBUM COR SERPL-MCNC: 9.1 MG/DL (ref 8.3–10.1)
CALCIUM SERPL-MCNC: 8.6 MG/DL (ref 8.3–10.1)
CHLORIDE SERPL-SCNC: 106 MMOL/L (ref 100–108)
CHOLEST SERPL-MCNC: 200 MG/DL (ref 50–200)
CO2 SERPL-SCNC: 28 MMOL/L (ref 21–32)
CREAT SERPL-MCNC: 0.58 MG/DL (ref 0.6–1.3)
EOSINOPHIL # BLD AUTO: 0.08 THOUSAND/ΜL (ref 0–0.61)
EOSINOPHIL NFR BLD AUTO: 2 % (ref 0–6)
ERYTHROCYTE [DISTWIDTH] IN BLOOD BY AUTOMATED COUNT: 14.3 % (ref 11.6–15.1)
GFR SERPL CREATININE-BSD FRML MDRD: 87 ML/MIN/1.73SQ M
GLUCOSE P FAST SERPL-MCNC: 94 MG/DL (ref 65–99)
HCT VFR BLD AUTO: 39.2 % (ref 34.8–46.1)
HDLC SERPL-MCNC: 82 MG/DL
HGB BLD-MCNC: 12.5 G/DL (ref 11.5–15.4)
IMM GRANULOCYTES # BLD AUTO: 0.03 THOUSAND/UL (ref 0–0.2)
IMM GRANULOCYTES NFR BLD AUTO: 1 % (ref 0–2)
LDLC SERPL CALC-MCNC: 107 MG/DL (ref 0–100)
LYMPHOCYTES # BLD AUTO: 0.9 THOUSANDS/ΜL (ref 0.6–4.47)
LYMPHOCYTES NFR BLD AUTO: 21 % (ref 14–44)
MCH RBC QN AUTO: 30.5 PG (ref 26.8–34.3)
MCHC RBC AUTO-ENTMCNC: 31.9 G/DL (ref 31.4–37.4)
MCV RBC AUTO: 96 FL (ref 82–98)
MONOCYTES # BLD AUTO: 0.34 THOUSAND/ΜL (ref 0.17–1.22)
MONOCYTES NFR BLD AUTO: 8 % (ref 4–12)
NEUTROPHILS # BLD AUTO: 2.94 THOUSANDS/ΜL (ref 1.85–7.62)
NEUTS SEG NFR BLD AUTO: 67 % (ref 43–75)
NONHDLC SERPL-MCNC: 118 MG/DL
NRBC BLD AUTO-RTO: 0 /100 WBCS
PLATELET # BLD AUTO: 192 THOUSANDS/UL (ref 149–390)
PMV BLD AUTO: 9.9 FL (ref 8.9–12.7)
POTASSIUM SERPL-SCNC: 3.6 MMOL/L (ref 3.5–5.3)
PROT SERPL-MCNC: 6.8 G/DL (ref 6.4–8.2)
RBC # BLD AUTO: 4.1 MILLION/UL (ref 3.81–5.12)
SODIUM SERPL-SCNC: 141 MMOL/L (ref 136–145)
TRIGL SERPL-MCNC: 55 MG/DL
WBC # BLD AUTO: 4.33 THOUSAND/UL (ref 4.31–10.16)

## 2021-08-13 PROCEDURE — 82306 VITAMIN D 25 HYDROXY: CPT

## 2021-08-13 PROCEDURE — 85025 COMPLETE CBC W/AUTO DIFF WBC: CPT

## 2021-08-13 PROCEDURE — 80053 COMPREHEN METABOLIC PANEL: CPT

## 2021-08-13 PROCEDURE — 80061 LIPID PANEL: CPT

## 2021-08-13 PROCEDURE — 36415 COLL VENOUS BLD VENIPUNCTURE: CPT

## 2021-08-30 ENCOUNTER — OFFICE VISIT (OUTPATIENT)
Dept: OBGYN CLINIC | Facility: MEDICAL CENTER | Age: 81
End: 2021-08-30
Payer: COMMERCIAL

## 2021-08-30 VITALS — DIASTOLIC BLOOD PRESSURE: 60 MMHG | BODY MASS INDEX: 22.83 KG/M2 | SYSTOLIC BLOOD PRESSURE: 115 MMHG | WEIGHT: 133 LBS

## 2021-08-30 DIAGNOSIS — L73.9 FOLLICULITIS: Primary | ICD-10-CM

## 2021-08-30 PROCEDURE — 99213 OFFICE O/P EST LOW 20 MIN: CPT | Performed by: OBSTETRICS & GYNECOLOGY

## 2021-08-30 NOTE — PROGRESS NOTES
Assessment:   Joann Winter was seen today for breast lump  Diagnoses and all orders for this visit:    Folliculitis      Plan:  Right breast folliculitis  Appears to be resolving  Patient was counseled she could use moist heat to see if she could express further  Patient has mammogram already scheduled  Will also d/w dermatologist   All questions answered  Subjective:    Patient is a [de-identified] y o  y o  Female who presents for a problem visit  Pt is c/o - breast pain and + breast lump  Sx's are in the right side  Sx's started 2 weeks ago  Started out as a "pimple"  Squeezed it and it was smaller  Pt reports - changes to her diet  She drinks "a lot" of caffeinated products daily but quantity unchanged  Pt reports - breast trauma  Pt reports - changes in diet and activity  Pt reports - nipple discharge  Has appt with derm tomorrow       Patient Active Problem List   Diagnosis    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)    Glaucoma    S/P coil embolization of cerebral aneurysm    Hyponatremia    Dysuria    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 condition    History of colon polyps    Family history of malignant neoplasm of colon    Family history of colonic polyps    Family hx of colon cancer    Sensorineural hearing loss (SNHL) of both ears    Post-nasal drip    Nasal congestion    Nasal septal deviation       Past Medical History:   Diagnosis Date    Aneurysm ([address] ) 11/29/2018    brain    Colon polyp     Fracture of arm     Glaucoma     Hypotension        Past Surgical History:   Procedure Laterality Date    BUNIONECTOMY      BUNIONECTOMY      CATARACT EXTRACTION      ELBOW SURGERY Right     IR CEREBRAL ANGIOGRAPHY  6/26/2019    IR CEREBRAL ANGIOGRAPHY / INTERVENTION  11/25/2018    TONSILLECTOMY         Family History   Problem Relation Age of Onset    Cancer Maternal Grandfather     Colon cancer Maternal Grandfather 36    No Known Problems Mother     Heart attack Father     No Known Problems Sister     No Known Problems Daughter     No Known Problems Maternal Grandmother     No Known Problems Paternal Grandmother     No Known Problems Paternal Grandfather     No Known Problems Maternal Aunt     No Known Problems Paternal Aunt     No Known Problems Paternal Aunt     Heart disease Family        Social History     Socioeconomic History    Marital status:      Spouse name: Not on file    Number of children: Not on file    Years of education: Not on file    Highest education level: Not on file   Occupational History    Not on file   Tobacco Use    Smoking status: Never Smoker    Smokeless tobacco: Never Used   Vaping Use    Vaping Use: Never used   Substance and Sexual Activity    Alcohol use: Not Currently     Alcohol/week: 2 0 standard drinks     Types: 2 Glasses of wine per week     Comment: rare use, not using due to her condition    Drug use: No    Sexual activity: Not Currently     Birth control/protection: Post-menopausal   Other Topics Concern    Not on file   Social History Narrative    Not on file     Social Determinants of Health     Financial Resource Strain:     Difficulty of Paying Living Expenses:    Food Insecurity:     Worried About Running Out of Food in the Last Year:     [address] in the Last Year:    Transportation Needs:     Lack of Transportation (Medical):      Lack of Transportation (Non-Medical):    Physical Activity:     Days of Exercise per Week:     Minutes of Exercise per Session:    Stress:     Feeling of Stress :    Social Connections:     Frequency of Communication with Friends and Family:     Frequency of Social Gatherings with Friends and Family:     Attends L[employer] Services:     Active Member of Clubs or Organizations:     Attends Club or Organization Meetings:     Marital Status:    Intimate Partner Violence:     Fear of Current or Ex-Partner:     Emotionally Abused:     Physically Abused:     Sexually Abused:          Current Outpatient Medications:     CALCIUM PO, Take 1 tablet by mouth daily, Disp: , Rfl:     cholecalciferol (VITAMIN D3) 1,000 units tablet, Take 1,000 Units by mouth daily, Disp: , Rfl:     dorzolamide-timolol (COSOPT) 22 3-6 8 MG/ML ophthalmic solution, Administer 1 drop to both eyes 2 (two) times a day, Disp: , Rfl:     multivitamin (THERAGRAN) TABS, Take 1 tablet by mouth daily, Disp: , Rfl:     travoprost (TRAVATAN-Z) 0 004 % ophthalmic solution, 1 drop daily at bedtime, Disp: , Rfl:     Yuvafem 10 MCG TABS vaginal tablet, INSERT 1 TABLET (10 MCG TOTAL) INTO THE VAGINA 2 (TWO) TIMES A WEEK, Disp: 24 tablet, Rfl: 2    Allergies   Allergen Reactions    Codeine GI Intolerance    Metronidazole      Headaches/dizzy/swelling of tongue       Review of Systems  Constitutional :no fever, feels well, no tiredness, no recent weight gain or loss  ENT: no ear ache, no loss of hearing, no nosebleeds or nasal discharge, no sore throat or hoarseness  Cardiovascular: no complaints of slow or fast heart beat, no chest pain, no palpitations, no leg claudication or lower extremity edema  Respiratory: no complaints of shortness of shortness of breath, no OLVERA  Breasts:no complaints of breast pain, +breast lump, no nipple discharge  Gastrointestinal: no complaints of abdominal pain, constipation, nausea, vomiting, or diarrhea or bloody stools  Genitourinary : no complaints of dysuria, incontinence, pelvic pain, no dysmenorrhea, vaginal discharge or abnormal vaginal bleeding and as noted in HPI  Musculoskeletal: no complaints of arthralgia, no myalgia, no joint swelling or            stiffness, no limb pain or swelling    Integumentary: no complaints of skin rash or lesion, itching or dry skin  Neurological: no complaints of headache, no confusion, no numbness or tingling, no dizziness or fainting    Constitutional   General appearance: No acute distress, well appearing and well nourished  Psychiatric   Orientation to person, place, and time: Normal     Mood and affect: Normal     Breast  Breasts: breasts appear normal, no suspicious masses, no nipple changes or axillary nodes   sm f-c changes b/l  lower border of right breast at 5 o'clock position, 2 mm area of slight induration with "head", very minimal erythema, non tender

## 2021-09-17 ENCOUNTER — HOSPITAL ENCOUNTER (OUTPATIENT)
Dept: RADIOLOGY | Age: 81
Discharge: HOME | End: 2021-09-17
Payer: COMMERCIAL

## 2021-09-17 VITALS — BODY MASS INDEX: 22.71 KG/M2 | WEIGHT: 133 LBS | HEIGHT: 64 IN

## 2021-09-17 DIAGNOSIS — Z12.31 ENCOUNTER FOR SCREENING MAMMOGRAM FOR MALIGNANT NEOPLASM OF BREAST: ICD-10-CM

## 2021-09-17 PROCEDURE — 77067 SCR MAMMO BI INCL CAD: CPT

## 2021-09-17 PROCEDURE — 77063 BREAST TOMOSYNTHESIS BI: CPT

## 2021-10-27 ENCOUNTER — IMMUNIZATIONS (OUTPATIENT)
Dept: FAMILY MEDICINE CLINIC | Facility: HOSPITAL | Age: 81
End: 2021-10-27

## 2021-10-27 DIAGNOSIS — Z23 ENCOUNTER FOR IMMUNIZATION: Primary | ICD-10-CM

## 2022-04-04 DIAGNOSIS — N95.2 ATROPHIC VAGINITIS: ICD-10-CM

## 2022-04-04 RX ORDER — ESTRADIOL 10 UG/1
1 INSERT VAGINAL 2 TIMES WEEKLY
Qty: 24 TABLET | Refills: 2 | Status: SHIPPED | OUTPATIENT
Start: 2022-04-04
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
## 2022-05-24 ENCOUNTER — EVALUATION (OUTPATIENT)
Dept: PHYSICAL THERAPY | Age: 82
End: 2022-05-24
Payer: COMMERCIAL

## 2022-05-24 DIAGNOSIS — M77.8 SHOULDER TENDONITIS, RIGHT: Primary | ICD-10-CM

## 2022-05-24 PROCEDURE — 97162 PT EVAL MOD COMPLEX 30 MIN: CPT

## 2022-05-24 PROCEDURE — 97110 THERAPEUTIC EXERCISES: CPT

## 2022-05-24 NOTE — LETTER
May 25, 2022    MD Gina Diamond [address] [place] [id number]    Patient: Yahaira Small   YOB: 1940   Date of Visit: 2022     Encounter Diagnosis     ICD-10-CM    1  Shoulder tendonitis, right  M77 8        Dear Dr Agatha Mosley: Thank you for your recent referral of Yahaira Small  Please review the attached evaluation summary from Ellie's recent visit  Please verify that you agree with the plan of care by signing the attached order  If you have any questions or concerns, please do not hesitate to call  I sincerely appreciate the opportunity to share in the care of one of your patients and hope to have another opportunity to work with you in the near future  Sincerely,    Kristi Ledbetter, PT      Referring Provider:      I certify that I have read the below Plan of Care and certify the need for these services furnished under this plan of treatment while under my care  MD Gina Diamond [address] [place] [address]  Via Fax: [phone]          PT Evaluation     Today's date: 2022  Patient name: Yahaira Small  : 1940  MRN: [id number]  Referring provider: Kriss Benavides MD  Dx:   Encounter Diagnosis     ICD-10-CM    1  Shoulder tendonitis, right  M77 8                   Assessment  Assessment details: Yahaira Small is an 81year old female referred to outpt PT with a diagnosis of right shoulder tendonitis/ bursitis with onset of right shoulder pain noted 1 month ago after report of increased cleaning and lifting of boxes per her report  PT is warranted to address the above stated deficits in efforts to return to all prior activity without increased c/o pain  She did receive a steroid injection to the right shoulder on  22 with a noted reduction in pain post injection  PT is warranted to improved sitting posture with the use of a lumbar support   PT shall also address proper lifting mechanics in the near future  Impairments: abnormal or restricted ROM, activity intolerance, impaired physical strength, lacks appropriate home exercise program, pain with function, poor posture  and poor body mechanics  Other impairment: right shoulder pain with sleeping on right side cannot tolerate  Functional limitations: decreased reaching overhead and lifting activity with the right ue, decreased dressing , poor postural status, decreased body mechanics with lifting observed  Symptom irritability: moderateUnderstanding of Dx/Px/POC: good   Prognosis: good    Goals  stg independent with home exer program in two weeks  Improved right shoulder arom to wfl's without increased c/o pain in two weeks    ltg achieve right shoulder mmt 4+/5 to 5/5 status in 4 weeks  Improve sitting posture with use of a lumbar support in 4 weeks  Consistent use of proper lifting  in 4 weeks       Plan  Patient would benefit from: PT eval and skilled physical therapy  Referral necessary: Yes  Other planned modality interventions: as needed  Planned therapy interventions: manual therapy, neuromuscular re-education, patient education, postural training, body mechanics training, strengthening, stretching, therapeutic activities, therapeutic exercise, home exercise program and motor coordination training  Other planned therapy interventions: glenohumeral and scapular rhythm exer   Frequency: 2x week  Duration in weeks: 8  Plan of Care beginning date: 5/24/2022  Plan of Care expiration date: 7/25/2022  Treatment plan discussed with: patient        Subjective Evaluation    History of Present Illness  Onset date: 1 month ago onset of right sh pain   Mechanism of injury: Jahaira Pedersen is an 81year old presenting with a 1 month hx of right shoulder tendonitis/bursitis with report of repetitive lifting and cleaning activities at that same time   Pain is present with right ue lifting, dressing and reaching overhead activities  She does report in the pain having periodic right shoulder soreness when caring for her  prior to his passing 2 years ago which self resolved with rest and over the counter medical care  She did receive a steroid injection to the right shoulder on 22 and reports a significant improvement in her symptoms to date  Pain is also present with sleeping on her right shoulder  She has never received PT for this condition in the past per her report  Recurrent probem    Quality of life: good    Pain  Current pain ratin  At best pain ratin  At worst pain ratin  Location: right sh pain mid deltoid and subacromial bursa , cannot lie on right side sleeping due to pain  Quality: sharp, radiating, dull ache, pressure and pulling  Relieving factors: change in position, heat, ice and rest  Aggravating factors: overhead activity and lifting (sleeping on right side worst pain )  Progression: improved (s/p injection improved )    Social Support  Steps to enter house: yes (2-3 steps left railing)  Stairs in house: yes (12 steps right railing)   Lives in: multiple-level home  Lives with: alone    Employment status: not working  Hand dominance: right  Exercise history: pt walks daily for 45 min weather permitting, no upper body exer performed  prolonged hours on computer or other electronic devices  , decreased postural status       Treatments  Current treatment: physical therapy  Patient Goals  Patient goals for therapy: decreased pain, increased motion, increased strength, independence with ADLs/IADLs and return to sport/leisure activities  Patient goal: reduction in pain by 50 percent with activity in 4 weeks         Objective     Active Range of Motion   Left Shoulder   Flexion: WFL  Extension: WFL  Abduction: WFL  Adduction: WFL  External rotation 90°: WFL  Internal rotation 90°: WFL    Right Shoulder   Flexion: 160 degrees with pain  Abduction: 155 degrees with pain  External rotation 90°: 80 degrees  Internal rotation 90°: 70 degrees     Additional Active Range of Motion Details  +scoliosis deformity left shoulder higher than right, pt is right handed,  Decreased lumbar lordosis in sitting and standing noted, rounded shoulders and significantly internally rotated , forward head     Strength/Myotome Testing     Left Shoulder     Planes of Motion   Flexion: 4+   Extension: 5   Abduction: 4+   Adduction: 5   External rotation at 90°: 4+   Internal rotation at 45°: 5   Horizontal abduction: 5   Horizontal adduction: 5     Isolated Muscles   Middle trapezius: 3+   Serratus anterior: 4+     Right Shoulder     Planes of Motion   Flexion: 4-   Extension: 4+   Abduction: 4-   Adduction: 5   External rotation at 90°: 4   Internal rotation at 45°: 4+   Horizontal abduction: 4+   Horizontal adduction: 4+     Isolated Muscles   Middle trapezius: 3-   Serratus anterior: 4-     Additional Strength Details  Speed's test on right neg,   Drop arm neg, sh distraction neg    Ambulation     Ambulation: Level Surfaces   Ambulation without assistive device: independent    Additional Level Surfaces Ambulation Details  100ft       Flowsheet Rows    Flowsheet Row Most Recent Value   PT/OT G-Codes    Current Score 53   Projected Score 67   Assessment Type Evaluation   G code set Carrying, Moving & Handling Objects   Carrying, Moving and Handling Objects Current Status () CK   Carrying, Moving and Handling Objects Goal Status () CJ             Precautions: allergies: codeine, metronidazole  PMH: non traumatic subarachnoid hemorrhage from ant communicating artery 11/29/18 s/p coil embolization of cerebral aneurysm, nasal septum deviation, glaucoma, hyponatremia, dysuria, insomnia due to medical condition, hypotension, bunionectomy, right elbow surgery s/p fracture from fall, tonsillectomy, osteopenia, low back pain, bilateral cataract extraction, s/p right shoulder steroid injection 5/23/22, hx of scoliosis with left sh higher than right      Manuals 5/24/22             I eval                                                    Neuro Re-Ed/ther exer              Right sh flex and abd 5 reps             Wall pulleys 5 min            Sh backward rolls  10 reps             scap squeezes 10 reps hold 5 sec             Right sh isometrics sh flex, ext, ext rotation  5 sec hold 5 reps             theraband sh ext and row, ext rot Red 1 set of 10 issued R TB                         Ther Ex             Sandra 1,3, 5             Cane exer                                                                                           Ther Activity                                       Gait Training                                       Modalities             Ice right sh prn

## 2022-05-25 NOTE — PROGRESS NOTES
PT Evaluation     Today's date: 2022  Patient name: Gil Gerard  : 1940  MRN: [id number]  Referring provider: Julio Hernandez MD  Dx:   Encounter Diagnosis     ICD-10-CM    1  Shoulder tendonitis, right  M77 8                   Assessment  Assessment details: Gil Gerard is an 81year old female referred to outpt PT with a diagnosis of right shoulder tendonitis/ bursitis with onset of right shoulder pain noted 1 month ago after report of increased cleaning and lifting of boxes per her report  PT is warranted to address the above stated deficits in efforts to return to all prior activity without increased c/o pain  She did receive a steroid injection to the right shoulder on  22 with a noted reduction in pain post injection  PT is warranted to improved sitting posture with the use of a lumbar support  PT shall also address proper lifting mechanics in the near future     Impairments: abnormal or restricted ROM, activity intolerance, impaired physical strength, lacks appropriate home exercise program, pain with function, poor posture  and poor body mechanics  Other impairment: right shoulder pain with sleeping on right side cannot tolerate  Functional limitations: decreased reaching overhead and lifting activity with the right ue, decreased dressing , poor postural status, decreased body mechanics with lifting observed  Symptom irritability: moderateUnderstanding of Dx/Px/POC: good   Prognosis: good    Goals  stg independent with home exer program in two weeks  Improved right shoulder arom to wfl's without increased c/o pain in two weeks    ltg achieve right shoulder mmt 4+/5 to 5/5 status in 4 weeks  Improve sitting posture with use of a lumbar support in 4 weeks  Consistent use of proper lifting  in 4 weeks       Plan  Patient would benefit from: PT eval and skilled physical therapy  Referral necessary: Yes  Other planned modality interventions: as needed  Planned therapy interventions: manual therapy, neuromuscular re-education, patient education, postural training, body mechanics training, strengthening, stretching, therapeutic activities, therapeutic exercise, home exercise program and motor coordination training  Other planned therapy interventions: glenohumeral and scapular rhythm exer   Frequency: 2x week  Duration in weeks: 8  Plan of Care beginning date: 2022  Plan of Care expiration date: 2022  Treatment plan discussed with: patient        Subjective Evaluation    History of Present Illness  Onset date: 1 month ago onset of right sh pain   Mechanism of injury: Jhony Flores is an 81year old presenting with a 1 month hx of right shoulder tendonitis/bursitis with report of repetitive lifting and cleaning activities at that same time  Pain is present with right ue lifting, dressing and reaching overhead activities  She does report in the pain having periodic right shoulder soreness when caring for her  prior to his passing 2 years ago which self resolved with rest and over the counter medical care  She did receive a steroid injection to the right shoulder on 22 and reports a significant improvement in her symptoms to date  Pain is also present with sleeping on her right shoulder  She has never received PT for this condition in the past per her report             Recurrent probem    Quality of life: good    Pain  Current pain ratin  At best pain ratin  At worst pain ratin  Location: right sh pain mid deltoid and subacromial bursa , cannot lie on right side sleeping due to pain  Quality: sharp, radiating, dull ache, pressure and pulling  Relieving factors: change in position, heat, ice and rest  Aggravating factors: overhead activity and lifting (sleeping on right side worst pain )  Progression: improved (s/p injection improved )    Social Support  Steps to enter house: yes (2-3 steps left railing)  Stairs in house: yes (12 steps right dana)   Lives in: multiple-level home  Lives with: alone    Employment status: not working  Hand dominance: right  Exercise history: pt walks daily for 45 min weather permitting, no upper body exer performed  prolonged hours on computer or other electronic devices  , decreased postural status       Treatments  Current treatment: physical therapy  Patient Goals  Patient goals for therapy: decreased pain, increased motion, increased strength, independence with ADLs/IADLs and return to sport/leisure activities  Patient goal: reduction in pain by 50 percent with activity in 4 weeks         Objective     Active Range of Motion   Left Shoulder   Flexion: WFL  Extension: WFL  Abduction: WFL  Adduction: WFL  External rotation 90°: WFL  Internal rotation 90°: WFL    Right Shoulder   Flexion: 160 degrees with pain  Abduction: 155 degrees with pain  External rotation 90°: 80 degrees  Internal rotation 90°: 70 degrees     Additional Active Range of Motion Details  +scoliosis deformity left shoulder higher than right, pt is right handed,  Decreased lumbar lordosis in sitting and standing noted, rounded shoulders and significantly internally rotated , forward head     Strength/Myotome Testing     Left Shoulder     Planes of Motion   Flexion: 4+   Extension: 5   Abduction: 4+   Adduction: 5   External rotation at 90°: 4+   Internal rotation at 45°: 5   Horizontal abduction: 5   Horizontal adduction: 5     Isolated Muscles   Middle trapezius: 3+   Serratus anterior: 4+     Right Shoulder     Planes of Motion   Flexion: 4-   Extension: 4+   Abduction: 4-   Adduction: 5   External rotation at 90°: 4   Internal rotation at 45°: 4+   Horizontal abduction: 4+   Horizontal adduction: 4+     Isolated Muscles   Middle trapezius: 3-   Serratus anterior: 4-     Additional Strength Details  Speed's test on right neg,   Drop arm neg, sh distraction neg    Ambulation     Ambulation: Level Surfaces   Ambulation without assistive device: independent    Additional Level Surfaces Ambulation Details  100ft       Flowsheet Rows    Flowsheet Row Most Recent Value   PT/OT G-Codes    Current Score 53   Projected Score 67   Assessment Type Evaluation   G code set Carrying, Moving & Handling Objects   Carrying, Moving and Handling Objects Current Status () CK   Carrying, Moving and Handling Objects Goal Status () CJ             Precautions: allergies: codeine, metronidazole  PMH: non traumatic subarachnoid hemorrhage from ant communicating artery 11/29/18 s/p coil embolization of cerebral aneurysm, nasal septum deviation, glaucoma, hyponatremia, dysuria, insomnia due to medical condition, hypotension, bunionectomy, right elbow surgery s/p fracture from fall, tonsillectomy, osteopenia, low back pain, bilateral cataract extraction, s/p right shoulder steroid injection 5/23/22, hx of scoliosis with left sh higher than right      Manuals 5/24/22             I eval                                                    Neuro Re-Ed/ther exer              Right sh flex and abd 5 reps             Wall pulleys 5 min            Sh backward rolls  10 reps             scap squeezes 10 reps hold 5 sec             Right sh isometrics sh flex, ext, ext rotation  5 sec hold 5 reps             theraband sh ext and row, ext rot Red 1 set of 10 issued R TB                         Ther Ex             Sandra 1,3, 5             Cane exer                                                                                           Ther Activity                                       Gait Training                                       Modalities             Ice right sh prn

## 2022-06-01 ENCOUNTER — OFFICE VISIT (OUTPATIENT)
Dept: PHYSICAL THERAPY | Age: 82
End: 2022-06-01
Payer: COMMERCIAL

## 2022-06-01 DIAGNOSIS — M77.8 SHOULDER TENDONITIS, RIGHT: Primary | ICD-10-CM

## 2022-06-01 PROCEDURE — 97140 MANUAL THERAPY 1/> REGIONS: CPT

## 2022-06-01 PROCEDURE — 97110 THERAPEUTIC EXERCISES: CPT

## 2022-06-01 NOTE — PROGRESS NOTES
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 cerebral aneurysm, nasal septum deviation, glaucoma, hyponatremia, dysuria, insomnia due to medical condition, hypotension, bunionectomy, right elbow surgery s/p fracture from fall, tonsillectomy, osteopenia, low back pain, bilateral cataract extraction, s/p right shoulder steroid injection 22, hx of scoliosis with left sh higher than right      Manuals 22            I eval  15 min                                                  Neuro Re-Ed/ther exer              Right sh flex and abd 5 reps  20x           Wall pulleys 5 min 5 min           Sh backward rolls  10 reps  20x           scap squeezes 10 reps hold 5 sec  20x           Right sh isometrics sh flex, ext, ext rotation  5 sec hold 5 reps  5sec x 5           theraband sh ext and row, ext rot Red 1 set of 10 issued R TB 30x                        Ther Ex             Sandra 1,3, 5             Cane exer                                                                                           Ther Activity                                       Gait Training                                       Modalities             Ice right sh prn

## 2022-06-02 ENCOUNTER — OFFICE VISIT (OUTPATIENT)
Dept: PHYSICAL THERAPY | Age: 82
End: 2022-06-02
Payer: COMMERCIAL

## 2022-06-02 DIAGNOSIS — M77.8 SHOULDER TENDONITIS, RIGHT: Primary | ICD-10-CM

## 2022-06-02 PROCEDURE — 97140 MANUAL THERAPY 1/> REGIONS: CPT

## 2022-06-02 PROCEDURE — 97110 THERAPEUTIC EXERCISES: CPT

## 2022-06-02 NOTE — PROGRESS NOTES
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 of cerebral aneurysm, nasal septum deviation, glaucoma, hyponatremia, dysuria, insomnia due to medical condition, hypotension, bunionectomy, right elbow surgery s/p fracture from fall, tonsillectomy, osteopenia, low back pain, bilateral cataract extraction, s/p right shoulder steroid injection 22, hx of scoliosis with left sh higher than right      Manuals 22           I eval  15 min 15 min man                                                 Neuro Re-Ed/ther exer              Right sh flex and abd 5 reps  20x 20x          Wall pulleys 5 min 5 min 5 min          Sh backward rolls  10 reps  20x 20x          scap squeezes 10 reps hold 5 sec  20x 20x          Right sh isometrics sh flex, ext, ext rotation  5 sec hold 5 reps  5sec x 5 5sec x 5          theraband sh ext and row, ext rot Red 1 set of 10 issued R TB 30x 30x                       Ther Ex             Sandra 1,3, 5             Cane exer   2# 20x          Chest press   2# 20x          butterflies   30x                                                              Ther Activity                                       Gait Training                                       Modalities             Ice right sh prn

## 2022-06-07 ENCOUNTER — OFFICE VISIT (OUTPATIENT)
Dept: PHYSICAL THERAPY | Age: 82
End: 2022-06-07
Payer: COMMERCIAL

## 2022-06-07 DIAGNOSIS — M77.8 SHOULDER TENDONITIS, RIGHT: Primary | ICD-10-CM

## 2022-06-07 PROCEDURE — 97110 THERAPEUTIC EXERCISES: CPT

## 2022-06-07 NOTE — PROGRESS NOTES
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 of cerebral aneurysm, nasal septum deviation, glaucoma, hyponatremia, dysuria, insomnia due to medical condition, hypotension, bunionectomy, right elbow surgery s/p fracture from fall, tonsillectomy, osteopenia, low back pain, bilateral cataract extraction, s/p right shoulder steroid injection 22, hx of scoliosis with left sh higher than right      Manuals 22          I eval  15 min 15 min man                                                 Neuro Re-Ed/ther exer              Right sh flex and abd 5 reps  20x 20x 10         Wall pulleys 5 min 5 min 5 min 5 min         Sh backward rolls  10 reps  20x 20x 10         scap squeezes 10 reps hold 5 sec  20x 20x 10         Right sh isometrics sh flex, ext, ext rotation  5 sec hold 5 reps  5sec x 5 5sec x 5          theraband sh ext and row, ext rot Red 1 set of 10 issued R TB 30x 30x Green 3 x 10 also punch outs                      Ther Ex             Hughmaria guadalupe 1,3, 5             Cane exer   2# 20x          Chest press   2# 20x          butterflies   30x          Body blade medium size right     1 min x 5 positions        Small weighted blue ball toss palms up and palms down in 90 flex and abd and against wall overhead      30 reps  each On wall ccw and cw circles with ball     30 reps         ube 10 min alt fwd/bwd      10 min        Ther Activity                                       Gait Training                                       Modalities             Ice right sh prn

## 2022-06-09 ENCOUNTER — OFFICE VISIT (OUTPATIENT)
Dept: PHYSICAL THERAPY | Age: 82
End: 2022-06-09
Payer: COMMERCIAL

## 2022-06-09 DIAGNOSIS — M77.8 SHOULDER TENDONITIS, RIGHT: Primary | ICD-10-CM

## 2022-06-09 PROCEDURE — 97110 THERAPEUTIC EXERCISES: CPT

## 2022-06-10 NOTE — PROGRESS NOTES
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 of cerebral aneurysm, nasal septum deviation, glaucoma, hyponatremia, dysuria, insomnia due to medical condition, hypotension, bunionectomy, right elbow surgery s/p fracture from fall, tonsillectomy, osteopenia, low back pain, bilateral cataract extraction, s/p right shoulder steroid injection 22, hx of scoliosis with left sh higher than right      Manuals 22 6 6 6        Prom right sh I eval  15 min 15 min man                                                 Neuro Re-Ed/ther exer              Right sh flex and abd 5 reps  20x 20x 10 10        Wall pulleys 5 min 5 min 5 min 5 min  5min        Sh backward rolls  10 reps  20x 20x 10 10        scap squeezes 10 reps hold 5 sec  20x 20x 10 10        Right sh isometrics sh flex, ext, ext rotation  5 sec hold 5 reps  5sec x 5 5sec x 5          theraband sh ext and row, ext rot Red 1 set of 10 issued R TB 30x 30x Green 3 x 10 also punch outs                      Ther Ex             Sandra 1,3, 5             Cane exer   2# 20x  2# 20        Chest press   2# 20x  2# 30        butterflies   30x          Body blade medium size right     1 min x 5 positions        Small weighted blue ball toss palms up and palms down in 90 flex and abd and against wall overhead      30 reps  each        On wall ccw and cw circles with ball     30 reps         ube 10 min alt fwd/bwd      10 min        Ther Activity                                       Gait Training                                       Modalities             Ice right sh prn

## 2022-06-14 ENCOUNTER — OFFICE VISIT (OUTPATIENT)
Dept: PHYSICAL THERAPY | Age: 82
End: 2022-06-14
Payer: COMMERCIAL

## 2022-06-14 DIAGNOSIS — M77.8 SHOULDER TENDONITIS, RIGHT: Primary | ICD-10-CM

## 2022-06-14 PROCEDURE — 97110 THERAPEUTIC EXERCISES: CPT

## 2022-06-14 NOTE — PROGRESS NOTES
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 of cerebral aneurysm, nasal septum deviation, glaucoma, hyponatremia, dysuria, insomnia due to medical condition, hypotension, bunionectomy, right elbow surgery s/p fracture from fall, tonsillectomy, osteopenia, low back pain, bilateral cataract extraction, s/p right shoulder steroid injection 22, hx of scoliosis with left sh higher than right      Manuals 22 6 6/ 6 6       Prom right sh I eval  15 min 15 min man                                                 Neuro Re-Ed/ther exer       Wall pushups 30 reps        Right sh flex and abd 5 reps  20x 20x 10 10        Wall pulleys 5 min 5 min 5 min 5 min  5min  5min       Sh backward rolls  10 reps  20x 20x 10 10        scap squeezes 10 reps hold 5 sec  20x 20x 10 10        Right sh isometrics sh flex, ext, ext rotation  5 sec hold 5 reps  5sec x 5 5sec x 5          theraband sh ext and row, ext rot Red 1 set of 10 issued R TB 30x 30x Green 3 x 10 also punch outs                      Ther Ex             Sandra 1,3, 5             Cane exer   2# 20x  2# 20        Chest press   2# 20x  2# 30        butterflies   30x          Body blade medium size right     1 min x 5 positions 30 sec x 5 positions x 2       Small weighted blue ball toss palms up and palms down in 90 flex and abd and against wall overhead      30 reps  each 30 reps        On wall ccw and cw circles with ball     30 reps  30 reps ube 10 min alt fwd/bwd      10 min 10 min        Supine serratus, triceps       2# 3 x 10        Right sh ext rotation in left sidelying       2# 3 x 10                    Gait Training                                       Modalities             Ice right sh prn

## 2022-06-15 ENCOUNTER — OFFICE VISIT (OUTPATIENT)
Dept: OBGYN CLINIC | Facility: CLINIC | Age: 82
End: 2022-06-15
Payer: COMMERCIAL

## 2022-06-15 VITALS
DIASTOLIC BLOOD PRESSURE: 78 MMHG | BODY MASS INDEX: 21.85 KG/M2 | SYSTOLIC BLOOD PRESSURE: 120 MMHG | HEIGHT: 64 IN | WEIGHT: 128 LBS

## 2022-06-15 DIAGNOSIS — N95.2 VAGINAL ATROPHY: ICD-10-CM

## 2022-06-15 DIAGNOSIS — Z12.31 ENCOUNTER FOR SCREENING MAMMOGRAM FOR MALIGNANT NEOPLASM OF BREAST: Primary | ICD-10-CM

## 2022-06-15 PROCEDURE — 99213 OFFICE O/P EST LOW 20 MIN: CPT | Performed by: OBSTETRICS & GYNECOLOGY

## 2022-06-15 NOTE — PROGRESS NOTES
Assessment/Plan:  Normal breast and gyn exam except for vaginal atrophy  Osteoporosis  Normal mammogram 2021  Colonoscopy with polyps 2020  Due for repeat at 5 years  Plan:  Rx mammogram   Continue weight-bearing exercise and healthy diet  Continue calcium , vitamin D3, and multivitamin  Continue vaginal estrogen as needed  Subjective:   (spontaneous vaginal delivery x3)     Patient ID: Ellis Guzman is a 81 y o  female presents as a new patient to the office for evaluation  Patient denies any vaginal bleeding pelvic pain breast bowel or bladder issues  History of a brain aneurysm  Finish physical therapy but continues her balance exercises  Diagnosed with osteoporosis and doing weight-bearing exercises  Ob history  Spontaneous vaginal delivery at term  Vaginal delivery for anencephalic infant  Spontaneous vaginal delivery at term    No change in family history  Medications reviewed  Review of Systems   Constitutional: Negative  Negative for fatigue, fever and unexpected weight change  HENT: Negative  Eyes: Negative  Respiratory: Negative  Negative for chest tightness, shortness of breath, wheezing and stridor  Cardiovascular: Negative  Negative for chest pain, palpitations and leg swelling  Gastrointestinal: Negative  Negative for abdominal pain, blood in stool, diarrhea, nausea, rectal pain and vomiting  Endocrine: Negative  Genitourinary: Negative for dysuria, frequency, vaginal bleeding, vaginal discharge and vaginal pain  Musculoskeletal: Negative  Skin: Negative  Allergic/Immunologic: Negative  Neurological: Negative  Hematological: Negative  Psychiatric/Behavioral: Negative  All other systems reviewed and are negative          Objective:      /78 (BP Location: Left arm, Patient Position: Sitting, Cuff Size: Standard)   Ht 5' 4" (1 626 m)   Wt 58 1 kg (128 lb)   LMP  (LMP Unknown)   BMI 21 97 kg/m² Physical Exam  Constitutional:       Appearance: She is well-developed  HENT:      Head: Normocephalic and atraumatic  Neck:      Thyroid: No thyromegaly  Trachea: No tracheal deviation  Cardiovascular:      Rate and Rhythm: Normal rate and regular rhythm  Heart sounds: Normal heart sounds  Pulmonary:      Effort: Pulmonary effort is normal  No respiratory distress  Breath sounds: Normal breath sounds  No stridor  No wheezing or rales  Chest:      Chest wall: No tenderness  Breasts: Breasts are symmetrical       Right: No inverted nipple, mass, nipple discharge, skin change or tenderness  Left: No inverted nipple, mass, nipple discharge, skin change or tenderness  Abdominal:      General: Bowel sounds are normal  There is no distension  Palpations: Abdomen is soft  There is no mass  Tenderness: There is no abdominal tenderness  There is no guarding or rebound  Hernia: No hernia is present  There is no hernia in the left inguinal area  Genitourinary:     Labia:         Right: No rash, tenderness, lesion or injury  Left: No rash, tenderness, lesion or injury  Vagina: Normal  No signs of injury and foreign body  No vaginal discharge, erythema, tenderness or bleeding  Cervix: No cervical motion tenderness, discharge or friability  Uterus: Not deviated, not enlarged, not fixed and not tender  Adnexa:         Right: No mass, tenderness or fullness  Left: No mass, tenderness or fullness  Rectum: No mass, anal fissure, external hemorrhoid or internal hemorrhoid  Comments: Normal urethra and [place]'s glands  Vaginal atrophy  No uterine prolapse  No cystocele or rectocele  Musculoskeletal:      Cervical back: Normal range of motion and neck supple  Lymphadenopathy:      Lower Body: No right inguinal adenopathy  No left inguinal adenopathy  Skin:     General: Skin is warm and dry     Neurological: Mental Status: She is alert and oriented to person, place, and time  Psychiatric:         Behavior: Behavior normal          Thought Content:  Thought content normal          Judgment: Judgment normal

## 2022-06-16 ENCOUNTER — APPOINTMENT (OUTPATIENT)
Dept: PHYSICAL THERAPY | Age: 82
End: 2022-06-16
Payer: COMMERCIAL

## 2022-06-17 ENCOUNTER — OFFICE VISIT (OUTPATIENT)
Dept: PHYSICAL THERAPY | Age: 82
End: 2022-06-17
Payer: COMMERCIAL

## 2022-06-17 DIAGNOSIS — M77.8 SHOULDER TENDONITIS, RIGHT: Primary | ICD-10-CM

## 2022-06-17 PROCEDURE — 97110 THERAPEUTIC EXERCISES: CPT

## 2022-06-17 NOTE — PROGRESS NOTES
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 of cerebral aneurysm, nasal septum deviation, glaucoma, hyponatremia, dysuria, insomnia due to medical condition, hypotension, bunionectomy, right elbow surgery s/p fracture from fall, tonsillectomy, osteopenia, low back pain, bilateral cataract extraction, s/p right shoulder steroid injection 22, hx of scoliosis with left sh higher than right      Manuals 22 6 6/ 6 6      Prom right sh I eval  15 min 15 min man    13 min man                                             Neuro Re-Ed/ther exer       Wall pushups 30 reps  30      Right sh flex and abd 5 reps  20x 20x 10 10  10      Wall pulleys 5 min 5 min 5 min 5 min  5min  5min  5min      Sh backward rolls  10 reps  20x 20x 10 10  10      scap squeezes 10 reps hold 5 sec  20x 20x 10 10  10 with 2# cuff wts      Right sh isometrics sh flex, ext, ext rotation  5 sec hold 5 reps  5sec x 5 5sec x 5          theraband sh ext and row, ext rot Red 1 set of 10 issued R TB 30x 30x Green 3 x 10 also punch outs   Sh ext and int rot green right 3 x 10 added                   Ther Ex             Sandra 1,3, 5       In standing with 2# 2 sets of 10      Cane exer   2# 20x  2# 20        Chest press   2# 20x  2# 30        butterflies   30x    30      Body blade medium size right     1 min x 5 positions 30 sec x 5 positions x 2 Large bodyblade 30 sec x 5 x 2      Small weighted blue ball toss palms up and palms down in 90 flex and abd and against wall overhead      30 reps  each 30 reps  30 reps each      On wall ccw and cw circles with ball     30 reps  30 reps 30 reps       ube 10 min alt fwd/bwd      10 min 10 min  10 min alt      Supine serratus, triceps       2# 3 x 10  2# 3 x 10      Right sh ext rotation in left sidelying       2# 3 x 10 2# 3 x 10                   Gait Training                                       Modalities             Ice right sh prn

## 2022-06-21 ENCOUNTER — OFFICE VISIT (OUTPATIENT)
Dept: PHYSICAL THERAPY | Age: 82
End: 2022-06-21
Payer: COMMERCIAL

## 2022-06-21 DIAGNOSIS — M77.8 SHOULDER TENDONITIS, RIGHT: Primary | ICD-10-CM

## 2022-06-21 PROCEDURE — 97110 THERAPEUTIC EXERCISES: CPT

## 2022-06-21 PROCEDURE — 97140 MANUAL THERAPY 1/> REGIONS: CPT

## 2022-06-22 NOTE — PROGRESS NOTES
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 of cerebral aneurysm, nasal septum deviation, glaucoma, hyponatremia, dysuria, insomnia due to medical condition, hypotension, bunionectomy, right elbow surgery s/p fracture from fall, tonsillectomy, osteopenia, low back pain, bilateral cataract extraction, s/p right shoulder steroid injection 22, hx of scoliosis with left sh higher than right      Manuals 22 6 6/ 6     Prom right sh I eval  15 min 15 min man    13 min man 13 min man                                            Neuro Re-Ed/ther exer       Wall pushups 30 reps  30 30 reps wall pushups     Right sh flex and abd 5 reps  20x 20x 10 10  10 10     Wall pulleys 5 min 5 min 5 min 5 min  5min  5min  5min 5 min     Sh backward rolls  10 reps  20x 20x 10 10  10 10     scap squeezes 10 reps hold 5 sec  20x 20x 10 10  10 with 2# cuff wts 10     Right sh isometrics sh flex, ext, ext rotation  5 sec hold 5 reps  5sec x 5 5sec x 5          theraband sh ext and row, ext rot Red 1 set of 10 issued R TB 30x 30x Green 3 x 10 also punch outs   Sh ext and int rot green right 3 x 10 added                   Ther Ex             Sandra 1,3, 5       In standing with 2# 2 sets of 10      Cane exer   2# 20x  2# 20        Chest press   2# 20x  2# 30        butterflies   30x    30 30     Body blade medium size right     1 min x 5 positions 30 sec x 5 positions x 2 Large bodyblade 30 sec x 5 x 2 Large 1min x 5 positions x 2     Small weighted blue ball toss palms up and palms down in 90 flex and abd and against wall overhead      30 reps  each 30 reps  30 reps each 30 reps red ball      On wall ccw and cw circles with ball     30 reps  30 reps 30 reps  30 reps      ube 10 min alt fwd/bwd      10 min 10 min  10 min alt 10     Supine serratus, triceps       2# 3 x 10  2# 3 x 10      Right sh ext rotation in left sidelying       2# 3 x 10 2# 3 x 10                   Gait Training                                       Modalities             Ice right sh prn

## 2022-06-23 ENCOUNTER — OFFICE VISIT (OUTPATIENT)
Dept: PHYSICAL THERAPY | Age: 82
End: 2022-06-23
Payer: COMMERCIAL

## 2022-06-23 DIAGNOSIS — M77.8 SHOULDER TENDONITIS, RIGHT: Primary | ICD-10-CM

## 2022-06-23 PROCEDURE — 97110 THERAPEUTIC EXERCISES: CPT

## 2022-06-23 NOTE — PROGRESS NOTES
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 of cerebral aneurysm, nasal septum deviation, glaucoma, hyponatremia, dysuria, insomnia due to medical condition, hypotension, bunionectomy, right elbow surgery s/p fracture from fall, tonsillectomy, osteopenia, low back pain, bilateral cataract extraction, s/p right shoulder steroid injection 22, hx of scoliosis with left sh higher than right      Manuals 22 6/ 6/ 6    Prom right sh I eval  15 min 15 min man    13 min man 13 min man                                            Neuro Re-Ed/ther exer       Wall pushups 30 reps  30 30 reps wall pushups     Right sh flex and abd 5 reps  20x 20x 10 10  10 10     Wall pulleys 5 min 5 min 5 min 5 min  5min  5min  5min 5 min 5min    Sh backward rolls  10 reps  20x 20x 10 10  10 10 20    scap squeezes 10 reps hold 5 sec  20x 20x 10 10  10 with 2# cuff wts 10 20    Right sh isometrics sh flex, ext, ext rotation  5 sec hold 5 reps  5sec x 5 5sec x 5          theraband sh ext and row, ext rot Red 1 set of 10 issued R TB 30x 30x Green 3 x 10 also punch outs   Sh ext and int rot green right 3 x 10 added  30x                 Ther Ex             Sandra 1,3, 5       In standing with 2# 2 sets of 10      Cane exer   2# 20x  2# 20        Chest press   2# 20x  2# 30        butterflies   30x    30 30     Body blade medium size right     1 min x 5 positions 30 sec x 5 positions x 2 Large bodyblade 30 sec x 5 x 2 Large 1min x 5 positions x 2 5x    Small weighted blue ball toss palms up and palms down in 90 flex and abd and against wall overhead      30 reps  each 30 reps  30 reps each 30 reps red ball  30x    On wall ccw and cw circles with ball     30 reps  30 reps 30 reps  30 reps  30x    ube 10 min alt fwd/bwd      10 min 10 min  10 min alt 10 10 min/alt    Supine serratus, triceps       2# 3 x 10  2# 3 x 10  2# 30x    Right sh ext rotation in left sidelying       2# 3 x 10 2# 3 x 10  2# 30x                 Gait Training                                       Modalities             Ice right sh prn

## 2022-07-23 ENCOUNTER — HOSPITAL ENCOUNTER (EMERGENCY)
Facility: HOSPITAL | Age: 82
Discharge: HOME | End: 2022-07-23
Attending: EMERGENCY MEDICINE
Payer: COMMERCIAL

## 2022-07-23 VITALS
DIASTOLIC BLOOD PRESSURE: 59 MMHG | OXYGEN SATURATION: 99 % | BODY MASS INDEX: 22.2 KG/M2 | RESPIRATION RATE: 16 BRPM | TEMPERATURE: 98 F | WEIGHT: 130 LBS | SYSTOLIC BLOOD PRESSURE: 127 MMHG | HEIGHT: 64 IN | HEART RATE: 70 BPM

## 2022-07-23 DIAGNOSIS — R42 LIGHTHEADEDNESS: Primary | ICD-10-CM

## 2022-07-23 DIAGNOSIS — R11.0 NAUSEA: ICD-10-CM

## 2022-07-23 LAB
ALBUMIN SERPL BCP-MCNC: 3.7 G/DL (ref 3.5–5)
ALP SERPL-CCNC: 46 U/L (ref 34–104)
ALT SERPL W P-5'-P-CCNC: 15 U/L (ref 7–52)
AMORPH URATE CRY URNS QL MICRO: ABNORMAL
ANION GAP SERPL CALCULATED.3IONS-SCNC: 4 MMOL/L (ref 4–13)
AST SERPL W P-5'-P-CCNC: 24 U/L (ref 13–39)
BACTERIA UR QL AUTO: ABNORMAL /HPF
BASOPHILS # BLD AUTO: 0.05 THOUSANDS/ΜL (ref 0–0.1)
BASOPHILS NFR BLD AUTO: 1 % (ref 0–1)
BILIRUB SERPL-MCNC: 0.45 MG/DL (ref 0.2–1)
BILIRUB UR QL STRIP: NEGATIVE
BUN SERPL-MCNC: 15 MG/DL (ref 5–25)
CALCIUM SERPL-MCNC: 8.6 MG/DL (ref 8.4–10.2)
CARDIAC TROPONIN I PNL SERPL HS: 3 NG/L
CHLORIDE SERPL-SCNC: 101 MMOL/L (ref 96–108)
CLARITY UR: CLEAR
CO2 SERPL-SCNC: 33 MMOL/L (ref 21–32)
COLOR UR: COLORLESS
CREAT SERPL-MCNC: 0.63 MG/DL (ref 0.6–1.3)
EOSINOPHIL # BLD AUTO: 0.11 THOUSAND/ΜL (ref 0–0.61)
EOSINOPHIL NFR BLD AUTO: 2 % (ref 0–6)
ERYTHROCYTE [DISTWIDTH] IN BLOOD BY AUTOMATED COUNT: 14.9 % (ref 11.6–15.1)
GFR SERPL CREATININE-BSD FRML MDRD: 84 ML/MIN/1.73SQ M
GLUCOSE SERPL-MCNC: 116 MG/DL (ref 65–140)
GLUCOSE UR STRIP-MCNC: NEGATIVE MG/DL
HCT VFR BLD AUTO: 40.5 % (ref 34.8–46.1)
HGB BLD-MCNC: 13.2 G/DL (ref 11.5–15.4)
HGB UR QL STRIP.AUTO: ABNORMAL
IMM GRANULOCYTES # BLD AUTO: 0.06 THOUSAND/UL (ref 0–0.2)
IMM GRANULOCYTES NFR BLD AUTO: 1 % (ref 0–2)
KETONES UR STRIP-MCNC: NEGATIVE MG/DL
LEUKOCYTE ESTERASE UR QL STRIP: NEGATIVE
LYMPHOCYTES # BLD AUTO: 0.39 THOUSANDS/ΜL (ref 0.6–4.47)
LYMPHOCYTES NFR BLD AUTO: 9 % (ref 14–44)
MCH RBC QN AUTO: 32 PG (ref 26.8–34.3)
MCHC RBC AUTO-ENTMCNC: 32.6 G/DL (ref 31.4–37.4)
MCV RBC AUTO: 98 FL (ref 82–98)
MONOCYTES # BLD AUTO: 0.44 THOUSAND/ΜL (ref 0.17–1.22)
MONOCYTES NFR BLD AUTO: 10 % (ref 4–12)
NEUTROPHILS # BLD AUTO: 3.56 THOUSANDS/ΜL (ref 1.85–7.62)
NEUTS SEG NFR BLD AUTO: 77 % (ref 43–75)
NITRITE UR QL STRIP: NEGATIVE
NON-SQ EPI CELLS URNS QL MICRO: ABNORMAL /HPF
NRBC BLD AUTO-RTO: 0 /100 WBCS
PH UR STRIP.AUTO: 7.5 [PH]
PLATELET # BLD AUTO: 141 THOUSANDS/UL (ref 149–390)
PMV BLD AUTO: 9.2 FL (ref 8.9–12.7)
POTASSIUM SERPL-SCNC: 3.6 MMOL/L (ref 3.5–5.3)
PROT SERPL-MCNC: 6.1 G/DL (ref 6.4–8.4)
PROT UR STRIP-MCNC: NEGATIVE MG/DL
RBC # BLD AUTO: 4.13 MILLION/UL (ref 3.81–5.12)
RBC #/AREA URNS AUTO: ABNORMAL /HPF
SODIUM SERPL-SCNC: 138 MMOL/L (ref 135–147)
SP GR UR STRIP.AUTO: 1 (ref 1–1.03)
UROBILINOGEN UR STRIP-ACNC: <2 MG/DL
WBC # BLD AUTO: 4.61 THOUSAND/UL (ref 4.31–10.16)
WBC #/AREA URNS AUTO: ABNORMAL /HPF

## 2022-07-23 PROCEDURE — 85025 COMPLETE CBC W/AUTO DIFF WBC: CPT

## 2022-07-23 PROCEDURE — 99284 EMERGENCY DEPT VISIT MOD MDM: CPT | Performed by: EMERGENCY MEDICINE

## 2022-07-23 PROCEDURE — 99284 EMERGENCY DEPT VISIT MOD MDM: CPT

## 2022-07-23 PROCEDURE — 36415 COLL VENOUS BLD VENIPUNCTURE: CPT

## 2022-07-23 PROCEDURE — 96365 THER/PROPH/DIAG IV INF INIT: CPT

## 2022-07-23 PROCEDURE — 80053 COMPREHEN METABOLIC PANEL: CPT

## 2022-07-23 PROCEDURE — 93005 ELECTROCARDIOGRAM TRACING: CPT

## 2022-07-23 PROCEDURE — 81001 URINALYSIS AUTO W/SCOPE: CPT

## 2022-07-23 PROCEDURE — 84484 ASSAY OF TROPONIN QUANT: CPT

## 2022-07-23 RX ORDER — IBUPROFEN 200 MG
CAPSULE ORAL 2 TIMES DAILY
COMMUNITY

## 2022-07-23 RX ADMIN — SODIUM CHLORIDE, SODIUM LACTATE, POTASSIUM CHLORIDE, AND CALCIUM CHLORIDE 1000 ML: .6; .31; .03; .02 INJECTION, SOLUTION INTRAVENOUS at 13:39

## 2022-07-23 NOTE — DISCHARGE INSTRUCTIONS
Please follow-up with your primary care provider as soon as possible for continued evaluation of your symptoms  Please continue to maintain appropriate intake of solids and liquids by mouth for continued improvement in your symptoms  Please return to the emergency department if you experience worsening symptoms that include but are not limited to: Loss of consciousness, fevers, chills, chest pain, shortness of breath, abdominal pain, changes in your urination, changes in your bowel movements

## 2022-07-23 NOTE — ED PROVIDER NOTES
History  Chief Complaint   Patient presents with    Dizziness     Patient states to have gotten covid booster 2 days ago and since "hasnt been feeling right" pt reports dizziness, nausea  Pt states to feel "off balance"      [facility] comes to the ED accompanied by family after 1 day of progressive weakness/lightheadedness while at home that is exacerbated with sitting up after being in a sitting position  Patient does describe that 2 days prior to today's emergency department visit, she received her COVID booster vaccine  She states that she was feeling well/at her baseline 1 day prior (7/22)  Describes that she when out for her normal walk following dinner with her family and was able to tolerate her activity level at her normal baseline  Patient did note that it was very hot and cut her activity short  She stated that when she woke up this morning, she felt nauseous and lightheaded  Denies any episodes of emesis  Patient was able a full breakfast and keep all of her food down  Patient is being continue to hydrate herself orally with liquids  Based of the persistence of the feelings of malaise, lightheadedness, decreased energy level, nausea family expressed concern and wanted her to come to the emergency department for continued evaluation of symptoms  Patient denies any fevers chills, abdominal pain, chest pain, shortness of breath, new rashes, changes in vision, changes in hearing, numbness, or paresthesias        History provided by:  Patient   used: No    Dizziness  Quality:  Lightheadedness  Severity:  Mild  Onset quality:  Gradual  Duration:  1 day  Timing:  Constant  Progression:  Unchanged  Chronicity:  New  Context: physical activity and standing up    Context: not when bending over, not with bowel movement, not with ear pain, not with eye movement, not with head movement, not with inactivity, not with loss of consciousness, not with medication and not when urinating Relieved by:  Nothing  Worsened by:  Nothing  Ineffective treatments:  Fluids, lying down, being still and food  Associated symptoms: no blood in stool, no chest pain, no diarrhea, no headaches, no hearing loss, no nausea, no palpitations, no shortness of breath, no syncope, no tinnitus, no vision changes, no vomiting and no weakness    Risk factors: no new medications        Prior to Admission Medications   Prescriptions Last Dose Informant Patient Reported? Taking?    CALCIUM PO   Yes Yes   Sig: Take 1 tablet by mouth daily   calcium carbonate (OS-MELLO) 1250 (500 Ca) MG tablet   Yes Yes   Si (two) times a day   cholecalciferol (VITAMIN D3) 1,000 units tablet   Yes Yes   Sig: Take 1,000 Units by mouth daily   dorzolamide-timolol (COSOPT) 22 3-6 8 MG/ML ophthalmic solution   Yes Yes   Sig: Administer 1 drop to both eyes 2 (two) times a day   estradiol (Yuvafem) 10 MCG TABS vaginal tablet   No Yes   Sig: Insert 1 tablet (10 mcg total) into the vagina 2 (two) times a week   multivitamin (THERAGRAN) TABS   Yes Yes   Sig: Take 1 tablet by mouth daily   travoprost (TRAVATAN-Z) 0 004 % ophthalmic solution   Yes Yes   Si drop daily at bedtime      Facility-Administered Medications: None       Past Medical History:   Diagnosis Date    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    Colon polyp     Fracture of arm     Glaucoma     Hypotension        Past Surgical History:   Procedure Laterality Date    BUNIONECTOMY      BUNIONECTOMY      CATARACT EXTRACTION      ELBOW SURGERY Right     IR CEREBRAL ANGIOGRAPHY  2019    IR CEREBRAL ANGIOGRAPHY / INTERVENTION  2018    TONSILLECTOMY         Family History   Problem Relation Age of Onset    Cancer Maternal Grandfather     Colon cancer Maternal Grandfather 36    No Known Problems Mother     Heart attack Father     No Known Problems Sister     No Known Problems Daughter     No Known Problems Maternal Grandmother     No Known Problems Paternal Grandmother     No Known Problems Paternal Grandfather     No Known Problems Maternal Aunt     No Known Problems Paternal Aunt     No Known Problems Paternal Aunt     Heart disease Family      I have reviewed and agree with the history as documented  E-Cigarette/Vaping    E-Cigarette Use Never User      E-Cigarette/Vaping Substances    Nicotine No     THC No     CBD No     Flavoring No     Unknown No      Social History     Tobacco Use    Smoking status: Never Smoker    Smokeless tobacco: Never Used   Vaping Use    Vaping Use: Never used   Substance Use Topics    Alcohol use: Not Currently     Alcohol/week: 2 0 standard drinks     Types: 2 Glasses of wine per week     Comment: rare use, not using due to her condition    Drug use: No        Review of Systems   Constitutional: Negative for chills and fever  HENT: Negative for ear pain, hearing loss, sore throat and tinnitus  Eyes: Negative for pain and visual disturbance  Respiratory: Negative for cough and shortness of breath  Cardiovascular: Negative for chest pain, palpitations and syncope  Gastrointestinal: Negative for abdominal pain, blood in stool, diarrhea, nausea and vomiting  Genitourinary: Negative for dysuria and hematuria  Musculoskeletal: Negative for arthralgias and back pain  Skin: Negative for color change and rash  Neurological: Positive for dizziness  Negative for seizures, syncope, weakness and headaches  All other systems reviewed and are negative        Physical Exam  ED Triage Vitals   Temperature Pulse Respirations Blood Pressure SpO2   07/23/22 1248 07/23/22 1248 07/23/22 1320 07/23/22 1248 07/23/22 1248   97 8 °F (36 6 °C) 71 16 146/69 98 %      Temp Source Heart Rate Source Patient Position - Orthostatic VS BP Location FiO2 (%)   07/23/22 1248 07/23/22 1248 07/23/22 1248 07/23/22 1248 --   Oral Monitor Sitting Right arm       Pain Score       07/23/22 1248       No Pain             Orthostatic Vital Signs  Vitals:    07/23/22 1325 07/23/22 1345 07/23/22 1424 07/23/22 1500   BP: 138/65 129/61 136/61 127/59   Pulse: 70 68 69 70   Patient Position - Orthostatic VS: Standing - Orthostatic VS  Lying Sitting       Physical Exam  Vitals and nursing note reviewed  Constitutional:       Appearance: Normal appearance  She is well-developed  HENT:      Head: Normocephalic and atraumatic  Right Ear: External ear normal       Left Ear: External ear normal       Mouth/Throat:      Mouth: Mucous membranes are moist       Pharynx: No posterior oropharyngeal erythema  Eyes:      General:         Right eye: No discharge  Left eye: No discharge  Conjunctiva/sclera: Conjunctivae normal    Cardiovascular:      Rate and Rhythm: Normal rate and regular rhythm  Heart sounds: No murmur heard  Pulmonary:      Effort: Pulmonary effort is normal  No respiratory distress  Breath sounds: Normal breath sounds  Abdominal:      Palpations: Abdomen is soft  Tenderness: There is no abdominal tenderness  Musculoskeletal:         General: No tenderness, deformity or signs of injury  Normal range of motion  Cervical back: Normal range of motion and neck supple  Right lower leg: No edema  Left lower leg: No edema  Skin:     General: Skin is warm and dry  Capillary Refill: Capillary refill takes less than 2 seconds  Neurological:      General: No focal deficit present  Mental Status: She is alert and oriented to person, place, and time     Psychiatric:         Mood and Affect: Mood normal          ED Medications  Medications   lactated ringers bolus 1,000 mL (0 mL Intravenous Stopped 7/23/22 1506)       Diagnostic Studies  Results Reviewed     Procedure Component Value Units Date/Time    Urine Microscopic [[id number]]  (Abnormal) Collected: 07/23/22 1424    Lab Status: Final result Specimen: Urine, Clean Catch Updated: 07/23/22 1533     RBC, UA 2-4 /hpf      WBC, UA 1-2 /hpf Epithelial Cells Occasional /hpf      Bacteria, UA Occasional /hpf      Amorphous Crystals, UA Moderate    UA (URINE) with reflex to Scope [[id number]]  (Abnormal) Collected: 07/23/22 1424    Lab Status: Final result Specimen: Urine, Clean Catch Updated: 07/23/22 1450     Color, UA Colorless     Clarity, UA Clear     Specific Gravity, UA 1 004     pH, UA 7 5     Leukocytes, UA Negative     Nitrite, UA Negative     Protein, UA Negative mg/dl      Glucose, UA Negative mg/dl      Ketones, UA Negative mg/dl      Urobilinogen, UA <2 0 mg/dl      Bilirubin, UA Negative     Occult Blood, UA Small    HS Troponin 0hr (reflex protocol) [[id number]]  (Normal) Collected: 07/23/22 1339    Lab Status: Final result Specimen: Blood from Arm, Right Updated: 07/23/22 1414     hs TnI 0hr 3 ng/L     Comprehensive metabolic panel [[id number]]  (Abnormal) Collected: 07/23/22 1332    Lab Status: Final result Specimen: Blood from Arm, Right Updated: 07/23/22 1408     Sodium 138 mmol/L      Potassium 3 6 mmol/L      Chloride 101 mmol/L      CO2 33 mmol/L      ANION GAP 4 mmol/L      BUN 15 mg/dL      Creatinine 0 63 mg/dL      Glucose 116 mg/dL      Calcium 8 6 mg/dL      AST 24 U/L      ALT 15 U/L      Alkaline Phosphatase 46 U/L      Total Protein 6 1 g/dL      Albumin 3 7 g/dL      Total Bilirubin 0 45 mg/dL      eGFR 84 ml/min/1 73sq m     Narrative:      [place] guidelines for Chronic Kidney Disease (CKD):     Stage 1 with normal or high GFR (GFR > 90 mL/min/1 73 square meters)    Stage 2 Mild CKD (GFR = 60-89 mL/min/1 73 square meters)    Stage 3A Moderate CKD (GFR = 45-59 mL/min/1 73 square meters)    Stage 3B Moderate CKD (GFR = 30-44 mL/min/1 73 square meters)    Stage 4 Severe CKD (GFR = 15-29 mL/min/1 73 square meters)    Stage 5 End Stage CKD (GFR <15 mL/min/1 73 square meters)  Note: GFR calculation is accurate only with a steady state creatinine    CBC and differential [[id number]]  (Abnormal) Collected: 07/23/22 1332    Lab Status: Final result Specimen: Blood from Arm, Right Updated: 07/23/22 1352     WBC 4 61 Thousand/uL      RBC 4 13 Million/uL      Hemoglobin 13 2 g/dL      Hematocrit 40 5 %      MCV 98 fL      MCH 32 0 pg      MCHC 32 6 g/dL      RDW 14 9 %      MPV 9 2 fL      Platelets 813 Thousands/uL      nRBC 0 /100 WBCs      Neutrophils Relative 77 %      Immat GRANS % 1 %      Lymphocytes Relative 9 %      Monocytes Relative 10 %      Eosinophils Relative 2 %      Basophils Relative 1 %      Neutrophils Absolute 3 56 Thousands/µL      Immature Grans Absolute 0 06 Thousand/uL      Lymphocytes Absolute 0 39 Thousands/µL      Monocytes Absolute 0 44 Thousand/µL      Eosinophils Absolute 0 11 Thousand/µL      Basophils Absolute 0 05 Thousands/µL                  No orders to display         Procedures  ECG 12 Lead Documentation Only    Date/Time: 7/23/2022 2:02 PM  Performed by: Shawn Merchant MD  Authorized by: Shawn Merchant MD     Patient location:  ED  Previous ECG:     Previous ECG:  Compared to current    Similarity:  Changes noted    Comparison to cardiac monitor: No    Interpretation:     Interpretation: normal    Quality:     Tracing quality:  Limited by artifact  Rate:     ECG rate:  65    ECG rate assessment: normal    Rhythm:     Rhythm: sinus rhythm    Ectopy:     Ectopy: none    QRS:     QRS axis:  Normal    QRS intervals:  Normal  Conduction:     Conduction: abnormal      Abnormal conduction: incomplete RBBB    ST segments:     ST segments:  Normal  T waves:     T waves: normal            ED Course                             SBIRT 20yo+    Flowsheet Row Most Recent Value   SBIRT (23 yo +)    In order to provide better care to our patients, we are screening all of our patients for alcohol and drug use  Would it be okay to ask you these screening questions?  No Filed at: 07/23/2022 1351                [facility]  Number of Diagnoses or Management Options  Lightheadedness: new and requires workup  Nausea: new and requires workup  Diagnosis management comments: Brayan Godinez comes to the emergency department accompanied by family after gradual increase in lightheadedness nausea while at home after awakening this morning  Patient is concerned that she is potentially dehydrated secondary to decreased p o  intake  Based on initial presenting complaints, initial laboratory study and measurements were taken  CBC, CMP, troponin, and urinalysis were unremarkable  Orthostatic vital signs were taken at the bedside secondary to the patient's expressed complaint that her lightheadedness becomes more exacerbated when she stands up  Orthostatic vital signs showed no fluctuation in her blood pressure  Patient was administered a L bolus of fluids while in the emergency department  Patient stated that she felt significantly better with fluid hydration  Patient was ambulated to the bathroom in order to provide a urine sample, and patient stated that she was not feeling lightheaded or unsteady on her feet as she had felt earlier in the day  She stated that she was feeling much better  Repeat examination of the patient while awaiting urinalysis results indicated that the patient was continually improving while in the emergency department  Based off of unremarkable physical examination, ability to ambulate in the emergency department at her baseline, and subjective improvement in symptoms, patient was deemed stable for discharge home with close PCP follow-up  Patient was counseled on strict return precautions/red flag symptoms that would warrant continued evaluation in the emergency department  Both patient and son expressed understanding with this plan  Patient was counseled that she should remain indoors during the heat wave and remain in a cool environment/air-conditioned environment and maintain adequate hydration of fluids at home      Disposition:  Discharged home with symptomatic improvement of symptoms with utilization of increased p o  Intake of fluids, close PCP follow-up, strict return precautions discussed at bedside  Amount and/or Complexity of Data Reviewed  Clinical lab tests: ordered and reviewed  Tests in the radiology section of CPT®: ordered and reviewed  Review and summarize past medical records: yes  Discuss the patient with other providers: yes  Independent visualization of images, tracings, or specimens: yes    Risk of Complications, Morbidity, and/or Mortality  Presenting problems: moderate  Diagnostic procedures: moderate  Management options: moderate    Patient Progress  Patient progress: stable      Disposition  Final diagnoses:   Lightheadedness   Nausea     Time reflects when diagnosis was documented in both MDM as applicable and the Disposition within this note     Time User Action Codes Description Comment    7/23/2022  3:53 PM Paddy Band Add [R42] Lightheadedness     7/23/2022  3:53 PM Paddy Band Add [R11 0] Nausea       ED Disposition     ED Disposition   Discharge    Condition   Stable    Date/Time   Sat Jul 23, 2022  3:53 PM    Comment   Johana Malhotra discharge to home/self care                 Follow-up Information     Follow up With Specialties Details Why Contact Info Additional [address] [facility] Perla Parker MD Geriatric Medicine Schedule an appointment as soon as possible for a visit  As needed [address] [address] Emergency Department Emergency Medicine  As needed, If symptoms worsen [address] [facility] [id number] [place] [address] Emergency Department, [address], [place], [place], [address]          Discharge Medication List as of 7/23/2022  3:55 PM      CONTINUE these medications which have NOT CHANGED    Details   calcium carbonate (OS-MELLO) 1250 (500 Ca) MG tablet 2 (two) times a day, Historical Med      CALCIUM PO Take 1 tablet by mouth daily, Historical Med      cholecalciferol (VITAMIN D3) 1,000 units tablet Take 1,000 Units by mouth daily, Historical Med      dorzolamide-timolol (COSOPT) 22 3-6 8 MG/ML ophthalmic solution Administer 1 drop to both eyes 2 (two) times a day, Historical Med      estradiol (Yuvafem) 10 MCG TABS vaginal tablet Insert 1 tablet (10 mcg total) into the vagina 2 (two) times a week, Starting Mon 4/4/2022, Normal      multivitamin (THERAGRAN) TABS Take 1 tablet by mouth daily, Historical Med      travoprost (TRAVATAN-Z) 0 004 % ophthalmic solution 1 drop daily at bedtime, Historical Med           No discharge procedures on file  PDMP Review     None           ED Provider  Attending physically available and evaluated Ponce Christine  ALLA managed the patient along with the ED Attending      Electronically Signed by         Efren Plata MD  07/23/22 8677

## 2022-07-23 NOTE — ED NOTES
Pt ambulated to bathroom with steady gait, no complaints of dizziness or headache     Keny Frank  07/23/22 3261

## 2022-07-23 NOTE — ED NOTES
Pt unable to urinate at this time, informed we need a urine sample when able       Alferd Sandifer, RN  07/23/22 5006 none

## 2022-07-24 LAB
ATRIAL RATE: 68 BPM
P AXIS: 78 DEGREES
PR INTERVAL: 192 MS
QRS AXIS: 59 DEGREES
QRSD INTERVAL: 94 MS
QT INTERVAL: 390 MS
QTC INTERVAL: 406 MS
T WAVE AXIS: 53 DEGREES
VENTRICULAR RATE: 65 BPM

## 2022-07-24 PROCEDURE — 93010 ELECTROCARDIOGRAM REPORT: CPT | Performed by: INTERNAL MEDICINE

## 2022-07-25 NOTE — ED ATTENDING ATTESTATION
7/23/2022  I, Raymond Esposito MD, saw and evaluated the patient  I have discussed the patient with the resident/non-physician practitioner and agree with the resident's/non-physician practitioner's findings, Plan of Care, and MDM as documented in the resident's/non-physician practitioner's note, except where noted  All available labs and Radiology studies were reviewed  I was present for key portions of any procedure(s) performed by the resident/non-physician practitioner and I was immediately available to provide assistance  At this point I agree with the current assessment done in the Emergency Department  I have conducted an independent evaluation of this patient a history and physical is as follows:     82 yo F presents to the ED w/ nausea & lightheadedness - worse w/ standing  Appetite has been a bit decreased though she reports trying to take in a good amount of fluids  No vomiting, fever, diarrhea, CP, cough or dyspnea  No HA or focal weakness  She received her 2nd COVID vaccine booster 2 d ago and suggests this may be responsible for her symptoms  She felt well yesterday  Reports having had milder but similar symptoms the day following her 1st booster  On exam - following receipt of approximately 500ml saline she reports feeling moderate improvement  Her son identifies that she looks much better as well  She is alert & w/ MMM  She is able to move about stretcher w/ ease  Good coloring  Heart & lung sounds unremarkable  Abdomen soft & NT  LE NT & NE  Clear speech  No facial asymmetry/ deficit appreciated  Pupils briskly reactive to light  EOMI  No nystagmus  Strong eye closure & symmetric brow raise  Ability to insufflate cheeks, protrude tongue midline & range this laterally  Symmetric/ intact sensation in the upper, mid & lower portions of the face    5/5 strength on head turn, shoulder shrug, bicep, tricep & deltoid movement against resistance b/l   5/5 strength on b/l hand , pincer grasp, finger abduction, dorsi & volar flexion, hip flexion, dorsi & plantar flexion  Symmetric grossly intact sensation in the UE & LE  No dysmetria  Strongly suspect symptoms related to mild dehydration  Labs pursued in consideration of this & alternate etiologies  ED Course     No concerning findings on study results  Symptomatically improved following IVF      Critical Care Time  Procedures

## 2022-09-19 ENCOUNTER — HOSPITAL ENCOUNTER (OUTPATIENT)
Dept: RADIOLOGY | Age: 82
Discharge: HOME | End: 2022-09-19
Payer: COMMERCIAL

## 2022-09-19 VITALS — BODY MASS INDEX: 21.34 KG/M2 | HEIGHT: 64 IN | WEIGHT: 125 LBS

## 2022-09-19 DIAGNOSIS — Z12.31 ENCOUNTER FOR SCREENING MAMMOGRAM FOR MALIGNANT NEOPLASM OF BREAST: ICD-10-CM

## 2022-09-19 PROCEDURE — 77063 BREAST TOMOSYNTHESIS BI: CPT

## 2022-09-19 PROCEDURE — 77067 SCR MAMMO BI INCL CAD: CPT

## 2022-11-09 ENCOUNTER — OFFICE VISIT (OUTPATIENT)
Dept: FAMILY MEDICINE CLINIC | Facility: CLINIC | Age: 82
End: 2022-11-09

## 2022-11-09 VITALS
WEIGHT: 130.8 LBS | BODY MASS INDEX: 22.33 KG/M2 | HEART RATE: 72 BPM | OXYGEN SATURATION: 99 % | HEIGHT: 64 IN | TEMPERATURE: 98 F | RESPIRATION RATE: 14 BRPM | SYSTOLIC BLOOD PRESSURE: 112 MMHG | DIASTOLIC BLOOD PRESSURE: 76 MMHG

## 2022-11-09 DIAGNOSIS — I60.2 NONTRAUMATIC SUBARACHNOID HEMORRHAGE FROM ANTERIOR COMMUNICATING ARTERY (HCC): Primary | ICD-10-CM

## 2022-11-09 DIAGNOSIS — D69.6 THROMBOCYTOPENIA (HCC): ICD-10-CM

## 2022-11-09 DIAGNOSIS — R53.83 OTHER FATIGUE: ICD-10-CM

## 2022-11-09 DIAGNOSIS — Z13.1 SCREENING FOR DIABETES MELLITUS: ICD-10-CM

## 2022-11-09 DIAGNOSIS — Z13.6 SCREENING FOR CARDIOVASCULAR CONDITION: ICD-10-CM

## 2022-11-09 DIAGNOSIS — Z98.890 S/P COIL EMBOLIZATION OF CEREBRAL ANEURYSM: ICD-10-CM

## 2022-11-09 DIAGNOSIS — E55.9 VITAMIN D DEFICIENCY: ICD-10-CM

## 2022-11-09 NOTE — PROGRESS NOTES
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)  Comments:  Pt stable - doing well  Has continued f/u with Neurosurgery  2  S/P coil embolization of cerebral aneurysm  Comments:  As above  3  Thrombocytopenia ([address] )  Comments:  Mild; last CBC done  Orders:  -     CBC and differential; Future    4  Vitamin D deficiency  Comments:  Hx of - check Vit D level  Pt to continue a healthy diet, and stay active  Orders:  -     Vitamin D 25 hydroxy; Future    5  Screening for diabetes mellitus  -     Comprehensive metabolic panel; Future    6  Screening for cardiovascular condition  -     Lipid Panel with Direct LDL reflex; Future    7  Other fatigue  -     CBC and differential; Future  -     TSH, 3rd generation with Free T4 reflex; Future         There are no Patient Instructions on file for this visit  Chief Complaint     Chief Complaint   Patient presents with   • Establish Care     Patient being seen to establish care   • Labs     Patient being seen for lab orders       History of Present Illness   Jalyn Cartwright is a 81y o -year-old female who presents today as a New Patient  Her son, Ashley Lin, is also a patient of mine  Hx of a subarachnoid hemorrhage from the anterior communicating artery bleed - coil procedure done successfully by Dr Judy Esquivel of Neurosurgery  Sees Ophthalmology for hx of very stable glaucoma  Sees Dr Artie Nair of OB/GYN  Pt is very active  Has not gone back to the gym though - with the current COV-19 pandemic  Pt has had 4 doses of the Moderna COV-19 vaccine - will be getting a bivalent booster (we discussed); had a HD Flu Vaccine already  Colonoscopy in   Pt's  passed away in 2020      Review of Systems   Review of Systems Constitutional: Negative for activity change  Respiratory: Negative for shortness of breath  Cardiovascular: Negative for chest pain  Gastrointestinal: Negative for abdominal pain and blood in stool  Psychiatric/Behavioral: Negative for dysphoric mood  The patient is not nervous/anxious          Active Problem List     Patient Active Problem List   Diagnosis   • 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)   • Glaucoma   • S/P coil embolization of cerebral aneurysm   • Hyponatremia   • Dysuria   • 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 condition   • History of colon polyps   • Family history of malignant neoplasm of colon   • Family history of colonic polyps   • Family hx of colon cancer   • Sensorineural hearing loss (SNHL) of both ears   • Post-nasal drip   • Nasal congestion   • Nasal septal deviation         Past Medical History:  Past Medical History:   Diagnosis Date   • Aneurysm ([address] ) 11/29/2018    brain   • Colon polyp    • Fracture of arm    • Glaucoma    • Hypotension        Past Surgical History:  Past Surgical History:   Procedure Laterality Date   • BUNIONECTOMY     • BUNIONECTOMY     • CATARACT EXTRACTION     • ELBOW SURGERY Right    • EYE SURGERY  CATARACTS   • FRACTURE SURGERY  PINS IN BROKEN ELBOW   • IR CEREBRAL ANGIOGRAPHY  06/26/2019   • IR CEREBRAL ANGIOGRAPHY / INTERVENTION  11/25/2018   • TONSILLECTOMY         Family History:  Family History   Problem Relation Age of Onset   • Cancer Maternal Grandfather    • Colon cancer Maternal Grandfather 36   • No Known Problems Mother    • Heart attack Father    • Diabetes Father    • No Known Problems Sister    • No Known Problems Daughter    • No Known Problems Maternal Grandmother    • No Known Problems Paternal Grandmother    • No Known Problems Paternal Grandfather    • No Known Problems Maternal Aunt    • No Known Problems Paternal Aunt    • No Known Problems Paternal Aunt    • Heart disease Family        Social History:  Social History     Socioeconomic History   • Marital status:      Spouse name: Not on file   • Number of children: Not on file   • Years of education: Not on file   • Highest education level: Not on file   Occupational History   • Not on file   Tobacco Use   • Smoking status: Never Smoker   • Smokeless tobacco: Never Used   Vaping Use   • Vaping Use: Never used   Substance and Sexual Activity   • Alcohol use: Not Currently     Comment: rare use, not using due to her condition   • Drug use: No   • Sexual activity: Not Currently     Birth control/protection: Post-menopausal   Other Topics Concern   • Not on file   Social History Narrative   • Not on file     Social Determinants of Health     Financial Resource Strain: Not on file   Food Insecurity: Not on file   Transportation Needs: Not on file   Physical Activity: Not on file   Stress: Not on file   Social Connections: Not on file   Intimate Partner Violence: Not on file   Housing Stability: Not on file       Objective     Vitals:    11/09/22 0921   BP: 112/76   Pulse: 72   Resp: 14   Temp: 98 °F (36 7 °C)   SpO2: 99%     Wt Readings from Last 3 Encounters:   11/09/22 59 3 kg (130 lb 12 8 oz)   09/19/22 56 7 kg (125 lb)   07/23/22 59 kg (130 lb)       Physical Exam  Vitals and nursing note reviewed  Constitutional:       General: She is not in acute distress  Appearance: Normal appearance  She is not ill-appearing, toxic-appearing or diaphoretic  HENT:      Head: Normocephalic and atraumatic  Eyes:      General: No scleral icterus  Conjunctiva/sclera: Conjunctivae normal    Cardiovascular:      Rate and Rhythm: Normal rate and regular rhythm  Heart sounds: Normal heart sounds  No murmur heard  No friction rub  No gallop  Pulmonary:      Effort: Pulmonary effort is normal  No respiratory distress  Breath sounds: Normal breath sounds  No stridor  No wheezing, rhonchi or rales     Musculoskeletal:      Cervical back: Normal range of motion and neck supple  No rigidity or tenderness  Lymphadenopathy:      Cervical: No cervical adenopathy  Neurological:      Mental Status: She is alert and oriented to person, place, and time  Psychiatric:         Mood and Affect: Mood normal          Behavior: Behavior normal          Thought Content:  Thought content normal          Judgment: Judgment normal          Pertinent Laboratory/Diagnostic Studies:  Lab Results   Component Value Date    BUN 15 07/23/2022    CREATININE 0 63 07/23/2022    CALCIUM 8 6 07/23/2022    K 3 6 07/23/2022    CO2 33 (H) 07/23/2022     07/23/2022     Lab Results   Component Value Date    ALT 15 07/23/2022    AST 24 07/23/2022    ALKPHOS 46 07/23/2022       Lab Results   Component Value Date    WBC 4 61 07/23/2022    HGB 13 2 07/23/2022    HCT 40 5 07/23/2022    MCV 98 07/23/2022     (L) 07/23/2022       No results found for: TSH    No results found for: CHOL  Lab Results   Component Value Date    TRIG 55 08/13/2021     Lab Results   Component Value Date    HDL 82 08/13/2021     Lab Results   Component Value Date    LDLCALC 107 (H) 08/13/2021     Lab Results   Component Value Date    HGBA1C 5 1 07/22/2019       Results for orders placed or performed during the hospital encounter of 07/23/22   CBC and differential   Result Value Ref Range    WBC 4 61 4 31 - 10 16 Thousand/uL    RBC 4 13 3 81 - 5 12 Million/uL    Hemoglobin 13 2 11 5 - 15 4 g/dL    Hematocrit 40 5 34 8 - 46 1 %    MCV 98 82 - 98 fL    MCH 32 0 26 8 - 34 3 pg    MCHC 32 6 31 4 - 37 4 g/dL    RDW 14 9 11 6 - 15 1 %    MPV 9 2 8 9 - 12 7 fL    Platelets 446 (L) 542 - 390 Thousands/uL    nRBC 0 /100 WBCs    Neutrophils Relative 77 (H) 43 - 75 %    Immat GRANS % 1 0 - 2 %    Lymphocytes Relative 9 (L) 14 - 44 %    Monocytes Relative 10 4 - 12 %    Eosinophils Relative 2 0 - 6 %    Basophils Relative 1 0 - 1 %    Neutrophils Absolute 3 56 1 85 - 7 62 Thousands/µL    Immature Grans Absolute 0 06 0 00 - 0 20 Thousand/uL    Lymphocytes Absolute 0 39 (L) 0 60 - 4 47 Thousands/µL    Monocytes Absolute 0 44 0 17 - 1 22 Thousand/µL    Eosinophils Absolute 0 11 0 00 - 0 61 Thousand/µL    Basophils Absolute 0 05 0 00 - 0 10 Thousands/µL   Comprehensive metabolic panel   Result Value Ref Range    Sodium 138 135 - 147 mmol/L    Potassium 3 6 3 5 - 5 3 mmol/L    Chloride 101 96 - 108 mmol/L    CO2 33 (H) 21 - 32 mmol/L    ANION GAP 4 4 - 13 mmol/L    BUN 15 5 - 25 mg/dL    Creatinine 0 63 0 60 - 1 30 mg/dL    Glucose 116 65 - 140 mg/dL    Calcium 8 6 8 4 - 10 2 mg/dL    AST 24 13 - 39 U/L    ALT 15 7 - 52 U/L    Alkaline Phosphatase 46 34 - 104 U/L    Total Protein 6 1 (L) 6 4 - 8 4 g/dL    Albumin 3 7 3 5 - 5 0 g/dL    Total Bilirubin 0 45 0 20 - 1 00 mg/dL    eGFR 84 ml/min/1 73sq m   UA (URINE) with reflex to Scope   Result Value Ref Range    Color, UA Colorless     Clarity, UA Clear     Specific [place], UA 1 004 1 003 - 1 030    pH, UA 7 5 4 5, 5 0, 5 5, 6 0, 6 5, 7 0, 7 5, 8 0    Leukocytes, UA Negative Negative    Nitrite, UA Negative Negative    Protein, UA Negative Negative mg/dl    Glucose, UA Negative Negative mg/dl    Ketones, UA Negative Negative mg/dl    Urobilinogen, UA <2 0 <2 0 mg/dl mg/dl    Bilirubin, UA Negative Negative    Occult Blood, UA Small (A) Negative   HS Troponin 0hr (reflex protocol)   Result Value Ref Range    hs TnI 0hr 3 "Refer to ACS Flowchart"- see link ng/L   Urine Microscopic   Result Value Ref Range    RBC, UA 2-4 (A) None Seen, 1-2 /hpf    WBC, UA 1-2 None Seen, 1-2 /hpf    Epithelial Cells Occasional None Seen, Occasional /hpf    Bacteria, UA Occasional None Seen, Occasional /hpf    Amorphous Crystals, UA Moderate    ECG 12 lead   Result Value Ref Range    Ventricular Rate 65 BPM    Atrial Rate 68 BPM    NC Interval 192 ms    QRSD Interval 94 ms    QT Interval 390 ms    QTC Interval 406 ms    P Axis 78 degrees    QRS Axis 59 degrees    T Wave Axis 53 degrees       Orders Placed This Encounter   Procedures   • Comprehensive metabolic panel   • Lipid Panel with Direct LDL reflex   • CBC and differential   • TSH, 3rd generation with Free T4 reflex   • Vitamin D 25 hydroxy       ALLERGIES:  Allergies   Allergen Reactions   • Atorvastatin Other (See Comments)     Severe muscle cramping    • Codeine GI Intolerance   • Metronidazole      Headaches/dizzy/swelling of tongue   • Risedronate Sodium Other (See Comments)     Severe muscle cramping        Current Medications     Current Outpatient Medications   Medication Sig Dispense Refill   • calcium carbonate (OS-MELLO) 1250 (500 Ca) MG tablet 2 (two) times a day     • CALCIUM PO Take 1 tablet by mouth daily     • cholecalciferol (VITAMIN D3) 1,000 units tablet Take 1,000 Units by mouth daily     • dorzolamide-timolol (COSOPT) 22 3-6 8 MG/ML ophthalmic solution Administer 1 drop to both eyes 2 (two) times a day     • estradiol (Yuvafem) 10 MCG TABS vaginal tablet Insert 1 tablet (10 mcg total) into the vagina 2 (two) times a week 24 tablet 2   • multivitamin (THERAGRAN) TABS Take 1 tablet by mouth daily Centrum Silver     • travoprost (TRAVATAN-Z) 0 004 % ophthalmic solution 1 drop daily at bedtime       No current facility-administered medications for this visit           Health Maintenance     Health Maintenance   Topic Date Due   • Medicare Annual Wellness Visit (AWV)  Never done   • Pneumococcal Vaccine: 65+ Years (1 - PCV) Never done   • PT PLAN OF CARE  06/24/2022   • Fall Risk  05/24/2023   • Breast Cancer Screening: Mammogram  09/19/2023   • Depression Screening  11/09/2023   • Urinary Incontinence Screening  11/09/2023   • BMI: Adult  11/09/2023   • Colorectal Cancer Screening  08/07/2025   • Osteoporosis Screening  Completed   • Influenza Vaccine  Completed   • COVID-19 Vaccine  Completed   • HIB Vaccine  Aged Out   • Hepatitis B Vaccine  Aged Out   • IPV Vaccine  Aged Out   • Hepatitis A Vaccine  Aged Out   • Meningococcal ACWY Vaccine  Aged Out   • HPV Vaccine  Aged Out     Immunization History   Administered Date(s) Administered   • COVID-19 MODERNA VACC 0 25 ML IM BOOSTER 10/27/2021   • COVID-19 MODERNA VACC 0 5 ML IM 01/19/2021, 02/16/2021, 10/11/2021, 07/21/2022   • INFLUENZA 10/13/2021, 10/14/2022   • Influenza Split High Dose Preservative Free IM 09/12/2018, 10/18/2019, 10/01/2020   • Influenza, Seasonal Vaccine, Quadrivalent, Adjuvanted,   5e 10/13/2021   • Influenza, high dose seasonal 0 7 mL 09/29/2020   • Tdap 06/16/2021   • Zoster Vaccine Recombinant 08/22/2019, 11/26/2019, 02/01/2020       Depression Screening and Follow-up Plan: Patient was screened for depression during today's encounter  They screened negative with a PHQ-2 score of 0          [address]

## 2022-11-14 ENCOUNTER — APPOINTMENT (OUTPATIENT)
Dept: LAB | Facility: CLINIC | Age: 82
End: 2022-11-14

## 2022-11-14 DIAGNOSIS — E55.9 VITAMIN D DEFICIENCY: ICD-10-CM

## 2022-11-14 DIAGNOSIS — Z13.6 SCREENING FOR CARDIOVASCULAR CONDITION: ICD-10-CM

## 2022-11-14 DIAGNOSIS — Z13.1 SCREENING FOR DIABETES MELLITUS: ICD-10-CM

## 2022-11-14 DIAGNOSIS — D69.6 THROMBOCYTOPENIA (HCC): ICD-10-CM

## 2022-11-14 DIAGNOSIS — R53.83 OTHER FATIGUE: ICD-10-CM

## 2022-11-14 LAB
25(OH)D3 SERPL-MCNC: 44.9 NG/ML (ref 30–100)
ALBUMIN SERPL BCP-MCNC: 4.1 G/DL (ref 3.5–5)
ALP SERPL-CCNC: 57 U/L (ref 34–104)
ALT SERPL W P-5'-P-CCNC: 11 U/L (ref 7–52)
ANION GAP SERPL CALCULATED.3IONS-SCNC: 6 MMOL/L (ref 4–13)
AST SERPL W P-5'-P-CCNC: 22 U/L (ref 13–39)
BASOPHILS # BLD AUTO: 0.06 THOUSANDS/ÂΜL (ref 0–0.1)
BASOPHILS NFR BLD AUTO: 1 % (ref 0–1)
BILIRUB SERPL-MCNC: 0.49 MG/DL (ref 0.2–1)
BUN SERPL-MCNC: 17 MG/DL (ref 5–25)
CALCIUM SERPL-MCNC: 9 MG/DL (ref 8.4–10.2)
CHLORIDE SERPL-SCNC: 105 MMOL/L (ref 96–108)
CHOLEST SERPL-MCNC: 227 MG/DL
CO2 SERPL-SCNC: 29 MMOL/L (ref 21–32)
CREAT SERPL-MCNC: 0.63 MG/DL (ref 0.6–1.3)
EOSINOPHIL # BLD AUTO: 0.12 THOUSAND/ÂΜL (ref 0–0.61)
EOSINOPHIL NFR BLD AUTO: 2 % (ref 0–6)
ERYTHROCYTE [DISTWIDTH] IN BLOOD BY AUTOMATED COUNT: 13.7 % (ref 11.6–15.1)
GFR SERPL CREATININE-BSD FRML MDRD: 84 ML/MIN/1.73SQ M
GLUCOSE P FAST SERPL-MCNC: 88 MG/DL (ref 65–99)
HCT VFR BLD AUTO: 42.9 % (ref 34.8–46.1)
HDLC SERPL-MCNC: 85 MG/DL
HGB BLD-MCNC: 13.3 G/DL (ref 11.5–15.4)
IMM GRANULOCYTES # BLD AUTO: 0.02 THOUSAND/UL (ref 0–0.2)
IMM GRANULOCYTES NFR BLD AUTO: 0 % (ref 0–2)
LDLC SERPL CALC-MCNC: 127 MG/DL (ref 0–100)
LYMPHOCYTES # BLD AUTO: 1.2 THOUSANDS/ÂΜL (ref 0.6–4.47)
LYMPHOCYTES NFR BLD AUTO: 24 % (ref 14–44)
MCH RBC QN AUTO: 30.2 PG (ref 26.8–34.3)
MCHC RBC AUTO-ENTMCNC: 31 G/DL (ref 31.4–37.4)
MCV RBC AUTO: 98 FL (ref 82–98)
MONOCYTES # BLD AUTO: 0.42 THOUSAND/ÂΜL (ref 0.17–1.22)
MONOCYTES NFR BLD AUTO: 8 % (ref 4–12)
NEUTROPHILS # BLD AUTO: 3.21 THOUSANDS/ÂΜL (ref 1.85–7.62)
NEUTS SEG NFR BLD AUTO: 65 % (ref 43–75)
NRBC BLD AUTO-RTO: 0 /100 WBCS
PLATELET # BLD AUTO: 180 THOUSANDS/UL (ref 149–390)
PMV BLD AUTO: 9.6 FL (ref 8.9–12.7)
POTASSIUM SERPL-SCNC: 4.2 MMOL/L (ref 3.5–5.3)
PROT SERPL-MCNC: 6.8 G/DL (ref 6.4–8.4)
RBC # BLD AUTO: 4.4 MILLION/UL (ref 3.81–5.12)
SODIUM SERPL-SCNC: 140 MMOL/L (ref 135–147)
TRIGL SERPL-MCNC: 73 MG/DL
TSH SERPL DL<=0.05 MIU/L-ACNC: 1.31 UIU/ML (ref 0.45–4.5)
WBC # BLD AUTO: 5.03 THOUSAND/UL (ref 4.31–10.16)

## 2022-11-18 NOTE — PLAN OF CARE
Physician Progress Note      Drew Morris  CSN #:                  [id number]  :                       1938  ADMIT DATE:       11/15/2022 8:25 PM  [address] [place] DATE:  RESPONDING  PROVIDER #:        Meng Ortega MD          QUERY TEXT:    Pt admitted with generalized edema and worsening renal function. Per   Nephrology consult, has DEISY on CKD 4. If possible, please document in   progress notes and discharge summary:    The medical record reflects the following:  Risk Factors: HTN, CKD, DM  Clinical Indicators: per nephrology consult baseline creatinine 2.0; on   admission BUN 93, creatinine 2.8, GFR 22  Treatment: nephrology consult, urine studies    Thank you,  Zonia Ríos RN, BSN, [email or web address] [email or web address]  Options provided:  -- DEISY on CKD 4  -- Other - I will add my own diagnosis  -- Disagree - Not applicable / Not valid  -- Disagree - Clinically unable to determine / Unknown  -- Refer to Clinical Documentation Reviewer    PROVIDER RESPONSE TEXT:    This patient has DEISY on CKD 4 which was present on admission. Query created by: Abigail Morin on 2022 4:40 AM      QUERY TEXT:    Pt admitted with anasarca and generalized weakness. Per nephrology consult,   has acute on chronic diastolic CHF. If possible, please document in progress   notes and discharge summary:    The medical record reflects the following:  Risk Factors: HTN, CKD  Clinical Indicators: SOB, anasarca, Pro-, CXR shows vascular   congestion, ECHO with EF 60-65%  Treatment: IV Lasix, fluid restriction, low Na diet    Thank you,  Zonia Ríos RN, BSN, [email or web address]  Options provided:  -- Acute on chronic diastolic CHF  -- Defer to nephrology consultant documentation regarding acute on chronic   diastolic CHF  -- Other - I will add my own diagnosis  -- Disagree - Not applicable / Not valid  -- Disagree - Clinically unable to determine / Unknown  -- Refer to Clinical Problem: PHYSICAL THERAPY ADULT  Goal: Performs mobility at highest level of function for planned discharge setting  See evaluation for individualized goals  Treatment/Interventions: Functional transfer training, LE strengthening/ROM, Therapeutic exercise, Endurance training, Cognitive reorientation, Patient/family training, Bed mobility, Gait training, Equipment eval/education, Spoke to nursing, Continued evaluation, Compensatory technique education  Equipment Recommended: Wale Winter       See flowsheet documentation for full assessment, interventions and recommendations  Outcome: Progressing  Prognosis: Good  Problem List: Impaired balance, Pain  Assessment: Pt seen for physical therapy treatment focused on therapeutic exercise and gait training  Pt is supine at start of session and agreeable to therapy  Pt transferred supine <> sit with supervision  Pt transferred sit <> stand with supervision  Pt ambulated 500 ft with supervision and no AD  Pt presents with slowed kee and narrow DONTE, no LOB  Pt performed standing/seated ther-ex as noted above  Pt remained in bedside chair with all needs within reach at end of session  PT recommendation for OPPT upon D/C  PT to follow up  Barriers to Discharge: Inaccessible home environment ([place])  Barriers to Discharge Comments: Pt needs to demonstrate stair competence  Recommendation: Outpatient PT, Home with family support     PT - OK to Discharge: No (Pending stair trial)    See flowsheet documentation for full assessment         Comments: Amarilis Blue, PT, DPT Documentation Reviewer    PROVIDER RESPONSE TEXT:    This patient has acute on chronic diastolic CHF which was present on   admission.     Query created by: Victoriano Chow on 11/18/2022 4:40 AM      Electronically signed by:  Juany Clemens MD 11/18/2022 8:25 AM

## 2023-01-11 ENCOUNTER — HOSPITAL ENCOUNTER (OUTPATIENT)
Dept: RADIOLOGY | Age: 83
Discharge: HOME | End: 2023-01-11

## 2023-01-11 VITALS — BODY MASS INDEX: 23.04 KG/M2 | HEIGHT: 63 IN | WEIGHT: 130 LBS

## 2023-01-11 DIAGNOSIS — M81.0 OSTEOPOROSIS WITHOUT CURRENT PATHOLOGICAL FRACTURE, UNSPECIFIED OSTEOPOROSIS TYPE: ICD-10-CM

## 2023-03-16 ENCOUNTER — TELEPHONE (OUTPATIENT)
Age: 83
End: 2023-03-16

## 2023-03-16 NOTE — TELEPHONE ENCOUNTER
Pt states she received a letter saying she was due in 3 yrs for [facility]; Last FC recall from 8/2020 says 5 yrs recall  Pt would like to clarify w/ provider  Pt has a hx of polyps and fm hx of colon CA       Please advised, thanks~

## 2023-03-29 NOTE — TELEPHONE ENCOUNTER
PT called stating she was waiting to hear back on when she's due for colonoscopy  Per Dr FATOUMATA Schreiber's 3/22/23 note, advised PT she should do 3 year recall due to her being increased risk  PT will call back beginning of June to schedule for August 2023

## 2023-05-19 ENCOUNTER — RA CDI HCC (OUTPATIENT)
Dept: OTHER | Facility: HOSPITAL | Age: 83
End: 2023-05-19

## 2023-05-19 NOTE — PROGRESS NOTES
[facility]  coding opportunities       Chart reviewed, no opportunity found:   [address]        Patients Insurance     Medicare Insurance: [employer] Attending MD Manzo.  Agree with above.  PT is a 52 yr old female with GERD, HLD, DMII, daily cigar smoker with SOB since sunday.  Mild assoc nausea.  PT has had no CP/abdominal pain/vomiting.  Pt seen at [facility] md today.  PT told she should come to ED for ACS rule-out.  Onset of SOB was non-descript.  Pt cannot recall and inciting event.  Equal exercise tolerance since sxs began.  PE pt is well appearing no LE DVT signs.  BReath sounds clear.  No CP on palpation.  No fevers/chills/orthopnea.  NO assoc palpitations.  Likely pulmonary in nature.  No previous cardiac work-up or cards hx.  Pt has risk factors of DMII, HLD, age, daily cigar smoking. Attending MD Manzo.  Agree with above.  PT is a 52 yr old female with GERD, HLD, DMII, daily cigar smoker with SOB since sunday.  Mild assoc nausea.  PT has had no CP/abdominal pain/vomiting.  Pt seen at [facility] md today.  PT told she should come to ED for ACS rule-out.  Onset of SOB was non-descript.  Pt cannot recall and inciting event.  Equal exercise tolerance since sxs began.  PE pt is well appearing no LE DVT signs.  BReath sounds clear.  No CP on palpation.  No fevers/chills/orthopnea.  NO assoc palpitations.  Likely pulmonary in nature.  No previous cardiac work-up or cards hx.  Pt has risk factors of DMII, HLD, age, daily cigar smoking.  Pt warrants troponin cycling and stress test given cardiac risk factors and SOB.  Will likely require pulm referral as outpt if cardiac work-up is negative.  No LE edema, no chest pain, no hypoxia or tachycardia.  Pt amenable to CDU stay.  Stable for transfer of care to observation/CDU.

## 2023-05-24 ENCOUNTER — OFFICE VISIT (OUTPATIENT)
Dept: FAMILY MEDICINE CLINIC | Facility: CLINIC | Age: 83
End: 2023-05-24

## 2023-05-24 VITALS
BODY MASS INDEX: 22.61 KG/M2 | WEIGHT: 127.6 LBS | RESPIRATION RATE: 14 BRPM | HEART RATE: 72 BPM | OXYGEN SATURATION: 99 % | DIASTOLIC BLOOD PRESSURE: 80 MMHG | SYSTOLIC BLOOD PRESSURE: 118 MMHG | HEIGHT: 63 IN | TEMPERATURE: 97.7 F

## 2023-05-24 DIAGNOSIS — Z13.1 SCREENING FOR DIABETES MELLITUS: ICD-10-CM

## 2023-05-24 DIAGNOSIS — E55.9 VITAMIN D DEFICIENCY: ICD-10-CM

## 2023-05-24 DIAGNOSIS — Z13.6 SCREENING FOR CARDIOVASCULAR CONDITION: ICD-10-CM

## 2023-05-24 DIAGNOSIS — H40.9 GLAUCOMA OF BOTH EYES, UNSPECIFIED GLAUCOMA TYPE: ICD-10-CM

## 2023-05-24 DIAGNOSIS — I60.2 NONTRAUMATIC SUBARACHNOID HEMORRHAGE FROM ANTERIOR COMMUNICATING ARTERY (HCC): Primary | ICD-10-CM

## 2023-05-24 DIAGNOSIS — Z80.0 FAMILY HISTORY OF MALIGNANT NEOPLASM OF COLON: ICD-10-CM

## 2023-05-24 DIAGNOSIS — Z23 IMMUNIZATION DUE: ICD-10-CM

## 2023-05-24 DIAGNOSIS — M81.0 AGE-RELATED OSTEOPOROSIS WITHOUT CURRENT PATHOLOGICAL FRACTURE: ICD-10-CM

## 2023-05-24 DIAGNOSIS — Z98.890 S/P COIL EMBOLIZATION OF CEREBRAL ANEURYSM: ICD-10-CM

## 2023-05-24 DIAGNOSIS — Z00.00 MEDICARE ANNUAL WELLNESS VISIT, SUBSEQUENT: ICD-10-CM

## 2023-05-24 NOTE — PROGRESS NOTES
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 ([address] ) - Primary       Other    Glaucoma    S/P coil embolization of cerebral aneurysm    Family history of malignant neoplasm of colon   Other Visit Diagnoses     Medicare annual wellness visit, subsequent        Hesham Lazo appears well  Continue a healthy diet, and regular exercise  Repeat FBW prior to next OV  Vitamin D deficiency        Stable on OTC Vit D3 supplement  Relevant Orders    Vitamin D 25 hydroxy    Age-related osteoporosis without current pathological fracture        Gets plenty of weight bearing exercise  On a calcium supplement as well  Screening for diabetes mellitus        Relevant Orders    Comprehensive metabolic panel    Screening for cardiovascular condition        Relevant Orders    Lipid Panel with Direct LDL reflex    Immunization due        Prevnar-20 given IM, and tolerated well  Relevant Orders    Pneumococcal Conjugate Vaccine 20-valent (Pcv20)          Depression Screening and Follow-up Plan: Patient was screened for depression during today's encounter  They screened negative with a PHQ-2 score of 0  Preventive health issues were discussed with patient, and age appropriate screening tests were ordered as noted in patient's After Visit Summary  Personalized health advice and appropriate referrals for health education or preventive services given if needed, as noted in patient's After Visit Summary  History of Present Illness:     Patient presents for a Medicare Wellness Visit    AWV Visit  Her son, Susan Epstein, is also a patient of mine  Hx of a subarachnoid hemorrhage from the anterior communicating artery bleed - coil procedure done successfully by Dr Kim Jamison of Neurosurgery  Sees Ophthalmology for hx of very stable glaucoma  Sees Dr Janis Hayward of OB/GYN  Pt is very active  Has gone back to the gym! Pt vaccinated against COV-19 vaccine  Colonoscopy in 2020  Pt's  passed away in 03/2020  Patient Care Team:  Dayana Mckoy DO as PCP - General (Family Medicine)  Charolette Hamman, MD (Colon and Rectal Surgery)     Review of Systems:     Review of Systems   Constitutional: Negative for activity change  Eyes: Negative for visual disturbance  Respiratory: Negative for shortness of breath  Cardiovascular: Negative for chest pain  Gastrointestinal: Negative for abdominal pain, blood in stool and constipation  Genitourinary:        No new breast lumps on self-exam    Neurological: Negative for headaches  Psychiatric/Behavioral: Negative for dysphoric mood  The patient is not nervous/anxious           Problem List:     Patient Active Problem List   Diagnosis   • 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)   • Glaucoma   • S/P coil embolization of cerebral aneurysm   • Hyponatremia   • Dysuria   • 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 condition   • History of colon polyps   • Family history of malignant neoplasm of colon   • Family history of colonic polyps   • Family hx of colon cancer   • Sensorineural hearing loss (SNHL) of both ears   • Post-nasal drip   • Nasal congestion   • Nasal septal deviation      Past Medical and Surgical History:     Past Medical History:   Diagnosis Date   • Aneurysm ([address] ) 11/29/2018    brain   • Colon polyp    • Fracture of arm    • Glaucoma    • Hypotension      Past Surgical History:   Procedure Laterality Date   • BUNIONECTOMY     • BUNIONECTOMY     • CATARACT EXTRACTION     • ELBOW SURGERY Right    • EYE SURGERY  CATARACTS   • FRACTURE SURGERY  PINS IN BROKEN ELBOW   • IR CEREBRAL ANGIOGRAPHY  06/26/2019   • IR CEREBRAL ANGIOGRAPHY / INTERVENTION  11/25/2018   • TONSILLECTOMY        Family History:     Family History   Problem Relation Age of Onset   • Cancer Maternal Grandfather    • Colon cancer Maternal Grandfather 40   • No Known Problems Mother    • Heart attack Father    • Diabetes Father    • No Known Problems Sister    • No Known Problems Daughter    • No Known Problems Maternal Grandmother    • No Known Problems Paternal Grandmother    • No Known Problems Paternal Grandfather    • No Known Problems Maternal Aunt    • No Known Problems Paternal Aunt    • No Known Problems Paternal Aunt    • Heart disease Family       Social History:     Social History     Socioeconomic History   • Marital status:      Spouse name: None   • Number of children: None   • Years of education: None   • Highest education level: None   Occupational History   • None   Tobacco Use   • Smoking status: Never   • Smokeless tobacco: Never   Vaping Use   • Vaping Use: Never used   Substance and Sexual Activity   • Alcohol use: Not Currently     Comment: not using due to her condition   • Drug use: No   • Sexual activity: Not Currently     Birth control/protection: Post-menopausal   Other Topics Concern   • None   Social History Narrative   • None     Social Determinants of Health     Financial Resource Strain: Low Risk  (5/18/2023)    Overall Financial Resource Strain (CARDIA)    • Difficulty of Paying Living Expenses: Not hard at all   Food Insecurity: Not on file   Transportation Needs: No Transportation Needs (5/18/2023)    PRAPARE - Transportation    • Lack of Transportation (Medical): No    • Lack of Transportation (Non-Medical):  No   Physical Activity: Not on file   Stress: Not on file   Social Connections: Not on file   Intimate Partner Violence: Not on file   Housing Stability: Not on file      Medications and Allergies:     Current Outpatient Medications   Medication Sig Dispense Refill   • calcium carbonate (OS-MELLO) 1250 (500 Ca) MG tablet 2 (two) times a day     • CALCIUM PO Take 1 tablet by mouth daily     • cholecalciferol (VITAMIN D3) 1,000 units tablet Take 1,000 Units by mouth daily     • dorzolamide-timolol (COSOPT) 22 3-6 8 MG/ML ophthalmic solution Administer 1 drop to both eyes 2 (two) times a day     • estradiol (Yuvafem) 10 MCG TABS vaginal tablet Insert 1 tablet (10 mcg total) into the vagina 2 (two) times a week 24 tablet 2   • multivitamin (THERAGRAN) TABS Take 1 tablet by mouth daily Centrum Silver     • travoprost (TRAVATAN-Z) 0 004 % ophthalmic solution 1 drop daily at bedtime       No current facility-administered medications for this visit  Allergies   Allergen Reactions   • Atorvastatin Other (See Comments)     Severe muscle cramping    • Codeine GI Intolerance   • Metronidazole      Headaches/dizzy/swelling of tongue   • Risedronate Sodium Other (See Comments)     Severe muscle cramping       Immunizations:     Immunization History   Administered Date(s) Administered   • COVID-19 MODERNA VACC 0 25 ML IM BOOSTER 10/27/2021   • COVID-19 MODERNA VACC 0 5 ML IM 10/11/2021, 07/21/2022   • COVID-19, unspecified 01/19/2021, 02/16/2021   • INFLUENZA 10/13/2021, 10/14/2022   • Influenza Split High Dose Preservative Free IM 09/12/2018, 10/18/2019, 10/01/2020   • Influenza, Seasonal Vaccine, Quadrivalent, Adjuvanted,   5e 10/13/2021   • Influenza, high dose seasonal 0 7 mL 09/29/2020   • Tdap 06/16/2021   • Zoster Vaccine Recombinant 08/22/2019, 11/26/2019, 02/01/2020      Health Maintenance:         Topic Date Due   • Breast Cancer Screening: Mammogram  09/19/2023   • Colorectal Cancer Screening  08/07/2025         Topic Date Due   • Pneumococcal Vaccine: 65+ Years (1 - PCV) Never done   • COVID-19 Vaccine (6 - Mixed Product series) 09/15/2022      Medicare Screening Tests and Risk Assessments:     Alessio Moyer is here for her Subsequent Wellness visit  Last Medicare Wellness visit information reviewed, patient interviewed, no change since last AWV  Health Risk Assessment:   Patient rates overall health as very good  Patient feels that their physical health rating is same  Patient is satisfied with their life  Eyesight was rated as same   Hearing was rated as same  Patient feels that their emotional and mental health rating is slightly better  Patients states they are never, rarely angry  Patient states they are sometimes unusually tired/fatigued  Pain experienced in the last 7 days has been none  Patient states that she has experienced no weight loss or gain in last 6 months  Depression Screening:   PHQ-2 Score: 0      Fall Risk Screening: In the past year, patient has experienced: no history of falling in past year      Urinary Incontinence Screening:   Patient has not leaked urine accidently in the last six months  Home Safety:  Patient does not have trouble with stairs inside or outside of their home  Patient has working smoke alarms and has working carbon monoxide detector  Home safety hazards include: none  Nutrition:   Current diet is Regular  Medications:   Patient is currently taking over-the-counter supplements  OTC medications include: Centrum silver women, Calcium 600 mg 2x , D3,1000  IU  Patient is able to manage medications  Activities of Daily Living (ADLs)/Instrumental Activities of Daily Living (IADLs):   Walk and transfer into and out of bed and chair?: Yes  Dress and groom yourself?: Yes    Bathe or shower yourself?: Yes    Feed yourself? Yes  Do your laundry/housekeeping?: Yes  Manage your money, pay your bills and track your expenses?: Yes  Make your own meals?: Yes    Do your own shopping?: Yes    Durable Medical Equipment Suppliers  [employer] pendant    Previous Hospitalizations:   Any hospitalizations or ED visits within the last 12 months?: No      Advance Care Planning:   Living will: Yes    Durable POA for healthcare:  Yes    Advanced directive: Yes    End of Life Decisions reviewed with patient: Yes      Cognitive Screening:   Provider or family/friend/caregiver concerned regarding cognition?: No    PREVENTIVE SCREENINGS      Cardiovascular Screening:    General: Screening Current      Diabetes "Screening:     General: Screening Current      Colorectal Cancer Screening:     General: Screening Current      Breast Cancer Screening:     General: Screening Current      Cervical Cancer Screening:    General: Screening Not Indicated      Osteoporosis Screening:    General: Screening Not Indicated and History Osteoporosis      Lung Cancer Screening:     General: Screening Not Indicated    Screening, Brief Intervention, and Referral to Treatment (SBIRT)    Screening  Typical number of drinks in a day: 0  Typical number of drinks in a week: 0  Interpretation: Low risk drinking behavior  AUDIT-C Screenin) How often did you have a drink containing alcohol in the past year? never  2) How many drinks did you have on a typical day when you were drinking in the past year? 0  3) How often did you have 6 or more drinks on one occasion in the past year? never    AUDIT-C Score: 0  Interpretation: Score 0-2 (female): Negative screen for alcohol misuse    Single Item Drug Screening:  How often have you used an illegal drug (including marijuana) or a prescription medication for non-medical reasons in the past year? never    Single Item Drug Screen Score: 0  Interpretation: Negative screen for possible drug use disorder    No results found  Physical Exam:     /80 (BP Location: Left arm, Patient Position: Sitting, Cuff Size: Standard)   Pulse 72   Temp 97 7 °F (36 5 °C) (Tympanic)   Resp 14   Ht 5' 2 99\" (1 6 m)   Wt 57 9 kg (127 lb 9 6 oz)   LMP  (LMP Unknown)   SpO2 99%   BMI 22 61 kg/m²     Physical Exam  Vitals and nursing note reviewed  Constitutional:       General: She is not in acute distress  Appearance: Normal appearance  She is not ill-appearing, toxic-appearing or diaphoretic  HENT:      Head: Normocephalic and atraumatic  Eyes:      General: No scleral icterus  Conjunctiva/sclera: Conjunctivae normal    Cardiovascular:      Rate and Rhythm: Normal rate and regular rhythm        " Heart sounds: Normal heart sounds  No murmur heard  No friction rub  No gallop  Pulmonary:      Effort: Pulmonary effort is normal  No respiratory distress  Breath sounds: Normal breath sounds  No stridor  No wheezing, rhonchi or rales  Abdominal:      General: Abdomen is flat  Bowel sounds are normal  There is no distension  Palpations: Abdomen is soft  There is no mass  Tenderness: There is no abdominal tenderness  There is no guarding or rebound  Musculoskeletal:      Cervical back: Normal range of motion and neck supple  No rigidity or tenderness  Lymphadenopathy:      Cervical: No cervical adenopathy  Neurological:      Mental Status: She is alert and oriented to person, place, and time  Psychiatric:         Mood and Affect: Mood normal          Behavior: Behavior normal          Thought Content: Thought content normal          Judgment: Judgment normal           Tadeo Parks was seen today for medicare wellness visit  Diagnoses and all orders for this visit:    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)  Comments:  Hx of coiling procedure in past - Neurosurgery signed off  S/P coil embolization of cerebral aneurysm  Comments:  As above  Medicare annual wellness visit, subsequent  Comments:  Tadeo Parks appears well  Continue a healthy diet, and regular exercise  Repeat FBW prior to next OV  Vitamin D deficiency  Comments:  Stable on OTC Vit D3 supplement  Orders:  -     Vitamin D 25 hydroxy; Future    Age-related osteoporosis without current pathological fracture  Comments:  Gets plenty of weight bearing exercise  On a calcium supplement as well  Glaucoma of both eyes, unspecified glaucoma type  Comments:  Has regular eye exams  Family history of malignant neoplasm of colon  Comments:  Working with [facility]  Screening for diabetes mellitus  -     Comprehensive metabolic panel;  Future    Screening for cardiovascular condition  - Lipid Panel with Direct LDL reflex; Future    Immunization due  Comments:  Prevnar-20 given IM, and tolerated well      Orders:  -     Pneumococcal Conjugate Vaccine 20-valent (Pcv20)          Carlo Iqbal DO

## 2023-06-16 ENCOUNTER — OFFICE VISIT (OUTPATIENT)
Dept: GYNECOLOGY | Facility: CLINIC | Age: 83
End: 2023-06-16
Payer: COMMERCIAL

## 2023-06-16 VITALS
DIASTOLIC BLOOD PRESSURE: 60 MMHG | SYSTOLIC BLOOD PRESSURE: 102 MMHG | WEIGHT: 128 LBS | HEIGHT: 63 IN | BODY MASS INDEX: 22.68 KG/M2

## 2023-06-16 DIAGNOSIS — N95.2 ATROPHIC VAGINITIS: ICD-10-CM

## 2023-06-16 DIAGNOSIS — Z12.31 SCREENING MAMMOGRAM FOR BREAST CANCER: Primary | ICD-10-CM

## 2023-06-16 DIAGNOSIS — R92.2 DENSE BREAST TISSUE ON MAMMOGRAM: ICD-10-CM

## 2023-06-16 DIAGNOSIS — N95.2 VAGINAL ATROPHY: ICD-10-CM

## 2023-06-16 PROCEDURE — 99212 OFFICE O/P EST SF 10 MIN: CPT | Performed by: OBSTETRICS & GYNECOLOGY

## 2023-06-16 RX ORDER — ESTRADIOL 10 UG/1
1 INSERT VAGINAL 2 TIMES WEEKLY
Qty: 24 TABLET | Refills: 2 | Status: SHIPPED | OUTPATIENT
Start: 2023-06-19

## 2023-08-04 ENCOUNTER — ANESTHESIA (OUTPATIENT)
Dept: ANESTHESIOLOGY | Facility: HOSPITAL | Age: 83
End: 2023-08-04

## 2023-08-04 ENCOUNTER — ANESTHESIA EVENT (OUTPATIENT)
Dept: ANESTHESIOLOGY | Facility: HOSPITAL | Age: 83
End: 2023-08-04

## 2023-08-18 ENCOUNTER — ANESTHESIA EVENT (OUTPATIENT)
Dept: GASTROENTEROLOGY | Facility: AMBULARY SURGERY CENTER | Age: 83
End: 2023-08-18

## 2023-08-18 ENCOUNTER — ANESTHESIA (OUTPATIENT)
Dept: GASTROENTEROLOGY | Facility: AMBULARY SURGERY CENTER | Age: 83
End: 2023-08-18

## 2023-08-18 ENCOUNTER — HOSPITAL ENCOUNTER (OUTPATIENT)
Dept: GASTROENTEROLOGY | Facility: AMBULARY SURGERY CENTER | Age: 83
Setting detail: OUTPATIENT SURGERY
End: 2023-08-18
Attending: COLON & RECTAL SURGERY
Payer: COMMERCIAL

## 2023-08-18 VITALS
WEIGHT: 128 LBS | HEIGHT: 63 IN | BODY MASS INDEX: 22.68 KG/M2 | RESPIRATION RATE: 22 BRPM | TEMPERATURE: 96.6 F | SYSTOLIC BLOOD PRESSURE: 131 MMHG | DIASTOLIC BLOOD PRESSURE: 70 MMHG | HEART RATE: 51 BPM | OXYGEN SATURATION: 99 %

## 2023-08-18 DIAGNOSIS — Z80.0 FAMILY HISTORY OF COLON CANCER: ICD-10-CM

## 2023-08-18 DIAGNOSIS — Z86.010 PERSONAL HISTORY OF COLONIC POLYPS: ICD-10-CM

## 2023-08-18 PROCEDURE — 45385 COLONOSCOPY W/LESION REMOVAL: CPT | Performed by: COLON & RECTAL SURGERY

## 2023-08-18 PROCEDURE — 88305 TISSUE EXAM BY PATHOLOGIST: CPT | Performed by: PATHOLOGY

## 2023-08-18 RX ORDER — SODIUM CHLORIDE, SODIUM LACTATE, POTASSIUM CHLORIDE, CALCIUM CHLORIDE 600; 310; 30; 20 MG/100ML; MG/100ML; MG/100ML; MG/100ML
INJECTION, SOLUTION INTRAVENOUS CONTINUOUS PRN
Status: DISCONTINUED | OUTPATIENT
Start: 2023-08-18 | End: 2023-08-18

## 2023-08-18 RX ORDER — PROPOFOL 10 MG/ML
INJECTION, EMULSION INTRAVENOUS AS NEEDED
Status: DISCONTINUED | OUTPATIENT
Start: 2023-08-18 | End: 2023-08-18

## 2023-08-18 RX ADMIN — PROPOFOL 30 MG: 10 INJECTION, EMULSION INTRAVENOUS at 09:36

## 2023-08-18 RX ADMIN — PROPOFOL 30 MG: 10 INJECTION, EMULSION INTRAVENOUS at 10:01

## 2023-08-18 RX ADMIN — PROPOFOL 20 MG: 10 INJECTION, EMULSION INTRAVENOUS at 09:45

## 2023-08-18 RX ADMIN — PROPOFOL 30 MG: 10 INJECTION, EMULSION INTRAVENOUS at 09:55

## 2023-08-18 RX ADMIN — PROPOFOL 20 MG: 10 INJECTION, EMULSION INTRAVENOUS at 09:52

## 2023-08-18 RX ADMIN — PROPOFOL 30 MG: 10 INJECTION, EMULSION INTRAVENOUS at 09:35

## 2023-08-18 RX ADMIN — PROPOFOL 50 MG: 10 INJECTION, EMULSION INTRAVENOUS at 09:48

## 2023-08-18 RX ADMIN — PROPOFOL 30 MG: 10 INJECTION, EMULSION INTRAVENOUS at 09:50

## 2023-08-18 RX ADMIN — PROPOFOL 50 MG: 10 INJECTION, EMULSION INTRAVENOUS at 09:34

## 2023-08-18 RX ADMIN — SODIUM CHLORIDE, SODIUM LACTATE, POTASSIUM CHLORIDE, AND CALCIUM CHLORIDE: .6; .31; .03; .02 INJECTION, SOLUTION INTRAVENOUS at 09:28

## 2023-08-18 RX ADMIN — PROPOFOL 20 MG: 10 INJECTION, EMULSION INTRAVENOUS at 09:59

## 2023-08-18 RX ADMIN — PROPOFOL 30 MG: 10 INJECTION, EMULSION INTRAVENOUS at 10:05

## 2023-08-18 RX ADMIN — PROPOFOL 30 MG: 10 INJECTION, EMULSION INTRAVENOUS at 09:41

## 2023-08-18 RX ADMIN — PROPOFOL 30 MG: 10 INJECTION, EMULSION INTRAVENOUS at 09:38

## 2023-08-18 NOTE — H&P
History and Physical   Colon and Rectal Surgery   Bonita Gaxiola 81 y.o. female MRN: [id number]  Unit/Bed#:  Encounter: [id number]  08/18/23   9:24 AM      CC:  History of polyps    History of Present Illness   HPI:  Bonita Gaxiola is a 81 y.o. female without GI symptoms.   Historical Information   Past Medical History:   Diagnosis Date   • Aneurysm ([address]) 11/29/2018    brain   • Colon polyp    • Fracture of arm    • Glaucoma    • Hypotension      Past Surgical History:   Procedure Laterality Date   • BUNIONECTOMY     • BUNIONECTOMY     • CATARACT EXTRACTION     • ELBOW SURGERY Right    • EYE SURGERY  CATARACTS   • FRACTURE SURGERY  PINS IN BROKEN ELBOW   • IR CEREBRAL ANGIOGRAPHY  06/26/2019   • IR CEREBRAL ANGIOGRAPHY / INTERVENTION  11/25/2018   • TONSILLECTOMY         Meds/Allergies     (Not in a hospital admission)        Current Outpatient Medications:   •  cholecalciferol (VITAMIN D3) 1,000 units tablet, Take 1,000 Units by mouth daily, Disp: , Rfl:   •  dorzolamide-timolol (COSOPT) 22.3-6.8 MG/ML ophthalmic solution, Administer 1 drop to both eyes 2 (two) times a day, Disp: , Rfl:   •  multivitamin (THERAGRAN) TABS, Take 1 tablet by mouth daily Centrum Silver, Disp: , Rfl:   •  travoprost (TRAVATAN-Z) 0.004 % ophthalmic solution, 1 drop daily at bedtime, Disp: , Rfl:   •  estradiol (Yuvafem) 10 MCG TABS vaginal tablet, Insert 1 tablet (10 mcg total) into the vagina 2 (two) times a week, Disp: 24 tablet, Rfl: 2    Allergies   Allergen Reactions   • Atorvastatin Other (See Comments)     Severe muscle cramping    • Codeine GI Intolerance   • Metronidazole      Headaches/dizzy/swelling of tongue   • Risedronate Sodium Other (See Comments)     Severe muscle cramping          Social History   Social History     Substance and Sexual Activity   Alcohol Use Not Currently    Comment: not using due to her condition     Social History     Substance and Sexual Activity   Drug Use No     Social History     Tobacco Use   Smoking Status Never   Smokeless Tobacco Never         Family History:   Family History   Problem Relation Age of Onset   • No Known Problems Mother    • Heart attack Father    • Diabetes Father    • No Known Problems Sister    • No Known Problems Daughter    • No Known Problems Maternal Grandmother    • Cancer Maternal Grandfather    • Colon cancer Maternal Grandfather 36   • No Known Problems Paternal Grandmother    • No Known Problems Paternal Grandfather    • No Known Problems Maternal Aunt    • Cancer Paternal Aunt         pancreatic   • No Known Problems Paternal Aunt    • Heart disease Family          Objective     Current Vitals:   Blood Pressure: 141/77 (08/18/23 0821)  Pulse: 82 (08/18/23 0821)  Temperature: (!) 96.9 °F (36.1 °C) (08/18/23 0821)  Temp Source: Temporal (08/18/23 0821)  Respirations: 16 (08/18/23 0821)  Height: 5' 3" (160 cm) (08/18/23 3335)  Weight - Scale: 58.1 kg (128 lb) (08/18/23 0821)  SpO2: 97 % (08/18/23 0821)  No intake or output data in the 24 hours ending 08/18/23 0924    Physical Exam:  General:  Well nourished, no distress. Neuro: Alert and oriented  Eyes:Sclera anicteric, conjunctiva pink. Pulm: Clear to auscultation bilaterally. No respiratory Distress. CV:  Regular rate and rhythm. No murmurs. Abdomen:  Soft, flat, non-tender, without masses or hepatosplenomegaly. Lab Results:       ASSESSMENT:  Larene Kehr is a 81 y.o. female for surveillance. PLAN:  Colonoscopy. Risks , including, but not limited to, bleeding, perforation, missed lesions, and potential need for surgery, were reviewed. Alternatives to colonoscopy were discussed.   Mireya Saucedo MD

## 2023-08-18 NOTE — ANESTHESIA PREPROCEDURE EVALUATION
Procedure:  COLONOSCOPY    Relevant Problems   No relevant active problems      Past Medical History:   Diagnosis Date   • Aneurysm ([address]) 11/29/2018    brain   • Colon polyp    • Fracture of arm    • Glaucoma    • Hypotension          Physical Exam    Airway    Mallampati score: II  TM Distance: >3 FB  Neck ROM: full     Dental   No notable dental hx     Cardiovascular  Rhythm: regular, Rate: normal,     Pulmonary  Breath sounds clear to auscultation,     Other Findings  Intercisor Distance > 3cm          Anesthesia Plan  ASA Score- 2     Anesthesia Type- IV sedation with anesthesia with ASA Monitors. Additional Monitors:   Airway Plan:     Comment: NPO appropriate. Discussed benefits/risks of monitored anesthetic care which involves providing a dynamic level of mild to deep sedation. Complications include awareness and/or airway obstruction/aspiration which may necessitate conversion to general anesthesia. All questions answered. Patient understands and wishes to proceed. .       Plan Factors-Exercise tolerance (METS): >4 METS. Chart reviewed. EKG reviewed. Existing labs reviewed. Induction-     Postoperative Plan- Plan for postoperative opioid use. Informed Consent- Anesthetic plan and risks discussed with patient. I personally reviewed this patient with the CRNA. Discussed and agreed on the Anesthesia Plan with the CRNA. Grisel Hinds

## 2023-08-18 NOTE — ANESTHESIA POSTPROCEDURE EVALUATION
Post-Op Assessment Note    CV Status:  Stable  Pain Score: 0    Pain management: adequate     Mental Status:  Alert and awake   Hydration Status:  Euvolemic and stable   PONV Controlled:  Controlled   Airway Patency:  Patent and adequate      Post Op Vitals Reviewed: Yes      Staff: CRNA         No notable events documented.     BP   116/61   Temp     Pulse 78   Resp 16   SpO2 98% RA

## 2023-08-21 ENCOUNTER — NURSE TRIAGE (OUTPATIENT)
Age: 83
End: 2023-08-21

## 2023-08-21 ENCOUNTER — TELEPHONE (OUTPATIENT)
Dept: FAMILY MEDICINE CLINIC | Facility: CLINIC | Age: 83
End: 2023-08-21

## 2023-08-21 NOTE — TELEPHONE ENCOUNTER
Patient called and left message, copied below: This is [employer]. I'm a patient of [facility]. I realize he's leaving. My birthday is April 1st, 1940. I'd like to send him a message, please. I'd like to know where he stands with the RSV shot, whether he's recommending his patients to get it, and if so, how do you space out the RSV, the flu shot and COVID so that they're not affected by one another? My phone number is [phone]. Thank you.

## 2023-08-21 NOTE — TELEPHONE ENCOUNTER
Litzy Sue, called she was concerned that diverticula were found on her recent colonoscopy. Patient wanted to know if there are any foods that she should avoid because of the diverticula. I explained what diverticula are and that they are benign. Unless she begins to have diverticulitis type symptoms she has no dietary restrictions and should be eating a high fiber diet.

## 2023-08-22 PROCEDURE — 88305 TISSUE EXAM BY PATHOLOGIST: CPT | Performed by: PATHOLOGY

## 2023-09-21 ENCOUNTER — TELEPHONE (OUTPATIENT)
Age: 83
End: 2023-09-21

## 2023-11-13 ENCOUNTER — APPOINTMENT (OUTPATIENT)
Dept: LAB | Facility: CLINIC | Age: 83
End: 2023-11-13
Payer: COMMERCIAL

## 2023-11-13 DIAGNOSIS — E55.9 VITAMIN D DEFICIENCY: ICD-10-CM

## 2023-11-13 DIAGNOSIS — Z13.1 SCREENING FOR DIABETES MELLITUS: ICD-10-CM

## 2023-11-13 DIAGNOSIS — Z13.6 SCREENING FOR CARDIOVASCULAR CONDITION: ICD-10-CM

## 2023-11-13 LAB
25(OH)D3 SERPL-MCNC: 48.4 NG/ML (ref 30–100)
ALBUMIN SERPL BCP-MCNC: 4.4 G/DL (ref 3.5–5)
ALP SERPL-CCNC: 57 U/L (ref 34–104)
ALT SERPL W P-5'-P-CCNC: 14 U/L (ref 7–52)
ANION GAP SERPL CALCULATED.3IONS-SCNC: 3 MMOL/L
AST SERPL W P-5'-P-CCNC: 23 U/L (ref 13–39)
BILIRUB SERPL-MCNC: 0.57 MG/DL (ref 0.2–1)
BUN SERPL-MCNC: 16 MG/DL (ref 5–25)
CALCIUM SERPL-MCNC: 9.2 MG/DL (ref 8.4–10.2)
CHLORIDE SERPL-SCNC: 105 MMOL/L (ref 96–108)
CHOLEST SERPL-MCNC: 242 MG/DL
CO2 SERPL-SCNC: 32 MMOL/L (ref 21–32)
CREAT SERPL-MCNC: 0.57 MG/DL (ref 0.6–1.3)
GFR SERPL CREATININE-BSD FRML MDRD: 86 ML/MIN/1.73SQ M
GLUCOSE P FAST SERPL-MCNC: 85 MG/DL (ref 65–99)
HDLC SERPL-MCNC: 86 MG/DL
LDLC SERPL CALC-MCNC: 141 MG/DL (ref 0–100)
POTASSIUM SERPL-SCNC: 4.3 MMOL/L (ref 3.5–5.3)
PROT SERPL-MCNC: 7 G/DL (ref 6.4–8.4)
SODIUM SERPL-SCNC: 140 MMOL/L (ref 135–147)
TRIGL SERPL-MCNC: 73 MG/DL

## 2023-11-13 PROCEDURE — 36415 COLL VENOUS BLD VENIPUNCTURE: CPT

## 2023-11-13 PROCEDURE — 80053 COMPREHEN METABOLIC PANEL: CPT

## 2023-11-13 PROCEDURE — 82306 VITAMIN D 25 HYDROXY: CPT

## 2023-11-13 PROCEDURE — 80061 LIPID PANEL: CPT

## 2023-11-17 ENCOUNTER — OFFICE VISIT (OUTPATIENT)
Dept: FAMILY MEDICINE CLINIC | Facility: CLINIC | Age: 83
End: 2023-11-17
Payer: COMMERCIAL

## 2023-11-17 VITALS
HEIGHT: 63 IN | TEMPERATURE: 98.6 F | OXYGEN SATURATION: 97 % | DIASTOLIC BLOOD PRESSURE: 78 MMHG | BODY MASS INDEX: 22.64 KG/M2 | RESPIRATION RATE: 14 BRPM | WEIGHT: 127.8 LBS | SYSTOLIC BLOOD PRESSURE: 110 MMHG | HEART RATE: 73 BPM

## 2023-11-17 DIAGNOSIS — H40.9 GLAUCOMA OF BOTH EYES, UNSPECIFIED GLAUCOMA TYPE: ICD-10-CM

## 2023-11-17 DIAGNOSIS — E87.1 HYPONATREMIA: Primary | ICD-10-CM

## 2023-11-17 DIAGNOSIS — E78.2 MIXED HYPERLIPIDEMIA: ICD-10-CM

## 2023-11-17 DIAGNOSIS — G47.01 INSOMNIA DUE TO MEDICAL CONDITION: ICD-10-CM

## 2023-11-17 PROCEDURE — 99214 OFFICE O/P EST MOD 30 MIN: CPT | Performed by: FAMILY MEDICINE

## 2023-11-17 NOTE — ASSESSMENT & PLAN NOTE
Discuss sleep hygiene.    Melatonin in effective in the past  Recommend Mg 200 mg before bed or "sleepy time" tea

## 2023-11-17 NOTE — ASSESSMENT & PLAN NOTE
Lab Results   Component Value Date    CHOLESTEROL 242 (H) 11/13/2023    CHOLESTEROL 227 (H) 11/14/2022    CHOLESTEROL 200 08/13/2021     Lab Results   Component Value Date    HDL 86 11/13/2023    HDL 85 11/14/2022    HDL 82 08/13/2021     Lab Results   Component Value Date    TRIG 73 11/13/2023    TRIG 73 11/14/2022    TRIG 55 08/13/2021     Lab Results   Component Value Date    NONHDLC 118 08/13/2021    [address] 02/05/2020    [address] [facility] [address] 07/22/2019     Mildly elevated. Explained that the main dietary sources of cholesterol are oils and animal products. Minimize cooking with oil but if she does,  use olive or avocado oil. Watch packaged food labels for added oils and saturated and trans fats. Any animal products will contain cholesterol. For meats, favor poultry and fish over red meat, sausage, ch, processed meats, etc.  Limit dairy, cheese, butter, eggs.   Also suggested regular exercise

## 2023-11-17 NOTE — PROGRESS NOTES
Name: Evelina Roman      : 1940      MRN: [id number]  Encounter Provider: Monie Morales MD  Encounter Date: 2023   Encounter department: [facility]     1. Hyponatremia  Resolved. Last Na was 140     2. 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 condition  Assessment & Plan:  Discuss sleep hygiene. Melatonin in effective in the past  Recommend Mg 200 mg before bed or "sleepy time" tea       3. Glaucoma of both eyes, unspecified glaucoma type  Assessment & Plan:  Follows with ophthalmology and is on dorzolamide-timolol drops       4. Mixed hyperlipidemia  Assessment & Plan:  Lab Results   Component Value Date    CHOLESTEROL 242 (H) 2023    CHOLESTEROL 227 (H) 2022    CHOLESTEROL 200 2021     Lab Results   Component Value Date    HDL 86 2023    HDL 85 2022    HDL 82 2021     Lab Results   Component Value Date    TRIG 73 2023    TRIG 73 2022    TRIG 55 2021     Lab Results   Component Value Date    [address] 118 2021    [address] 139 2020    [address] 147 2019     Mildly elevated. Explained that the main dietary sources of cholesterol are oils and animal products. Minimize cooking with oil but if she does,  use olive or avocado oil. Watch packaged food labels for added oils and saturated and trans fats. Any animal products will contain cholesterol. For meats, favor poultry and fish over red meat, sausage, ch, processed meats, etc.  Limit dairy, cheese, butter, eggs. Also suggested regular exercise              Subjective       Here for follow up and to discuss labs. Doing well. Received both covid and flu vaccine this season.        Review of Systems    Current Outpatient Medications on File Prior to Visit   Medication Sig   • cholecalciferol (VITAMIN D3) 1,000 units tablet Take 1,000 Units by mouth daily   • dorzolamide-timolol (COSOPT) 22.3-6.8 MG/ML ophthalmic solution Administer 1 drop to both eyes 2 (two) times a day   • estradiol (Yuvafem) 10 MCG TABS vaginal tablet Insert 1 tablet (10 mcg total) into the vagina 2 (two) times a week   • multivitamin (THERAGRAN) TABS Take 1 tablet by mouth daily Centrum Silver   • travoprost (TRAVATAN-Z) 0.004 % ophthalmic solution 1 drop daily at bedtime   • Dorzolamide HCl-Timolol Mal PF 22.3-6.8 MG/ML SOLN INSTILL 1 DROP IN BOTH EYES TWICE DAILY (Patient not taking: Reported on 11/17/2023)       Objective     /78 (BP Location: Left arm, Patient Position: Sitting, Cuff Size: Adult)   Pulse 73   Temp 98.6 °F (37 °C) (Tympanic)   Resp 14   Ht 5' 3" (1.6 m)   Wt 58 kg (127 lb 12.8 oz)   LMP  (LMP Unknown)   SpO2 97%   BMI 22.64 kg/m²     Physical Exam  Vitals reviewed. Constitutional:       General: She is not in acute distress. Appearance: Normal appearance. She is normal weight. She is not ill-appearing or toxic-appearing. HENT:      Head: Normocephalic and atraumatic. 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 Heart sounds: No murmur heard. No friction rub. Pulmonary:      Breath sounds: Normal breath sounds. Abdominal:      General: There is no distension. Palpations: Abdomen is soft. There is no mass. Tenderness: There is no abdominal tenderness. There is no guarding or rebound. Hernia: No hernia is present. Musculoskeletal:      Right lower leg: No edema. Left lower leg: No edema. Lymphadenopathy:      Cervical: No cervical adenopathy. Skin:     General: Skin is warm. Neurological:      Mental Status: She is alert and oriented to person, place, and time. Psychiatric:         Mood and Affect: Mood normal.         Behavior: Behavior normal.         Thought Content:  Thought content normal.       Hola Burrows MD

## 2023-11-20 ENCOUNTER — TELEPHONE (OUTPATIENT)
Dept: NEUROSURGERY | Facility: CLINIC | Age: 83
End: 2023-11-20

## 2023-11-20 NOTE — TELEPHONE ENCOUNTER
Received a call from patient questioning if she needs antibiotics prior to dental work. Returned call to patient and advised no antibiotics are needed. She stated an understanding and was appreciative of the call back.

## 2023-11-21 ENCOUNTER — TELEPHONE (OUTPATIENT)
Age: 83
End: 2023-11-21

## 2023-11-21 DIAGNOSIS — S42.402S CLOSED FRACTURE OF LEFT ELBOW, SEQUELA: ICD-10-CM

## 2023-11-21 DIAGNOSIS — Z98.818 OTHER DENTAL PROCEDURE STATUS: Primary | ICD-10-CM

## 2023-11-21 NOTE — TELEPHONE ENCOUNTER
Patient called regarding a dental procedure next Wednesday and will patient need antibiotics  due to patient having pins  in elbow from a prior surgery.

## 2023-11-22 RX ORDER — AMOXICILLIN 500 MG/1
2000 CAPSULE ORAL ONCE
Qty: 4 CAPSULE | Refills: 0 | Status: SHIPPED | OUTPATIENT
Start: 2023-11-22 | End: 2023-11-22

## 2023-12-06 ENCOUNTER — APPOINTMENT (EMERGENCY)
Dept: RADIOLOGY | Facility: HOSPITAL | Age: 83
End: 2023-12-06
Payer: COMMERCIAL

## 2023-12-06 ENCOUNTER — HOSPITAL ENCOUNTER (EMERGENCY)
Facility: HOSPITAL | Age: 83
Discharge: HOME | End: 2023-12-06
Attending: EMERGENCY MEDICINE
Payer: COMMERCIAL

## 2023-12-06 VITALS
RESPIRATION RATE: 20 BRPM | OXYGEN SATURATION: 97 % | SYSTOLIC BLOOD PRESSURE: 146 MMHG | HEART RATE: 78 BPM | DIASTOLIC BLOOD PRESSURE: 97 MMHG | TEMPERATURE: 98.3 F

## 2023-12-06 DIAGNOSIS — R58 ECCHYMOSIS: ICD-10-CM

## 2023-12-06 DIAGNOSIS — S42.411A CLOSED SUPRACONDYLAR FRACTURE OF RIGHT ELBOW, INITIAL ENCOUNTER: ICD-10-CM

## 2023-12-06 DIAGNOSIS — S59.901A ELBOW INJURY, RIGHT, INITIAL ENCOUNTER: Primary | ICD-10-CM

## 2023-12-06 PROCEDURE — 73080 X-RAY EXAM OF ELBOW: CPT

## 2023-12-06 PROCEDURE — 99283 EMERGENCY DEPT VISIT LOW MDM: CPT

## 2023-12-06 NOTE — ED PROVIDER NOTES
History  Chief Complaint   Patient presents with    Fall     Tripped and fell last night, caught self on right arm. Reports pain and bruising. Denies head strike or thinners       This is an 81-year-old female patient who stated she tripped and fell last night on an outstretched hand has pain in the radial side of her elbow there was no head strike she is not on blood thinners she offers no other complaints. She is right-hand dominant there is no numbness or tingling. She has full range of motion. At this time show an x-ray of her right elbow. She is nontoxic no acute distress        Prior to Admission Medications   Prescriptions Last Dose Informant Patient Reported? Taking?    Dorzolamide HCl-Timolol Mal PF 22.3-6.8 MG/ML SOLN   Yes No   Sig: INSTILL 1 DROP IN BOTH EYES TWICE DAILY   Patient not taking: Reported on 2023   cholecalciferol (VITAMIN D3) 1,000 units tablet   Yes No   Sig: Take 1,000 Units by mouth daily   dorzolamide-timolol (COSOPT) 22.3-6.8 MG/ML ophthalmic solution   Yes No   Sig: Administer 1 drop to both eyes 2 (two) times a day   estradiol (Yuvafem) 10 MCG TABS vaginal tablet   No No   Sig: Insert 1 tablet (10 mcg total) into the vagina 2 (two) times a week   multivitamin (THERAGRAN) TABS   Yes No   Sig: Take 1 tablet by mouth daily Centrum Silver   travoprost (TRAVATAN-Z) 0.004 % ophthalmic solution   Yes No   Si drop daily at bedtime      Facility-Administered Medications: None       Past Medical History:   Diagnosis Date    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    Colon polyp     Fracture of arm     Glaucoma     Hyponatremia 2018    Hypotension        Past Surgical History:   Procedure Laterality Date    BUNIONECTOMY      BUNIONECTOMY      CATARACT EXTRACTION      ELBOW SURGERY Right     EYE SURGERY  CATARACTS    FRACTURE SURGERY  PINS IN BROKEN ELBOW    IR CEREBRAL ANGIOGRAPHY  2019    IR CEREBRAL ANGIOGRAPHY / INTERVENTION  2018    TONSILLECTOMY         Family History   Problem Relation Age of Onset    No Known Problems Mother     Heart attack Father     Diabetes Father     No Known Problems Sister     No Known Problems Daughter     No Known Problems Maternal Grandmother     Cancer Maternal Grandfather     Colon cancer Maternal Grandfather 36    No Known Problems Paternal Grandmother     No Known Problems Paternal Grandfather     No Known Problems Maternal Aunt     Cancer Paternal Aunt         pancreatic    No Known Problems Paternal Aunt     Heart disease Family      I have reviewed and agree with the history as documented. E-Cigarette/Vaping    E-Cigarette Use Never User      E-Cigarette/Vaping Substances    Nicotine No     THC No     CBD No     Flavoring No     Unknown No      Social History     Tobacco Use    Smoking status: Never    Smokeless tobacco: Never   Vaping Use    Vaping Use: Never used   Substance Use Topics    Alcohol use: Not Currently     Comment: not using due to her condition    Drug use: No       Review of Systems   Constitutional:  Negative for diaphoresis, fatigue and fever. HENT:  Negative for congestion, ear pain, nosebleeds and sore throat. Eyes:  Negative for photophobia, pain, discharge and visual disturbance. Respiratory:  Negative for cough, choking, chest tightness, shortness of breath and wheezing. Cardiovascular:  Negative for chest pain and palpitations. Gastrointestinal:  Negative for abdominal distention, abdominal pain, diarrhea and vomiting. Genitourinary:  Negative for dysuria, flank pain and frequency. Musculoskeletal:  Negative for back pain, gait problem and joint swelling. Right elbow pain   Skin:  Negative for color change and rash. Neurological:  Negative for dizziness, syncope and headaches. Psychiatric/Behavioral:  Negative for behavioral problems and confusion. The patient is not nervous/anxious. All other systems reviewed and are negative.       Physical Exam  Physical Exam  Vitals and nursing note reviewed. Constitutional:       General: She is not in acute distress. Appearance: Normal appearance. She is not ill-appearing, toxic-appearing or diaphoretic. HENT:      Head: Normocephalic and atraumatic. Right Ear: Tympanic membrane, ear canal and external ear normal.      Left Ear: Tympanic membrane, ear canal and external ear normal.      Nose: Nose normal. No congestion or rhinorrhea. Mouth/Throat:      Mouth: Mucous membranes are dry. Pharynx: Oropharynx is clear. No oropharyngeal exudate or posterior oropharyngeal erythema. Eyes:      Extraocular Movements: Extraocular movements intact. Conjunctiva/sclera: Conjunctivae normal.      Pupils: Pupils are equal, round, and reactive to light. Cardiovascular:      Rate and Rhythm: Normal rate and regular rhythm. Pulmonary:      Effort: Pulmonary effort is normal. No respiratory distress. Breath sounds: Normal breath sounds. Abdominal:      General: Bowel sounds are normal.      Palpations: Abdomen is soft. Tenderness: There is no abdominal tenderness. Musculoskeletal:         General: Normal range of motion. Arms:       Cervical back: Normal range of motion and neck supple. No rigidity or tenderness. Right lower leg: No edema. Left lower leg: No edema. Lymphadenopathy:      Cervical: No cervical adenopathy. Skin:     General: Skin is warm and dry. Capillary Refill: Capillary refill takes less than 2 seconds. Findings: No rash. Neurological:      General: No focal deficit present. Mental Status: She is alert and oriented to person, place, and time. Mental status is at baseline.    Psychiatric:         Mood and Affect: Mood normal.         Behavior: Behavior normal.         Vital Signs  ED Triage Vitals [12/06/23 1453]   Temperature Pulse Respirations Blood Pressure SpO2   98.3 °F (36.8 °C) 78 20 146/97 97 %      Temp Source Heart Rate Source Patient Position - Orthostatic VS BP Location FiO2 (%)   Oral Monitor Sitting Right arm --      Pain Score       8           Vitals:    12/06/23 1453   BP: 146/97   Pulse: 78   Patient Position - Orthostatic VS: Sitting         Visual Acuity      ED Medications  Medications - No data to display    Diagnostic Studies  Results Reviewed       None                   XR elbow 3+ vw RIGHT   ED Interpretation by Jacinto Hilliard PA-C (12/06 3937)   No fracture or dislocation. there is old hardware noted that does not appear loose. patient does have full range of motion. Procedures  Procedures         ED Course                                             Medical Decision Making  This is an 67-year-old female patient who had a mechanical fall last night landing on an outstretched hand injuring her right elbow previous surgery. No head strike no headache neck pain only complaint is elbow pain she has full range of motion she has increased bruising since she placed heat over it but has no sign of compartment syndrome. She will have an x-ray to rule out fracture versus sprain    Problems Addressed:  Ecchymosis: acute illness or injury     Details: Has excess ecchymosis on the ulnar side of the arm from the elbow it is not hard to the touch there is no sign of compartment syndrome it does not make I believe it is from the heating pad she was told to discontinue  Elbow injury, right, initial encounter: acute illness or injury     Details: No fracture of fat pad that was positive hardware. She has full range of motion she is advised to use Tylenol ice and rest follow-up with orthopedics    Amount and/or Complexity of Data Reviewed  External Data Reviewed: radiology. Details: I did look at previous images to gain comparison  Radiology: ordered and independent interpretation performed.      Details: I personally interpreted the x-ray of the right elbow there is no sign of fracture dislocation no fat pad that was old however it did not look displaced  Discussion of management or test interpretation with external provider(s): Using joint decision-making patient agrees to discharge ice rest Tylenol follow-up with orthopedics return if worsening symptoms questions comments and concerns verbalized understanding and agreement             Disposition  Final diagnoses:   Elbow injury, right, initial encounter   Ecchymosis     Time reflects when diagnosis was documented in both MDM as applicable and the Disposition within this note       Time User Action Codes Description Comment    12/6/2023  3:47 PM Ike Gabrieleadelina [place] [S57. 01XA] Crushing injury of right elbow, initial encounter     12/6/2023  3:47 PM Mega Parmar Remove [id number]. 01XA] Crushing injury of right elbow, initial encounter     12/6/2023  3:47 PM Willy Parmar [S21.340F] Elbow injury, right, initial encounter     12/6/2023  3:48 PM Willy Parmar [R58] Ecchymosis           ED Disposition       ED Disposition   Discharge    Condition   Stable    Date/Time   Wed Dec 6, 2023  3:47 PM    Comment   Kelsie Wang discharge to home/self care.                    Follow-up Information       Follow up With Specialties Details Why Contact Info Additional [address] [facility] Specialists [facility] Orthopedic Surgery Schedule an appointment as soon as possible for a visit   [address] [place] [id number] [phone] 10[address] [facility] [address] [place] [address]), [facility] E [facility] [phone]            Discharge Medication List as of 12/6/2023  3:49 PM        CONTINUE these medications which have NOT CHANGED    Details   cholecalciferol (VITAMIN D3) 1,000 units tablet Take 1,000 Units by mouth daily, Historical Med      Dorzolamide HCl-Timolol Mal PF 22.3-6.8 MG/ML SOLN INSTILL 1 DROP IN BOTH EYES TWICE DAILY, Historical Med      dorzolamide-timolol (COSOPT) 22.3-6.8 MG/ML ophthalmic solution Administer 1 drop to both eyes 2 (two) times a day, Historical Med      estradiol (Yuvafem) 10 MCG TABS vaginal tablet Insert 1 tablet (10 mcg total) into the vagina 2 (two) times a week, Starting Mon 6/19/2023, Normal      multivitamin (THERAGRAN) TABS Take 1 tablet by mouth daily Centrum Silver, Historical Med      travoprost (TRAVATAN-Z) 0.004 % ophthalmic solution 1 drop daily at bedtime, Historical Med             No discharge procedures on file.     PDMP Review         Value Time User    PDMP Reviewed  Yes 11/9/2022  7:04 AM Mike Ureña DO            ED Provider  Electronically Signed by             Ciara Elizabeth PA-C  12/06/23 [address] [facility] Danial Ponce PA-C  12/06/23 8059

## 2023-12-06 NOTE — DISCHARGE INSTRUCTIONS
Follow-up with orthopedic doctor listed    Return with any worsening symptoms questions comments or concerns

## 2023-12-07 NOTE — RESULT ENCOUNTER NOTE
I notified the patient of her positive supracondylar fracture on her right elbow. She is going to return for application of a posterior splint and a sling.   We will give her an orthopedic referral.

## 2023-12-07 NOTE — RESULT ENCOUNTER NOTE
Patient returned to the emergency room and had an application of a posterior splint to the right elbow. A sling was applied as well. She was given follow-up with orthopedics. She will call and arrange a follow-up appointment.

## 2023-12-07 NOTE — ED PROVIDER NOTES
History  Chief Complaint   Patient presents with    Fall     Tripped and fell last night, caught self on right arm. Reports pain and bruising. Denies head strike or thinners         Fall      Prior to Admission Medications   Prescriptions Last Dose Informant Patient Reported? Taking?    Dorzolamide HCl-Timolol Mal PF 22.3-6.8 MG/ML SOLN   Yes No   Sig: INSTILL 1 DROP IN BOTH EYES TWICE DAILY   Patient not taking: Reported on 2023   cholecalciferol (VITAMIN D3) 1,000 units tablet   Yes No   Sig: Take 1,000 Units by mouth daily   dorzolamide-timolol (COSOPT) 22.3-6.8 MG/ML ophthalmic solution   Yes No   Sig: Administer 1 drop to both eyes 2 (two) times a day   estradiol (Yuvafem) 10 MCG TABS vaginal tablet   No No   Sig: Insert 1 tablet (10 mcg total) into the vagina 2 (two) times a week   multivitamin (THERAGRAN) TABS   Yes No   Sig: Take 1 tablet by mouth daily Centrum Silver   travoprost (TRAVATAN-Z) 0.004 % ophthalmic solution   Yes No   Si drop daily at bedtime      Facility-Administered Medications: None       Past Medical History:   Diagnosis Date    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    Colon polyp     Fracture of arm     Glaucoma     Hyponatremia 2018    Hypotension        Past Surgical History:   Procedure Laterality Date    BUNIONECTOMY      BUNIONECTOMY      CATARACT EXTRACTION      ELBOW SURGERY Right     EYE SURGERY  CATARACTS    FRACTURE SURGERY  PINS IN BROKEN ELBOW    IR CEREBRAL ANGIOGRAPHY  2019    IR CEREBRAL ANGIOGRAPHY / INTERVENTION  2018    TONSILLECTOMY         Family History   Problem Relation Age of Onset    No Known Problems Mother     Heart attack Father     Diabetes Father     No Known Problems Sister     No Known Problems Daughter     No Known Problems Maternal Grandmother     Cancer Maternal Grandfather     Colon cancer Maternal Grandfather 40    No Known Problems Paternal Grandmother     No Known Problems Paternal Grandfather     No Known Problems Maternal Aunt     Cancer Paternal Aunt         pancreatic    No Known Problems Paternal Aunt     Heart disease Family      I have reviewed and agree with the history as documented. E-Cigarette/Vaping    E-Cigarette Use Never User      E-Cigarette/Vaping Substances    Nicotine No     THC No     CBD No     Flavoring No     Unknown No      Social History     Tobacco Use    Smoking status: Never    Smokeless tobacco: Never   Vaping Use    Vaping Use: Never used   Substance Use Topics    Alcohol use: Not Currently     Comment: not using due to her condition    Drug use: No       Review of Systems    Physical Exam  Physical Exam    Vital Signs  ED Triage Vitals [12/06/23 1453]   Temperature Pulse Respirations Blood Pressure SpO2   98.3 °F (36.8 °C) 78 20 146/97 97 %      Temp Source Heart Rate Source Patient Position - Orthostatic VS BP Location FiO2 (%)   Oral Monitor Sitting Right arm --      Pain Score       8           Vitals:    12/06/23 1453   BP: 146/97   Pulse: 78   Patient Position - Orthostatic VS: Sitting         Visual Acuity      ED Medications  Medications - No data to display    Diagnostic Studies  Results Reviewed       None                   XR elbow 3+ vw RIGHT   ED Interpretation by Yasmine Aaron PA-C (12/06 1547)   No fracture or dislocation. there is old hardware noted that does not appear loose. patient does have full range of motion. Final Result by Iesha Aquino DO (12/07 1126)      Acute, lateral distal humerus supracondylar fracture. Small joint effusion. Given discrepancy between the ED preliminary report and final report, the study was marked in [facility] for immediate notification. Resident: Tonya Lara, the attending radiologist, have reviewed the images and agree with the final report above.       Workstation performed: [id number]                    Procedures  Splint application    Date/Time: 12/7/2023 1:03 PM    Performed by: Saundra Torres MIQUEL  Authorized by: Curtis Juarez PA-C  Universal Protocol:  Procedure performed by:  Consent: Verbal consent obtained. Risks and benefits: risks, benefits and alternatives were discussed  Consent given by: patient  Time out: Immediately prior to procedure a "time out" was called to verify the correct patient, procedure, equipment, support staff and site/side marked as required. Timeout called at: 12/7/2023 1:03 PM.  Patient states understanding of procedure being performed: application of splint. Patient consent: the patient's understanding of the procedure matches consent given  Test results: test results available and properly labeled  Site marked: the operative site was marked  Radiology Images displayed and confirmed. If images not available, report reviewed: imaging studies available  Patient identity confirmed: verbally with patient and arm band    Pre-procedure details:     Sensation:  Normal  Procedure details:     Laterality:  Right    Strapping: no  Cast type:  Long arm        Splint type:  Long arm    Supplies:  Sling  Post-procedure details:     Pain:  Improved    Sensation:  Normal    Patient tolerance of procedure: Tolerated well, no immediate complications           ED Course                                             Medical Decision Making  Amount and/or Complexity of Data Reviewed  Radiology: ordered and independent interpretation performed. Disposition  Final diagnoses:   Elbow injury, right, initial encounter   Ecchymosis   Closed supracondylar fracture of right elbow, initial encounter     Time reflects when diagnosis was documented in both MDM as applicable and the Disposition within this note       Time User Action Codes Description Comment    12/6/2023  3:47 PM Willy Levine [S57. 01XA] Crushing injury of right elbow, initial encounter     12/6/2023  3:47 PM Mega Parmar Remove [id number]. 01XA] Crushing injury of right elbow, initial encounter     12/6/2023 3:47 PM Willy Pramar [C62.952R] Elbow injury, right, initial encounter     12/6/2023  3:48 PM Willy Parmar [R58] Ecchymosis     12/7/2023  1:01 PM Ivana Buck [S42.411A] Closed supracondylar fracture of right elbow, initial encounter           ED Disposition       ED Disposition   Discharge    Condition   Stable    Date/Time   Wed Dec 6, 2023  3:47 PM    Comment   Foster Goff discharge to home/self care. Follow-up Information       Follow up With Specialties Details Why Contact Info Additional [address] [facility] Specialists [facility] Orthopedic Surgery Schedule an appointment as soon as possible for a visit   [address] [place] [id number] [phone] [facility] [address] [place] [address] ([place]), [address] [facility] [phone]            Discharge Medication List as of 12/6/2023  3:49 PM        CONTINUE these medications which have NOT CHANGED    Details   cholecalciferol (VITAMIN D3) 1,000 units tablet Take 1,000 Units by mouth daily, Historical Med      Dorzolamide HCl-Timolol Mal PF 22.3-6.8 MG/ML SOLN INSTILL 1 DROP IN BOTH EYES TWICE DAILY, Historical Med      dorzolamide-timolol (COSOPT) 22.3-6.8 MG/ML ophthalmic solution Administer 1 drop to both eyes 2 (two) times a day, Historical Med      estradiol (Yuvafem) 10 MCG TABS vaginal tablet Insert 1 tablet (10 mcg total) into the vagina 2 (two) times a week, Starting Mon 6/19/2023, Normal      multivitamin (THERAGRAN) TABS Take 1 tablet by mouth daily Centrum Silver, Historical Med      travoprost (TRAVATAN-Z) 0.004 % ophthalmic solution 1 drop daily at bedtime, Historical Med             No discharge procedures on file.     PDMP Review         Value Time User    PDMP Reviewed  Yes 11/9/2022  7:04 AM Charmaine Vallecillo DO            ED Provider  Electronically Signed by             Evan Michelle PA-C  12/07/23 8176

## 2023-12-08 ENCOUNTER — TELEPHONE (OUTPATIENT)
Age: 83
End: 2023-12-08

## 2023-12-08 NOTE — TELEPHONE ENCOUNTER
Caller: Patient     Doctor: Edith Shaikh     Reason for call: Asked for medical advice, I did say we are not able to give medical advise since we have not seen her

## 2023-12-12 ENCOUNTER — APPOINTMENT (OUTPATIENT)
Dept: RADIOLOGY | Facility: AMBULARY SURGERY CENTER | Age: 83
End: 2023-12-12
Attending: STUDENT IN AN ORGANIZED HEALTH CARE EDUCATION/TRAINING PROGRAM
Payer: COMMERCIAL

## 2023-12-12 ENCOUNTER — TELEPHONE (OUTPATIENT)
Dept: OBGYN CLINIC | Facility: CLINIC | Age: 83
End: 2023-12-12

## 2023-12-12 ENCOUNTER — APPOINTMENT (OUTPATIENT)
Dept: LAB | Facility: AMBULARY SURGERY CENTER | Age: 83
End: 2023-12-12
Payer: COMMERCIAL

## 2023-12-12 ENCOUNTER — OFFICE VISIT (OUTPATIENT)
Dept: OBGYN CLINIC | Facility: CLINIC | Age: 83
End: 2023-12-12
Payer: COMMERCIAL

## 2023-12-12 VITALS — HEIGHT: 63 IN | WEIGHT: 127.8 LBS | BODY MASS INDEX: 22.64 KG/M2

## 2023-12-12 DIAGNOSIS — S42.494A OTHER CLOSED NONDISPLACED FRACTURE OF DISTAL END OF RIGHT HUMERUS, INITIAL ENCOUNTER: ICD-10-CM

## 2023-12-12 DIAGNOSIS — M25.521 PAIN IN RIGHT ELBOW: Primary | ICD-10-CM

## 2023-12-12 DIAGNOSIS — M25.521 PAIN IN RIGHT ELBOW: ICD-10-CM

## 2023-12-12 LAB
APTT PPP: 30 SECONDS (ref 23–37)
ERYTHROCYTE [DISTWIDTH] IN BLOOD BY AUTOMATED COUNT: 14.7 % (ref 11.6–15.1)
HCT VFR BLD AUTO: 42.2 % (ref 34.8–46.1)
HGB BLD-MCNC: 13.4 G/DL (ref 11.5–15.4)
INR PPP: 1.01 (ref 0.84–1.19)
MCH RBC QN AUTO: 31.2 PG (ref 26.8–34.3)
MCHC RBC AUTO-ENTMCNC: 31.8 G/DL (ref 31.4–37.4)
MCV RBC AUTO: 98 FL (ref 82–98)
PLATELET # BLD AUTO: 256 THOUSANDS/UL (ref 149–390)
PMV BLD AUTO: 10.3 FL (ref 8.9–12.7)
PROTHROMBIN TIME: 13.2 SECONDS (ref 11.6–14.5)
RBC # BLD AUTO: 4.3 MILLION/UL (ref 3.81–5.12)
WBC # BLD AUTO: 6.45 THOUSAND/UL (ref 4.31–10.16)

## 2023-12-12 PROCEDURE — 85730 THROMBOPLASTIN TIME PARTIAL: CPT

## 2023-12-12 PROCEDURE — 85027 COMPLETE CBC AUTOMATED: CPT

## 2023-12-12 PROCEDURE — 73070 X-RAY EXAM OF ELBOW: CPT

## 2023-12-12 PROCEDURE — 85610 PROTHROMBIN TIME: CPT

## 2023-12-12 PROCEDURE — 36415 COLL VENOUS BLD VENIPUNCTURE: CPT

## 2023-12-12 PROCEDURE — 99204 OFFICE O/P NEW MOD 45 MIN: CPT | Performed by: STUDENT IN AN ORGANIZED HEALTH CARE EDUCATION/TRAINING PROGRAM

## 2023-12-12 RX ORDER — CHLORHEXIDINE GLUCONATE ORAL RINSE 1.2 MG/ML
15 SOLUTION DENTAL ONCE
Status: CANCELLED | OUTPATIENT
Start: 2023-12-12 | End: 2023-12-12

## 2023-12-12 NOTE — H&P (VIEW-ONLY)
Orthopaedics Office Visit - new Patient Visit    ASSESSMENT/PLAN:    Assessment:   #1 Right Closed Distal Humerus Supracondylar Fracture, DOI 12/5/23    Plan:   X-rays of the right elbow were reviewed in detail with the patient and her son today in clinic. She has a minimally displaced supracondylar humerus fracture which is transverse and in the supracondylar region. It is completed across the medial and lateral columns. Patient has previous olecranon fracture which is well-healed with interfragmentary screw and tension band construct  New splint provided today, she will remain nonweightbearing to the right upper extremity  She is encouraged to do finger motion to prevent stiffness and help with swelling  She is encouraged to take Tylenol as needed for pain  May use ice also to help with pain and swelling  Long discussion was taken with the patient and her son regarding nonoperative versus operative management of her fracture. We discussed immobilization of the supracondylar humerus fracture with progression from splint to casting for 6 to 8 weeks. We also discussed the significant stiffness that this would result in in the elbow which the patient would have to try to work out through physical therapy afterwards. Given the patient's nondisplaced nature this was discussed extensively with the family. We also discussed surgical intervention including open reduction internal fixation of the right distal humerus to allow for immediate range of motion of the right distal elbow and maintain as much range of motion of the elbow as we possibly can as the patient is high functioning and has met minimal medical comorbidities. This is also her dominant arm.   After long discussion of the risk benefits and alternatives of both treatment arms patient and her family elected to proceed with surgical fixation  Risks, benefits, alternatives were discussed, risks including but not limited to deep vein thrombosis, pulmonary embolism, malunion, nonunion, damage to neurovascular structures both near and distant, infection both deep and superficial, symptomatic hardware, juan implant fracture, hardware failure, metal sensitivity, chronic pain, postoperative stiffness, avascular necrosis, extremity weakness, decreased range of motion, need for subsequent repeat procedures, as well as the risks associated with general endotracheal anesthesia which include but not limited to death. Patient agreed informed consent was obtained. Preoperative labs ordered  Informed consent signed today  She will follow-up postoperatively in 2 weeks for wound check and suture removal and to start physical therapy    To Do Next Visit:  postop    _____________________________________________________  CHIEF COMPLAINT:  Chief Complaint   Patient presents with    Right Arm - Fracture         SUBJECTIVE:  Jerica Weir is a 81 y.o. female who presents for initial visit accompanied by her son for evaluation of right elbow pain. Patient had a mechanical fall 1 week ago and injured her right elbow. She was evaluated in the ER and was provided a splint and referred to orthopedics. Pain is well localized to the right elbow without radiation, pain is worse with motion and palpation, improves at rest and with the splint. She denies associated numbness or tingling to the arm. She does have history of a right olecranon fracture ORIF done over 20 years ago. She denies any pain in her elbow before this new fall, she denies any issues with her previous surgery or hardware. She states that she is fairly active for her age and is independent on all of her activities of daily living including driving.     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    Colon polyp     Fracture of arm     Glaucoma     Hyponatremia 12/05/2018    Hypotension        PAST SURGICAL HISTORY:  Past Surgical History:   Procedure Laterality Date    BUNIONECTOMY      BUNIONECTOMY      CATARACT EXTRACTION      ELBOW SURGERY Right     EYE SURGERY  CATARACTS    FRACTURE SURGERY  PINS IN BROKEN ELBOW    IR CEREBRAL ANGIOGRAPHY  06/26/2019    IR CEREBRAL ANGIOGRAPHY / INTERVENTION  11/25/2018    TONSILLECTOMY         FAMILY HISTORY:  Family History   Problem Relation Age of Onset    No Known Problems Mother     Heart attack Father     Diabetes Father     No Known Problems Sister     No Known Problems Daughter     No Known Problems Maternal Grandmother     Cancer Maternal Grandfather     Colon cancer Maternal Grandfather 36    No Known Problems Paternal Grandmother     No Known Problems Paternal Grandfather     No Known Problems Maternal Aunt     Cancer Paternal Aunt         pancreatic    No Known Problems Paternal Aunt     Heart disease Family        SOCIAL HISTORY:  Social History     Tobacco Use    Smoking status: Never    Smokeless tobacco: Never   Vaping Use    Vaping Use: Never used   Substance Use Topics    Alcohol use: Not Currently     Comment: not using due to her condition    Drug use: No       MEDICATIONS:    Current Outpatient Medications:     cholecalciferol (VITAMIN D3) 1,000 units tablet, Take 1,000 Units by mouth daily, Disp: , Rfl:     dorzolamide-timolol (COSOPT) 22.3-6.8 MG/ML ophthalmic solution, Administer 1 drop to both eyes 2 (two) times a day, Disp: , Rfl:     estradiol (Yuvafem) 10 MCG TABS vaginal tablet, Insert 1 tablet (10 mcg total) into the vagina 2 (two) times a week, Disp: 24 tablet, Rfl: 2    multivitamin (THERAGRAN) TABS, Take 1 tablet by mouth daily Centrum Silver, Disp: , Rfl:     travoprost (TRAVATAN-Z) 0.004 % ophthalmic solution, 1 drop daily at bedtime, Disp: , Rfl:     Dorzolamide HCl-Timolol Mal PF 22.3-6.8 MG/ML SOLN, INSTILL 1 DROP IN BOTH EYES TWICE DAILY (Patient not taking: Reported on 11/17/2023), Disp: , Rfl:     ALLERGIES:  Allergies   Allergen Reactions    Atorvastatin Other (See Comments)     Severe muscle cramping     Codeine GI Intolerance    Metronidazole      Headaches/dizzy/swelling of tongue    Risedronate Sodium Other (See Comments)     Severe muscle cramping        REVIEW OF SYSTEMS:  MSK: right elbow pain  Neuro: denies numbness or tingling  Pertinent items are otherwise noted in HPI. A comprehensive review of systems was otherwise negative. LABS:  HgA1c:   Lab Results   Component Value Date    HGBA1C 5.1 07/22/2019     BMP:   Lab Results   Component Value Date    CALCIUM 9.2 11/13/2023    K 4.3 11/13/2023    CO2 32 11/13/2023     11/13/2023    BUN 16 11/13/2023    CREATININE 0.57 (L) 11/13/2023     CBC: No components found for: "CBC"    _____________________________________________________  PHYSICAL EXAMINATION:  Vital signs: Ht 5' 3" (1.6 m)   Wt 58 kg (127 lb 12.8 oz)   LMP  (LMP Unknown)   BMI 22.64 kg/m²   General: No acute distress, awake and alert  Psychiatric: Mood and affect appear appropriate  HEENT: Trachea Midline, No torticollis, no apparent facial trauma  Cardiovascular: No audible murmurs; Extremities appear perfused  Pulmonary: No audible wheezing or stridor  Skin: No open lesions; see further details (if any) below    MUSCULOSKELETAL EXAMINATION:  Extremities: The right upper extremity is in a well padded posterior splint. Splint was removed for examination. Skin is intact around the elbow with no open lesions or wounds. There is moderate area of ecchymosis from mid arm to mid forearm. There is tenderness to palpation to the distal humerus on the medial and lateral aspects. No tenderness over the previously implanted olecranon hardware. No tenderness over the radial head. No tenderness at the wrist or shoulder. . Range of motion not assessed today due to known fracture. Sensation intact to light touch to the axillary, musculocutaneous, radial, median and ulnar nerve distribution. Motor intact distally to the hand to AIN, PIN, radial, median and ulnar nerve muscle groups. Limb well perfused. _____________________________________________________  STUDIES REVIEWED:  I personally reviewed the images and interpretation is as follows:  Xrays right elbow from 12/6/2023 and 12/12/2023 show a minimally displaced supracondylar humerus fracture and osteopenic bone quality. There is previous olecranon hardware fixation construct. The supracondylar humerus fracture is complete through the medial and lateral condyle. There does not appear to be a intra-articular split. No subluxation of the radiocapitellar or ulnohumeral joints.     PROCEDURES PERFORMED:  Procedures    Tony Leon MD

## 2023-12-12 NOTE — PROGRESS NOTES
Orthopaedics Office Visit - new Patient Visit    ASSESSMENT/PLAN:    Assessment:   #1 Right Closed Distal Humerus Supracondylar Fracture, DOI 12/5/23    Plan:   X-rays of the right elbow were reviewed in detail with the patient and her son today in clinic. She has a minimally displaced supracondylar humerus fracture which is transverse and in the supracondylar region. It is completed across the medial and lateral columns. Patient has previous olecranon fracture which is well-healed with interfragmentary screw and tension band construct  New splint provided today, she will remain nonweightbearing to the right upper extremity  She is encouraged to do finger motion to prevent stiffness and help with swelling  She is encouraged to take Tylenol as needed for pain  May use ice also to help with pain and swelling  Long discussion was taken with the patient and her son regarding nonoperative versus operative management of her fracture. We discussed immobilization of the supracondylar humerus fracture with progression from splint to casting for 6 to 8 weeks. We also discussed the significant stiffness that this would result in in the elbow which the patient would have to try to work out through physical therapy afterwards. Given the patient's nondisplaced nature this was discussed extensively with the family. We also discussed surgical intervention including open reduction internal fixation of the right distal humerus to allow for immediate range of motion of the right distal elbow and maintain as much range of motion of the elbow as we possibly can as the patient is high functioning and has met minimal medical comorbidities. This is also her dominant arm.   After long discussion of the risk benefits and alternatives of both treatment arms patient and her family elected to proceed with surgical fixation  Risks, benefits, alternatives were discussed, risks including but not limited to deep vein thrombosis, pulmonary embolism, malunion, nonunion, damage to neurovascular structures both near and distant, infection both deep and superficial, symptomatic hardware, juan implant fracture, hardware failure, metal sensitivity, chronic pain, postoperative stiffness, avascular necrosis, extremity weakness, decreased range of motion, need for subsequent repeat procedures, as well as the risks associated with general endotracheal anesthesia which include but not limited to death. Patient agreed informed consent was obtained. Preoperative labs ordered  Informed consent signed today  She will follow-up postoperatively in 2 weeks for wound check and suture removal and to start physical therapy    To Do Next Visit:  postop    _____________________________________________________  CHIEF COMPLAINT:  Chief Complaint   Patient presents with    Right Arm - Fracture         SUBJECTIVE:  Martha Paul is a 81 y.o. female who presents for initial visit accompanied by her son for evaluation of right elbow pain. Patient had a mechanical fall 1 week ago and injured her right elbow. She was evaluated in the ER and was provided a splint and referred to orthopedics. Pain is well localized to the right elbow without radiation, pain is worse with motion and palpation, improves at rest and with the splint. She denies associated numbness or tingling to the arm. She does have history of a right olecranon fracture ORIF done over 20 years ago. She denies any pain in her elbow before this new fall, she denies any issues with her previous surgery or hardware. She states that she is fairly active for her age and is independent on all of her activities of daily living including driving.     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    Colon polyp     Fracture of arm     Glaucoma     Hyponatremia 12/05/2018    Hypotension        PAST SURGICAL HISTORY:  Past Surgical History:   Procedure Laterality Date    BUNIONECTOMY      BUNIONECTOMY      CATARACT EXTRACTION      ELBOW SURGERY Right     EYE SURGERY  CATARACTS    FRACTURE SURGERY  PINS IN BROKEN ELBOW    IR CEREBRAL ANGIOGRAPHY  06/26/2019    IR CEREBRAL ANGIOGRAPHY / INTERVENTION  11/25/2018    TONSILLECTOMY         FAMILY HISTORY:  Family History   Problem Relation Age of Onset    No Known Problems Mother     Heart attack Father     Diabetes Father     No Known Problems Sister     No Known Problems Daughter     No Known Problems Maternal Grandmother     Cancer Maternal Grandfather     Colon cancer Maternal Grandfather 36    No Known Problems Paternal Grandmother     No Known Problems Paternal Grandfather     No Known Problems Maternal Aunt     Cancer Paternal Aunt         pancreatic    No Known Problems Paternal Aunt     Heart disease Family        SOCIAL HISTORY:  Social History     Tobacco Use    Smoking status: Never    Smokeless tobacco: Never   Vaping Use    Vaping Use: Never used   Substance Use Topics    Alcohol use: Not Currently     Comment: not using due to her condition    Drug use: No       MEDICATIONS:    Current Outpatient Medications:     cholecalciferol (VITAMIN D3) 1,000 units tablet, Take 1,000 Units by mouth daily, Disp: , Rfl:     dorzolamide-timolol (COSOPT) 22.3-6.8 MG/ML ophthalmic solution, Administer 1 drop to both eyes 2 (two) times a day, Disp: , Rfl:     estradiol (Yuvafem) 10 MCG TABS vaginal tablet, Insert 1 tablet (10 mcg total) into the vagina 2 (two) times a week, Disp: 24 tablet, Rfl: 2    multivitamin (THERAGRAN) TABS, Take 1 tablet by mouth daily Centrum Silver, Disp: , Rfl:     travoprost (TRAVATAN-Z) 0.004 % ophthalmic solution, 1 drop daily at bedtime, Disp: , Rfl:     Dorzolamide HCl-Timolol Mal PF 22.3-6.8 MG/ML SOLN, INSTILL 1 DROP IN BOTH EYES TWICE DAILY (Patient not taking: Reported on 11/17/2023), Disp: , Rfl:     ALLERGIES:  Allergies   Allergen Reactions    Atorvastatin Other (See Comments)     Severe muscle cramping     Codeine GI Intolerance    Metronidazole      Headaches/dizzy/swelling of tongue    Risedronate Sodium Other (See Comments)     Severe muscle cramping        REVIEW OF SYSTEMS:  MSK: right elbow pain  Neuro: denies numbness or tingling  Pertinent items are otherwise noted in HPI. A comprehensive review of systems was otherwise negative. LABS:  HgA1c:   Lab Results   Component Value Date    HGBA1C 5.1 07/22/2019     BMP:   Lab Results   Component Value Date    CALCIUM 9.2 11/13/2023    K 4.3 11/13/2023    CO2 32 11/13/2023     11/13/2023    BUN 16 11/13/2023    CREATININE 0.57 (L) 11/13/2023     CBC: No components found for: "CBC"    _____________________________________________________  PHYSICAL EXAMINATION:  Vital signs: Ht 5' 3" (1.6 m)   Wt 58 kg (127 lb 12.8 oz)   LMP  (LMP Unknown)   BMI 22.64 kg/m²   General: No acute distress, awake and alert  Psychiatric: Mood and affect appear appropriate  HEENT: Trachea Midline, No torticollis, no apparent facial trauma  Cardiovascular: No audible murmurs; Extremities appear perfused  Pulmonary: No audible wheezing or stridor  Skin: No open lesions; see further details (if any) below    MUSCULOSKELETAL EXAMINATION:  Extremities: The right upper extremity is in a well padded posterior splint. Splint was removed for examination. Skin is intact around the elbow with no open lesions or wounds. There is moderate area of ecchymosis from mid arm to mid forearm. There is tenderness to palpation to the distal humerus on the medial and lateral aspects. No tenderness over the previously implanted olecranon hardware. No tenderness over the radial head. No tenderness at the wrist or shoulder. . Range of motion not assessed today due to known fracture. Sensation intact to light touch to the axillary, musculocutaneous, radial, median and ulnar nerve distribution. Motor intact distally to the hand to AIN, PIN, radial, median and ulnar nerve muscle groups. Limb well perfused. _____________________________________________________  STUDIES REVIEWED:  I personally reviewed the images and interpretation is as follows:  Xrays right elbow from 12/6/2023 and 12/12/2023 show a minimally displaced supracondylar humerus fracture and osteopenic bone quality. There is previous olecranon hardware fixation construct. The supracondylar humerus fracture is complete through the medial and lateral condyle. There does not appear to be a intra-articular split. No subluxation of the radiocapitellar or ulnohumeral joints.     PROCEDURES PERFORMED:  Procedures    Vee Martínez MD

## 2023-12-13 ENCOUNTER — ANESTHESIA EVENT (OUTPATIENT)
Dept: PERIOP | Facility: HOSPITAL | Age: 83
End: 2023-12-13
Payer: COMMERCIAL

## 2023-12-13 ENCOUNTER — TELEPHONE (OUTPATIENT)
Age: 83
End: 2023-12-13

## 2023-12-13 PROBLEM — S42.401A: Status: ACTIVE | Noted: 2023-12-13

## 2023-12-13 RX ORDER — ANTIOX #8/OM3/DHA/EPA/LUT/ZEAX 250-2.5 MG
CAPSULE ORAL
COMMUNITY

## 2023-12-13 NOTE — TELEPHONE ENCOUNTER
Caller: Patients son    Doctor: Karolyn Anderson    Reason for call: Patients son is calling to request if his mother can have homecare after her surgery?     Call back#: [phone]

## 2023-12-13 NOTE — PRE-PROCEDURE INSTRUCTIONS
Pre-Surgery Instructions:   Medication Instructions    cholecalciferol (VITAMIN D3) 1,000 units tablet Hold day of surgery. dorzolamide-timolol (COSOPT) 22.3-6.8 MG/ML ophthalmic solution Take day of surgery. estradiol (Yuvafem) 10 MCG TABS vaginal tablet Uses PRN- OK to take day of surgery    Multiple Vitamins-Minerals (PreserVision AREDS 2) CAPS Hold day of surgery. multivitamin (THERAGRAN) TABS Hold day of surgery. travoprost (TRAVATAN-Z) 0.004 % ophthalmic solution Take day of surgery. Medication instructions for day surgery reviewed. Please use only a sip of water to take your instructed medications. Avoid all over the counter vitamins, supplements and NSAIDS for one week prior to surgery per anesthesia guidelines. Tylenol is ok to take as needed. You will receive a call one business day prior to surgery with an arrival time and hospital directions. If your surgery is scheduled on a Monday, the hospital will be calling you on the Friday prior to your surgery. If you have not heard from anyone by 8pm, please call the hospital supervisor through the hospital  at [phone]. Elham Every [phone]). Do not eat or drink anything after midnight the night before your surgery, including candy, mints, lifesavers, or chewing gum. Do not drink alcohol 24hrs before your surgery. Try not to smoke at least 24hrs before your surgery. Follow the pre surgery showering instructions as listed in the [facility] Surgical Experience Booklet” or otherwise provided by your surgeon's office. Do not use a blade to shave the surgical area 1 week before surgery. It is okay to use a clean electric clippers up to 24 hours before surgery. Do not apply any lotions, creams, including makeup, cologne, deodorant, or perfumes after showering on the day of your surgery. Do not use dry shampoo, hair spray, hair gel, or any type of hair products. No contact lenses, eye make-up, or artificial eyelashes.  Remove nail polish, including gel polish, and any artificial, gel, or acrylic nails if possible. Remove all jewelry including rings and body piercing jewelry. Wear causal clothing that is easy to take on and off. Consider your type of surgery. Keep any valuables, jewelry, piercings at home. Please bring any specially ordered equipment (sling, braces) if indicated. Arrange for a responsible person to drive you to and from the hospital on the day of your surgery. Visitor Guidelines discussed. Call the surgeon's office with any new illnesses, exposures, or additional questions prior to surgery. Please reference your [facility] Surgical Experience Booklet” for additional information to prepare for your upcoming surgery. Pt. Verbalized an understanding of all instructions reviewed and offers no concerns at this time.

## 2023-12-14 ENCOUNTER — TELEPHONE (OUTPATIENT)
Age: 83
End: 2023-12-14

## 2023-12-14 ENCOUNTER — APPOINTMENT (OUTPATIENT)
Dept: RADIOLOGY | Facility: HOSPITAL | Age: 83
End: 2023-12-14
Payer: COMMERCIAL

## 2023-12-14 ENCOUNTER — ANESTHESIA (OUTPATIENT)
Dept: PERIOP | Facility: HOSPITAL | Age: 83
End: 2023-12-14
Payer: COMMERCIAL

## 2023-12-14 ENCOUNTER — HOSPITAL ENCOUNTER (OUTPATIENT)
Facility: HOSPITAL | Age: 83
Setting detail: OUTPATIENT SURGERY
Discharge: HOME | End: 2023-12-14
Attending: STUDENT IN AN ORGANIZED HEALTH CARE EDUCATION/TRAINING PROGRAM | Admitting: STUDENT IN AN ORGANIZED HEALTH CARE EDUCATION/TRAINING PROGRAM
Payer: COMMERCIAL

## 2023-12-14 VITALS
BODY MASS INDEX: 22.5 KG/M2 | SYSTOLIC BLOOD PRESSURE: 138 MMHG | RESPIRATION RATE: 16 BRPM | HEIGHT: 63 IN | TEMPERATURE: 97.9 F | DIASTOLIC BLOOD PRESSURE: 71 MMHG | OXYGEN SATURATION: 95 % | WEIGHT: 127 LBS | HEART RATE: 70 BPM

## 2023-12-14 DIAGNOSIS — S42.494A OTHER CLOSED NONDISPLACED FRACTURE OF DISTAL END OF RIGHT HUMERUS, INITIAL ENCOUNTER: Primary | ICD-10-CM

## 2023-12-14 DIAGNOSIS — S42.491A OTHER CLOSED DISPLACED FRACTURE OF DISTAL END OF RIGHT HUMERUS, INITIAL ENCOUNTER: ICD-10-CM

## 2023-12-14 PROCEDURE — C1713 ANCHOR/SCREW BN/BN,TIS/BN: HCPCS | Performed by: STUDENT IN AN ORGANIZED HEALTH CARE EDUCATION/TRAINING PROGRAM

## 2023-12-14 PROCEDURE — C9290 INJ, BUPIVACAINE LIPOSOME: HCPCS | Performed by: ANESTHESIOLOGY

## 2023-12-14 PROCEDURE — 24545 OPTX HUM FX WO NTRCNDYLR XTN: CPT | Performed by: STUDENT IN AN ORGANIZED HEALTH CARE EDUCATION/TRAINING PROGRAM

## 2023-12-14 PROCEDURE — 73080 X-RAY EXAM OF ELBOW: CPT

## 2023-12-14 DEVICE — 2.7MM VA LCKNG SCREW SLF-TPNG WITH T8 STARDRIVE RECESS 10MM: Type: IMPLANTABLE DEVICE | Site: HUMERUS | Status: FUNCTIONAL

## 2023-12-14 DEVICE — 2.7MM VA LCKNG SCREW SLF-TPNG WITH T8 STARDRIVE RECESS 22MM: Type: IMPLANTABLE DEVICE | Site: HUMERUS | Status: FUNCTIONAL

## 2023-12-14 DEVICE — 3.5MM CORTEX SCREW SELF-TAPPING 24MM: Type: IMPLANTABLE DEVICE | Site: HUMERUS | Status: FUNCTIONAL

## 2023-12-14 DEVICE — 3.5MM LOCKING SCREW SLF-TPNG W/STARDRIVE(TM) RECESS 24MM: Type: IMPLANTABLE DEVICE | Site: HUMERUS | Status: FUNCTIONAL

## 2023-12-14 DEVICE — 2.7MM VA LCKNG SCREW SLF-TPNG WITH T8 STARDRIVE RECESS 18MM: Type: IMPLANTABLE DEVICE | Site: HUMERUS | Status: FUNCTIONAL

## 2023-12-14 DEVICE — 2.7MM/3.5MM VA-LCP POSTLAT DHP-LAT SUPT 3H/RT/75MM-SHORT
Type: IMPLANTABLE DEVICE | Site: HUMERUS | Status: FUNCTIONAL
Brand: VA-LCP

## 2023-12-14 DEVICE — 2.7MM VA LCKNG SCREW SLF-TPNG WITH T8 STARDRIVE RECESS 20MM: Type: IMPLANTABLE DEVICE | Site: HUMERUS | Status: FUNCTIONAL

## 2023-12-14 DEVICE — 2.7MM VA LCKNG SCREW SLF-TPNG WITH T8 STARDRIVE RECESS 30MM: Type: IMPLANTABLE DEVICE | Site: HUMERUS | Status: FUNCTIONAL

## 2023-12-14 RX ORDER — FENTANYL CITRATE 50 UG/ML
INJECTION, SOLUTION INTRAMUSCULAR; INTRAVENOUS AS NEEDED
Status: DISCONTINUED | OUTPATIENT
Start: 2023-12-14 | End: 2023-12-14

## 2023-12-14 RX ORDER — LIDOCAINE HYDROCHLORIDE 10 MG/ML
INJECTION, SOLUTION EPIDURAL; INFILTRATION; INTRACAUDAL; PERINEURAL AS NEEDED
Status: DISCONTINUED | OUTPATIENT
Start: 2023-12-14 | End: 2023-12-14

## 2023-12-14 RX ORDER — PHENYLEPHRINE HCL IN 0.9% NACL 1 MG/10 ML
SYRINGE (ML) INTRAVENOUS AS NEEDED
Status: DISCONTINUED | OUTPATIENT
Start: 2023-12-14 | End: 2023-12-14

## 2023-12-14 RX ORDER — ROCURONIUM BROMIDE 10 MG/ML
INJECTION, SOLUTION INTRAVENOUS AS NEEDED
Status: DISCONTINUED | OUTPATIENT
Start: 2023-12-14 | End: 2023-12-14

## 2023-12-14 RX ORDER — HYDROMORPHONE HCL/PF 1 MG/ML
0.5 SYRINGE (ML) INJECTION
Status: DISCONTINUED | OUTPATIENT
Start: 2023-12-14 | End: 2023-12-14 | Stop reason: HOSPADM

## 2023-12-14 RX ORDER — DIPHENHYDRAMINE HYDROCHLORIDE 50 MG/ML
12.5 INJECTION INTRAMUSCULAR; INTRAVENOUS ONCE AS NEEDED
Status: DISCONTINUED | OUTPATIENT
Start: 2023-12-14 | End: 2023-12-14 | Stop reason: HOSPADM

## 2023-12-14 RX ORDER — BUPIVACAINE HYDROCHLORIDE 5 MG/ML
INJECTION, SOLUTION EPIDURAL; INTRACAUDAL
Status: COMPLETED | OUTPATIENT
Start: 2023-12-14 | End: 2023-12-14

## 2023-12-14 RX ORDER — MAGNESIUM HYDROXIDE 1200 MG/15ML
LIQUID ORAL AS NEEDED
Status: DISCONTINUED | OUTPATIENT
Start: 2023-12-14 | End: 2023-12-14 | Stop reason: HOSPADM

## 2023-12-14 RX ORDER — METOCLOPRAMIDE HYDROCHLORIDE 5 MG/ML
10 INJECTION INTRAMUSCULAR; INTRAVENOUS ONCE AS NEEDED
Status: DISCONTINUED | OUTPATIENT
Start: 2023-12-14 | End: 2023-12-14 | Stop reason: HOSPADM

## 2023-12-14 RX ORDER — LIDOCAINE HYDROCHLORIDE 10 MG/ML
0.5 INJECTION, SOLUTION EPIDURAL; INFILTRATION; INTRACAUDAL; PERINEURAL ONCE AS NEEDED
Status: DISCONTINUED | OUTPATIENT
Start: 2023-12-14 | End: 2023-12-14 | Stop reason: HOSPADM

## 2023-12-14 RX ORDER — SODIUM CHLORIDE, SODIUM LACTATE, POTASSIUM CHLORIDE, CALCIUM CHLORIDE 600; 310; 30; 20 MG/100ML; MG/100ML; MG/100ML; MG/100ML
125 INJECTION, SOLUTION INTRAVENOUS CONTINUOUS
Status: DISCONTINUED | OUTPATIENT
Start: 2023-12-14 | End: 2023-12-14 | Stop reason: HOSPADM

## 2023-12-14 RX ORDER — SENNOSIDES 8.6 MG
650 CAPSULE ORAL EVERY 8 HOURS PRN
Qty: 30 TABLET | Refills: 0 | Status: SHIPPED | OUTPATIENT
Start: 2023-12-14

## 2023-12-14 RX ORDER — ONDANSETRON 2 MG/ML
4 INJECTION INTRAMUSCULAR; INTRAVENOUS ONCE AS NEEDED
Status: DISCONTINUED | OUTPATIENT
Start: 2023-12-14 | End: 2023-12-14 | Stop reason: HOSPADM

## 2023-12-14 RX ORDER — DEXAMETHASONE SODIUM PHOSPHATE 10 MG/ML
INJECTION, SOLUTION INTRAMUSCULAR; INTRAVENOUS AS NEEDED
Status: DISCONTINUED | OUTPATIENT
Start: 2023-12-14 | End: 2023-12-14

## 2023-12-14 RX ORDER — FENTANYL CITRATE/PF 50 MCG/ML
50 SYRINGE (ML) INJECTION
Status: DISCONTINUED | OUTPATIENT
Start: 2023-12-14 | End: 2023-12-14 | Stop reason: HOSPADM

## 2023-12-14 RX ORDER — ONDANSETRON 2 MG/ML
4 INJECTION INTRAMUSCULAR; INTRAVENOUS EVERY 6 HOURS PRN
Status: CANCELLED | OUTPATIENT
Start: 2023-12-14

## 2023-12-14 RX ORDER — CEPHALEXIN 500 MG/1
500 CAPSULE ORAL EVERY 12 HOURS SCHEDULED
Qty: 10 CAPSULE | Refills: 0 | Status: SHIPPED | OUTPATIENT
Start: 2023-12-14 | End: 2023-12-19

## 2023-12-14 RX ORDER — VANCOMYCIN HYDROCHLORIDE 1 G/20ML
INJECTION, POWDER, LYOPHILIZED, FOR SOLUTION INTRAVENOUS AS NEEDED
Status: DISCONTINUED | OUTPATIENT
Start: 2023-12-14 | End: 2023-12-14 | Stop reason: HOSPADM

## 2023-12-14 RX ORDER — ONDANSETRON 2 MG/ML
INJECTION INTRAMUSCULAR; INTRAVENOUS AS NEEDED
Status: DISCONTINUED | OUTPATIENT
Start: 2023-12-14 | End: 2023-12-14

## 2023-12-14 RX ORDER — OXYCODONE HYDROCHLORIDE 5 MG/1
5 TABLET ORAL EVERY 6 HOURS PRN
Qty: 20 TABLET | Refills: 0 | Status: SHIPPED | OUTPATIENT
Start: 2023-12-14

## 2023-12-14 RX ORDER — CEFAZOLIN SODIUM 2 G/50ML
2000 SOLUTION INTRAVENOUS ONCE
Status: COMPLETED | OUTPATIENT
Start: 2023-12-14 | End: 2023-12-14

## 2023-12-14 RX ORDER — CHLORHEXIDINE GLUCONATE ORAL RINSE 1.2 MG/ML
15 SOLUTION DENTAL ONCE
Status: COMPLETED | OUTPATIENT
Start: 2023-12-14 | End: 2023-12-14

## 2023-12-14 RX ORDER — HYDROMORPHONE HCL IN WATER/PF 6 MG/30 ML
0.2 PATIENT CONTROLLED ANALGESIA SYRINGE INTRAVENOUS
Status: DISCONTINUED | OUTPATIENT
Start: 2023-12-14 | End: 2023-12-14 | Stop reason: HOSPADM

## 2023-12-14 RX ORDER — PROPOFOL 10 MG/ML
INJECTION, EMULSION INTRAVENOUS AS NEEDED
Status: DISCONTINUED | OUTPATIENT
Start: 2023-12-14 | End: 2023-12-14

## 2023-12-14 RX ORDER — EPHEDRINE SULFATE 50 MG/ML
INJECTION INTRAVENOUS AS NEEDED
Status: DISCONTINUED | OUTPATIENT
Start: 2023-12-14 | End: 2023-12-14

## 2023-12-14 RX ADMIN — FENTANYL CITRATE 50 MCG: 50 INJECTION INTRAMUSCULAR; INTRAVENOUS at 07:38

## 2023-12-14 RX ADMIN — SODIUM CHLORIDE, SODIUM LACTATE, POTASSIUM CHLORIDE, AND CALCIUM CHLORIDE: .6; .31; .03; .02 INJECTION, SOLUTION INTRAVENOUS at 07:34

## 2023-12-14 RX ADMIN — SUGAMMADEX 200 MG: 100 INJECTION, SOLUTION INTRAVENOUS at 09:05

## 2023-12-14 RX ADMIN — CEFAZOLIN SODIUM 2000 MG: 2 SOLUTION INTRAVENOUS at 07:40

## 2023-12-14 RX ADMIN — EPHEDRINE SULFATE 5 MG: 50 INJECTION INTRAVENOUS at 08:35

## 2023-12-14 RX ADMIN — FENTANYL CITRATE 25 MCG: 50 INJECTION INTRAMUSCULAR; INTRAVENOUS at 07:48

## 2023-12-14 RX ADMIN — ONDANSETRON 4 MG: 2 INJECTION INTRAMUSCULAR; INTRAVENOUS at 07:38

## 2023-12-14 RX ADMIN — DEXAMETHASONE SODIUM PHOSPHATE 10 MG: 10 INJECTION INTRAMUSCULAR; INTRAVENOUS at 07:38

## 2023-12-14 RX ADMIN — FENTANYL CITRATE 25 MCG: 50 INJECTION INTRAMUSCULAR; INTRAVENOUS at 07:53

## 2023-12-14 RX ADMIN — BUPIVACAINE HYDROCHLORIDE 10 ML: 5 INJECTION, SOLUTION EPIDURAL; INTRACAUDAL at 07:17

## 2023-12-14 RX ADMIN — ROCURONIUM BROMIDE 50 MG: 10 INJECTION, SOLUTION INTRAVENOUS at 07:38

## 2023-12-14 RX ADMIN — PROPOFOL 100 MG: 10 INJECTION, EMULSION INTRAVENOUS at 07:38

## 2023-12-14 RX ADMIN — SODIUM CHLORIDE, SODIUM LACTATE, POTASSIUM CHLORIDE, AND CALCIUM CHLORIDE: .6; .31; .03; .02 INJECTION, SOLUTION INTRAVENOUS at 09:16

## 2023-12-14 RX ADMIN — Medication 100 MCG: at 07:54

## 2023-12-14 RX ADMIN — CHLORHEXIDINE GLUCONATE 15 ML: 1.2 SOLUTION ORAL at 07:12

## 2023-12-14 RX ADMIN — LIDOCAINE HYDROCHLORIDE 50 MG: 10 INJECTION, SOLUTION EPIDURAL; INFILTRATION; INTRACAUDAL at 07:38

## 2023-12-14 RX ADMIN — Medication 100 MCG: at 08:21

## 2023-12-14 RX ADMIN — EPHEDRINE SULFATE 5 MG: 50 INJECTION INTRAVENOUS at 08:24

## 2023-12-14 RX ADMIN — Medication 100 MCG: at 08:07

## 2023-12-14 RX ADMIN — BUPIVACAINE 20 ML: 13.3 INJECTION, SUSPENSION, LIPOSOMAL INFILTRATION at 07:17

## 2023-12-14 NOTE — TELEPHONE ENCOUNTER
Received call and spoke w/pt's son Marvel Chase. He states that they got home and were parked on a hill and when pt  went to get out of car, she lost balance and slide back into car in slow motion and may have hit elbow. She is still experiencing the effects of the nerve block that was given. So no c/o pain and cannot move fingers. Advised to follow AVS and ice area 20 min on and 20 min off. Will forward concern to Dr Johann Fischer to notify and for further advice. Thank you. Frandy Sagastume # [phone]

## 2023-12-14 NOTE — DISCHARGE INSTR - AVS FIRST PAGE
Discharge Instructions - Pediatric Orthopedics  Lynn Busch 81 y.o. female MRN: [id number]  Unit/Bed#: AN OR MAIN      Weight Bearing Status:                                           Nonweightbearing to the right arm. Motion of the elbow as tolerated, sling for comfort    Care after Procedure:   Keep your dressing on until you see your physician in the office. Keep this clean and dry at all times. 2.  Apply ice to the surgical area (20 minutes on and 20 minutes off) or use the cold therapy unit you may have purchased. Make sure that the ice is not in direct contact with your skin. 3.  Observe your operative extremity for color, warmth and sensation several times a day. Call your doctor at [phone] for the followin. Tingling, numbness, coldness or excessive swelling of the operative extremity. 2.  Redness, swelling, or excessive drainage from surgical wounds. 3.  Pain unresponsive to the medication provided. 4.  Chills, Malaise or fevers over 101.5     Anesthesia precautions:  1. General Anesthesia:  A. Have a responsible person drive you home and stay with you at home. B.  Relax and Rest for 24 hours. C.  Drink clear liquids until you are certain there is no nausea or vomiting. Medication:   1. Please take pain medication as directed on prescription. Follow Up:   A follow up appointment should have been made pre-operatively. If not, please call the office at the above number for an appointment within 2-3 weeks after surgery.

## 2023-12-14 NOTE — ANESTHESIA PROCEDURE NOTES
Peripheral Block    Patient location during procedure: holding area  Start time: 12/14/2023 7:17 AM  Reason for block: at surgeon's request and post-op pain management  Staffing  Performed by: Tristan Cody MD  Authorized by: Tristan Cody MD    Preanesthetic Checklist  Completed: patient identified, IV checked, site marked, risks and benefits discussed, surgical consent, monitors and equipment checked, pre-op evaluation and timeout performed  Peripheral Block  Patient position: supine  Prep: ChloraPrep  Patient monitoring: frequent blood pressure checks, continuous pulse oximetry and heart rate  Block type: Supraclavicular  Laterality: right  Injection technique: single-shot  Procedures: ultrasound guided, Ultrasound guidance required for the procedure to increase accuracy and safety of medication placement and decrease risk of complications.  Ultrasound permanent image savedbupivacaine (PF) (MARCAINE) 0.5 % injection 20 mL - Perineural   10 mL - 12/14/2023 7:17:00 AM  bupivacaine liposomal (EXPAREL) 1.3 % injection 20 mL - Perineural   20 mL - 12/14/2023 7:17:00 AM  Needle  Needle type: Stimuplex   Needle localization: anatomical landmarks and ultrasound guidance  Assessment  Injection assessment: incremental injection, frequent aspiration, injected with ease, negative aspiration, negative for heart rate change, no paresthesia on injection, no symptoms of intraneural/intravenous injection and needle tip visualized at all times  Paresthesia pain: none  Post-procedure:  site cleaned  patient tolerated the procedure well with no immediate complications

## 2023-12-14 NOTE — TELEPHONE ENCOUNTER
Caller: Patient's son    Doctor: Alex     Reason for call: States while getting in the car she hit her elbow and is concerned , call was warm transferred to nurse

## 2023-12-14 NOTE — OP NOTE
OPERATIVE REPORT  PATIENT NAME: Kelsie Wang    :  1940  MRN: [id number]  Pt Location: [facility] OR ROOM 01    SURGERY DATE: 2023    Surgeon(s) and Role:     * Ambika Tinoco DO - Primary    * Lieutenant Kamar MD - Assisting     * Karthik Adam PA-C - Assisting   I was present for the entire procedure. and I was present for all critical portions of the procedure. Preop Diagnosis:  #1 right supracondylar distal humerus fracture    Post-Op Diagnosis:  #1 right supracondylar distal humerus fracture    Procedures:  #1 open reduction internal fixation of right supracondylar distal humerus fracture      Specimen(s):  None    Estimated Blood Loss:   5 cc    Drains:  None    Anesthesia Type:   General endotracheal    Operative Indications:  Patient is a 81-year-old female who is right-hand dominant and lives independently. She walks without an assistive device. She performs all her activities of daily living on her own. She is quite active and has a distant history of a right sided olecranon fracture fixed with lag screw and tension band technique. The patient had a fall which resulted in a minimally displaced supracondylar distal humerus fracture. The patient was splinted in the emergency department. I had a discussion with the patient and her family members about operative versus nonoperative management. We discussed immobilization in a splint followed by casting and hinged elbow bracing for the distal humerus fracture as a nonoperative technique. We also discussed operative fixation allowing for immediate range of motion and maintenance of range of motion of the distal humerus and elbow.   After discussion and discussing the patient's goals for post injury function the patient elected to proceed with surgical fixation of the right distal humerus      Implants:   Synthes 3-hole posterior lateral distal humerus plate with offset    Tourniquet time:   Tourniquet was inflated to 250 mmHg for 71 minutes      Complications:   No acute complications were encountered. Patient was transferred to PACU in stable condition    Operative findings:  Patient had a minimally displaced posterior supracondylar distal humerus fracture. The patient humerus had callus formation already occurring at the fracture site. It had displaced slightly from previous films. A posterior lateral plate was applied to the posterior lateral aspect of the distal humerus with locking fixation. The patient had osteopenic bone which was significantly soft during implantation. Locking screws and a long metaphyseal screw were inserted into the distal segment and a proximal locking screw and cortical screws were placed in the proximal segment. The elbow was then stable after range of motion full range of motion was able to be obtained in the operating room. Procedure and Technique:  Patient is an 28-year-old female that was seen examined the preoperative holding area. Operative extremities marked. All patient's questions were answered. Patient was then taken back to the operating room and general endotracheal anesthesia was administered by department anesthesia. The patient was then transferred over the operating table in Cetylev spine precautions. All bony prominences were well-padded. The patient was placed in a lateral decubitus position. The patient's bony prominences were well-padded. The patient was then prepped and draped in a standard sterile orthopedic fashion. A timeout was performed confirm correct site, correct patient, correct procedure. All in agreement procedure was started. The patient's previous posterior incision was marked out. This was extended proximally. In line with the humerus. A well-padded sterile tourniquet was applied to the upper extremity. A timeout was performed confirm correct site, correct patient, correct procedure. All in agreement procedure was started.   The right upper extremity was exsanguinated and the tourniquet was inflated to 250 mmHg. Sharp dissection was carried down through skin subcutaneous tissues. Electrocautery was used to obtain hemostasis. Dissection was carried down on the radial aspect of the triceps. Dissection was carried down to the intermuscular septum. The triceps was then lifted off of the posterior aspect of the humerus. The fascial connection between the triceps and the anconeus was split and dissected to allow for later repair. The posterior lateral aspect of the humerus was completely visualized. The olecranon fossa was also visualized. The fracture site was able to be visualized laterally. It did transverse to the distal humerus. There was already callus formation at the fracture site. A posterior lateral plate was then selected with the offset screws to allow for fixation into the medial column. A plate was slid into place and held temporarily with K wires. It plate placement was confirmed under multiplanar fluoroscopic imaging. After which multiple locking screws were inserted into the distal humeral segment. The plate was then secured with a cortical screw proximally and then a locking screw in the second hole given the patient's extremely osteopenic bone. The lateral to medial screw was then placed using a metaphyseal screw that gained just purchase in the trochlea on the medial aspect of the humerus. Final reduction implant placement was confirmed under multiplanar fluoroscopic imaging after removal of all temporary fixation. The arm was brought through range of motion. Patient was able to achieve 5 to 130 degrees of flexion. No impingement noted. The patient's wounds were then copiously irrigated with sterile normal saline. 1 g of vancomycin was placed deep in the wound. The anconeus triceps connection was then repaired using 2-0 Monocryl suture. The subcutaneous tissues were then repaired using 2-0 Monocryl suture.   The skin was closed with staples. The wounds were then dressed in Adaptic 4 x 4's and cast padding. The patient was then placed into a well-padded Ace wrap. The tourniquet was let down. The patient was then transferred off the operating table in Cetylev spine precautions extubated and transferred to PACU in stable condition        Postoperative plan:  Patient will be nonweightbearing to the right upper extremity. Patient is cleared for range of motion as tolerated to the right upper extremity. Patient will receive 5 days of Keflex 500 mg twice daily for infection prophylaxis. The patient will be provided a sling for comfort but should come out of the sling for range of motion exercises.   The patient will need to follow-up in 2 weeks time for repeat x-ray evaluation and removal of staples and the initiation of physical therapy    SIGNATURE: Patrizia Morales,   DATE: December 14, 2023  TIME: 9:13 AM

## 2023-12-14 NOTE — TELEPHONE ENCOUNTER
Caller: Bradley Lucero ( son)    Doctor: Shanae Jenkins / Justina Varela     Reason for call: Please see previous notes from Nurse Jahaira Norton. Patient's son calling back regarding the antibiotics and a few other concerns he needs addressed. Please call patient's son tonight asap.        Call back#: [phone]    Message sent to TT

## 2023-12-14 NOTE — INTERVAL H&P NOTE
H&P reviewed. After examining the patient I find no changes in the patients condition since the H&P had been written.     Vitals:    12/14/23 0657   BP: 136/72   Pulse: 74   Resp: 18   Temp: 99.2 °F (37.3 °C)   SpO2: 99%

## 2023-12-14 NOTE — CASE MANAGEMENT
Case Management Discharge Planning Note    Patient name Layo Tucker  Location OR POOL/OR POOL MRN [id number]  : 1940 Date 2023       Current Admission Date: 2023  Current Admission Diagnosis:Fracture of distal end of right humerus  Patient Active Problem List    Diagnosis Date Noted    Fracture of distal end of right humerus 2023    Mixed hyperlipidemia 2023    Sensorineural hearing loss (SNHL) of both ears 2021    Post-nasal drip 2021    Nasal congestion 2021    Nasal septal deviation 2021    History of colon polyps 2019    Family history of malignant neoplasm of colon 2019    Family history of colonic polyps 2019    Family hx of colon cancer 2019    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 condition 2018    Dysuria 2018    S/P coil embolization of cerebral aneurysm 2018    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  Secondary Insurance:     DISCHARGE DETAILS:    Discharge planning discussed with[de-identified] Selwyn Mendoza of Choice: Yes  Comments - Freedom of Choice: Referral to [facility] for [address] [place] [address] Drive support  CM contacted family/caregiver?: Yes    Other Referral/Resources/Interventions Provided:  Interventions: HHA  Referral Comments: CM consulted for resources to assist with pt ADLs at home.  CM spoke to pt selwyn Ng and reviewed this will be an out of pocket cost. [facility] confirmed they will assist pt for the enxt couple days and are agreeable to resources. Referrals sent to [facility] via [employer], requested they contact son Morales Tucker to follow up.     Would you like to participate in our [id number] service program?  : No - Declined

## 2023-12-14 NOTE — TELEPHONE ENCOUNTER
Caller: Panfilo Cuevas, son    Doctor: Kelby Chan    Reason for call: Needs clarification on whether patient should be taking PO antibiotics, along with response from earlier message.  Also, her hand is red and tingling and asking if this is normal. Please advise ASAP    Call back#: [phone]

## 2023-12-14 NOTE — ANESTHESIA POSTPROCEDURE EVALUATION
Post-Op Assessment Note    CV Status:  Stable  Pain Score: 0    Pain management: adequate       Mental Status:  Alert and awake   Hydration Status:  Stable   PONV Controlled:  None   Airway Patency:  Patent     Post Op Vitals Reviewed: Yes      Staff: CRNA               BP   128/78   Temp   97.0   Pulse  63   Resp   12   SpO2   98

## 2023-12-14 NOTE — ANESTHESIA PREPROCEDURE EVALUATION
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      Anesthesia Plan  ASA Score- 2     Anesthesia Type- general with ASA Monitors.         Additional Monitors:     Airway Plan: ETT.    Comment: Right supraclavicular block for postoperative pain control.       Plan Factors-Exercise tolerance (METS): >4 METS.    Chart reviewed. EKG reviewed. Imaging results reviewed. Existing labs reviewed. Patient summary reviewed.                  Induction- intravenous.    Postoperative Plan- Plan for postoperative opioid use.     Informed Consent- Anesthetic plan and risks discussed with patient.  I personally reviewed this patient with the CRNA. Discussed and agreed on the Anesthesia Plan with the CRNA..              Recent labs personally reviewed:  Lab Results   Component Value Date    WBC 6.45 12/12/2023    HGB 13.4 12/12/2023     12/12/2023     Lab Results   Component Value Date    K 4.3 11/13/2023     BUN 16 11/13/2023    CREATININE 0.57 (L) 11/13/2023     Lab Results   Component Value Date    PTT 30 12/12/2023      Lab Results   Component Value Date    INR 1.01 12/12/2023       Blood type A    Lab Results   Component Value Date    HGBA1C 5.1 07/22/2019       I, Tristan Cody MD, have personally seen and evaluated the patient prior to anesthetic care.  I have reviewed the pre-anesthetic record, and other medical records if appropriate to the anesthetic care.  If a CRNA is involved in the case, I have reviewed the CRNA assessment, if present, and agree. Risks/benefits and alternatives discussed with patient including possible PONV, sore throat, and possibility of rare anesthetic and surgical emergencies.

## 2023-12-17 ENCOUNTER — HOSPITAL ENCOUNTER (EMERGENCY)
Facility: HOSPITAL | Age: 83
Discharge: HOME | End: 2023-12-17
Attending: EMERGENCY MEDICINE
Payer: COMMERCIAL

## 2023-12-17 VITALS
WEIGHT: 128.75 LBS | TEMPERATURE: 98.4 F | OXYGEN SATURATION: 97 % | HEART RATE: 90 BPM | BODY MASS INDEX: 22.81 KG/M2 | SYSTOLIC BLOOD PRESSURE: 166 MMHG | RESPIRATION RATE: 18 BRPM | DIASTOLIC BLOOD PRESSURE: 85 MMHG | HEIGHT: 63 IN

## 2023-12-17 DIAGNOSIS — T78.40XA ALLERGIC REACTION, INITIAL ENCOUNTER: Primary | ICD-10-CM

## 2023-12-17 PROCEDURE — 99283 EMERGENCY DEPT VISIT LOW MDM: CPT

## 2023-12-17 PROCEDURE — 99284 EMERGENCY DEPT VISIT MOD MDM: CPT | Performed by: EMERGENCY MEDICINE

## 2023-12-17 RX ORDER — DIPHENHYDRAMINE HCL 25 MG
25 TABLET ORAL ONCE
Status: DISCONTINUED | OUTPATIENT
Start: 2023-12-17 | End: 2023-12-17

## 2023-12-17 RX ORDER — DIPHENHYDRAMINE HCL 25 MG
25 TABLET ORAL ONCE
Status: COMPLETED | OUTPATIENT
Start: 2023-12-17 | End: 2023-12-17

## 2023-12-17 RX ADMIN — DIPHENHYDRAMINE HYDROCHLORIDE 25 MG: 25 TABLET ORAL at 11:59

## 2023-12-17 NOTE — ED PROVIDER NOTES
History  Chief Complaint   Patient presents with    Allergic Reaction     Patient presents to ED for allergic reaction to cephalexin. Patient states she had surgery done on Thursday and and yesterday began swelling of the tongue and extremities. Patient denies sob, throat swelling, or difficulty swallowing     84yo F s/p ORIF of a Fx to the right midshaft humerus (3 days ago) presenting for allergic reaction. Earlier this morning Pt took her keflex as prescribed following the procedure (day 3/5) and then went back to bed for 2 hours. When she woke up she noticed her tongue was swollen. She has not had a reaction to keflex in the past. She also noticed her right hand was more swollen and had tingling sensations. Denies dysphagia, muffled voice, trismus, wheezing, SOB, CP, rash, abdominal pain, N/V/D, weakness, pain, discoloration of the fingers.      History provided by:  Patient      Prior to Admission Medications   Prescriptions Last Dose Informant Patient Reported? Taking?   Dorzolamide HCl-Timolol Mal PF 22.3-6.8 MG/ML SOLN  Self Yes No   Multiple Vitamins-Minerals (PreserVision AREDS 2) CAPS   Yes No   Sig: Take by mouth   acetaminophen (TYLENOL) 650 mg CR tablet   No No   Sig: Take 1 tablet (650 mg total) by mouth every 8 (eight) hours as needed for mild pain   cephalexin (KEFLEX) 500 mg capsule   No No   Sig: Take 1 capsule (500 mg total) by mouth every 12 (twelve) hours for 5 days   cholecalciferol (VITAMIN D3) 1,000 units tablet  Self Yes No   Sig: Take 1,000 Units by mouth daily   dorzolamide-timolol (COSOPT) 22.3-6.8 MG/ML ophthalmic solution  Self Yes No   Sig: Administer 1 drop to both eyes 2 (two) times a day   estradiol (Yuvafem) 10 MCG TABS vaginal tablet  Self No No   Sig: Insert 1 tablet (10 mcg total) into the vagina 2 (two) times a week   multivitamin (THERAGRAN) TABS  Self Yes No   Sig: Take 1 tablet by mouth daily Centrum Silver   oxyCODONE (Roxicodone) 5 immediate release tablet   No No   Sig:  Take 1 tablet (5 mg total) by mouth every 6 (six) hours as needed for severe pain for up to 30 doses Max Daily Amount: 20 mg   travoprost (TRAVATAN-Z) 0.004 % ophthalmic solution  Self Yes No   Si drop daily at bedtime      Facility-Administered Medications: None       Past Medical History:   Diagnosis Date    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    Colon polyp     Fracture of arm     Glaucoma     Hyponatremia 2018    Hypotension        Past Surgical History:   Procedure Laterality Date    BUNIONECTOMY      BUNIONECTOMY      CATARACT EXTRACTION      ELBOW SURGERY Right     EYE SURGERY  CATARACTS    FRACTURE SURGERY  PINS IN BROKEN ELBOW    IR CEREBRAL ANGIOGRAPHY  2019    IR CEREBRAL ANGIOGRAPHY / INTERVENTION  2018    MI OPEN TX HUMERAL SUPRACONDYLAR FRACTURE W/O XTN Right 2023    Procedure: OPEN REDUCTION W/ INTERNAL FIXATION (ORIF) HUMERUS MIDSHAFT / DISTAL;  Surgeon: Todd Johnson DO;  Location: [facility];  Service: Orthopedics    TONSILLECTOMY         Family History   Problem Relation Age of Onset    No Known Problems Mother     Heart attack Father     Diabetes Father     No Known Problems Sister     No Known Problems Daughter     No Known Problems Maternal Grandmother     Cancer Maternal Grandfather     Colon cancer Maternal Grandfather 40    No Known Problems Paternal Grandmother     No Known Problems Paternal Grandfather     No Known Problems Maternal Aunt     Cancer Paternal Aunt         pancreatic    No Known Problems Paternal Aunt     Heart disease Family      I have reviewed and agree with the history as documented.    E-Cigarette/Vaping    E-Cigarette Use Never User      E-Cigarette/Vaping Substances    Nicotine No     THC No     CBD No     Flavoring No     Unknown No      Social History     Tobacco Use    Smoking status: Never    Smokeless tobacco: Never   Vaping Use    Vaping status: Never Used   Substance Use Topics    Alcohol use: Not Currently     Comment: not using due  to her condition    Drug use: No        Review of Systems   Constitutional: Negative.    HENT: Negative.     Respiratory: Negative.     Cardiovascular: Negative.    Gastrointestinal: Negative.    Genitourinary: Negative.    Musculoskeletal: Negative.    Skin: Negative.    Neurological: Negative.    Psychiatric/Behavioral: Negative.         Physical Exam  ED Triage Vitals   Temperature Pulse Respirations Blood Pressure SpO2   12/17/23 1047 12/17/23 1047 12/17/23 1047 12/17/23 1047 12/17/23 1045   98.4 °F (36.9 °C) 90 18 166/85 98 %      Temp Source Heart Rate Source Patient Position - Orthostatic VS BP Location FiO2 (%)   12/17/23 1047 12/17/23 1047 12/17/23 1047 12/17/23 1047 --   Oral Monitor Sitting Left arm       Pain Score       12/17/23 1047       No Pain             Orthostatic Vital Signs  Vitals:    12/17/23 1047   BP: 166/85   Pulse: 90   Patient Position - Orthostatic VS: Sitting       Physical Exam  Constitutional:       General: She is not in acute distress.     Appearance: Normal appearance.   HENT:      Head: Normocephalic and atraumatic.      Right Ear: External ear normal.      Left Ear: External ear normal.      Nose: Nose normal. No rhinorrhea.      Mouth/Throat:      Mouth: Mucous membranes are moist.      Pharynx: Oropharynx is clear.   Eyes:      Extraocular Movements: Extraocular movements intact.      Conjunctiva/sclera: Conjunctivae normal.   Cardiovascular:      Rate and Rhythm: Normal rate and regular rhythm.      Pulses: Normal pulses.      Heart sounds: Normal heart sounds.   Pulmonary:      Effort: Pulmonary effort is normal.      Breath sounds: Normal breath sounds.   Abdominal:      General: Abdomen is flat.      Palpations: Abdomen is soft.   Musculoskeletal:        Arms:    Skin:     General: Skin is warm and dry.      Capillary Refill: Capillary refill takes less than 2 seconds.   Neurological:      General: No focal deficit present.      Mental Status: She is alert and oriented to  person, place, and time.      Cranial Nerves: No cranial nerve deficit.      Sensory: No sensory deficit.      Motor: No weakness.         ED Medications  Medications   diphenhydrAMINE (BENADRYL) tablet 25 mg (25 mg Oral Given 12/17/23 1159)       Diagnostic Studies  Results Reviewed       None                   No orders to display         Procedures  Procedures      ED Course                             SBIRT 20yo+      Flowsheet Row Most Recent Value   Initial Alcohol Screen: US AUDIT-C     1. How often do you have a drink containing alcohol? 0 Filed at: 12/17/2023 1049   2. How many drinks containing alcohol do you have on a typical day you are drinking?  0 Filed at: 12/17/2023 1049   3a. Male UNDER 65: How often do you have five or more drinks on one occasion? 0 Filed at: 12/17/2023 1049   3b. FEMALE Any Age, or MALE 65+: How often do you have 4 or more drinks on one occassion? 0 Filed at: 12/17/2023 1049   Audit-C Score 0 Filed at: 12/17/2023 1049   QIANA: How many times in the past year have you...    Used an illegal drug or used a prescription medication for non-medical reasons? Never Filed at: 12/17/2023 1049                  Medical Decision Making  Pt's presentation is not c/f allergic reaction given she has been on this medication for several days, no clinical signs of angioedema or allergic reaction. Nevertheless, benadryl was given ppx. Orthopedics was consulted regarding the need for an antibiotic and they recommended stopping in the event this was an allergic rxn. Regarding her right arm- no c/f compartment syndrome or DVT given absent physical exam findings. Advised Pt to f/u with orthopedics. Strict return precautions discussed.    Risk  OTC drugs.          Disposition  Final diagnoses:   Allergic reaction, initial encounter     Time reflects when diagnosis was documented in both MDM as applicable and the Disposition within this note       Time User Action Codes Description Comment    12/17/2023  12:27 PM Norris España [T78.40XA] Allergic reaction, initial encounter           ED Disposition       ED Disposition   Discharge    Condition   Stable    Date/Time   Sun Dec 17, 2023 1227    Comment   Ellie Franklin discharge to home/self care.                   Follow-up Information       Follow up With Specialties Details Why Contact Info Additional Information    [facility] Emergency Department Emergency Medicine Go to  If symptoms worsen [address] [facility] [address]  [phone] [facility] Emergency Department, [address] [place], [place], [address]            Discharge Medication List as of 12/17/2023 12:27 PM        CONTINUE these medications which have NOT CHANGED    Details   acetaminophen (TYLENOL) 650 mg CR tablet Take 1 tablet (650 mg total) by mouth every 8 (eight) hours as needed for mild pain, Starting Thu 12/14/2023, Normal      cephalexin (KEFLEX) 500 mg capsule Take 1 capsule (500 mg total) by mouth every 12 (twelve) hours for 5 days, Starting Thu 12/14/2023, Until Tue 12/19/2023, Normal      cholecalciferol (VITAMIN D3) 1,000 units tablet Take 1,000 Units by mouth daily, Historical Med      Dorzolamide HCl-Timolol Mal PF 22.3-6.8 MG/ML SOLN Historical Med      dorzolamide-timolol (COSOPT) 22.3-6.8 MG/ML ophthalmic solution Administer 1 drop to both eyes 2 (two) times a day, Historical Med      estradiol (Yuvafem) 10 MCG TABS vaginal tablet Insert 1 tablet (10 mcg total) into the vagina 2 (two) times a week, Starting Mon 6/19/2023, Normal      Multiple Vitamins-Minerals (PreserVision AREDS 2) CAPS Take by mouth, Historical Med      multivitamin (THERAGRAN) TABS Take 1 tablet by mouth daily Centrum Silver, Historical Med      oxyCODONE (Roxicodone) 5 immediate release tablet Take 1 tablet (5 mg total) by mouth every 6 (six) hours as needed for severe pain for up to 30 doses Max Daily Amount: 20 mg, Starting Thu  12/14/2023, Normal      travoprost (TRAVATAN-Z) 0.004 % ophthalmic solution 1 drop daily at bedtime, Historical Med           No discharge procedures on file.    PDMP Review         Value Time User    PDMP Reviewed  Yes 11/9/2022  7:04 AM Carlo Iqbal DO             ED Provider  Attending physically available and evaluated Ellie Franklin. I managed the patient along with the ED Attending.    Electronically Signed by           Norris España MD  12/17/23 8852

## 2023-12-18 ENCOUNTER — OFFICE VISIT (OUTPATIENT)
Dept: OBGYN CLINIC | Facility: CLINIC | Age: 83
End: 2023-12-18

## 2023-12-18 ENCOUNTER — TELEPHONE (OUTPATIENT)
Age: 83
End: 2023-12-18

## 2023-12-18 VITALS — HEIGHT: 63 IN | WEIGHT: 128.75 LBS | BODY MASS INDEX: 22.81 KG/M2

## 2023-12-18 DIAGNOSIS — S42.494D OTHER CLOSED NONDISPLACED FRACTURE OF DISTAL END OF RIGHT HUMERUS WITH ROUTINE HEALING, SUBSEQUENT ENCOUNTER: Primary | ICD-10-CM

## 2023-12-18 PROCEDURE — 99024 POSTOP FOLLOW-UP VISIT: CPT | Performed by: STUDENT IN AN ORGANIZED HEALTH CARE EDUCATION/TRAINING PROGRAM

## 2023-12-18 NOTE — TELEPHONE ENCOUNTER
Caller: Patient    Doctor: Dr. Johnson    Reason for call: Patient seen in the ER over the weekend after suffering allergic reaction to the antibiotics.  She was taken off of antibiotics by ER and they took off her dressing and replaced it with loose ace wrap.  Patient concerned the ace wrap is not sufficient enough and asked to see Dr. Johnson.  Patient placed on Dr. Johnson's schedule today at 10:45AM.    Call back#: [phone]

## 2023-12-18 NOTE — ED ATTENDING ATTESTATION
12/17/2023  I, Yulisa Watt MD, saw and evaluated the patient. I have discussed the patient with the resident/non-physician practitioner and agree with the resident's/non-physician practitioner's findings, Plan of Care, and MDM as documented in the resident's/non-physician practitioner's note, except where noted. All available labs and Radiology studies were reviewed.  I was present for key portions of any procedure(s) performed by the resident/non-physician practitioner and I was immediately available to provide assistance.       At this point I agree with the current assessment done in the Emergency Department.  I have conducted an independent evaluation of this patient a history and physical is as follows:    Patient is a very pleasant 83-year-old female who presents to the emergency department after appreciating tongue swelling, right hand swelling and paresthesias.  She underwent ORIF of distal humerus fracture on Thursday and has been taking cephalexin prophylactically since.  Nerve block has worn off within the last day.      She describes having awoken around 8 AM, having taken her cephalexin and having immediately return to sleep.  A couple of hours later on waking she appreciated a sensation of mild swelling of her tongue.  Right hand was additionally a bit edematous and paresthesias were appreciated in all 5 fingers.  Concern for possible allergic reaction she presented to the ED.  Within minutes prior to my seeing her she reported full resolution of sensation of tongue swelling.  She confirmed that no other abnormal sensation was appreciated in the mouth or throat.  Lips and throat did not feel swollen.  She did not notice any pruritic/itchy quality to those regions, difficulty breathing, nausea, chest tightness or rash.  No recall of similar tongue swelling.  No other new medications taken today.  She is uncertain if she snores.    She relays that she has been keeping arm elevated  during much of the day and appreciated minimal hand swelling over the last day until now.  She does not have any pain in the hand nor in the arm and has not experienced any chest discomfort or dyspnea.  No fevers.  No drainage from incision site.    She is well-appearing.  Mucous membranes are moist.  Speech is clear.  Uvula is thin without any evidence of edema.  No oropharyngeal injection.  I do not appreciate edema of lips or tongue.  Heart sounds regular.  Lungs clear to auscultation bilaterally.  Fullness is appreciated in the posterior aspect of the right upper arm-soft.  There is no tenderness over the inner aspect of the right upper arm or AC fossa.  Incision over the posterior aspect of the right upper arm is well-approximated with staples.  No erythema, increased warmth or drainage noted.  Right hand is mildly edematous and ecchymotic.  She is able to range fingers well and has good sensation in these.  +2 radial and ulnar pulses.    Uncertain what to attribute tongue swelling 2.  This would be a bit unusual as an isolated symptom from allergic reaction although not impossible.  Consider possible swelling as a result of mild trauma with sleeping/snoring.  She does report resolution of symptoms right around time of diphenhydramine ingestion (to closely time for diphenhydramine to have been absorbed/having taken effect).  With some concern given timeline of possible mild allergic reaction Dr. España did reach out to orthopedics regarding possible discontinuation of prophylactic antibiotic early.    No evidence of compartment syndrome or infection in the right arm.  She has no tenderness along course of deep veins to suggest DVT presence.  Swelling in right hand likely secondary to dependent positioning while sleeping.  Encouraged continued frequent elevation as well as use of ice a few times daily for 15 to 20 minutes to assist with swelling.    Patient will contact her orthopedic specialist prn Worsening/new  concerns.    ED Course         Critical Care Time  Procedures

## 2023-12-18 NOTE — PROGRESS NOTES
Orthopaedics Office Visit - postop Patient Visit    ASSESSMENT/PLAN:    Assessment:   #1 Right Closed Distal Humerus Supracondylar Fracture status post ORIF, date of surgery 12/14/2023    Plan:   No x-rays taken at today's visit  Remain nonweightbearing to the right upper extremity in soft dressing and sling  Encouraged to move shoulder, wrist and fingers to prevent stiffness and help with swelling  She is encouraged to take Tylenol as needed for pain  May use ice also to help with pain and swelling  Patient had a reaction to Keflex with some swelling of the tongue and lips, since this happened antibiotic has been stopped  The wound was dressed appropriately with Adaptic, 4 x 4 ABD and Ace wrap  Continue to follow postoperative instructions given at the hospital    To Do Next Visit:  Follow-up as previously scheduled for 2-week postop visit with new x-rays of the elbow at that time    _____________________________________________________  CHIEF COMPLAINT:  Chief Complaint   Patient presents with    Right Arm - Post-op         SUBJECTIVE:  Ellie Franklin is a 83 y.o. female who presents for initial visit accompanied by her son for evaluation of right elbow pain.  Patient had a mechanical fall 1 week ago and injured her right elbow.  She was evaluated in the ER and was provided a splint and referred to orthopedics.  Pain is well localized to the right elbow without radiation, pain is worse with motion and palpation, improves at rest and with the splint.  She denies associated numbness or tingling to the arm.  She does have history of a right olecranon fracture ORIF done over 20 years ago.  She denies any pain in her elbow before this new fall, she denies any issues with her previous surgery or hardware.  She states that she is fairly active for her age and is independent on all of her activities of daily living including driving.    Interval history on 12/18/2023:  Patient is status post right distal humerus ORIF  4 days ago, date of surgery 12/14/2023.  Patient presents for unscheduled follow-up, she states that she was taking the prescribed prophylactic Keflex and she experience some lips and tongue swelling on day #2 prompting presentation to the ER.  While she was in the ER, she was given Benadryl and the antibiotic was discontinued.  In the ER, the ER physician took down her postoperative dressing, and new dressing was applied consisting only of fluffy Webril and a very loose Ace wrap.  According to the son who is present at the visit today in the emergency department the date did not see any signs of a allergic reaction to the antibiotics.  They discontinued them out of caution.  Patient presents today for dressing evaluation and concerns regarding the antibiotic situation.  Her pain is well-controlled, the peripheral block that she received worked very well and has just started to wear off.  She has only taken Tylenol.    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    Colon polyp     Fracture of arm     Glaucoma     Hyponatremia 12/05/2018    Hypotension        PAST SURGICAL HISTORY:  Past Surgical History:   Procedure Laterality Date    BUNIONECTOMY      BUNIONECTOMY      CATARACT EXTRACTION      ELBOW SURGERY Right     EYE SURGERY  CATARACTS    FRACTURE SURGERY  PINS IN BROKEN ELBOW    IR CEREBRAL ANGIOGRAPHY  06/26/2019    IR CEREBRAL ANGIOGRAPHY / INTERVENTION  11/25/2018    CA OPEN TX HUMERAL SUPRACONDYLAR FRACTURE W/O XTN Right 12/14/2023    Procedure: OPEN REDUCTION W/ INTERNAL FIXATION (ORIF) HUMERUS MIDSHAFT / DISTAL;  Surgeon: Todd Johnson DO;  Location: [facility];  Service: Orthopedics    TONSILLECTOMY         FAMILY HISTORY:  Family History   Problem Relation Age of Onset    No Known Problems Mother     Heart attack Father     Diabetes Father     No Known Problems Sister     No Known Problems Daughter     No Known Problems Maternal Grandmother      Cancer Maternal Grandfather     Colon cancer Maternal Grandfather 40    No Known Problems Paternal Grandmother     No Known Problems Paternal Grandfather     No Known Problems Maternal Aunt     Cancer Paternal Aunt         pancreatic    No Known Problems Paternal Aunt     Heart disease Family        SOCIAL HISTORY:  Social History     Tobacco Use    Smoking status: Never    Smokeless tobacco: Never   Vaping Use    Vaping status: Never Used   Substance Use Topics    Alcohol use: Not Currently     Comment: not using due to her condition    Drug use: No       MEDICATIONS:    Current Outpatient Medications:     acetaminophen (TYLENOL) 650 mg CR tablet, Take 1 tablet (650 mg total) by mouth every 8 (eight) hours as needed for mild pain, Disp: 30 tablet, Rfl: 0    cholecalciferol (VITAMIN D3) 1,000 units tablet, Take 1,000 Units by mouth daily, Disp: , Rfl:     Dorzolamide HCl-Timolol Mal PF 22.3-6.8 MG/ML SOLN, , Disp: , Rfl:     dorzolamide-timolol (COSOPT) 22.3-6.8 MG/ML ophthalmic solution, Administer 1 drop to both eyes 2 (two) times a day, Disp: , Rfl:     estradiol (Yuvafem) 10 MCG TABS vaginal tablet, Insert 1 tablet (10 mcg total) into the vagina 2 (two) times a week, Disp: 24 tablet, Rfl: 2    Multiple Vitamins-Minerals (PreserVision AREDS 2) CAPS, Take by mouth, Disp: , Rfl:     multivitamin (THERAGRAN) TABS, Take 1 tablet by mouth daily Centrum Silver, Disp: , Rfl:     travoprost (TRAVATAN-Z) 0.004 % ophthalmic solution, 1 drop daily at bedtime, Disp: , Rfl:     cephalexin (KEFLEX) 500 mg capsule, Take 1 capsule (500 mg total) by mouth every 12 (twelve) hours for 5 days (Patient not taking: Reported on 12/18/2023), Disp: 10 capsule, Rfl: 0    oxyCODONE (Roxicodone) 5 immediate release tablet, Take 1 tablet (5 mg total) by mouth every 6 (six) hours as needed for severe pain for up to 30 doses Max Daily Amount: 20 mg (Patient not taking: Reported on 12/18/2023), Disp: 20 tablet, Rfl: 0    ALLERGIES:  Allergies  "  Allergen Reactions    Atorvastatin Other (See Comments)     Severe muscle cramping     Codeine GI Intolerance    Metronidazole Dizziness     Headaches/dizzy/swelling of tongue    Risedronate Sodium Other (See Comments)     Severe muscle cramping        REVIEW OF SYSTEMS:  MSK: right elbow pain  Neuro: denies numbness or tingling  Pertinent items are otherwise noted in HPI.  A comprehensive review of systems was otherwise negative.    LABS:  HgA1c:   Lab Results   Component Value Date    HGBA1C 5.1 07/22/2019     BMP:   Lab Results   Component Value Date    CALCIUM 9.2 11/13/2023    K 4.3 11/13/2023    CO2 32 11/13/2023     11/13/2023    BUN 16 11/13/2023    CREATININE 0.57 (L) 11/13/2023     CBC: No components found for: \"CBC\"    _____________________________________________________  PHYSICAL EXAMINATION:  Vital signs: Ht 5' 3\" (1.6 m)   Wt 58.4 kg (128 lb 12 oz)   LMP  (LMP Unknown)   BMI 22.81 kg/m²   General: No acute distress, awake and alert  Psychiatric: Mood and affect appear appropriate  HEENT: Trachea Midline, No torticollis, no apparent facial trauma  Cardiovascular: No audible murmurs; Extremities appear perfused  Pulmonary: No audible wheezing or stridor  Skin: No open lesions; see further details (if any) below    MUSCULOSKELETAL EXAMINATION:  Extremities: The right upper extremity is in a sling with a very loosely applied Ace wrap.  The Ace wrap and Webril were removed, it was noted that the fluffy Webril was in direct contact with the staples in the incision.  The incision was then cleaned off from where blood remnants.  The incision is well-approximated with staples in place, no signs of erythema or dehiscence or drainage.  She has mild swelling along the arm and mild ecchymosis along the arm which is expected postoperatively. Range of motion not assessed today. sensation intact to light touch to the axillary, musculocutaneous, radial, median and ulnar nerve distribution. Motor intact " distally to the hand to AIN, PIN, radial, median and ulnar nerve muscle groups. Limb well perfused.     The right upper extremity was then appropriately dressed, and the incision site Adaptic, 4 x 4, ABD was placed and then an Ace wrap going from the hand to the upper arm.  Sling was then replaced.      _____________________________________________________  STUDIES REVIEWED:  I personally reviewed the images and interpretation is as follows:  No x-rays obtained at today's visit    PROCEDURES PERFORMED:  Procedures    Michael Mock MD

## 2023-12-18 NOTE — TELEPHONE ENCOUNTER
Caller: Patient's daughter in law     Doctor: Alex    Reason for call: Family daughter in law calling due to concerns with patient after surgery and would like to speak with clinical please call son back (eulogio)    Call back#: [phone]

## 2023-12-26 ENCOUNTER — APPOINTMENT (OUTPATIENT)
Dept: RADIOLOGY | Facility: AMBULARY SURGERY CENTER | Age: 83
End: 2023-12-26
Attending: STUDENT IN AN ORGANIZED HEALTH CARE EDUCATION/TRAINING PROGRAM
Payer: COMMERCIAL

## 2023-12-26 ENCOUNTER — OFFICE VISIT (OUTPATIENT)
Dept: OBGYN CLINIC | Facility: CLINIC | Age: 83
End: 2023-12-26

## 2023-12-26 VITALS — HEIGHT: 63 IN | BODY MASS INDEX: 22.81 KG/M2 | WEIGHT: 128.75 LBS

## 2023-12-26 DIAGNOSIS — S42.494D OTHER CLOSED NONDISPLACED FRACTURE OF DISTAL END OF RIGHT HUMERUS WITH ROUTINE HEALING, SUBSEQUENT ENCOUNTER: Primary | ICD-10-CM

## 2023-12-26 DIAGNOSIS — S42.494D OTHER CLOSED NONDISPLACED FRACTURE OF DISTAL END OF RIGHT HUMERUS WITH ROUTINE HEALING, SUBSEQUENT ENCOUNTER: ICD-10-CM

## 2023-12-26 PROCEDURE — 73080 X-RAY EXAM OF ELBOW: CPT

## 2023-12-26 PROCEDURE — 99024 POSTOP FOLLOW-UP VISIT: CPT | Performed by: STUDENT IN AN ORGANIZED HEALTH CARE EDUCATION/TRAINING PROGRAM

## 2023-12-26 NOTE — PROGRESS NOTES
Orthopaedics Office Visit - postop Patient Visit    ASSESSMENT/PLAN:    Assessment:   #1 Right Closed Distal Humerus Supracondylar Fracture status post ORIF, date of surgery 12/14/2023    Plan:   Patient doing well with very minimal right elbow pain at rest  Start physical therapy for ROM  Patient can take tylenol prior to physical therapy  Patient cleared for range of motion as tolerated to the right elbow  No weight or resistance greater than 5 pounds with right upper extremity  The patient can shower letting soapy water over incision, yet should not to submerge the incision, and then pat dry.    Patient encouraged to do ROM while in shower allowing hot water to loosen tissue  5lbs maximum weight limit lifting right upper extremity   Okay to utilize the right upper extremity for activities of daily living including brushing teeth and combing hair.  Tylenol for pain  Follow up in 4 weeks    To Do Next Visit:  Recheck with repeat x-rays     _____________________________________________________  CHIEF COMPLAINT:  Chief Complaint   Patient presents with    Right Arm - Post-op         SUBJECTIVE:  Ellie Franklin is a 83 y.o. female who presents for initial visit accompanied by her son for evaluation of right elbow pain.  Patient had a mechanical fall 1 week ago and injured her right elbow.  She was evaluated in the ER and was provided a splint and referred to orthopedics.  Pain is well localized to the right elbow without radiation, pain is worse with motion and palpation, improves at rest and with the splint.  She denies associated numbness or tingling to the arm.  She does have history of a right olecranon fracture ORIF done over 20 years ago.  She denies any pain in her elbow before this new fall, she denies any issues with her previous surgery or hardware.  She states that she is fairly active for her age and is independent on all of her activities of daily living including driving.    Interval history on  12/18/2023:  Patient is status post right distal humerus ORIF 4 days ago, date of surgery 12/14/2023.  Patient presents for unscheduled follow-up, she states that she was taking the prescribed prophylactic Keflex and she experience some lips and tongue swelling on day #2 prompting presentation to the ER.  While she was in the ER, she was given Benadryl and the antibiotic was discontinued.  In the ER, the ER physician took down her postoperative dressing, and new dressing was applied consisting only of fluffy Webril and a very loose Ace wrap.  According to the son who is present at the visit today in the emergency department the date did not see any signs of a allergic reaction to the antibiotics.  They discontinued them out of caution.  Patient presents today for dressing evaluation and concerns regarding the antibiotic situation.  Her pain is well-controlled, the peripheral block that she received worked very well and has just started to wear off.  She has only taken Tylenol.    Interval history 12/26/2023:  The patient presents 12 days s/p right distal midshaft humerus ORIF, 12/14/2023.  She is doing well.  She denies any secondary incidences of sensation of swelling in her mouth or lips.  today she complains of mild left elbow and upper arm soreness.  She denies distal tingling or numbness.  She has used Tylenol yet has stopped as she is feeling better.  No longer using any narcotic pain medications she has used sling.  Patient has visiting home health aid come to her house.    Her main complaint has been of the Ace wrap.  She has discontinued the Ace wrap and feels better now.  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    Colon polyp     Fracture of arm     Glaucoma     Hyponatremia 12/05/2018    Hypotension        PAST SURGICAL HISTORY:  Past Surgical History:   Procedure Laterality Date    BUNIONECTOMY      BUNIONECTOMY       CATARACT EXTRACTION      ELBOW SURGERY Right     EYE SURGERY  CATARACTS    FRACTURE SURGERY  PINS IN BROKEN ELBOW    IR CEREBRAL ANGIOGRAPHY  06/26/2019    IR CEREBRAL ANGIOGRAPHY / INTERVENTION  11/25/2018    MA OPEN TX HUMERAL SUPRACONDYLAR FRACTURE W/O XTN Right 12/14/2023    Procedure: OPEN REDUCTION W/ INTERNAL FIXATION (ORIF) HUMERUS MIDSHAFT / DISTAL;  Surgeon: Todd Johnson DO;  Location[facility];  Service: Orthopedics    TONSILLECTOMY         FAMILY HISTORY:  Family History   Problem Relation Age of Onset    No Known Problems Mother     Heart attack Father     Diabetes Father     No Known Problems Sister     No Known Problems Daughter     No Known Problems Maternal Grandmother     Cancer Maternal Grandfather     Colon cancer Maternal Grandfather 40    No Known Problems Paternal Grandmother     No Known Problems Paternal Grandfather     No Known Problems Maternal Aunt     Cancer Paternal Aunt         pancreatic    No Known Problems Paternal Aunt     Heart disease Family        SOCIAL HISTORY:  Social History     Tobacco Use    Smoking status: Never    Smokeless tobacco: Never   Vaping Use    Vaping status: Never Used   Substance Use Topics    Alcohol use: Not Currently     Comment: not using due to her condition    Drug use: No       MEDICATIONS:    Current Outpatient Medications:     acetaminophen (TYLENOL) 650 mg CR tablet, Take 1 tablet (650 mg total) by mouth every 8 (eight) hours as needed for mild pain, Disp: 30 tablet, Rfl: 0    cholecalciferol (VITAMIN D3) 1,000 units tablet, Take 1,000 Units by mouth daily, Disp: , Rfl:     Dorzolamide HCl-Timolol Mal PF 22.3-6.8 MG/ML SOLN, , Disp: , Rfl:     dorzolamide-timolol (COSOPT) 22.3-6.8 MG/ML ophthalmic solution, Administer 1 drop to both eyes 2 (two) times a day, Disp: , Rfl:     estradiol (Yuvafem) 10 MCG TABS vaginal tablet, Insert 1 tablet (10 mcg total) into the vagina 2 (two) times a week, Disp: 24 tablet, Rfl: 2    Multiple Vitamins-Minerals  "(PreserVision AREDS 2) CAPS, Take by mouth, Disp: , Rfl:     multivitamin (THERAGRAN) TABS, Take 1 tablet by mouth daily Centrum Silver, Disp: , Rfl:     travoprost (TRAVATAN-Z) 0.004 % ophthalmic solution, 1 drop daily at bedtime, Disp: , Rfl:     oxyCODONE (Roxicodone) 5 immediate release tablet, Take 1 tablet (5 mg total) by mouth every 6 (six) hours as needed for severe pain for up to 30 doses Max Daily Amount: 20 mg (Patient not taking: Reported on 12/18/2023), Disp: 20 tablet, Rfl: 0    ALLERGIES:  Allergies   Allergen Reactions    Atorvastatin Other (See Comments)     Severe muscle cramping     Codeine GI Intolerance    Metronidazole Dizziness     Headaches/dizzy/swelling of tongue    Risedronate Sodium Other (See Comments)     Severe muscle cramping        REVIEW OF SYSTEMS:  MSK: right elbow pain  Neuro: denies numbness or tingling  Pertinent items are otherwise noted in HPI.  A comprehensive review of systems was otherwise negative.    LABS:  HgA1c:   Lab Results   Component Value Date    HGBA1C 5.1 07/22/2019     BMP:   Lab Results   Component Value Date    CALCIUM 9.2 11/13/2023    K 4.3 11/13/2023    CO2 32 11/13/2023     11/13/2023    BUN 16 11/13/2023    CREATININE 0.57 (L) 11/13/2023     CBC: No components found for: \"CBC\"    _____________________________________________________  PHYSICAL EXAMINATION:  Vital signs: Ht 5' 3\" (1.6 m)   Wt 58.4 kg (128 lb 12 oz)   LMP  (LMP Unknown)   BMI 22.81 kg/m²   General: No acute distress, awake and alert  Psychiatric: Mood and affect appear appropriate  HEENT: Trachea Midline, No torticollis, no apparent facial trauma  Cardiovascular: No audible murmurs; Extremities appear perfused  Pulmonary: No audible wheezing or stridor  Skin: No open lesions; see further details (if any) below    MUSCULOSKELETAL EXAMINATION:  Extremities:  patient's skin incision is healing well with no signs of erythema or induration.  Staples were removed.  Steri-Strips applied.  " She has mild swelling along the arm and mild ecchymosis along the arm.  Range of motion of the right upper extremity is from 10 degrees to 95 degrees of flexion.  No decrease in pronation supination arc and comparable to the contralateral side. sensation intact to light touch to the axillary, musculocutaneous, radial, median and ulnar nerve distribution. Motor intact distally to the hand to AIN, PIN, radial, median and ulnar nerve muscle groups. Limb well perfused.         _____________________________________________________  STUDIES REVIEWED:  I personally reviewed the images and interpretation is as follows:  Right elbow x-ray demonstrates x-ray of the right elbow demonstrates well-maintained alignment of the patient's right distal humerus fracture with no signs of hardware failure or loosening.    PROCEDURES PERFORMED:  Procedures    Evan Mays

## 2024-01-02 ENCOUNTER — EVALUATION (OUTPATIENT)
Dept: PHYSICAL THERAPY | Facility: CLINIC | Age: 84
End: 2024-01-02
Payer: COMMERCIAL

## 2024-01-02 DIAGNOSIS — S42.494D OTHER CLOSED NONDISPLACED FRACTURE OF DISTAL END OF RIGHT HUMERUS WITH ROUTINE HEALING, SUBSEQUENT ENCOUNTER: ICD-10-CM

## 2024-01-02 DIAGNOSIS — M25.521 ELBOW PAIN, CHRONIC, RIGHT: Primary | ICD-10-CM

## 2024-01-02 DIAGNOSIS — G89.29 ELBOW PAIN, CHRONIC, RIGHT: Primary | ICD-10-CM

## 2024-01-02 PROCEDURE — 97110 THERAPEUTIC EXERCISES: CPT | Performed by: PHYSICAL THERAPIST

## 2024-01-02 PROCEDURE — 97162 PT EVAL MOD COMPLEX 30 MIN: CPT | Performed by: PHYSICAL THERAPIST

## 2024-01-02 NOTE — PROGRESS NOTES
PT Evaluation     Today's date: 2024  Patient name: Ellie Franklin  : 1940  MRN: [id number]  Referring provider: No ref. provider found  Dx: No diagnosis found.               Assessment  Assessment details: Ellie Franklin is a 83 y.o. female s/p R distal humeral fx with ORIF.  Due to these impairments, Patient has difficulty performing a/iadls, recreational activities and engaging in social activities. Patient's clinical presentation is consistent with their referring diagnosis. Patient would benefit from skilled physical therapy to address their aforementioned impairments, improve their level of function and to improve their overall quality of life.  has been given a home exercise program and is in agreement with the plan of care.  Thank you for your referral.     Impairments: abnormal or restricted ROM, impaired physical strength, lacks appropriate home exercise program and pain with function    Symptom irritability: lowUnderstanding of Dx/Px/POC: excellent  Goals  ST Goals - 2-4 weeks  1. Patient will report decreased pain with activity by at least 2 points within 4 weeks  2. Patient will improve ROM 5-10 degrees within 4 weeks  3. Patient will demonstrate ability to actively correct posture without cueing within 4 weeks  4.  Patient will perform IADLs without pain in 2 weeks  5.  Patient will increase strength by 25% in 4 weeks    LT Goals - Discharge  1. Patient will improve FOTO score to maximum stated or greater by discharge  2. Patient will return to preferred recreational activity without significant pain increase by discharge   3.  Patient will return to all house work related activities without pain by discharge      Plan  Patient would benefit from: skilled physical therapy  Referral necessary: No  Planned therapy interventions: manual therapy, neuromuscular re-education, strengthening, stretching, therapeutic activities, therapeutic exercise and home exercise program  Frequency: 2x  "week  Duration in visits: 20  Duration in weeks: 20  Plan of Care beginning date: 2024  Plan of Care expiration date: 3/31/2024  Treatment plan discussed with: patient        Subjective Evaluation    History of Present Illness  Mechanism of injury: surgery and trauma  Mechanism of injury: On 23, patient tripped and fell, putting her R UE out to break her fall and she fractured her R distal humerus.  She had a previous fall in  resulting in hardware being in the R UE as well.  She did have an ORIF on 23.  She was placed in a sling with an ace bandage following surgery.  She wore tan ace bandage until .  Currently she has no sling.  She has a 5# weight limit at this time.    CC:  She reports that the elbow feels \"pretty good\".  She took tylenol this morning.  She notes her elbow feels stiff, but she feels that she is pretty mobile.  She reports that soreness has gotten better.  She reports that she has been flexing and extending her elbow.  She notes that she feels something moving when she does that.  She notes that she typically sleeps on that side, but she wakes up. She was driving prior to the injury but hasn't driven since the injury.  She denies tingling or numbness in that hand.   Function:  She is RHD.  She is able to feed herself, wash her hair, give herself a shower and brush her hair.  She has not gotten something such as a plate out of the cabinet due to lifting restrictions. She has help with laundry and heavier cleaning.  She does live by herself.  She has had no additional falls.   She likes to take walks and goes to the gym.            Recurrent probem    Quality of life: excellent    Patient Goals  Patient goals for therapy: decreased pain, increased motion, increased strength and return to sport/leisure activities    Pain  Current pain ratin  At best pain ratin  At worst pain ratin  Relieving factors: change in position and medications  Aggravating factors: " overhead activity and lifting    Social Support  Lives with: alone    Employment status: not working  Hand dominance: right      Diagnostic Tests  X-ray: abnormal  Treatments  Current treatment: medication        Objective     Observations     Right Elbow   Positive for incision.     Additional Observation Details  Well healing incision across the elbow joint.   All steri-strips have been removed.   Mild bruising noted.      Tenderness     Additional Tenderness Details  No ttp noted    Active Range of Motion     Right Elbow   Flexion: 120 degrees   Extension: -25 degrees   Forearm supination: 80 degrees   Forearm pronation: 80 degrees     Additional Active Range of Motion Details  Wrist extension:  65 degrees  Wrist Flexion:  25      Passive Range of Motion     Right Elbow   Flexion: 125 degrees   Extension: 25 degrees     Strength/Myotome Testing     Right Elbow   Flexion: 4  Extension: 4  Forearm supination: 4  Forearm pronation: 4    Additional Strength Details  Wrist flexion and extension:  4-/5             Precautions: Aneurysm    Access Code: [email or web address]  URL: [email or web address]  Date: 01/02/2024  Prepared by: Priscilla Foy    Exercises  - Seated Elbow Flexion and Extension AROM  - 2 x daily - 7 x weekly - 1 sets - 10 reps  - Wrist AROM Flexion Extension  - 2 x daily - 7 x weekly - 1 sets - 10 reps  - Seated Forearm Pronation and Supination AROM  - 2 x daily - 7 x weekly - 1 sets - 10 reps - 5 hold  - Supported Elbow Flexion Extension PROM  - 2 x daily - 7 x weekly - 1 sets - 10 reps - 5 hold  - Elbow Extension PROM  - 2 x daily - 7 x weekly - 1 sets - 10 reps - 5 hold    Manuals 1/2                                                                Neuro Re-Ed                                                                                                        Ther Ex                          Pulley nv            AROM flexion/Ext wrist 10 x :10            AROM elbow flex ext 10 x :10             Elbow AROM with overpressure  10 x :10            Elbow extension hang  1#                                      Ther Activity                                       Gait Training                                       Modalities

## 2024-01-04 ENCOUNTER — OFFICE VISIT (OUTPATIENT)
Dept: PHYSICAL THERAPY | Facility: CLINIC | Age: 84
End: 2024-01-04
Payer: COMMERCIAL

## 2024-01-04 DIAGNOSIS — G89.29 ELBOW PAIN, CHRONIC, RIGHT: Primary | ICD-10-CM

## 2024-01-04 DIAGNOSIS — M25.521 ELBOW PAIN, CHRONIC, RIGHT: Primary | ICD-10-CM

## 2024-01-04 PROCEDURE — 97110 THERAPEUTIC EXERCISES: CPT | Performed by: PHYSICAL THERAPIST

## 2024-01-04 NOTE — PROGRESS NOTES
Daily Note     Today's date: 2024  Patient name: Ellie Franklin  : 1940  MRN: [id number]  Referring provider: No ref. provider found  Dx:   Encounter Diagnosis     ICD-10-CM    1. Elbow pain, chronic, right  M25.521     G89.29                      Subjective: Patient reports that she has been working on her home exercises.      Objective: See treatment diary below      Assessment: Tolerated treatment well. Patient would benefit from continued PT      Plan: Continue per plan of care.      Precautions: Aneurysm;  5 # lifting precaution    Access Code: [email or web address]  URL: [email or web address]  Date: 2024  Prepared by: Priscilla Foy    Exercises  - Seated Elbow Flexion and Extension AROM  - 2 x daily - 7 x weekly - 1 sets - 10 reps  - Wrist AROM Flexion Extension  - 2 x daily - 7 x weekly - 1 sets - 10 reps  - Seated Forearm Pronation and Supination AROM  - 2 x daily - 7 x weekly - 1 sets - 10 reps - 5 hold  - Supported Elbow Flexion Extension PROM  - 2 x daily - 7 x weekly - 1 sets - 10 reps - 5 hold  - Elbow Extension PROM  - 2 x daily - 7 x weekly - 1 sets - 10 reps - 5 hold    Manuals /2                                                                Neuro Re-Ed                                                                                                        Ther Ex                          Pulley nv 5'           AROM flexion/Ext wrist 10 x :10 10 x :10           AROM elbow flex ext 10 x :10 2# x 10           Elbow flex/ext with overpressure  10 x :10 10 x :10           Elbow extension hang  1# 1# x 1'           Wall slide flexion with emphasis on elbow ext  5 x :10           Sup/pro  1# x 10           Ther Activity                                       Gait Training                                       Modalities

## 2024-01-08 ENCOUNTER — OFFICE VISIT (OUTPATIENT)
Dept: PHYSICAL THERAPY | Facility: CLINIC | Age: 84
End: 2024-01-08
Payer: COMMERCIAL

## 2024-01-08 DIAGNOSIS — G89.29 ELBOW PAIN, CHRONIC, RIGHT: Primary | ICD-10-CM

## 2024-01-08 DIAGNOSIS — M25.521 ELBOW PAIN, CHRONIC, RIGHT: Primary | ICD-10-CM

## 2024-01-08 PROCEDURE — 97110 THERAPEUTIC EXERCISES: CPT

## 2024-01-08 NOTE — PROGRESS NOTES
"Daily Note     Today's date: 2024  Patient name: Ellie Franklin  : 1940  MRN: [id number]  Referring provider: Todd Johnson DO  Dx: No diagnosis found.               Subjective: Patient states that she is feeling well. Some soreness following last treatment session.      Objective: See treatment diary below      Assessment: Tolerated treatment well. Patient exhibited good technique with therapeutic exercises      Plan: Continue per plan of care.      Precautions: Aneurysm;  5 # lifting precaution    Access Code: [email or web address]  URL: [email or web address]  Date: 2024  Prepared by: Priscilla Foy    Exercises  - Seated Elbow Flexion and Extension AROM  - 2 x daily - 7 x weekly - 1 sets - 10 reps  - Wrist AROM Flexion Extension  - 2 x daily - 7 x weekly - 1 sets - 10 reps  - Seated Forearm Pronation and Supination AROM  - 2 x daily - 7 x weekly - 1 sets - 10 reps - 5 hold  - Supported Elbow Flexion Extension PROM  - 2 x daily - 7 x weekly - 1 sets - 10 reps - 5 hold  - Elbow Extension PROM  - 2 x daily - 7 x weekly - 1 sets - 10 reps - 5 hold    Manuals 1/2 1/8          PROM extension   NV                                                 Neuro Re-Ed                                                                                                        Ther Ex                          Pulley nv 5' 5          AROM flexion/Ext wrist 10 x :10 10 x :10 10\" x 10           AROM elbow flex ext 10 x :10 2# x 10 2# 2 x 10           Elbow flex/ext with overpressure  10 x :10 10 x :10 10\" x 10           Elbow extension hang  1# 1# x 1' 1# 1 min           Wall slide flexion with emphasis on elbow ext  5 x :10 10\" x 10           Sup/pro  1# x 10 1# 10           Elbow extension stretch (holing table)    10\" x 10           Ther Activity                                       Gait Training                                       Modalities                                              "

## 2024-01-10 ENCOUNTER — APPOINTMENT (OUTPATIENT)
Dept: PHYSICAL THERAPY | Facility: CLINIC | Age: 84
End: 2024-01-10
Payer: COMMERCIAL

## 2024-01-11 ENCOUNTER — OFFICE VISIT (OUTPATIENT)
Dept: PHYSICAL THERAPY | Facility: CLINIC | Age: 84
End: 2024-01-11
Payer: COMMERCIAL

## 2024-01-11 DIAGNOSIS — S42.494D OTHER CLOSED NONDISPLACED FRACTURE OF DISTAL END OF RIGHT HUMERUS WITH ROUTINE HEALING, SUBSEQUENT ENCOUNTER: ICD-10-CM

## 2024-01-11 DIAGNOSIS — M25.521 ELBOW PAIN, CHRONIC, RIGHT: Primary | ICD-10-CM

## 2024-01-11 DIAGNOSIS — G89.29 ELBOW PAIN, CHRONIC, RIGHT: Primary | ICD-10-CM

## 2024-01-11 PROCEDURE — 97110 THERAPEUTIC EXERCISES: CPT

## 2024-01-11 NOTE — PROGRESS NOTES
"Daily Note     Today's date: 2024  Patient name: Ellie Franklin  : 1940  MRN: [id number]  Referring provider: Todd Johnson DO  Dx:   Encounter Diagnosis     ICD-10-CM    1. Elbow pain, chronic, right  M25.521     G89.29       2. Other closed nondisplaced fracture of distal end of right humerus with routine healing, subsequent encounter  S42.836D                      Subjective: Minimal soreness following last treatment session. Feels an improvement with function since starting therapy. Remains compliant with lifting restrictions.       Objective: See treatment diary below      Assessment: Tolerated treatment well. Patient exhibited good technique with therapeutic exercises      Plan: Continue per plan of care.      Precautions: Aneurysm;  5 # lifting precaution    Access Code: [email or web address]  URL: [email or web address]  Date: 2024  Prepared by: Priscilla Foy    Exercises  - Seated Elbow Flexion and Extension AROM  - 2 x daily - 7 x weekly - 1 sets - 10 reps  - Wrist AROM Flexion Extension  - 2 x daily - 7 x weekly - 1 sets - 10 reps  - Seated Forearm Pronation and Supination AROM  - 2 x daily - 7 x weekly - 1 sets - 10 reps - 5 hold  - Supported Elbow Flexion Extension PROM  - 2 x daily - 7 x weekly - 1 sets - 10 reps - 5 hold  - Elbow Extension PROM  - 2 x daily - 7 x weekly - 1 sets - 10 reps - 5 hold    Manuals 1/2          PROM extension   NV 5                                                Neuro Re-Ed                                                                                                        Ther Ex                          Pulley nv 5' 5 5         AROM flexion/Ext wrist 10 x :10 10 x :10 10\" x 10  10\" x 10          AROM elbow flex ext 10 x :10 2# x 10 2# 2 x 10  2# 2 x 10          Elbow flex/ext with overpressure  10 x :10 10 x :10 10\" x 10  10\" x 10          Elbow extension hang  1# 1# x 1' 1# 1 min  1# 1 min          Wall slide flexion with emphasis on " "elbow ext  5 x :10 10\" x 10  10\" x 10          Sup/pro  1# x 10 1# 10  1# 10          Elbow extension stretch (holing table)    10\" x 10  10\" x 10          Ther Activity                                       Gait Training                                       Modalities                                                "

## 2024-01-15 ENCOUNTER — OFFICE VISIT (OUTPATIENT)
Dept: PHYSICAL THERAPY | Facility: CLINIC | Age: 84
End: 2024-01-15
Payer: COMMERCIAL

## 2024-01-15 DIAGNOSIS — G89.29 ELBOW PAIN, CHRONIC, RIGHT: Primary | ICD-10-CM

## 2024-01-15 DIAGNOSIS — M25.521 ELBOW PAIN, CHRONIC, RIGHT: Primary | ICD-10-CM

## 2024-01-15 PROCEDURE — 97110 THERAPEUTIC EXERCISES: CPT

## 2024-01-15 NOTE — PROGRESS NOTES
"Daily Note     Today's date: 1/15/2024  Patient name: Ellie Franklin  : 1940  MRN: [id number]  Referring provider: Todd Johnson DO  Dx: No diagnosis found.               Subjective: Moderate soreness following last treatment session.     Objective: See treatment diary below      Assessment: Tolerated treatment well. Patient exhibited good technique with therapeutic exercises      Plan: Continue per plan of care.      Precautions: Aneurysm;  5 # lifting precaution    Access Code: [email or web address]  URL: [email or web address]  Date: 2024  Prepared by: Priscilla Foy    Exercises  - Seated Elbow Flexion and Extension AROM  - 2 x daily - 7 x weekly - 1 sets - 10 reps  - Wrist AROM Flexion Extension  - 2 x daily - 7 x weekly - 1 sets - 10 reps  - Seated Forearm Pronation and Supination AROM  - 2 x daily - 7 x weekly - 1 sets - 10 reps - 5 hold  - Supported Elbow Flexion Extension PROM  - 2 x daily - 7 x weekly - 1 sets - 10 reps - 5 hold  - Elbow Extension PROM  - 2 x daily - 7 x weekly - 1 sets - 10 reps - 5 hold    Manuals 1/2 1/4 15        PROM extension   NV 5 5                                               Neuro Re-Ed                                                                                                        Ther Ex                          Pulley nv 5' 5 5 5        AROM flexion/Ext wrist 10 x :10 10 x :10 10\" x 10  10\" x 10  10\" x 10         AROM elbow flex ext 10 x :10 2# x 10 2# 2 x 10  2# 2 x 10  2# 2 x 10         Elbow flex/ext with overpressure  10 x :10 10 x :10 10\" x 10  10\" x 10  10\" x 10         Elbow extension hang  1# 1# x 1' 1# 1 min  1# 1 min  1# 1 min        Wall slide flexion with emphasis on elbow ext  5 x :10 10\" x 10  10\" x 10  10\" x 10         Sup/pro  1# x 10 1# 10  1# 10  1# 10         Elbow extension stretch (holing table)    10\" x 10  10\" x 10  10\" x 10         Ther Activity                                       Gait Training                      "                  Modalities

## 2024-01-18 ENCOUNTER — OFFICE VISIT (OUTPATIENT)
Dept: PHYSICAL THERAPY | Facility: CLINIC | Age: 84
End: 2024-01-18
Payer: COMMERCIAL

## 2024-01-18 DIAGNOSIS — M25.521 ELBOW PAIN, CHRONIC, RIGHT: Primary | ICD-10-CM

## 2024-01-18 DIAGNOSIS — G89.29 ELBOW PAIN, CHRONIC, RIGHT: Primary | ICD-10-CM

## 2024-01-18 PROCEDURE — 97140 MANUAL THERAPY 1/> REGIONS: CPT | Performed by: PHYSICAL THERAPIST

## 2024-01-18 PROCEDURE — 97110 THERAPEUTIC EXERCISES: CPT | Performed by: PHYSICAL THERAPIST

## 2024-01-18 NOTE — PROGRESS NOTES
"Daily Note     Today's date: 2024  Patient name: Ellie Franklin  : 1940  MRN: [id number]  Referring provider: Todd Johnson DO  Dx:   Encounter Diagnosis     ICD-10-CM    1. Elbow pain, chronic, right  M25.521     G89.29           Start Time: 1000  Stop Time: 1040  Total time in clinic (min): 40 minutes    Subjective: Patient is doing well.  She reports that the one thing she cannot do is put her hair in a hair tie due to the pronation/supination.        Objective: See treatment diary below      Assessment: Tolerated treatment well. Patient would benefit from continued PT.  Patient with good increases in extension ROM.  She does have some limitation in flexion (at 150).  Added stretching in supine of flexion      Plan: Continue per plan of care.      Precautions: Aneurysm;  5 # lifting precaution    Access Code: [email or web address]  URL: [email or web address]  Date: 2024  Prepared by: Priscilla Foy    Exercises  - Seated Elbow Flexion and Extension AROM  - 2 x daily - 7 x weekly - 1 sets - 10 reps  - Wrist AROM Flexion Extension  - 2 x daily - 7 x weekly - 1 sets - 10 reps  - Seated Forearm Pronation and Supination AROM  - 2 x daily - 7 x weekly - 1 sets - 10 reps - 5 hold  - Supported Elbow Flexion Extension PROM  - 2 x daily - 7 x weekly - 1 sets - 10 reps - 5 hold  - Elbow Extension PROM  - 2 x daily - 7 x weekly - 1 sets - 10 reps - 5 hold    Manuals /2 1/4 1/8 1/11 1/15 1/18       PROM extension/flexion    NV 5 5 8'                                              Neuro Re-Ed                                                                                                        Ther Ex                          Pulley nv 5' 5 5 5 5       AROM flexion/Ext wrist 10 x :10 10 x :10 10\" x 10  10\" x 10  10\" x 10  10 x :10       AROM elbow flex ext 10 x :10 2# x 10 2# 2 x 10  2# 2 x 10  2# 2 x 10  2# 2 x 10       Elbow flex/ext with overpressure  10 x :10 10 x :10 10\" x 10  10\" x 10  10\" x 10    " "     Elbow extension hang  1# 1# x 1' 1# 1 min  1# 1 min  1# 1 min 2# x 1'       Wall slide flexion with emphasis on elbow ext  5 x :10 10\" x 10  10\" x 10  10\" x 10  10 x :10       Sup/pro  1# x 10 1# 10  1# 10  1# 10  1# 10       Elbow extension stretch (holding table)    10\" x 10  10\" x 10  10\" x 10  10 x :10       Ther Activity                                       Gait Training                                       Modalities                                                    "

## 2024-01-22 ENCOUNTER — OFFICE VISIT (OUTPATIENT)
Dept: OBGYN CLINIC | Facility: CLINIC | Age: 84
End: 2024-01-22

## 2024-01-22 ENCOUNTER — APPOINTMENT (OUTPATIENT)
Dept: RADIOLOGY | Facility: AMBULARY SURGERY CENTER | Age: 84
End: 2024-01-22
Attending: STUDENT IN AN ORGANIZED HEALTH CARE EDUCATION/TRAINING PROGRAM
Payer: COMMERCIAL

## 2024-01-22 VITALS — HEIGHT: 63 IN | BODY MASS INDEX: 22.81 KG/M2 | WEIGHT: 128.75 LBS

## 2024-01-22 DIAGNOSIS — S42.494D OTHER CLOSED NONDISPLACED FRACTURE OF DISTAL END OF RIGHT HUMERUS WITH ROUTINE HEALING, SUBSEQUENT ENCOUNTER: Primary | ICD-10-CM

## 2024-01-22 DIAGNOSIS — S42.494D OTHER CLOSED NONDISPLACED FRACTURE OF DISTAL END OF RIGHT HUMERUS WITH ROUTINE HEALING, SUBSEQUENT ENCOUNTER: ICD-10-CM

## 2024-01-22 PROCEDURE — 99024 POSTOP FOLLOW-UP VISIT: CPT | Performed by: STUDENT IN AN ORGANIZED HEALTH CARE EDUCATION/TRAINING PROGRAM

## 2024-01-22 PROCEDURE — 73080 X-RAY EXAM OF ELBOW: CPT

## 2024-01-22 NOTE — PROGRESS NOTES
Orthopaedics Office Visit - postop Patient Visit    ASSESSMENT/PLAN:    Assessment:   #1 Right Closed Distal Humerus Supracondylar Fracture status post ORIF, date of surgery 12/14/2023    Plan:   Patient doing well with very minimal right elbow pain at rest or with motion  Continue formal physical therapy for ROM  Patient can take tylenol prior to physical therapy  Patient cleared for range of motion as tolerated to the right elbow  Script written today for continuation of formal outpatient PT with changes to restrictions.  No more maximum weight limit lifting right upper extremity, patient can begin weight bearing as tolerated and begin progressive strengthening to the right upper extremity  Okay to utilize the right upper extremity for activities of daily living including brushing teeth and combing hair.  Tylenol for pain  Follow up in 6 weeks    To Do Next Visit:  Recheck with repeat x-rays     _____________________________________________________  CHIEF COMPLAINT:  Chief Complaint   Patient presents with    Right Arm - Post-op         SUBJECTIVE:  Ellie Franklin is a 83 y.o. female who presents for initial visit accompanied by her son for evaluation of right elbow pain.  Patient had a mechanical fall 1 week ago and injured her right elbow.  She was evaluated in the ER and was provided a splint and referred to orthopedics.  Pain is well localized to the right elbow without radiation, pain is worse with motion and palpation, improves at rest and with the splint.  She denies associated numbness or tingling to the arm.  She does have history of a right olecranon fracture ORIF done over 20 years ago.  She denies any pain in her elbow before this new fall, she denies any issues with her previous surgery or hardware.  She states that she is fairly active for her age and is independent on all of her activities of daily living including driving.    Interval history on 12/18/2023:  Patient is status post right distal  humerus ORIF 4 days ago, date of surgery 12/14/2023.  Patient presents for unscheduled follow-up, she states that she was taking the prescribed prophylactic Keflex and she experience some lips and tongue swelling on day #2 prompting presentation to the ER.  While she was in the ER, she was given Benadryl and the antibiotic was discontinued.  In the ER, the ER physician took down her postoperative dressing, and new dressing was applied consisting only of fluffy Webril and a very loose Ace wrap.  According to the son who is present at the visit today in the emergency department the date did not see any signs of a allergic reaction to the antibiotics.  They discontinued them out of caution.  Patient presents today for dressing evaluation and concerns regarding the antibiotic situation.  Her pain is well-controlled, the peripheral block that she received worked very well and has just started to wear off.  She has only taken Tylenol.    Interval history 12/26/2023:  The patient presents 12 days s/p right distal midshaft humerus ORIF, 12/14/2023.  She is doing well.  She denies any secondary incidences of sensation of swelling in her mouth or lips.  today she complains of mild left elbow and upper arm soreness.  She denies distal tingling or numbness.  She has used Tylenol yet has stopped as she is feeling better.  No longer using any narcotic pain medications she has used sling.  Patient has visiting home health aid come to her house.    Her main complaint has been of the Ace wrap.  She has discontinued the Ace wrap and feels better now.  No fevers or chills    Interval History 1/22/2024  Patient presents 6 weeks s/p right distal midshaft humerus ORIF, 12/14/2023.  She is doing well. She denies distal tingling or numbness.  She has used Tylenol yet has stopped as she is feeling better.  No longer using any narcotic pain medications. Patient has visiting home health aid come to her house. Patient has been working with PT.  She feels that she has made significant improvements in terms of both ROM and strength. She would like to continue working with formal PT.     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    Colon polyp     Fracture of arm     Glaucoma     Hyponatremia 12/05/2018    Hypotension        PAST SURGICAL HISTORY:  Past Surgical History:   Procedure Laterality Date    BUNIONECTOMY      BUNIONECTOMY      CATARACT EXTRACTION      ELBOW SURGERY Right     EYE SURGERY  CATARACTS    FRACTURE SURGERY  PINS IN BROKEN ELBOW    IR CEREBRAL ANGIOGRAPHY  06/26/2019    IR CEREBRAL ANGIOGRAPHY / INTERVENTION  11/25/2018    [place] OPEN TX HUMERAL SUPRACONDYLAR FRACTURE W/O XTN Right 12/14/2023    Procedure: OPEN REDUCTION W/ INTERNAL FIXATION (ORIF) HUMERUS MIDSHAFT / DISTAL;  Surgeon: Todd Johnson DO;  Location: [facility];  Service: Orthopedics    TONSILLECTOMY         FAMILY HISTORY:  Family History   Problem Relation Age of Onset    No Known Problems Mother     Heart attack Father     Diabetes Father     No Known Problems Sister     No Known Problems Daughter     No Known Problems Maternal Grandmother     Cancer Maternal Grandfather     Colon cancer Maternal Grandfather 40    No Known Problems Paternal Grandmother     No Known Problems Paternal Grandfather     No Known Problems Maternal Aunt     Cancer Paternal Aunt         pancreatic    No Known Problems Paternal Aunt     Heart disease Family        SOCIAL HISTORY:  Social History     Tobacco Use    Smoking status: Never    Smokeless tobacco: Never   Vaping Use    Vaping status: Never Used   Substance Use Topics    Alcohol use: Not Currently     Comment: not using due to her condition    Drug use: No       MEDICATIONS:    Current Outpatient Medications:     acetaminophen (TYLENOL) 650 mg CR tablet, Take 1 tablet (650 mg total) by mouth every 8 (eight) hours as needed for mild pain, Disp: 30 tablet, Rfl: 0    cholecalciferol  "(VITAMIN D3) 1,000 units tablet, Take 1,000 Units by mouth daily, Disp: , Rfl:     Dorzolamide HCl-Timolol Mal PF 22.3-6.8 MG/ML SOLN, , Disp: , Rfl:     dorzolamide-timolol (COSOPT) 22.3-6.8 MG/ML ophthalmic solution, Administer 1 drop to both eyes 2 (two) times a day, Disp: , Rfl:     estradiol (Yuvafem) 10 MCG TABS vaginal tablet, Insert 1 tablet (10 mcg total) into the vagina 2 (two) times a week, Disp: 24 tablet, Rfl: 2    Multiple Vitamins-Minerals (PreserVision AREDS 2) CAPS, Take by mouth, Disp: , Rfl:     multivitamin (THERAGRAN) TABS, Take 1 tablet by mouth daily Centrum Silver, Disp: , Rfl:     travoprost (TRAVATAN-Z) 0.004 % ophthalmic solution, 1 drop daily at bedtime, Disp: , Rfl:     oxyCODONE (Roxicodone) 5 immediate release tablet, Take 1 tablet (5 mg total) by mouth every 6 (six) hours as needed for severe pain for up to 30 doses Max Daily Amount: 20 mg (Patient not taking: Reported on 12/18/2023), Disp: 20 tablet, Rfl: 0    ALLERGIES:  Allergies   Allergen Reactions    Atorvastatin Other (See Comments)     Severe muscle cramping     Codeine GI Intolerance    Metronidazole Dizziness     Headaches/dizzy/swelling of tongue    Risedronate Sodium Other (See Comments)     Severe muscle cramping        REVIEW OF SYSTEMS:  MSK: right elbow pain  Neuro: denies numbness or tingling  Pertinent items are otherwise noted in HPI.  A comprehensive review of systems was otherwise negative.    LABS:  HgA1c:   Lab Results   Component Value Date    HGBA1C 5.1 07/22/2019     BMP:   Lab Results   Component Value Date    CALCIUM 9.2 11/13/2023    K 4.3 11/13/2023    CO2 32 11/13/2023     11/13/2023    BUN 16 11/13/2023    CREATININE 0.57 (L) 11/13/2023     CBC: No components found for: \"CBC\"    _____________________________________________________  PHYSICAL EXAMINATION:  Musculoskeletal: Right Upper Extremity  The right upper extremity was exposed inspected.  The patient's incision over the posterior aspect of " the elbow is well-healed and sealed without any surrounding erythema induration.  The patient has no tenderness to palpation juan-incisional he.  The patient plate is palpable subcutaneously on the lateral border of the distal humerus.  This is nontender.  There is no palpable subluxation of the ulnar nerve with flexion and extension of the elbow.  The patient's sensation is intact to light touch in the median, radial, ulnar nerve distributions.  AIN, PIN, ulnar nerves are intact.  +2 radial pulses distally.  Compartments are all soft compressible        _____________________________________________________  STUDIES REVIEWED:  I personally reviewed the images and interpretation is as follows:  Right elbow x-ray demonstrates x-ray of the right elbow demonstrates well-maintained alignment of the patient's right distal humerus fracture with no signs of hardware failure or loosening interval fracture healing noted..    PROCEDURES PERFORMED:  Procedures    Alcon Rodriguez MD

## 2024-01-23 ENCOUNTER — OFFICE VISIT (OUTPATIENT)
Dept: PHYSICAL THERAPY | Facility: CLINIC | Age: 84
End: 2024-01-23
Payer: COMMERCIAL

## 2024-01-23 DIAGNOSIS — S42.494D OTHER CLOSED NONDISPLACED FRACTURE OF DISTAL END OF RIGHT HUMERUS WITH ROUTINE HEALING, SUBSEQUENT ENCOUNTER: ICD-10-CM

## 2024-01-23 DIAGNOSIS — M25.521 ELBOW PAIN, CHRONIC, RIGHT: Primary | ICD-10-CM

## 2024-01-23 DIAGNOSIS — G89.29 ELBOW PAIN, CHRONIC, RIGHT: Primary | ICD-10-CM

## 2024-01-23 PROCEDURE — 97530 THERAPEUTIC ACTIVITIES: CPT

## 2024-01-23 PROCEDURE — 97110 THERAPEUTIC EXERCISES: CPT

## 2024-01-23 NOTE — PROGRESS NOTES
"Daily Note     Today's date: 2024  Patient name: Ellie Franklin  : 1940  MRN: [id number]  Referring provider: Todd Johnson DO  Dx: No diagnosis found.               Subjective: Patient states that she saw MD yesterday who is pleased with progress. He has lifting restrictions at this time and will follow up in 6 weeks.       Objective: See treatment diary below      Assessment: Tolerated treatment well. Patient exhibited good technique with therapeutic exercises      Plan: Continue per plan of care.      Precautions: Aneurysm; no lifting restrictions as of 24    Access Code: [email or web address]  URL: [email or web address]  Date: 2024  Prepared by: Priscilla Foy    Exercises  - Seated Elbow Flexion and Extension AROM  - 2 x daily - 7 x weekly - 1 sets - 10 reps  - Wrist AROM Flexion Extension  - 2 x daily - 7 x weekly - 1 sets - 10 reps  - Seated Forearm Pronation and Supination AROM  - 2 x daily - 7 x weekly - 1 sets - 10 reps - 5 hold  - Supported Elbow Flexion Extension PROM  - 2 x daily - 7 x weekly - 1 sets - 10 reps - 5 hold  - Elbow Extension PROM  - 2 x daily - 7 x weekly - 1 sets - 10 reps - 5 hold    Manuals 1/2 1/4 1/8 1/11 1/15 1/18 1/23      PROM extension/flexion    NV 5 5 8' 8                                              Neuro Re-Ed                                                                                                        Ther Ex             Weight shifting RUE       5\" x 10       Pulley nv 5' 5 5 5 5 5      Standing flexion at wall       1# 10       Cabinet taps        2# 10       AROM flexion/Ext wrist 10 x :10 10 x :10 10\" x 10  10\" x 10  10\" x 10  10 x :10 10\" x 10      AROM elbow flex ext 10 x :10 2# x 10 2# 2 x 10  2# 2 x 10  2# 2 x 10  2# 2 x 10 2# 2 x 10       Elbow flex/ext with overpressure  10 x :10 10 x :10 10\" x 10  10\" x 10  10\" x 10   10\" x 10       Elbow extension hang  1# 1# x 1' 1# 1 min  1# 1 min  1# 1 min 2# x 1' 2# 1 min       Wall slide " "flexion with emphasis on elbow ext  5 x :10 10\" x 10  10\" x 10  10\" x 10  10 x :10 10\" x 10      Sup/pro  1# x 10 1# 10  1# 10  1# 10  1# 10 1# 10       Elbow extension stretch (holding table)    10\" x 10  10\" x 10  10\" x 10  10 x :10 10\" x 10       Ther Activity                                       Gait Training                                       Modalities                                                      "

## 2024-01-24 ENCOUNTER — APPOINTMENT (OUTPATIENT)
Dept: PHYSICAL THERAPY | Facility: CLINIC | Age: 84
End: 2024-01-24
Payer: COMMERCIAL

## 2024-01-24 DIAGNOSIS — N95.2 ATROPHIC VAGINITIS: ICD-10-CM

## 2024-01-24 RX ORDER — ESTRADIOL 10 UG/1
1 INSERT VAGINAL 2 TIMES WEEKLY
Qty: 24 TABLET | Refills: 2 | Status: SHIPPED | OUTPATIENT
Start: 2024-01-25

## 2024-01-24 NOTE — TELEPHONE ENCOUNTER
The patient needs a refill for her estradiol tablets. Her yearly was in 6/2023. Please sign off on the script.

## 2024-01-25 ENCOUNTER — APPOINTMENT (OUTPATIENT)
Dept: PHYSICAL THERAPY | Facility: CLINIC | Age: 84
End: 2024-01-25
Payer: COMMERCIAL

## 2024-01-25 ENCOUNTER — OFFICE VISIT (OUTPATIENT)
Dept: PHYSICAL THERAPY | Facility: CLINIC | Age: 84
End: 2024-01-25
Payer: COMMERCIAL

## 2024-01-25 DIAGNOSIS — G89.29 ELBOW PAIN, CHRONIC, RIGHT: Primary | ICD-10-CM

## 2024-01-25 DIAGNOSIS — M25.521 ELBOW PAIN, CHRONIC, RIGHT: Primary | ICD-10-CM

## 2024-01-25 DIAGNOSIS — S42.494D OTHER CLOSED NONDISPLACED FRACTURE OF DISTAL END OF RIGHT HUMERUS WITH ROUTINE HEALING, SUBSEQUENT ENCOUNTER: ICD-10-CM

## 2024-01-25 PROCEDURE — 97110 THERAPEUTIC EXERCISES: CPT

## 2024-01-25 PROCEDURE — 97530 THERAPEUTIC ACTIVITIES: CPT

## 2024-01-25 NOTE — PROGRESS NOTES
"Daily Note     Today's date: 2024  Patient name: Ellie Franklin  : 1940  MRN: [id number]  Referring provider: Todd Johnson DO  Dx: No diagnosis found.               Subjective: Patient states that she is feeling well. No complaints following last treatment with the addition of strengthening        Objective: See treatment diary below      Assessment: Tolerated treatment well. Patient exhibited good technique with therapeutic exercises      Plan: Continue per plan of care.      Precautions: Aneurysm; no lifting restrictions as of 24    Access Code: [email or web address]  URL: [email or web address]  Date: 2024  Prepared by: Priscilla Foy    Exercises  - Seated Elbow Flexion and Extension AROM  - 2 x daily - 7 x weekly - 1 sets - 10 reps  - Wrist AROM Flexion Extension  - 2 x daily - 7 x weekly - 1 sets - 10 reps  - Seated Forearm Pronation and Supination AROM  - 2 x daily - 7 x weekly - 1 sets - 10 reps - 5 hold  - Supported Elbow Flexion Extension PROM  - 2 x daily - 7 x weekly - 1 sets - 10 reps - 5 hold  - Elbow Extension PROM  - 2 x daily - 7 x weekly - 1 sets - 10 reps - 5 hold    Manuals /2 1/4 1/8 1/11 1/15 1/18 1/23 1/25     PROM extension/flexion    NV 5 5 8' 8  KD                                            Neuro Re-Ed                                                                                                        Ther Ex             Weight shifting RUE       5\" x 10       Pulley nv 5' 5 5 5 5 5 5     Standing flexion at wall       1# 10  1# 10      Standing scaption at wall        1# 10      Cabinet taps        2# 10  2# 10      AROM flexion/Ext wrist 10 x :10 10 x :10 10\" x 10  10\" x 10  10\" x 10  10 x :10 10\" x 10 np     AROM elbow flex ext 10 x :10 2# x 10 2# 2 x 10  2# 2 x 10  2# 2 x 10  2# 2 x 10 2# 2 x 10  2# 20      Elbow flex/ext with overpressure  10 x :10 10 x :10 10\" x 10  10\" x 10  10\" x 10   10\" x 10  10\" x 10      Elbow extension hang  1# 1# x 1' 1# 1 min  " "1# 1 min  1# 1 min 2# x 1' 2# 1 min       Wall slide flexion with emphasis on elbow ext  5 x :10 10\" x 10  10\" x 10  10\" x 10  10 x :10 10\" x 10 10\"x 10      Sup/pro  1# x 10 1# 10  1# 10  1# 10  1# 10 1# 10  1# 20      Elbow extension stretch (holding table)    10\" x 10  10\" x 10  10\" x 10  10 x :10 10\" x 10  10\" x 10      Supine shoulder flexion         1# 10      Ther Activity                                       Gait Training                                       Modalities                                                        "

## 2024-01-30 ENCOUNTER — OFFICE VISIT (OUTPATIENT)
Dept: PHYSICAL THERAPY | Facility: CLINIC | Age: 84
End: 2024-01-30
Payer: COMMERCIAL

## 2024-01-30 DIAGNOSIS — M25.521 ELBOW PAIN, CHRONIC, RIGHT: Primary | ICD-10-CM

## 2024-01-30 DIAGNOSIS — S42.494D OTHER CLOSED NONDISPLACED FRACTURE OF DISTAL END OF RIGHT HUMERUS WITH ROUTINE HEALING, SUBSEQUENT ENCOUNTER: ICD-10-CM

## 2024-01-30 DIAGNOSIS — G89.29 ELBOW PAIN, CHRONIC, RIGHT: Primary | ICD-10-CM

## 2024-01-30 PROCEDURE — 97140 MANUAL THERAPY 1/> REGIONS: CPT | Performed by: PHYSICAL THERAPIST

## 2024-01-30 PROCEDURE — 97110 THERAPEUTIC EXERCISES: CPT | Performed by: PHYSICAL THERAPIST

## 2024-01-30 NOTE — PROGRESS NOTES
"Daily Note     Today's date: 2024  Patient name: Ellie Franklin  : 1940  MRN: [id number]  Referring provider: Todd Johnson DO  Dx:   Encounter Diagnosis     ICD-10-CM    1. Elbow pain, chronic, right  M25.521     G89.29       2. Other closed nondisplaced fracture of distal end of right humerus with routine healing, subsequent encounter  S42.889D                      Subjective: Patient reports that she has \"just soreness\" in the elbow.  She reports MD felt she was still lacking a bit in ROM.        Objective: See treatment diary below      Assessment: Tolerated treatment well. Patient would benefit from continued PT      Plan: Continue per plan of care.      Precautions: Aneurysm; no lifting restrictions as of 24    Access Code: [email or web address]  URL: [email or web address]  Date: 2024  Prepared by: Priscilla Foy    Exercises  - Seated Elbow Flexion and Extension AROM  - 2 x daily - 7 x weekly - 1 sets - 10 reps  - Wrist AROM Flexion Extension  - 2 x daily - 7 x weekly - 1 sets - 10 reps  - Seated Forearm Pronation and Supination AROM  - 2 x daily - 7 x weekly - 1 sets - 10 reps - 5 hold  - Supported Elbow Flexion Extension PROM  - 2 x daily - 7 x weekly - 1 sets - 10 reps - 5 hold  - Elbow Extension PROM  - 2 x daily - 7 x weekly - 1 sets - 10 reps - 5 hold    Manuals /2 1/4 1/8 1/11 1/15 1/18 1/23 1/25 1/30    PROM extension/flexion    NV 5 5 8' 8  KD 5'    REV         10                              Neuro Re-Ed                                                                                                        Ther Ex             Weight shifting RUE       5\" x 10       Pulley nv 5' 5 5 5 5 5 5 5'    Standing flexion at wall       1# 10  1# 10  1# x 10    Standing scaption at wall        1# 10  1# x 10    Cabinet taps        2# 10  2# 10  2# x 10    AROM flexion/Ext wrist 10 x :10 10 x :10 10\" x 10  10\" x 10  10\" x 10  10 x :10 10\" x 10 np     AROM elbow flex ext 10 x :10 2# " "x 10 2# 2 x 10  2# 2 x 10  2# 2 x 10  2# 2 x 10 2# 2 x 10  2# 20  2# 2 x 10    Elbow flex/ext with overpressure  10 x :10 10 x :10 10\" x 10  10\" x 10  10\" x 10   10\" x 10  10\" x 10      Elbow extension hang  1# 1# x 1' 1# 1 min  1# 1 min  1# 1 min 2# x 1' 2# 1 min   2# x 2'    Wall slide flexion with emphasis on elbow ext  5 x :10 10\" x 10  10\" x 10  10\" x 10  10 x :10 10\" x 10 10\"x 10  10 x :10    Sup/pro  1# x 10 1# 10  1# 10  1# 10  1# 10 1# 10  1# 20  1# x 20    Elbow extension stretch (holding table)    10\" x 10  10\" x 10  10\" x 10  10 x :10 10\" x 10  10\" x 10      Supine shoulder flexion         1# 10      Ther Activity                                       Gait Training                                       Modalities                                                          "

## 2024-01-30 NOTE — PROGRESS NOTES
PT Re-evaluation     Today's date: 2024  Patient name: Ellie Franklin  : 1940  MRN: [id number]  Referring provider: No ref. provider found  Dx: No diagnosis found.               Assessment    Ellie has been attending physical therapy s/p R distal humeral fx with ORIF.  She presents with reduced pain, increased strength, ROM and function.  She would continue to benefit from skilled physical therapy to maximize her ROM and abolish her pain sx.   Thank you for your referral.    Assessment details: Ellie Franklin is a 83 y.o. female s/p R distal humeral fx with ORIF.  Due to these impairments, Patient has difficulty performing a/iadls, recreational activities and engaging in social activities. Patient's clinical presentation is consistent with their referring diagnosis. Patient would benefit from skilled physical therapy to address their aforementioned impairments, improve their level of function and to improve their overall quality of life.  has been given a home exercise program and is in agreement with the plan of care.  Thank you for your referral.     Impairments: abnormal or restricted ROM, impaired physical strength, lacks appropriate home exercise program and pain with function    Symptom irritability: lowUnderstanding of Dx/Px/POC: excellent  Goals  ST Goals - 2-4 weeks  1. Patient will report decreased pain with activity by at least 2 points within 4 weeks -met  2. Patient will improve ROM 5-10 degrees within 4 weeks - partially met  3. Patient will demonstrate ability to actively correct posture without cueing within 4 weeks - met  4.  Patient will perform IADLs without pain in 2 weeks - met  5.  Patient will increase strength by 25% in 4 weeks - partially met    LT Goals - Discharge  1. Patient will improve FOTO score to maximum stated or greater by discharge  2. Patient will return to preferred recreational activity without significant pain increase by discharge   3.  Patient will  "return to all house work related activities without pain by discharge      Plan  Patient would benefit from: skilled physical therapy  Referral necessary: No  Planned therapy interventions: manual therapy, neuromuscular re-education, strengthening, stretching, therapeutic activities, therapeutic exercise and home exercise program  Frequency: 2x week  Duration in visits: 20  Duration in weeks: 20  Plan of Care beginning date: 1/2/2024  Plan of Care expiration date: 3/31/2024  Treatment plan discussed with: patient        Subjective Evaluation    History of Present Illness  Mechanism of injury: surgery and trauma  Mechanism of injury: On 12/6/23, patient tripped and fell, putting her R UE out to break her fall and she fractured her R distal humerus.  She had a previous fall in 2003 resulting in hardware being in the R UE as well.  She did have an ORIF on 12/14/23.  She was placed in a sling with an ace bandage following surgery.  She wore tan ace bandage until 12/26.  Currently she has no sling.  She has a 5# weight limit at this time.    CC:  She reports that the elbow feels \"pretty good\".  She took tylenol this morning.  She notes her elbow feels stiff, but she feels that she is pretty mobile.  She reports that soreness has gotten better.  She reports that she has been flexing and extending her elbow.  She notes that she feels something moving when she does that.  She notes that she typically sleeps on that side, but she wakes up. She was driving prior to the injury but hasn't driven since the injury.  She denies tingling or numbness in that hand.   Function:  She is RHD.  She is able to feed herself, wash her hair, give herself a shower and brush her hair.  She has not gotten something such as a plate out of the cabinet due to lifting restrictions. She has help with laundry and heavier cleaning.  She does live by herself.  She has had no additional falls.   She likes to take walks and goes to the gym.      1/30/24: "  Patient reports that she has no pain, just soreness.  Functionally she feels she is able to perform most/all of her daily activities.            Recurrent probem    Quality of life: excellent    Patient Goals  Patient goals for therapy: decreased pain, increased motion, increased strength and return to sport/leisure activities    Pain  Current pain ratin  At best pain ratin  At worst pain ratin /    0/10    Relieving factors: change in position and medications  Aggravating factors: overhead activity and lifting    Social Support  Lives with: alone    Employment status: not working  Hand dominance: right      Diagnostic Tests  X-ray: abnormal  Treatments  Current treatment: medication        Objective     Observations     Right Elbow   Positive for incision.     Additional Observation Details  Well healing incision across the elbow joint.   All steri-strips have been removed.   Mild bruising noted.      Tenderness     Additional Tenderness Details  No ttp noted    Active Range of Motion     Right Elbow                                                           24    Flexion: 120 degrees                                                  145                                         Extension: -25 degrees                                               -12  Forearm supination: 80 degrees   Forearm pronation: 80 degrees     Additional Active Range of Motion Details  Wrist extension:  65 degrees  Wrist Flexion:  25      Passive Range of Motion     Right Elbow   Flexion: 125 degrees                                   145 degrees  Extension: 25 degrees                                    0 degrees    Strength/Myotome Testing     Right Elbow   Flexion: 4                                                  4+/5                                          Extension: 4                                               4+/5  Forearm supination: 4                                5  Forearm pronation: 4                                   5  Additional Strength Details  Wrist flexion and extension:  4-/5               4/5    Brachioradialis                                             5/5  Brachialis                                                      5/5         Precautions: Aneurysm    Access Code: [email or web address]  URL: [email or web address]  Date: 01/02/2024  Prepared by: Priscilla Foy    Exercises  - Seated Elbow Flexion and Extension AROM  - 2 x daily - 7 x weekly - 1 sets - 10 reps  - Wrist AROM Flexion Extension  - 2 x daily - 7 x weekly - 1 sets - 10 reps  - Seated Forearm Pronation and Supination AROM  - 2 x daily - 7 x weekly - 1 sets - 10 reps - 5 hold  - Supported Elbow Flexion Extension PROM  - 2 x daily - 7 x weekly - 1 sets - 10 reps - 5 hold  - Elbow Extension PROM  - 2 x daily - 7 x weekly - 1 sets - 10 reps - 5 hold                                                                                                                                                                                                                                                                                                                                         Gait Training                                       Modalities

## 2024-02-01 ENCOUNTER — OFFICE VISIT (OUTPATIENT)
Dept: PHYSICAL THERAPY | Facility: CLINIC | Age: 84
End: 2024-02-01
Payer: COMMERCIAL

## 2024-02-01 DIAGNOSIS — M25.521 ELBOW PAIN, CHRONIC, RIGHT: Primary | ICD-10-CM

## 2024-02-01 DIAGNOSIS — G89.29 ELBOW PAIN, CHRONIC, RIGHT: Primary | ICD-10-CM

## 2024-02-01 PROCEDURE — 97530 THERAPEUTIC ACTIVITIES: CPT

## 2024-02-01 PROCEDURE — 97110 THERAPEUTIC EXERCISES: CPT

## 2024-02-01 PROCEDURE — 97140 MANUAL THERAPY 1/> REGIONS: CPT

## 2024-02-01 NOTE — PROGRESS NOTES
"Daily Note     Today's date: 2024  Patient name: Ellie Franklin  : 1940  MRN: [id number]  Referring provider: Todd Johnson DO  Dx: No diagnosis found.               Subjective: Patient is feeling well and please with her progress thus far.       Objective: See treatment diary below      Assessment: Tolerated treatment well. Patient exhibited good technique with therapeutic exercises      Plan: Continue per plan of care.      Precautions: Aneurysm; no lifting restrictions as of 24    Access Code: [email or web address]  URL: [email or web address]  Date: 2024  Prepared by: Priscilla Foy    Exercises  - Seated Elbow Flexion and Extension AROM  - 2 x daily - 7 x weekly - 1 sets - 10 reps  - Wrist AROM Flexion Extension  - 2 x daily - 7 x weekly - 1 sets - 10 reps  - Seated Forearm Pronation and Supination AROM  - 2 x daily - 7 x weekly - 1 sets - 10 reps - 5 hold  - Supported Elbow Flexion Extension PROM  - 2 x daily - 7 x weekly - 1 sets - 10 reps - 5 hold  - Elbow Extension PROM  - 2 x daily - 7 x weekly - 1 sets - 10 reps - 5 hold    Manuals 1/2 1/4 1/8 1/11 1/15 1/18 1/23 1/25 1/30 2/1   PROM extension/flexion    NV 5 5 8' 8  KD 5' 5   REV         10                              Neuro Re-Ed                                                                                                        Ther Ex             Weight shifting RUE       5\" x 10       Pulley nv 5' 5 5 5 5 5 5 5' 5                             Standing flexion at wall       1# 10  1# 10  1# x 10 1# 15    Standing scaption at wall        1# 10  1# x 10 1# 15   Cabinet taps        2# 10  2# 10  2# x 10 2# 12   AROM flexion/Ext wrist 10 x :10 10 x :10 10\" x 10  10\" x 10  10\" x 10  10 x :10 10\" x 10 np     AROM elbow flex ext 10 x :10 2# x 10 2# 2 x 10  2# 2 x 10  2# 2 x 10  2# 2 x 10 2# 2 x 10  2# 20  2# 2 x 10 2# 2 x 10   Elbow flex/ext with overpressure  10 x :10 10 x :10 10\" x 10  10\" x 10  10\" x 10   10\" x 10  10\" x 10    " "  Elbow extension hang  1# 1# x 1' 1# 1 min  1# 1 min  1# 1 min 2# x 1' 2# 1 min   2# x 2' 2# 2 min   Wall slide flexion with emphasis on elbow ext  5 x :10 10\" x 10  10\" x 10  10\" x 10  10 x :10 10\" x 10 10\"x 10  10 x :10 10\" x 10    Sup/pro  1# x 10 1# 10  1# 10  1# 10  1# 10 1# 10  1# 20  1# x 20 2# 20    Supine shoulder flexion          1# 20    Shoulder abduction           1# 20    Shoulder ER          1# 20    Elbow extension stretch (holding table)    10\" x 10  10\" x 10  10\" x 10  10 x :10 10\" x 10  10\" x 10      Supine shoulder flexion         1# 10      Ther Activity                                       Gait Training                                       Modalities                                                            "

## 2024-02-06 ENCOUNTER — OFFICE VISIT (OUTPATIENT)
Dept: PHYSICAL THERAPY | Facility: CLINIC | Age: 84
End: 2024-02-06
Payer: COMMERCIAL

## 2024-02-06 DIAGNOSIS — G89.29 ELBOW PAIN, CHRONIC, RIGHT: Primary | ICD-10-CM

## 2024-02-06 DIAGNOSIS — S42.494D OTHER CLOSED NONDISPLACED FRACTURE OF DISTAL END OF RIGHT HUMERUS WITH ROUTINE HEALING, SUBSEQUENT ENCOUNTER: ICD-10-CM

## 2024-02-06 DIAGNOSIS — M25.521 ELBOW PAIN, CHRONIC, RIGHT: Primary | ICD-10-CM

## 2024-02-06 PROCEDURE — 97530 THERAPEUTIC ACTIVITIES: CPT

## 2024-02-06 PROCEDURE — 97110 THERAPEUTIC EXERCISES: CPT

## 2024-02-06 NOTE — PROGRESS NOTES
"Daily Note     Today's date: 2024  Patient name: Ellie Franklin  : 1940  MRN: [id number]  Referring provider: Todd Johnson DO  Dx: No diagnosis found.               Subjective: Patient is feeling well       Objective: See treatment diary below      Assessment: Tolerated treatment well. Patient exhibited good technique with therapeutic exercises      Plan: Continue per plan of care.      Precautions: Aneurysm; no lifting restrictions as of 24    Access Code: [email or web address]  URL: [email or web address]  Date: 2024  Prepared by: Priscilla Foy    Exercises  - Seated Elbow Flexion and Extension AROM  - 2 x daily - 7 x weekly - 1 sets - 10 reps  - Wrist AROM Flexion Extension  - 2 x daily - 7 x weekly - 1 sets - 10 reps  - Seated Forearm Pronation and Supination AROM  - 2 x daily - 7 x weekly - 1 sets - 10 reps - 5 hold  - Supported Elbow Flexion Extension PROM  - 2 x daily - 7 x weekly - 1 sets - 10 reps - 5 hold  - Elbow Extension PROM  - 2 x daily - 7 x weekly - 1 sets - 10 reps - 5 hold    Manuals 2/6        PROM extension/flexion  5     8' 8  KD 5' 5   FOTO done        10                              Neuro Re-Ed                                                                                                        Ther Ex             Weight shifting RUE       5\" x 10       Pulley 5     5 5 5 5' 5                             Standing flexion at wall 1# 15      1# 10  1# 10  1# x 10 1# 15    Standing scaption at wall 1# 15        1# 10  1# x 10 1# 15   Cabinet taps  2# 15       2# 10  2# 10  2# x 10 2# 12   AROM flexion/Ext wrist      10 x :10 10\" x 10 np     AROM elbow flex ext 3# 20      2# 2 x 10 2# 2 x 10  2# 20  2# 2 x 10 2# 2 x 10   Elbow flex/ext with overpressure        10\" x 10  10\" x 10      Elbow extension hang       2# x 1' 2# 1 min   2# x 2' 2# 2 min   Wall slide flexion with emphasis on elbow ext 10\" x 10      10 x :10 10\" x 10 10\"x 10  10 x :10 " "10\" x 10    Sup/pro 2# 20      1# 10 1# 10  1# 20  1# x 20 2# 20    Supine shoulder flexion 1# 20          1# 20    Shoulder abduction  1# 20          1# 20    Shoulder ER 1# 20          1# 20    Elbow extension stretch (holding table)       10 x :10 10\" x 10  10\" x 10      Supine shoulder flexion         1# 10      Ther Activity                                       Gait Training                                       Modalities                                                              "

## 2024-02-08 ENCOUNTER — OFFICE VISIT (OUTPATIENT)
Dept: PHYSICAL THERAPY | Facility: CLINIC | Age: 84
End: 2024-02-08
Payer: COMMERCIAL

## 2024-02-08 DIAGNOSIS — M25.521 ELBOW PAIN, CHRONIC, RIGHT: Primary | ICD-10-CM

## 2024-02-08 DIAGNOSIS — G89.29 ELBOW PAIN, CHRONIC, RIGHT: Primary | ICD-10-CM

## 2024-02-08 PROCEDURE — 97110 THERAPEUTIC EXERCISES: CPT | Performed by: PHYSICAL THERAPIST

## 2024-02-08 PROCEDURE — 97530 THERAPEUTIC ACTIVITIES: CPT | Performed by: PHYSICAL THERAPIST

## 2024-02-08 PROCEDURE — 97140 MANUAL THERAPY 1/> REGIONS: CPT | Performed by: PHYSICAL THERAPIST

## 2024-02-08 NOTE — PROGRESS NOTES
"Daily Note     Today's date: 2024  Patient name: Ellie Franklin  : 1940  MRN: [id number]  Referring provider: Todd Johnson DO  Dx:   Encounter Diagnosis     ICD-10-CM    1. Elbow pain, chronic, right  M25.521     G89.29                      Subjective: Patient reports that she feels that she is doing well.        Objective: See treatment diary below      Assessment: Tolerated treatment well. Patient would benefit from continued PT  Flexion = 145;  Extension= 10 degrees      Plan: Patient to DC at this time.     Precautions: Aneurysm; no lifting restrictions as of 24    Access Code: [email or web address]  URL: [email or web address]  Date: 2024  Prepared by: Priscilla Foy    Exercises  - Seated Elbow Flexion and Extension AROM  - 2 x daily - 7 x weekly - 1 sets - 10 reps  - Wrist AROM Flexion Extension  - 2 x daily - 7 x weekly - 1 sets - 10 reps  - Seated Forearm Pronation and Supination AROM  - 2 x daily - 7 x weekly - 1 sets - 10 reps - 5 hold  - Supported Elbow Flexion Extension PROM  - 2 x daily - 7 x weekly - 1 sets - 10 reps - 5 hold  - Elbow Extension PROM  - 2 x daily - 7 x weekly - 1 sets - 10 reps - 5 hold    Manuals 2/6 2/8           PROM extension/flexion  5            FOTO done                                      Neuro Re-Ed                                                                                                        Ther Ex             Weight shifting RUE             Pulley 5 5                                     Standing flexion at wall 1# 15 1# x 15           Standing scaption at wall 1# 15  1 #            Cabinet taps  2# 15  2# x 15           AROM flexion/Ext wrist             AROM elbow flex ext 3# 20  3# x 20           Elbow flex/ext with overpressure              Elbow extension hang              Wall slide flexion with emphasis on elbow ext 10\" x 10  10 x :10           Sup/pro 2# 20  2# x 20           Supine shoulder flexion 1# 20  1# x 20         "   Shoulder abduction  1# 20  1# x 20           Shoulder ER 1# 20             Elbow extension stretch (holding table)              Supine shoulder flexion              Ther Activity                                       Gait Training                                       Modalities

## 2024-02-26 ENCOUNTER — HOSPITAL ENCOUNTER (OUTPATIENT)
Dept: RADIOLOGY | Facility: HOSPITAL | Age: 84
Discharge: HOME | End: 2024-02-26
Payer: COMMERCIAL

## 2024-02-26 DIAGNOSIS — R92.30 DENSE BREAST TISSUE ON MAMMOGRAM: ICD-10-CM

## 2024-02-26 DIAGNOSIS — Z12.31 SCREENING MAMMOGRAM FOR BREAST CANCER: ICD-10-CM

## 2024-02-26 PROCEDURE — 77067 SCR MAMMO BI INCL CAD: CPT

## 2024-02-26 PROCEDURE — 76641 ULTRASOUND BREAST COMPLETE: CPT

## 2024-02-26 PROCEDURE — 77063 BREAST TOMOSYNTHESIS BI: CPT

## 2024-03-04 ENCOUNTER — APPOINTMENT (OUTPATIENT)
Dept: RADIOLOGY | Facility: AMBULARY SURGERY CENTER | Age: 84
End: 2024-03-04
Attending: STUDENT IN AN ORGANIZED HEALTH CARE EDUCATION/TRAINING PROGRAM
Payer: COMMERCIAL

## 2024-03-04 ENCOUNTER — OFFICE VISIT (OUTPATIENT)
Dept: OBGYN CLINIC | Facility: CLINIC | Age: 84
End: 2024-03-04

## 2024-03-04 VITALS
WEIGHT: 128.75 LBS | BODY MASS INDEX: 22.81 KG/M2 | DIASTOLIC BLOOD PRESSURE: 78 MMHG | HEIGHT: 63 IN | SYSTOLIC BLOOD PRESSURE: 120 MMHG | HEART RATE: 74 BPM

## 2024-03-04 DIAGNOSIS — S42.494D OTHER CLOSED NONDISPLACED FRACTURE OF DISTAL END OF RIGHT HUMERUS WITH ROUTINE HEALING, SUBSEQUENT ENCOUNTER: ICD-10-CM

## 2024-03-04 DIAGNOSIS — S42.494D OTHER CLOSED NONDISPLACED FRACTURE OF DISTAL END OF RIGHT HUMERUS WITH ROUTINE HEALING, SUBSEQUENT ENCOUNTER: Primary | ICD-10-CM

## 2024-03-04 DIAGNOSIS — M25.521 PAIN IN RIGHT ELBOW: ICD-10-CM

## 2024-03-04 PROCEDURE — 99024 POSTOP FOLLOW-UP VISIT: CPT | Performed by: STUDENT IN AN ORGANIZED HEALTH CARE EDUCATION/TRAINING PROGRAM

## 2024-03-04 PROCEDURE — 73080 X-RAY EXAM OF ELBOW: CPT

## 2024-03-04 NOTE — PROGRESS NOTES
Orthopaedics Office Visit - postop Patient Visit    ASSESSMENT/PLAN:    Assessment:   #1 Right Closed Distal Humerus Supracondylar Fracture status post ORIF, date of surgery 12/14/2023    Plan:   X-rays of the right humerus were reviewed with the patient was demonstrated well-maintained alignment of the patient's right supracondylar distal humerus fracture with interval fracture healing.  No change in the patient's previous hardware alignment  Patient doing well with no right elbow pain at rest or with motion  weight bearing as tolerated without restrictions  Continue HEP to right elbow to work on strengthening.  Patient has completed formal physical therapy with return to her baseline  Continue to take Tylenol as needed for pain  Patient is cleared for pool activities, bathing  Recommended massage daily to the right elbow to help decrease possibility of symptomatic hardware  Follow up in 3 months    To Do Next Visit:  X-rays of right elbow      _____________________________________________________  CHIEF COMPLAINT:  Chief Complaint   Patient presents with    Right Arm - Post-op         SUBJECTIVE:  Ellie Franklin is a 83 y.o. female who presents for initial visit accompanied by her son for evaluation of right elbow pain.  Patient had a mechanical fall 1 week ago and injured her right elbow.  She was evaluated in the ER and was provided a splint and referred to orthopedics.  Pain is well localized to the right elbow without radiation, pain is worse with motion and palpation, improves at rest and with the splint.  She denies associated numbness or tingling to the arm.  She does have history of a right olecranon fracture ORIF done over 20 years ago.  She denies any pain in her elbow before this new fall, she denies any issues with her previous surgery or hardware.  She states that she is fairly active for her age and is independent on all of her activities of daily living including driving.    Interval history on  12/18/2023:  Patient is status post right distal humerus ORIF 4 days ago, date of surgery 12/14/2023.  Patient presents for unscheduled follow-up, she states that she was taking the prescribed prophylactic Keflex and she experience some lips and tongue swelling on day #2 prompting presentation to the ER.  While she was in the ER, she was given Benadryl and the antibiotic was discontinued.  In the ER, the ER physician took down her postoperative dressing, and new dressing was applied consisting only of fluffy Webril and a very loose Ace wrap.  According to the son who is present at the visit today in the emergency department the date did not see any signs of a allergic reaction to the antibiotics.  They discontinued them out of caution.  Patient presents today for dressing evaluation and concerns regarding the antibiotic situation.  Her pain is well-controlled, the peripheral block that she received worked very well and has just started to wear off.  She has only taken Tylenol.    Interval history 12/26/2023:  The patient presents 12 days s/p right distal midshaft humerus ORIF, 12/14/2023.  She is doing well.  She denies any secondary incidences of sensation of swelling in her mouth or lips.  today she complains of mild left elbow and upper arm soreness.  She denies distal tingling or numbness.  She has used Tylenol yet has stopped as she is feeling better.  No longer using any narcotic pain medications she has used sling.  Patient has visiting home health aid come to her house.    Her main complaint has been of the Ace wrap.  She has discontinued the Ace wrap and feels better now.  No fevers or chills    Interval History 1/22/2024  Patient presents 6 weeks s/p right distal midshaft humerus ORIF, 12/14/2023.  She is doing well. She denies distal tingling or numbness.  She has used Tylenol yet has stopped as she is feeling better.  No longer using any narcotic pain medications. Patient has visiting home health aid  come to her house. Patient has been working with PT. She feels that she has made significant improvements in terms of both ROM and strength. She would like to continue working with formal PT.     Interval History 3/4/2024  Patient presents 12 weeks s/p right distal midshaft humerus ORIF, 12/14/2023.  She is doing well. She denies distal tingling or numbness.  She no longer takes any analgesics. Patient has completed PT. She feels that she has made significant improvements in terms of both ROM and strength.  She has returned back to her baseline range of motion.  Denies any numbness or paresthesias in the upper extremity.  Only complaint is feeling a slight snapping feeling over the posterior lateral aspect of the elbow over the plate.  This is nonpainful.    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    Colon polyp     Fracture of arm     Glaucoma     Hyponatremia 12/05/2018    Hypotension        PAST SURGICAL HISTORY:  Past Surgical History:   Procedure Laterality Date    BUNIONECTOMY      BUNIONECTOMY      CATARACT EXTRACTION      ELBOW SURGERY Right     EYE SURGERY  CATARACTS    FRACTURE SURGERY  PINS IN BROKEN ELBOW    IR CEREBRAL ANGIOGRAPHY  06/26/2019    IR CEREBRAL ANGIOGRAPHY / INTERVENTION  11/25/2018    RI OPEN TX HUMERAL SUPRACONDYLAR FRACTURE W/O XTN Right 12/14/2023    Procedure: OPEN REDUCTION W/ INTERNAL FIXATION (ORIF) HUMERUS MIDSHAFT / DISTAL;  Surgeon: Todd Johnson DO;  Location: [facility];  Service: Orthopedics    TONSILLECTOMY         FAMILY HISTORY:  Family History   Problem Relation Age of Onset    No Known Problems Mother     Heart attack Father     Diabetes Father     No Known Problems Sister     No Known Problems Daughter     No Known Problems Maternal Grandmother     Cancer Maternal Grandfather     Colon cancer Maternal Grandfather 40    No Known Problems Paternal Grandmother     No Known Problems Paternal Grandfather     No  Known Problems Maternal Aunt     Cancer Paternal Aunt         pancreatic    No Known Problems Paternal Aunt     Heart disease Family        SOCIAL HISTORY:  Social History     Tobacco Use    Smoking status: Never    Smokeless tobacco: Never   Vaping Use    Vaping status: Never Used   Substance Use Topics    Alcohol use: Not Currently     Comment: not using due to her condition    Drug use: No       MEDICATIONS:    Current Outpatient Medications:     acetaminophen (TYLENOL) 650 mg CR tablet, Take 1 tablet (650 mg total) by mouth every 8 (eight) hours as needed for mild pain, Disp: 30 tablet, Rfl: 0    cholecalciferol (VITAMIN D3) 1,000 units tablet, Take 1,000 Units by mouth daily, Disp: , Rfl:     Dorzolamide HCl-Timolol Mal PF 22.3-6.8 MG/ML SOLN, , Disp: , Rfl:     dorzolamide-timolol (COSOPT) 22.3-6.8 MG/ML ophthalmic solution, Administer 1 drop to both eyes 2 (two) times a day, Disp: , Rfl:     estradiol (Yuvafem) 10 MCG TABS vaginal tablet, Insert 1 tablet (10 mcg total) into the vagina 2 (two) times a week, Disp: 24 tablet, Rfl: 2    Multiple Vitamins-Minerals (PreserVision AREDS 2) CAPS, Take by mouth, Disp: , Rfl:     multivitamin (THERAGRAN) TABS, Take 1 tablet by mouth daily Centrum Silver, Disp: , Rfl:     travoprost (TRAVATAN-Z) 0.004 % ophthalmic solution, 1 drop daily at bedtime, Disp: , Rfl:     oxyCODONE (Roxicodone) 5 immediate release tablet, Take 1 tablet (5 mg total) by mouth every 6 (six) hours as needed for severe pain for up to 30 doses Max Daily Amount: 20 mg (Patient not taking: Reported on 12/18/2023), Disp: 20 tablet, Rfl: 0    ALLERGIES:  Allergies   Allergen Reactions    Atorvastatin Other (See Comments)     Severe muscle cramping     Codeine GI Intolerance    Metronidazole Dizziness     Headaches/dizzy/swelling of tongue    Risedronate Sodium Other (See Comments)     Severe muscle cramping        REVIEW OF SYSTEMS:  MSK: right elbow pain  Neuro: denies numbness or tingling  Pertinent  "items are otherwise noted in HPI.  A comprehensive review of systems was otherwise negative.    LABS:  HgA1c:   Lab Results   Component Value Date    HGBA1C 5.1 07/22/2019     BMP:   Lab Results   Component Value Date    CALCIUM 9.2 11/13/2023    K 4.3 11/13/2023    CO2 32 11/13/2023     11/13/2023    BUN 16 11/13/2023    CREATININE 0.57 (L) 11/13/2023     CBC: No components found for: \"CBC\"    _____________________________________________________  PHYSICAL EXAMINATION:  Musculoskeletal: Right Upper Extremity  The right upper extremity was exposed inspected.  The patient's incision over the posterior aspect of the elbow is well-healed and sealed without any surrounding erythema induration.  The patient has no tenderness to palpation juan-incisionally.  The patient's plate is palpable subcutaneously on the lateral border of the distal humerus.  This is nontender.  With flexion and extension the sliding of the skin overlying the plate is able to be palpable is nonpainful.  ROM elbow is  without pain. There is no palpable subluxation of the ulnar nerve with flexion and extension of the elbow.  Elbow flexion and extension strength are 5/5. The patient's sensation is intact to light touch in the median, radial, ulnar nerve distributions.  AIN, PIN, ulnar nerves are intact.  +2 radial pulses distally.  Compartments are all soft compressible        _____________________________________________________  STUDIES REVIEWED:  I personally reviewed the images and interpretation is as follows:  Right elbow x-ray demonstrates x-ray of the right elbow demonstrates well-maintained alignment of the right distal humerus fracture with no signs of hardware failure or loosening. Fracture appears healed    PROCEDURES PERFORMED:  Procedures: none    Norris Brooks MD    "

## 2024-03-15 ENCOUNTER — TELEPHONE (OUTPATIENT)
Age: 84
End: 2024-03-15

## 2024-03-15 NOTE — TELEPHONE ENCOUNTER
Pt wants to know if Dr. Newton recommends she get the new Covid vaccine that came out. She states she received her last one 10-02-23, and she has had a total of 6 shots.    Please advise, thank you

## 2024-05-06 ENCOUNTER — OFFICE VISIT (OUTPATIENT)
Age: 84
End: 2024-05-06
Payer: COMMERCIAL

## 2024-05-06 VITALS
DIASTOLIC BLOOD PRESSURE: 70 MMHG | WEIGHT: 123.8 LBS | OXYGEN SATURATION: 100 % | SYSTOLIC BLOOD PRESSURE: 122 MMHG | HEART RATE: 82 BPM | BODY MASS INDEX: 21.13 KG/M2 | HEIGHT: 64 IN | TEMPERATURE: 97.2 F

## 2024-05-06 DIAGNOSIS — I60.2 NONTRAUMATIC SUBARACHNOID HEMORRHAGE FROM ANTERIOR COMMUNICATING ARTERY (HCC): ICD-10-CM

## 2024-05-06 DIAGNOSIS — R29.890 HEIGHT LOSS: ICD-10-CM

## 2024-05-06 DIAGNOSIS — Z79.899 ENCOUNTER FOR LONG-TERM (CURRENT) USE OF OTHER MEDICATIONS: ICD-10-CM

## 2024-05-06 DIAGNOSIS — E55.9 VITAMIN D INSUFFICIENCY: ICD-10-CM

## 2024-05-06 DIAGNOSIS — M81.0 SENILE OSTEOPOROSIS: Primary | ICD-10-CM

## 2024-05-06 DIAGNOSIS — S42.494D OTHER CLOSED NONDISPLACED FRACTURE OF DISTAL END OF RIGHT HUMERUS WITH ROUTINE HEALING, SUBSEQUENT ENCOUNTER: ICD-10-CM

## 2024-05-06 DIAGNOSIS — M15.0 PRIMARY GENERALIZED (OSTEO)ARTHRITIS: ICD-10-CM

## 2024-05-06 DIAGNOSIS — H90.3 SENSORINEURAL HEARING LOSS (SNHL) OF BOTH EARS: ICD-10-CM

## 2024-05-06 DIAGNOSIS — M41.25 OTHER IDIOPATHIC SCOLIOSIS, THORACOLUMBAR REGION: ICD-10-CM

## 2024-05-06 DIAGNOSIS — Z86.39 HISTORY OF THYROID DISEASE: ICD-10-CM

## 2024-05-06 PROCEDURE — 99204 OFFICE O/P NEW MOD 45 MIN: CPT | Performed by: INTERNAL MEDICINE

## 2024-05-06 NOTE — PATIENT INSTRUCTIONS
Get the lab work requested completed.  Get the x-rays of your thoracic and lumbar spines completed to make sure that you have had no asymptomatic compression deformities.  I suspect your height loss is from scoliosis.  Read the pamphlets on the 2 medications that I would consider.  We would need to have clearance from the periodontist before we could proceed, which may mean that you would need to have certain dental procedures to move forward with treatment for osteoporosis.  The concern is that you are at very high risk for fracture in view of your low bone mass as well as prior fracture history.  I will get a second opinion for clearance for osteoporosis medications with Dr. Jose Jeter in [place].

## 2024-05-06 NOTE — PROGRESS NOTES
Assessment and Plan:   Patient with senile osteoporosis with history of nondisplaced fracture distal right humerus requiring open reduction internal fixation, with markedly low T-score left hip and forearm.  High FRAX risk for fracture.  Rule out secondary metabolic causes of osteoporosis including asymptomatic hyperparathyroidism, hyperthyroidism, vitamin D deficiency malabsorption, multiple myeloma, and hypercalciuria.  Patient has remote history of thyroid replacement therapy in college, which she took until she was .  More recent thyroid function studies have been normal.  History periodontal disease followed by periodontist.  She does have chronic dry mouth.  She also has dry eye secondary to glaucoma.  Primary generalized osteoarthritis.  No evidence for inflammatory arthropathy or connective tissue disease syndrome.  History height loss likely secondary to scoliosis.  History nontraumatic subarachnoid hemorrhage.  Status post embolization in 2018/coil.  History of sleep disturbance with insomnia and early awakening treated with melatonin.    Plan:  Diagnoses and all orders for this visit:    Senile osteoporosis  -     CBC and differential; Future  -     C-reactive protein; Future  -     Comprehensive metabolic panel; Future  -     Protein electrophoresis, serum; Future  -     Immunofixation IgA,IgG,IgM Qt.Immunofixation Serum Immunoglobulin; Future  -     PTH, intact; Future  -     TSH, 3rd generation; Future  -     Celiac Antibodies Profile; Future  -     Vitamin D 25 hydroxy; Future  -     Urinalysis with microscopic  -     Calcium, urine, 24 hour; Future  -     XR spine lumbar minimum 4 views non injury; Future  -     XR spine thoracic 3 vw; Future    Primary generalized (osteo)arthritis    Other closed nondisplaced fracture of distal end of right humerus with routine healing, subsequent encounter    Sensorineural hearing loss (SNHL) of both ears    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  Will refer for laboratory evaluation to rule out secondary metabolic causes of osteoporosis.  Refer for x-rays thoracic and lumbar spines in view of height loss to rule out asymptomatic compression fractures, although, I suspect her height loss is secondary to scoliosis.  I did discuss therapeutic options for osteoporosis including anabolic agents and antiresorptive agents.  I believe she would be a candidate for anabolic agents in view of her significantly low BMD and low trauma fracture.  She has no contraindication from a cardiac perspective for Evenity, which would be an excellent option for her as she is not interested in self injection with parathyroid hormone analogs.  I have given her pamphlets on Evenity and Prolia for her review.  Will suggest monthly injections of Evenity for 12 months followed by Prolia every 6 months.  She will need clearance from the periodontist in view of her history of periodontal disease.  As her dentist is not located conveniently, I have suggested Dr. Jose Jeter in [place].  Once cleared by the periodontist, suggest starting monthly injections of Evenity with follow-up with me in approximately 4 months or sooner if needed.  Discussed in detail with patient.  Patient reassured.        84-year-old female, consult requested by primary care, for evaluation and management of osteoporosis.  Patient is  status post ORIF of closed right distal humerus supracondylar fracture and olecranon fracture dated 12/14/2023.  Fracture occurred with low trauma with pulling sweatshirt over her head which caused her to fall and utilize her right arm to break the fall.  She had a prior elbow fracture in 2003 after a fall on cement.  Patient has been menopausal since age 51 naturally.  She briefly took hormone therapy but was intolerant.  She has a 1-1/2 inch height loss, but does have documented scoliosis.  Family history is significant for son being treated for osteoporosis with history of cystic fibrosis.  Patient had remote history of nephrolithiasis with no recurrence.  She was put on Synthroid in college but this was discontinued when she was first .  She does take vitamin D 1000 IU daily in addition to the vitamin D3 and her Centrum multivitamin.    DEXA dated 1/11/2023 significant for spine T-score -1.8.  Left total hip T-score -2.8 with femoral neck -3.0.  Forearm T-score -5.0.  FRAX risk for hip fracture 9.3% and major fracture risk 22%.  She was on Fosamax in the past, but is not sure for how long.    Patient has no significant joint pain, joint swelling, back pain, or neck pain.  She does note morning stiffness of a couple minutes duration.  She does have a history of periodontal disease and is currently on PreviDent through the periodontist.  She does have dry mouth.  She also has dry eyes secondary to glaucoma which is treated by ophthalmologist.  She also takes PreserVision.  She has no history of headache.  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  She is status post coil placement.  Prior to the aneurysm rupture, she did have temporary hearing loss.  She has bilateral sensorineural hearing loss at present.  Review of systems is otherwise remarkable for insomnia with early awakening for which she uses melatonin.      Ellie Franklin is a 84 y.o.   female who presents for evaluation and management of osteoporosis with significantly BMD and history of low trauma fracture.    Review of Systems  Review of Systems   Constitutional:  Negative for chills and fever.   HENT:  Positive for dental problem (periodontal disease) and hearing loss. Negative for ear pain and sore throat.         Dry mouth   Eyes:  Negative for pain and visual disturbance.        Dry eyes from glaucoma   Respiratory:  Negative for cough and shortness of breath.    Cardiovascular:  Negative for chest pain and palpitations.   Gastrointestinal:  Negative for abdominal pain and vomiting.   Genitourinary:  Negative for dysuria and hematuria.        Nocturia   Musculoskeletal:  Negative for arthralgias and back pain.   Skin:  Negative for color change and rash.   Neurological:  Negative for seizures and syncope.   Psychiatric/Behavioral:  Positive for sleep disturbance.    All other systems reviewed and are negative.      Allergies  Allergies   Allergen Reactions    Atorvastatin Other (See Comments)     Severe muscle cramping     Codeine GI Intolerance    Metronidazole Dizziness     Headaches/dizzy/swelling of tongue    Risedronate Sodium Other (See Comments)     Severe muscle cramping        Home Medications    Current Outpatient Medications:     acetaminophen (TYLENOL) 650 mg CR tablet, Take 1 tablet (650 mg total) by mouth every 8 (eight) hours as needed for mild pain, Disp: 30 tablet, Rfl: 0    cholecalciferol (VITAMIN D3) 1,000 units tablet, Take 1,000 Units by mouth daily, Disp: , Rfl:     dorzolamide-timolol (COSOPT) 22.3-6.8 MG/ML ophthalmic solution, Administer 1 drop to both eyes 2 (two) times a day, Disp: , Rfl:     estradiol (Yuvafem) 10 MCG TABS vaginal tablet, Insert 1 tablet (10 mcg total) into the vagina 2 (two) times a week, Disp: 24 tablet, Rfl: 2    Multiple Vitamins-Minerals (PreserVision AREDS 2) CAPS, Take by mouth, Disp: , Rfl:     multivitamin (THERAGRAN) TABS, Take 1 tablet  by mouth daily Centrum Silver, Disp: , Rfl:     travoprost (TRAVATAN-Z) 0.004 % ophthalmic solution, 1 drop daily at bedtime, Disp: , Rfl:     Dorzolamide HCl-Timolol Mal PF 22.3-6.8 MG/ML SOLN, , Disp: , Rfl:     oxyCODONE (Roxicodone) 5 immediate release tablet, Take 1 tablet (5 mg total) by mouth every 6 (six) hours as needed for severe pain for up to 30 doses Max Daily Amount: 20 mg (Patient not taking: Reported on 12/18/2023), Disp: 20 tablet, Rfl: 0    Past Medical History  Past Medical History:   Diagnosis Date    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    Colon polyp     Fracture of arm     Glaucoma     Hyponatremia 12/05/2018    Hypotension        Past Surgical History   Past Surgical History:   Procedure Laterality Date    BUNIONECTOMY      BUNIONECTOMY      CATARACT EXTRACTION      ELBOW SURGERY Right     EYE SURGERY  CATARACTS    FRACTURE SURGERY  PINS IN BROKEN ELBOW    IR CEREBRAL ANGIOGRAPHY  06/26/2019    IR CEREBRAL ANGIOGRAPHY / INTERVENTION  11/25/2018    [place] OPEN TX HUMERAL SUPRACONDYLAR FRACTURE W/O XTN Right 12/14/2023    Procedure: OPEN REDUCTION W/ INTERNAL FIXATION (ORIF) HUMERUS MIDSHAFT / DISTAL;  Surgeon: Todd Johnson DO;  Location: [facility];  Service: Orthopedics    TONSILLECTOMY         Family History  Family History   Problem Relation Age of Onset    No Known Problems Mother     Heart attack Father     Diabetes Father     No Known Problems Sister     No Known Problems Daughter     No Known Problems Maternal Grandmother     Cancer Maternal Grandfather     Colon cancer Maternal Grandfather 40    No Known Problems Paternal Grandmother     No Known Problems Paternal Grandfather     No Known Problems Maternal Aunt     Cancer Paternal Aunt         pancreatic    No Known Problems Paternal Aunt     Heart disease Family        Social History  Occupation: Retired  Social History     Substance and Sexual Activity   Alcohol Use Not Currently    Comment: not using due to her condition     Social  "History     Substance and Sexual Activity   Drug Use No     Social History     Tobacco Use   Smoking Status Never   Smokeless Tobacco Never       Objective:    Vitals:    05/06/24 1701   BP: 122/70   BP Location: Left arm   Patient Position: Sitting   Cuff Size: Adult   Pulse: 82   Temp: (!) 97.2 °F (36.2 °C)   TempSrc: Temporal   SpO2: 100%   Weight: 56.2 kg (123 lb 12.8 oz)   Height: 5' 3.5\" (1.613 m)       Physical Exam  Vitals reviewed.   Constitutional:       Appearance: Normal appearance.   HENT:      Head: Normocephalic.      Nose:      Comments: Nose and throat unremarkable.  Eyes:      Extraocular Movements: Extraocular movements intact.   Neck:      Comments: Without masses, thyromegaly, lymphadenopathy  Cardiovascular:      Rate and Rhythm: Regular rhythm.   Pulmonary:      Breath sounds: Normal breath sounds.   Abdominal:      Palpations: Abdomen is soft.   Musculoskeletal:      Cervical back: Neck supple.      Comments: Neck decreased lateral flexion.  Shoulders full range of motion.  Elbows right lacks full extension secondary to prior fracture.  Left full range of motion.  Wrists full range of motion.  Tinel's positive bilateral wrist.  Hands squaring first carpometacarpal joints bilaterally with interphalangeal osteoarthritis.  Thickening flexor tendon sheaths ring fingers right greater than left and middle fingers.  No triggering appreciated.  No synovitis noted.  Back mild dorsal kyphosis.  Straight leg raising negative bilaterally.  Hips full range of motion.  Knees small cool effusions right greater than left with full range of motion with patellofemoral crepitus.  Ankles full range of motion.  Feet rearfoot hyperpronation with midfoot cavus deformities, high instep's, hallux valgus deformities, and hammertoe deformities.  No synovitis appreciated.   Skin:     General: Skin is warm.      Comments: Varicose veins lower extremities.  Trace ankle edema.  Periungual erythema with no digital " ulcerations or dilated nailfold capillary loops.  No Raynaud's appreciated.   Neurological:      General: No focal deficit present.         Imaging:   DEXA dated 1/11/2023 significant for spine T-score -1.8.  Left total hip -2.8.  Femoral neck -3.0.  Forearm -5.0.  FRAX risk for hip fracture 9.3% with major fracture risk 22%.  Right elbow status post surgery dated 3/4/2024 significant for status post ORIF of distal humeral and proximal ulnar fractures.    Labs:   Lab work dated 11/13/2023 significant for vitamin D 48.4.  Creatinine 0.57 with estimated GFR 86.  Calcium 9.2.  TSH dated 11/14/2022 normal at 1.310.      Reviewed records, imaging, labs in detail with patient.  Discussion and counseling completed.  Office visit with documentation 55 minutes.  This note was written in part using the assistance of the [email or web address] Direct [id number] microphone system. Those portions using this system have been dictated and not read.

## 2024-05-07 ENCOUNTER — TELEPHONE (OUTPATIENT)
Age: 84
End: 2024-05-07

## 2024-05-07 NOTE — TELEPHONE ENCOUNTER
Patient called with a medical update.  She spoke with her current Periodontist and went over the recent updates from her visit with , he now agrees and suggests that she proceed with  course of treatment.  She was unsure if she would still need to get the second opinion that was recommended, since her current Periodontist is on board with moving ahead with testing and injections.   She is going to complete her Lab Work soon and hopes to hear back with any next steps. Please advise

## 2024-05-08 ENCOUNTER — TELEPHONE (OUTPATIENT)
Age: 84
End: 2024-05-08

## 2024-05-08 NOTE — TELEPHONE ENCOUNTER
Patient was calling to see if the labs were fasting or not and showed the patient where to look on paper.

## 2024-05-08 NOTE — TELEPHONE ENCOUNTER
Patient's son is calling because they would like Dr. Mireles to send a script for 24hours urine study to [facility] and the son was told that [facility] does not do that anymore he is not sure if its for the 24 hours one please call the son back.     Ace PH: [phone].

## 2024-05-09 ENCOUNTER — APPOINTMENT (OUTPATIENT)
Dept: LAB | Facility: CLINIC | Age: 84
End: 2024-05-09
Payer: COMMERCIAL

## 2024-05-09 ENCOUNTER — TELEPHONE (OUTPATIENT)
Age: 84
End: 2024-05-09

## 2024-05-09 DIAGNOSIS — E55.9 VITAMIN D INSUFFICIENCY: ICD-10-CM

## 2024-05-09 DIAGNOSIS — M81.0 SENILE OSTEOPOROSIS: ICD-10-CM

## 2024-05-09 DIAGNOSIS — Z86.39 HISTORY OF THYROID DISEASE: ICD-10-CM

## 2024-05-09 DIAGNOSIS — Z79.899 ENCOUNTER FOR LONG-TERM (CURRENT) USE OF OTHER MEDICATIONS: ICD-10-CM

## 2024-05-09 LAB
25(OH)D3 SERPL-MCNC: 41.6 NG/ML (ref 30–100)
ALBUMIN SERPL BCP-MCNC: 4 G/DL (ref 3.5–5)
ALP SERPL-CCNC: 60 U/L (ref 34–104)
ALT SERPL W P-5'-P-CCNC: 12 U/L (ref 7–52)
ANION GAP SERPL CALCULATED.3IONS-SCNC: 5 MMOL/L (ref 4–13)
AST SERPL W P-5'-P-CCNC: 20 U/L (ref 13–39)
BACTERIA UR QL AUTO: ABNORMAL /HPF
BASOPHILS # BLD AUTO: 0.04 THOUSANDS/ÂΜL (ref 0–0.1)
BASOPHILS NFR BLD AUTO: 1 % (ref 0–1)
BILIRUB SERPL-MCNC: 0.57 MG/DL (ref 0.2–1)
BILIRUB UR QL STRIP: NEGATIVE
BUN SERPL-MCNC: 16 MG/DL (ref 5–25)
CALCIUM SERPL-MCNC: 8.7 MG/DL (ref 8.4–10.2)
CHLORIDE SERPL-SCNC: 103 MMOL/L (ref 96–108)
CLARITY UR: CLEAR
CO2 SERPL-SCNC: 31 MMOL/L (ref 21–32)
COLOR UR: COLORLESS
CREAT SERPL-MCNC: 0.63 MG/DL (ref 0.6–1.3)
CRP SERPL QL: 1.7 MG/L
EOSINOPHIL # BLD AUTO: 0.08 THOUSAND/ÂΜL (ref 0–0.61)
EOSINOPHIL NFR BLD AUTO: 2 % (ref 0–6)
ERYTHROCYTE [DISTWIDTH] IN BLOOD BY AUTOMATED COUNT: 14.4 % (ref 11.6–15.1)
GFR SERPL CREATININE-BSD FRML MDRD: 83 ML/MIN/1.73SQ M
GLIADIN PEPTIDE IGA SER-ACNC: <0.2 U/ML
GLIADIN PEPTIDE IGA SER-ACNC: NEGATIVE
GLIADIN PEPTIDE IGG SER-ACNC: <0.4 U/ML
GLIADIN PEPTIDE IGG SER-ACNC: NEGATIVE
GLUCOSE P FAST SERPL-MCNC: 85 MG/DL (ref 65–99)
GLUCOSE UR STRIP-MCNC: NEGATIVE MG/DL
HCT VFR BLD AUTO: 41.3 % (ref 34.8–46.1)
HGB BLD-MCNC: 13 G/DL (ref 11.5–15.4)
HGB UR QL STRIP.AUTO: ABNORMAL
IGA SERPL-MCNC: 182 MG/DL (ref 66–433)
IGA SERPL-MCNC: 182 MG/DL (ref 66–433)
IGG SERPL-MCNC: 816 MG/DL (ref 635–1741)
IGM SERPL-MCNC: 92 MG/DL (ref 45–281)
IMM GRANULOCYTES # BLD AUTO: 0.03 THOUSAND/UL (ref 0–0.2)
IMM GRANULOCYTES NFR BLD AUTO: 1 % (ref 0–2)
KETONES UR STRIP-MCNC: NEGATIVE MG/DL
LEUKOCYTE ESTERASE UR QL STRIP: NEGATIVE
LYMPHOCYTES # BLD AUTO: 0.86 THOUSANDS/ÂΜL (ref 0.6–4.47)
LYMPHOCYTES NFR BLD AUTO: 20 % (ref 14–44)
MCH RBC QN AUTO: 30.6 PG (ref 26.8–34.3)
MCHC RBC AUTO-ENTMCNC: 31.5 G/DL (ref 31.4–37.4)
MCV RBC AUTO: 97 FL (ref 82–98)
MONOCYTES # BLD AUTO: 0.3 THOUSAND/ÂΜL (ref 0.17–1.22)
MONOCYTES NFR BLD AUTO: 7 % (ref 4–12)
NEUTROPHILS # BLD AUTO: 2.9 THOUSANDS/ÂΜL (ref 1.85–7.62)
NEUTS SEG NFR BLD AUTO: 69 % (ref 43–75)
NITRITE UR QL STRIP: NEGATIVE
NON-SQ EPI CELLS URNS QL MICRO: ABNORMAL /HPF
NRBC BLD AUTO-RTO: 0 /100 WBCS
PH UR STRIP.AUTO: 7 [PH]
PLATELET # BLD AUTO: 204 THOUSANDS/UL (ref 149–390)
PMV BLD AUTO: 10.1 FL (ref 8.9–12.7)
POTASSIUM SERPL-SCNC: 3.7 MMOL/L (ref 3.5–5.3)
PROT SERPL-MCNC: 6.7 G/DL (ref 6.4–8.4)
PROT UR STRIP-MCNC: NEGATIVE MG/DL
PTH-INTACT SERPL-MCNC: 40.2 PG/ML (ref 12–88)
RBC # BLD AUTO: 4.25 MILLION/UL (ref 3.81–5.12)
RBC #/AREA URNS AUTO: ABNORMAL /HPF
SODIUM SERPL-SCNC: 139 MMOL/L (ref 135–147)
SP GR UR STRIP.AUTO: <=1.005 (ref 1–1.03)
TSH SERPL DL<=0.05 MIU/L-ACNC: 0.79 UIU/ML (ref 0.45–4.5)
TTG IGA SER-ACNC: <0.5 U/ML
TTG IGA SER-ACNC: NEGATIVE
TTG IGG SER-ACNC: <0.8 U/ML
TTG IGG SER-ACNC: NEGATIVE
UROBILINOGEN UR QL STRIP.AUTO: 0.2 E.U./DL
WBC # BLD AUTO: 4.21 THOUSAND/UL (ref 4.31–10.16)
WBC #/AREA URNS AUTO: ABNORMAL /HPF

## 2024-05-09 PROCEDURE — 81001 URINALYSIS AUTO W/SCOPE: CPT | Performed by: INTERNAL MEDICINE

## 2024-05-09 PROCEDURE — 84443 ASSAY THYROID STIM HORMONE: CPT

## 2024-05-09 PROCEDURE — 82306 VITAMIN D 25 HYDROXY: CPT

## 2024-05-09 PROCEDURE — 82784 ASSAY IGA/IGD/IGG/IGM EACH: CPT

## 2024-05-09 PROCEDURE — 80053 COMPREHEN METABOLIC PANEL: CPT

## 2024-05-09 PROCEDURE — 84165 PROTEIN E-PHORESIS SERUM: CPT

## 2024-05-09 PROCEDURE — 85025 COMPLETE CBC W/AUTO DIFF WBC: CPT

## 2024-05-09 PROCEDURE — 83970 ASSAY OF PARATHORMONE: CPT

## 2024-05-09 PROCEDURE — 86140 C-REACTIVE PROTEIN: CPT

## 2024-05-09 PROCEDURE — 36415 COLL VENOUS BLD VENIPUNCTURE: CPT

## 2024-05-09 PROCEDURE — 86364 TISS TRNSGLTMNASE EA IG CLAS: CPT

## 2024-05-09 PROCEDURE — 86258 DGP ANTIBODY EACH IG CLASS: CPT

## 2024-05-09 NOTE — TELEPHONE ENCOUNTER
----- Message from Alice Mireles MD sent at 5/9/2024 12:57 PM EDT -----  Mildly abnormal urinalysis.  Any urinary symptoms?  If yes get culture and sensitivity.

## 2024-05-10 LAB
ALBUMIN SERPL ELPH-MCNC: 3.98 G/DL (ref 3.2–5.1)
ALBUMIN SERPL ELPH-MCNC: 63.1 % (ref 48–70)
ALPHA1 GLOB SERPL ELPH-MCNC: 0.28 G/DL (ref 0.15–0.47)
ALPHA1 GLOB SERPL ELPH-MCNC: 4.5 % (ref 1.8–7)
ALPHA2 GLOB SERPL ELPH-MCNC: 0.63 G/DL (ref 0.42–1.04)
ALPHA2 GLOB SERPL ELPH-MCNC: 10 % (ref 5.9–14.9)
BETA GLOB ABNORMAL SERPL ELPH-MCNC: 0.33 G/DL (ref 0.31–0.57)
BETA1 GLOB SERPL ELPH-MCNC: 5.3 % (ref 4.7–7.7)
BETA2 GLOB SERPL ELPH-MCNC: 4.9 % (ref 3.1–7.9)
BETA2+GAMMA GLOB SERPL ELPH-MCNC: 0.31 G/DL (ref 0.2–0.58)
GAMMA GLOB ABNORMAL SERPL ELPH-MCNC: 0.77 G/DL (ref 0.4–1.66)
GAMMA GLOB SERPL ELPH-MCNC: 12.2 % (ref 6.9–22.3)
IGG/ALB SER: 1.71 {RATIO} (ref 1.1–1.8)
PROT PATTERN SERPL ELPH-IMP: ABNORMAL
PROT SERPL-MCNC: 6.3 G/DL (ref 6.4–8.2)

## 2024-05-10 PROCEDURE — 84165 PROTEIN E-PHORESIS SERUM: CPT | Performed by: PATHOLOGY

## 2024-05-10 NOTE — TELEPHONE ENCOUNTER
Patient called asking if  could please place a referral to 's office to be evaluated for the clearance. She has a hard time getting to her current Periodontist as he is very far away and does not have a visit until August. She would like to start the medication process sooner that later, so she would like to see Dr.Kapoor fine. Please advise

## 2024-05-11 ENCOUNTER — APPOINTMENT (OUTPATIENT)
Dept: LAB | Facility: CLINIC | Age: 84
End: 2024-05-11
Payer: COMMERCIAL

## 2024-05-11 PROCEDURE — 82340 ASSAY OF CALCIUM IN URINE: CPT

## 2024-05-12 LAB
CALCIUM 24H UR-MCNC: 253.8 MG/24 HRS (ref 100–300)
SPECIMEN VOL UR: 1800 ML

## 2024-05-13 ENCOUNTER — APPOINTMENT (OUTPATIENT)
Dept: RADIOLOGY | Age: 84
End: 2024-05-13
Payer: COMMERCIAL

## 2024-05-13 DIAGNOSIS — M81.0 SENILE OSTEOPOROSIS: ICD-10-CM

## 2024-05-13 DIAGNOSIS — M41.25 OTHER IDIOPATHIC SCOLIOSIS, THORACOLUMBAR REGION: ICD-10-CM

## 2024-05-13 PROBLEM — M15.0 PRIMARY GENERALIZED (OSTEO)ARTHRITIS: Status: ACTIVE | Noted: 2024-05-13

## 2024-05-13 PROBLEM — R29.890 HEIGHT LOSS: Status: ACTIVE | Noted: 2024-05-13

## 2024-05-13 PROBLEM — Z86.39 HISTORY OF THYROID DISEASE: Status: ACTIVE | Noted: 2024-05-13

## 2024-05-13 PROCEDURE — 72072 X-RAY EXAM THORAC SPINE 3VWS: CPT

## 2024-05-13 PROCEDURE — 72110 X-RAY EXAM L-2 SPINE 4/>VWS: CPT

## 2024-05-20 ENCOUNTER — TELEPHONE (OUTPATIENT)
Age: 84
End: 2024-05-20

## 2024-05-20 NOTE — TELEPHONE ENCOUNTER
aJyla spoke with patient and informed her of her results and Dr. Mireles's suggestions. She wants to speak with her other physicians before she starts Evenity but would like us to go forward with the benefit invest.

## 2024-05-20 NOTE — TELEPHONE ENCOUNTER
----- Message from Alice Mireles MD sent at 5/18/2024  1:20 AM EDT -----  Notify the patient that her lumbar spine film shows arthritis with no evidence for fracture.  I would suggest that she start Evenity if authorized by her insurance.  With managed Medicare, the out-of-pocket expense may be too high.  If the out-of-pocket is too high for Evenity, would suggest checking what her out-of-pocket expense would be for Prolia.  While Evenity would be the preferred medication for her, if it is cost prohibitive, we will go with Prolia.  She was cleared by the periodontist to start treatment for her osteoporosis.

## 2024-05-23 ENCOUNTER — RA CDI HCC (OUTPATIENT)
Dept: OTHER | Facility: HOSPITAL | Age: 84
End: 2024-05-23

## 2024-05-31 ENCOUNTER — OFFICE VISIT (OUTPATIENT)
Dept: FAMILY MEDICINE CLINIC | Facility: CLINIC | Age: 84
End: 2024-05-31
Payer: COMMERCIAL

## 2024-05-31 VITALS
HEIGHT: 64 IN | RESPIRATION RATE: 16 BRPM | OXYGEN SATURATION: 100 % | TEMPERATURE: 97.4 F | WEIGHT: 125 LBS | HEART RATE: 75 BPM | DIASTOLIC BLOOD PRESSURE: 82 MMHG | SYSTOLIC BLOOD PRESSURE: 128 MMHG | BODY MASS INDEX: 21.34 KG/M2

## 2024-05-31 DIAGNOSIS — S42.402S CLOSED FRACTURE OF LEFT ELBOW, SEQUELA: ICD-10-CM

## 2024-05-31 DIAGNOSIS — G47.01 INSOMNIA DUE TO MEDICAL CONDITION: ICD-10-CM

## 2024-05-31 DIAGNOSIS — E78.2 MIXED HYPERLIPIDEMIA: ICD-10-CM

## 2024-05-31 DIAGNOSIS — M81.0 SENILE OSTEOPOROSIS: ICD-10-CM

## 2024-05-31 DIAGNOSIS — Z00.00 MEDICARE ANNUAL WELLNESS VISIT, SUBSEQUENT: Primary | ICD-10-CM

## 2024-05-31 PROCEDURE — G0439 PPPS, SUBSEQ VISIT: HCPCS | Performed by: FAMILY MEDICINE

## 2024-05-31 RX ORDER — CHLORHEXIDINE GLUCONATE ORAL RINSE 1.2 MG/ML
SOLUTION DENTAL
COMMUNITY
Start: 2024-05-29

## 2024-05-31 NOTE — PATIENT INSTRUCTIONS
Medicare Preventive Visit Patient Instructions  Thank you for completing your Welcome to Medicare Visit or Medicare Annual Wellness Visit today. Your next wellness visit will be due in one year (6/1/2025).  The screening/preventive services that you may require over the next 5-10 years are detailed below. Some tests may not apply to you based off risk factors and/or age. Screening tests ordered at today's visit but not completed yet may show as past due. Also, please note that scanned in results may not display below.  Preventive Screenings:  Service Recommendations Previous Testing/Comments   Colorectal Cancer Screening  * Colonoscopy    * Fecal Occult Blood Test (FOBT)/Fecal Immunochemical Test (FIT)  * Fecal DNA/Cologuard Test  * Flexible Sigmoidoscopy Age: 45-75 years old   Colonoscopy: every 10 years (may be performed more frequently if at higher risk)  OR  FOBT/FIT: every 1 year  OR  Cologuard: every 3 years  OR  Sigmoidoscopy: every 5 years  Screening may be recommended earlier than age 45 if at higher risk for colorectal cancer. Also, an individualized decision between you and your healthcare provider will decide whether screening between the ages of 76-85 would be appropriate. Colonoscopy: 08/18/2023  FOBT/FIT: Not on file  Cologuard: Not on file  Sigmoidoscopy: Not on file          Breast Cancer Screening Age: 40+ years old  Frequency: every 1-2 years  Not required if history of left and right mastectomy Mammogram: 02/26/2024        Cervical Cancer Screening Between the ages of 21-29, pap smear recommended once every 3 years.   Between the ages of 30-65, can perform pap smear with HPV co-testing every 5 years.   Recommendations may differ for women with a history of total hysterectomy, cervical cancer, or abnormal pap smears in past. Pap Smear: 06/02/2021        Hepatitis C Screening Once for adults born between 1945 and 1965  More frequently in patients at high risk for Hepatitis C Hep C Antibody: Not on  file        Diabetes Screening 1-2 times per year if you're at risk for diabetes or have pre-diabetes Fasting glucose: 85 mg/dL (5/9/2024)  A1C: 5.1 % (7/22/2019)      Cholesterol Screening Once every 5 years if you don't have a lipid disorder. May order more often based on risk factors. Lipid panel: 11/13/2023          Other Preventive Screenings Covered by Medicare:  Abdominal Aortic Aneurysm (AAA) Screening: covered once if your at risk. You're considered to be at risk if you have a family history of AAA.  Lung Cancer Screening: covers low dose CT scan once per year if you meet all of the following conditions: (1) Age 55-77; (2) No signs or symptoms of lung cancer; (3) Current smoker or have quit smoking within the last 15 years; (4) You have a tobacco smoking history of at least 20 pack years (packs per day multiplied by number of years you smoked); (5) You get a written order from a healthcare provider.  Glaucoma Screening: covered annually if you're considered high risk: (1) You have diabetes OR (2) Family history of glaucoma OR (3)  aged 50 and older OR (4)  American aged 65 and older  Osteoporosis Screening: covered every 2 years if you meet one of the following conditions: (1) You're estrogen deficient and at risk for osteoporosis based off medical history and other findings; (2) Have a vertebral abnormality; (3) On glucocorticoid therapy for more than 3 months; (4) Have primary hyperparathyroidism; (5) On osteoporosis medications and need to assess response to drug therapy.   Last bone density test (DXA Scan): 01/11/2023.  HIV Screening: covered annually if you're between the age of 15-65. Also covered annually if you are younger than 15 and older than 65 with risk factors for HIV infection. For pregnant patients, it is covered up to 3 times per pregnancy.    Immunizations:  Immunization Recommendations   Influenza Vaccine Annual influenza vaccination during flu season is  recommended for all persons aged >= 6 months who do not have contraindications   Pneumococcal Vaccine   * Pneumococcal conjugate vaccine = PCV13 (Prevnar 13), PCV15 (Vaxneuvance), PCV20 (Prevnar 20)  * Pneumococcal polysaccharide vaccine = PPSV23 (Pneumovax) Adults 19-65 yo with certain risk factors or if 65+ yo  If never received any pneumonia vaccine: recommend Prevnar 20 (PCV20)  Give PCV20 if previously received 1 dose of PCV13 or PPSV23   Hepatitis B Vaccine 3 dose series if at intermediate or high risk (ex: diabetes, end stage renal disease, liver disease)   Respiratory syncytial virus (RSV) Vaccine - COVERED BY MEDICARE PART D  * RSVPreF3 (Arexvy) CDC recommends that adults 60 years of age and older may receive a single dose of RSV vaccine using shared clinical decision-making (SCDM)   Tetanus (Td) Vaccine - COST NOT COVERED BY MEDICARE PART B Following completion of primary series, a booster dose should be given every 10 years to maintain immunity against tetanus. Td may also be given as tetanus wound prophylaxis.   Tdap Vaccine - COST NOT COVERED BY MEDICARE PART B Recommended at least once for all adults. For pregnant patients, recommended with each pregnancy.   Shingles Vaccine (Shingrix) - COST NOT COVERED BY MEDICARE PART B  2 shot series recommended in those 19 years and older who have or will have weakened immune systems or those 50 years and older     Health Maintenance Due:      Topic Date Due   • Breast Cancer Screening: Mammogram  02/26/2025   • Colorectal Cancer Screening  08/16/2028     Immunizations Due:      Topic Date Due   • COVID-19 Vaccine (7 - 2023-24 season) 09/01/2023     Advance Directives   What are advance directives?  Advance directives are legal documents that state your wishes and plans for medical care. These plans are made ahead of time in case you lose your ability to make decisions for yourself. Advance directives can apply to any medical decision, such as the treatments you  want, and if you want to donate organs.   What are the types of advance directives?  There are many types of advance directives, and each state has rules about how to use them. You may choose a combination of any of the following:  Living will:  This is a written record of the treatment you want. You can also choose which treatments you do not want, which to limit, and which to stop at a certain time. This includes surgery, medicine, IV fluid, and tube feedings.   Durable power of  for healthcare (DPAHC):  This is a written record that states who you want to make healthcare choices for you when you are unable to make them for yourself. This person, called a proxy, is usually a family member or a friend. You may choose more than 1 proxy.  Do not resuscitate (DNR) order:  A DNR order is used in case your heart stops beating or you stop breathing. It is a request not to have certain forms of treatment, such as CPR. A DNR order may be included in other types of advance directives.  Medical directive:  This covers the care that you want if you are in a coma, near death, or unable to make decisions for yourself. You can list the treatments you want for each condition. Treatment may include pain medicine, surgery, blood transfusions, dialysis, IV or tube feedings, and a ventilator (breathing machine).  Values history:  This document has questions about your views, beliefs, and how you feel and think about life. This information can help others choose the care that you would choose.  Why are advance directives important?  An advance directive helps you control your care. Although spoken wishes may be used, it is better to have your wishes written down. Spoken wishes can be misunderstood, or not followed. Treatments may be given even if you do not want them. An advance directive may make it easier for your family to make difficult choices about your care.       © Copyright [employer] 2018 Information is for End  User's use only and may not be sold, redistributed or otherwise used for commercial purposes. All illustrations and images included in CareNotes® are the copyrighted property of [email or web address] or [employer]

## 2024-05-31 NOTE — ASSESSMENT & PLAN NOTE
Lab Results   Component Value Date    CHOLESTEROL 242 (H) 11/13/2023    CHOLESTEROL 227 (H) 11/14/2022    CHOLESTEROL 200 08/13/2021     Lab Results   Component Value Date    HDL 86 11/13/2023    HDL 85 11/14/2022    HDL 82 08/13/2021     Lab Results   Component Value Date    TRIG 73 11/13/2023    TRIG 73 11/14/2022    TRIG 55 08/13/2021     Lab Results   Component Value Date    NONHDLC 118 08/13/2021    NONHDLC 139 02/05/2020    NONHDLC 147 07/22/2019     Will recheck lipids  Low cholesterol diet advised

## 2024-05-31 NOTE — ASSESSMENT & PLAN NOTE
With history of nondisplaced fracture distal right humerus requiring open reduction internal fixation, with markedly low T-score in forearm (-5) on DEXA last year. Currently follows with rheumatology and was worked up for secondary causes

## 2024-05-31 NOTE — TELEPHONE ENCOUNTER
Spoke with pt today, she wanted to know if we could appeal the denial. I advised her we are already in the process of appealing, and she was pleased upon hearing that.

## 2024-05-31 NOTE — PROGRESS NOTES
Ambulatory Visit  Name: Ellie Franklin      : 1940      MRN: [id number]  Encounter Provider: Elodia Newton MD  Encounter Date: 2024   Encounter department: [facility]    Assessment & Plan   1. Medicare annual wellness visit, subsequent  Comments:  Doing well. Recovering from a fractured elbow. UTD for mammo. No longer need colonscopies or paps. Lipid to be done in 6 months. Plans to start evenity for osteoporosis  2. Closed fracture of left elbow, sequela  Comments:  As a result of a mechanical fall. Status post ORIF 2023. Completed PT and ortho is following. Osteoporosis was found and will be receiving treatment for this.  3. 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 condition  Comments:  Continues to have difficulty sleeping. Melatonin had caused dizziness in the past. Reccomend mg 200-400 mg prior to bed. Also aware of the other tx options available ( ie mirtazapine vs trazodone)  4. Senile osteoporosis  Assessment & Plan:  With history of nondisplaced fracture distal right humerus requiring open reduction internal fixation, with markedly low T-score in forearm (-5) on DEXA last year. Currently follows with rheumatology and was worked up for secondary causes was unrevealing. Evenity recently approved and rheumatologist will be administering  5. Mixed hyperlipidemia  Assessment & Plan:  Lab Results   Component Value Date    CHOLESTEROL 242 (H) 2023    CHOLESTEROL 227 (H) 2022    CHOLESTEROL 200 2021     Lab Results   Component Value Date    HDL 86 2023    HDL 85 2022    HDL 82 2021     Lab Results   Component Value Date    TRIG 73 2023    TRIG 73 2022    TRIG 55 2021     Lab Results   Component Value Date    NONHDLC 118 2021    NONHDLC 139 2020    NONHDLC 147 2019     Will recheck lipids  Low cholesterol diet advised    Orders:  -     Lipid panel; Future; Expected date: 2024      Depression Screening and  Follow-up Plan: Patient was screened for depression during today's encounter. They screened negative with a PHQ-2 score of 0.    Falls Plan of Care: balance, strength, and gait training instructions were provided.       Preventive health issues were discussed with patient, and age appropriate screening tests were ordered as noted in patient's After Visit Summary. Personalized health advice and appropriate referrals for health education or preventive services given if needed, as noted in patient's After Visit Summary.    History of Present Illness     Continues to have difficulty staying asleep   Wakes up 2 x to use the restroom   Fractured her right elbow in the Dec and underwent ORIF. Found to have osteoporosis and saw rheumatology   Work up for secondary causes was negative. Plans to start evenity     Patient Care Team:  Elodia Newton MD as PCP - General (Family Medicine)  Arlene Lange MD (Colon and Rectal Surgery)    Review of Systems  Medical History Reviewed by provider this encounter:       Annual Wellness Visit Questionnaire   Ellie is here for her Subsequent Wellness visit. Last Medicare Wellness visit information reviewed, patient interviewed and updates made to the record today.      Health Risk Assessment:   Patient rates overall health as good. Patient feels that their physical health rating is same. Patient is very satisfied with their life. Eyesight was rated as same. Hearing was rated as same. Patient feels that their emotional and mental health rating is slightly better. Patients states they are never, rarely angry. Patient states they are sometimes unusually tired/fatigued. Pain experienced in the last 7 days has been some. Patient's pain rating has been 1/10. Patient states that she has experienced no weight loss or gain in last 6 months. Pain at the elbow which was recently fractured    Depression Screening:   PHQ-2 Score: 0      Fall Risk Screening:   In the past year, patient has  experienced: history of falling in past year    Number of falls: 1  Injured during fall?: Yes    Feels unsteady when standing or walking?: No    Worried about falling?: No      Urinary Incontinence Screening:   Patient has not leaked urine accidently in the last six months.     Home Safety:  Patient does not have trouble with stairs inside or outside of their home. Patient has working smoke alarms and has working carbon monoxide detector. Home safety hazards include: none.     Nutrition:   Current diet is Regular.     Medications:   Patient is currently taking over-the-counter supplements. OTC medications include: see medication list. Patient is able to manage medications.     Activities of Daily Living (ADLs)/Instrumental Activities of Daily Living (IADLs):   Walk and transfer into and out of bed and chair?: Yes  Dress and groom yourself?: Yes    Bathe or shower yourself?: Yes    Feed yourself? Yes  Do your laundry/housekeeping?: Yes  Manage your money, pay your bills and track your expenses?: Yes  Make your own meals?: Yes    Do your own shopping?: Yes    Previous Hospitalizations:   Any hospitalizations or ED visits within the last 12 months?: Yes    How many hospitalizations have you had in the last year?: 1-2    Hospitalization Comments: Dr. Todd Johnson performed surgery on my right elbow . 12/23    Advance Care Planning:   Living will: Yes    Durable POA for healthcare: Yes    Advanced directive: Yes    Advanced directive counseling given: Yes      Cognitive Screening:   Provider or family/friend/caregiver concerned regarding cognition?: No    PREVENTIVE SCREENINGS      Cardiovascular Screening:    General: Screening Not Indicated and History Lipid Disorder      Diabetes Screening:     General: Screening Current      Colorectal Cancer Screening:     General: Screening Current      Breast Cancer Screening:     General: Screening Current      Cervical Cancer Screening:    General: Screening Not Indicated       Osteoporosis Screening:    General: Screening Not Indicated and History Osteoporosis      Lung Cancer Screening:     General: Screening Not Indicated    Screening, Brief Intervention, and Referral to Treatment (SBIRT)    Screening  Typical number of drinks in a day: 0  Typical number of drinks in a week: 0  Interpretation: Low risk drinking behavior.    AUDIT-C Screenin) How often did you have a drink containing alcohol in the past year? never  2) How many drinks did you have on a typical day when you were drinking in the past year? 0  3) How often did you have 6 or more drinks on one occasion in the past year? never    AUDIT-C Score: 0  Interpretation: Score 0-2 (female): Negative screen for alcohol misuse    Single Item Drug Screening:  How often have you used an illegal drug (including marijuana) or a prescription medication for non-medical reasons in the past year? never    Single Item Drug Screen Score: 0  Interpretation: Negative screen for possible drug use disorder    Social Determinants of Health     Financial Resource Strain: Low Risk  (2023)    Overall Financial Resource Strain (CARDIA)     Difficulty of Paying Living Expenses: Not hard at all   Food Insecurity: No Food Insecurity (2024)    Hunger Vital Sign     Worried About Running Out of Food in the Last Year: Never true     Ran Out of Food in the Last Year: Never true   Transportation Needs: No Transportation Needs (2024)    PRAPARE - Transportation     Lack of Transportation (Medical): No     Lack of Transportation (Non-Medical): No   Housing Stability: Low Risk  (2024)    Housing Stability Vital Sign     Unable to Pay for Housing in the Last Year: No     Number of Times Moved in the Last Year: 0     Homeless in the Last Year: No   Utilities: Not At Risk (2024)    [facility] Utilities     Threatened with loss of utilities: No     No results found.    Objective     /82 (BP Location: Left arm, Patient Position: Sitting,  "Cuff Size: Adult)   Pulse 75   Temp (!) 97.4 °F (36.3 °C) (Tympanic)   Resp 16   Ht 5' 3.5\" (1.613 m)   Wt 56.7 kg (125 lb)   LMP  (LMP Unknown)   SpO2 100%   BMI 21.80 kg/m²     Physical Exam  Constitutional:       General: She is not in acute distress.     Appearance: Normal appearance. She is not ill-appearing.   HENT:      Head: Normocephalic and atraumatic.      Right Ear: Tympanic membrane normal.      Left Ear: Tympanic membrane normal.      Mouth/Throat:      Mouth: Mucous membranes are moist.   Eyes:      Extraocular Movements: Extraocular movements intact.   Cardiovascular:      Rate and Rhythm: Normal rate and regular rhythm.      Heart sounds: No murmur heard.  Pulmonary:      Effort: Pulmonary effort is normal.      Breath sounds: Normal breath sounds.   Abdominal:      General: Abdomen is flat. There is no distension.      Palpations: There is no mass.      Tenderness: There is no abdominal tenderness. There is no guarding or rebound.      Hernia: No hernia is present.   Musculoskeletal:      Right lower leg: No edema.      Left lower leg: No edema.      Comments: Healed scar on the right elbow   Bone deformity on the right lateral elbow    Lymphadenopathy:      Cervical: No cervical adenopathy.   Neurological:      Mental Status: She is alert and oriented to person, place, and time.   Psychiatric:         Mood and Affect: Mood normal.         Behavior: Behavior normal.             "

## 2024-06-04 ENCOUNTER — APPOINTMENT (OUTPATIENT)
Dept: RADIOLOGY | Facility: AMBULARY SURGERY CENTER | Age: 84
End: 2024-06-04
Attending: STUDENT IN AN ORGANIZED HEALTH CARE EDUCATION/TRAINING PROGRAM
Payer: COMMERCIAL

## 2024-06-04 ENCOUNTER — OFFICE VISIT (OUTPATIENT)
Dept: OBGYN CLINIC | Facility: CLINIC | Age: 84
End: 2024-06-04
Payer: COMMERCIAL

## 2024-06-04 VITALS — WEIGHT: 125 LBS | BODY MASS INDEX: 21.34 KG/M2 | HEIGHT: 64 IN

## 2024-06-04 DIAGNOSIS — S42.494D OTHER CLOSED NONDISPLACED FRACTURE OF DISTAL END OF RIGHT HUMERUS WITH ROUTINE HEALING, SUBSEQUENT ENCOUNTER: Primary | ICD-10-CM

## 2024-06-04 DIAGNOSIS — S42.494D OTHER CLOSED NONDISPLACED FRACTURE OF DISTAL END OF RIGHT HUMERUS WITH ROUTINE HEALING, SUBSEQUENT ENCOUNTER: ICD-10-CM

## 2024-06-04 PROCEDURE — 99213 OFFICE O/P EST LOW 20 MIN: CPT | Performed by: STUDENT IN AN ORGANIZED HEALTH CARE EDUCATION/TRAINING PROGRAM

## 2024-06-04 PROCEDURE — 73080 X-RAY EXAM OF ELBOW: CPT

## 2024-06-04 NOTE — PROGRESS NOTES
Orthopaedics Office Visit - postop Patient Visit    ASSESSMENT/PLAN:    Assessment:   #1 Right Closed Distal Humerus Supracondylar Fracture status post ORIF, date of surgery 12/14/2023    Plan:   X-rays of the right humerus were reviewed with the patient was demonstrated well-maintained alignment of the patient's right supracondylar distal humerus fracture with interval fracture healing.  No change in the patient's previous hardware alignment  Patient doing well with no right elbow pain at rest or with motion  weight bearing as tolerated without restrictions  Continue HEP to right elbow to work on strengthening.  Patient has completed formal physical therapy with return to her baseline  Continue to take Tylenol as needed for pain  Patient is cleared for pool activities, bathing  Recommended massage daily to the right elbow to help decrease possibility of symptomatic hardware  Follow up in 3 months    To Do Next Visit:  X-rays of right elbow      _____________________________________________________  CHIEF COMPLAINT:  Chief Complaint   Patient presents with    Right Arm - Post-op         SUBJECTIVE:  Ellie Franklin is a 84 y.o. female who presents for initial visit accompanied by her son for evaluation of right elbow pain.  Patient had a mechanical fall 1 week ago and injured her right elbow.  She was evaluated in the ER and was provided a splint and referred to orthopedics.  Pain is well localized to the right elbow without radiation, pain is worse with motion and palpation, improves at rest and with the splint.  She denies associated numbness or tingling to the arm.  She does have history of a right olecranon fracture ORIF done over 20 years ago.  She denies any pain in her elbow before this new fall, she denies any issues with her previous surgery or hardware.  She states that she is fairly active for her age and is independent on all of her activities of daily living including driving.    Interval history on  12/18/2023:  Patient is status post right distal humerus ORIF 4 days ago, date of surgery 12/14/2023.  Patient presents for unscheduled follow-up, she states that she was taking the prescribed prophylactic Keflex and she experience some lips and tongue swelling on day #2 prompting presentation to the ER.  While she was in the ER, she was given Benadryl and the antibiotic was discontinued.  In the ER, the ER physician took down her postoperative dressing, and new dressing was applied consisting only of fluffy Webril and a very loose Ace wrap.  According to the son who is present at the visit today in the emergency department the date did not see any signs of a allergic reaction to the antibiotics.  They discontinued them out of caution.  Patient presents today for dressing evaluation and concerns regarding the antibiotic situation.  Her pain is well-controlled, the peripheral block that she received worked very well and has just started to wear off.  She has only taken Tylenol.    Interval history 12/26/2023:  The patient presents 12 days s/p right distal midshaft humerus ORIF, 12/14/2023.  She is doing well.  She denies any secondary incidences of sensation of swelling in her mouth or lips.  today she complains of mild left elbow and upper arm soreness.  She denies distal tingling or numbness.  She has used Tylenol yet has stopped as she is feeling better.  No longer using any narcotic pain medications she has used sling.  Patient has visiting home health aid come to her house.    Her main complaint has been of the Ace wrap.  She has discontinued the Ace wrap and feels better now.  No fevers or chills    Interval History 1/22/2024  Patient presents 6 weeks s/p right distal midshaft humerus ORIF, 12/14/2023.  She is doing well. She denies distal tingling or numbness.  She has used Tylenol yet has stopped as she is feeling better.  No longer using any narcotic pain medications. Patient has visiting home health aid  come to her house. Patient has been working with PT. She feels that she has made significant improvements in terms of both ROM and strength. She would like to continue working with formal PT.     Interval History 3/4/2024  Patient presents 12 weeks s/p right distal midshaft humerus ORIF, 12/14/2023.  She is doing well. She denies distal tingling or numbness.  She no longer takes any analgesics. Patient has completed PT. She feels that she has made significant improvements in terms of both ROM and strength.  She has returned back to her baseline range of motion.  Denies any numbness or paresthesias in the upper extremity.  Only complaint is feeling a slight snapping feeling over the posterior lateral aspect of the elbow over the plate.  This is nonpainful.    Interval history 6/4/2024  Patient presents approximately 5-months status post open reduction internal fixation of right distal humerus. She states overall she is doing well and denies any significant pain in the extremity. She feels that she has made significant improvements in terms of both ROM and strength.  She has returned back to her baseline range of motion. She offers no additional complaints. She has completed her PT at this time. She denies any numbness or paresthesias.     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    Colon polyp     Fracture of arm     Glaucoma     Hyponatremia 12/05/2018    Hypotension        PAST SURGICAL HISTORY:  Past Surgical History:   Procedure Laterality Date    BUNIONECTOMY      BUNIONECTOMY      CATARACT EXTRACTION      ELBOW SURGERY Right     EYE SURGERY  CATARACTS    FRACTURE SURGERY  PINS IN BROKEN ELBOW    IR CEREBRAL ANGIOGRAPHY  06/26/2019    IR CEREBRAL ANGIOGRAPHY / INTERVENTION  11/25/2018    DC OPEN TX HUMERAL SUPRACONDYLAR FRACTURE W/O XTN Right 12/14/2023    Procedure: OPEN REDUCTION W/ INTERNAL FIXATION (ORIF) HUMERUS MIDSHAFT / DISTAL;  Surgeon: Todd  DO Alex;  Location: [facility];  Service: Orthopedics    TONSILLECTOMY         FAMILY HISTORY:  Family History   Problem Relation Age of Onset    No Known Problems Mother     Heart attack Father     Diabetes Father     No Known Problems Sister     No Known Problems Daughter     No Known Problems Maternal Grandmother     Cancer Maternal Grandfather     Colon cancer Maternal Grandfather 40    No Known Problems Paternal Grandmother     No Known Problems Paternal Grandfather     No Known Problems Maternal Aunt     Cancer Paternal Aunt         pancreatic    No Known Problems Paternal Aunt     Heart disease Family        SOCIAL HISTORY:  Social History     Tobacco Use    Smoking status: Never    Smokeless tobacco: Never   Vaping Use    Vaping status: Never Used   Substance Use Topics    Alcohol use: Not Currently     Comment: not using due to her condition    Drug use: No       MEDICATIONS:    Current Outpatient Medications:     acetaminophen (TYLENOL) 650 mg CR tablet, Take 1 tablet (650 mg total) by mouth every 8 (eight) hours as needed for mild pain, Disp: 30 tablet, Rfl: 0    chlorhexidine (PERIDEX) 0.12 % solution, , Disp: , Rfl:     cholecalciferol (VITAMIN D3) 1,000 units tablet, Take 1,000 Units by mouth daily, Disp: , Rfl:     Dorzolamide HCl-Timolol Mal PF 22.3-6.8 MG/ML SOLN, , Disp: , Rfl:     dorzolamide-timolol (COSOPT) 22.3-6.8 MG/ML ophthalmic solution, Administer 1 drop to both eyes 2 (two) times a day, Disp: , Rfl:     estradiol (Yuvafem) 10 MCG TABS vaginal tablet, Insert 1 tablet (10 mcg total) into the vagina 2 (two) times a week, Disp: 24 tablet, Rfl: 2    Multiple Vitamins-Minerals (PreserVision AREDS 2) CAPS, Take by mouth, Disp: , Rfl:     multivitamin (THERAGRAN) TABS, Take 1 tablet by mouth daily Centrum Silver, Disp: , Rfl:     travoprost (TRAVATAN-Z) 0.004 % ophthalmic solution, 1 drop daily at bedtime, Disp: , Rfl:     ALLERGIES:  Allergies   Allergen Reactions    Atorvastatin Other (See  "Comments)     Severe muscle cramping     Codeine GI Intolerance    Metronidazole Dizziness     Headaches/dizzy/swelling of tongue    Risedronate Sodium Other (See Comments)     Severe muscle cramping        REVIEW OF SYSTEMS:  MSK: right elbow pain  Neuro: denies numbness or tingling  Pertinent items are otherwise noted in HPI.  A comprehensive review of systems was otherwise negative.    LABS:  HgA1c:   Lab Results   Component Value Date    HGBA1C 5.1 07/22/2019     BMP:   Lab Results   Component Value Date    CALCIUM 8.7 05/09/2024    K 3.7 05/09/2024    CO2 31 05/09/2024     05/09/2024    BUN 16 05/09/2024    CREATININE 0.63 05/09/2024     CBC: No components found for: \"CBC\"    _____________________________________________________  PHYSICAL EXAMINATION:  Musculoskeletal: Right Upper Extremity  The right upper extremity was exposed inspected.  The patient's incision over the posterior aspect of the elbow is well-healed and sealed without any surrounding erythema induration.  The patient has no tenderness to palpation juan-incisionally.  The patient's plate is palpable subcutaneously on the lateral border of the distal humerus.  This is nontender.  With flexion and extension the sliding of the skin overlying the plate is able to be palpable is nonpainful.  ROM elbow is 7-120 without pain. There is no palpable subluxation of the ulnar nerve with flexion and extension of the elbow.  Elbow flexion and extension strength are 5/5. The patient's sensation is intact to light touch in the median, radial, ulnar nerve distributions.  AIN, PIN, ulnar nerves are intact.  +2 radial pulses distally.  Compartments are all soft compressible        _____________________________________________________  STUDIES REVIEWED:  I personally reviewed the images and interpretation is as follows:  Right elbow x-ray demonstrates x-ray of the right elbow demonstrates well-maintained alignment of the right distal humerus fracture with no " signs of hardware failure or loosening. Fracture appears healed    PROCEDURES PERFORMED:  Procedures: none    Sg Dallas PA-C

## 2024-06-06 ENCOUNTER — TELEPHONE (OUTPATIENT)
Age: 84
End: 2024-06-06

## 2024-06-11 ENCOUNTER — CLINICAL SUPPORT (OUTPATIENT)
Age: 84
End: 2024-06-11

## 2024-06-11 VITALS
SYSTOLIC BLOOD PRESSURE: 116 MMHG | WEIGHT: 125.6 LBS | BODY MASS INDEX: 21.9 KG/M2 | OXYGEN SATURATION: 98 % | TEMPERATURE: 98.1 F | HEART RATE: 73 BPM | DIASTOLIC BLOOD PRESSURE: 64 MMHG

## 2024-06-11 DIAGNOSIS — M81.0 SENILE OSTEOPOROSIS: Primary | ICD-10-CM

## 2024-06-11 NOTE — PROGRESS NOTES
Assessment/Plan:    Ellie Franklin came into the [facility] Rheumatology Office today 06/11/24 to receive Evenity #1 injection.      Verbal consent obtained.  Consent given by: patient    patient states patient has been medically healthy with no underlining concerns/complications.      Ellie Franklin presents with no symptoms today.       All insturctions were reviewed with the patient.    If the patient should have any questions/concerns, advised patient to contacted [facility] Rheumatology Office.       Subjective:     History provided by: patient    Patient ID: Ellie Franklin is a 84 y.o. female      Objective:    Vitals:    06/11/24 0951   BP: 116/64   BP Location: Left arm   Patient Position: Sitting   Cuff Size: Adult   Pulse: 73   Temp: 98.1 °F (36.7 °C)   TempSrc: Tympanic   SpO2: 98%   Weight: 57 kg (125 lb 9.6 oz)       Patient tolerated the injection well without any complications.  Injection site/s b/l thighs.  Medication was provided by office.    Patient signed consent form yes   Patient signed ABN form no (If no patient is not a medicare patient).   Patient waited 15 minutes after injection yes (This only applies to patient's receiving first time injection).       Last Visit: 5/29/2024  Next visit:10/10/2024

## 2024-06-12 ENCOUNTER — TELEPHONE (OUTPATIENT)
Age: 84
End: 2024-06-12

## 2024-06-12 NOTE — TELEPHONE ENCOUNTER
Pt calls in and states that she has started Evinity and she wants to make sure it is ook for her to continue her exercises with PT and her PCP has placed her on magnesium for her sleep. She just wants to make sure this is ok before continuing. Please advise

## 2024-06-17 ENCOUNTER — ANNUAL EXAM (OUTPATIENT)
Dept: GYNECOLOGY | Facility: CLINIC | Age: 84
End: 2024-06-17
Payer: COMMERCIAL

## 2024-06-17 VITALS
DIASTOLIC BLOOD PRESSURE: 80 MMHG | WEIGHT: 124 LBS | SYSTOLIC BLOOD PRESSURE: 122 MMHG | HEIGHT: 64 IN | BODY MASS INDEX: 21.17 KG/M2

## 2024-06-17 DIAGNOSIS — Z01.419 ROUTINE GYNECOLOGICAL EXAMINATION: Primary | ICD-10-CM

## 2024-06-17 DIAGNOSIS — Z12.31 SCREENING MAMMOGRAM FOR BREAST CANCER: ICD-10-CM

## 2024-06-17 DIAGNOSIS — N95.2 VAGINAL ATROPHY: ICD-10-CM

## 2024-06-17 PROCEDURE — G0145 SCR C/V CYTO,THINLAYER,RESCR: HCPCS | Performed by: OBSTETRICS & GYNECOLOGY

## 2024-06-17 PROCEDURE — G0101 CA SCREEN;PELVIC/BREAST EXAM: HCPCS | Performed by: OBSTETRICS & GYNECOLOGY

## 2024-06-17 NOTE — PROGRESS NOTES
"Ambulatory Visit  Name: Ellie Franklin      : 1940      MRN: [id number]  Encounter Provider: Chico Rehman MD  Encounter Date: 2024   Encounter department: [facility] GYNECOLOGY [place]    Assessment & Plan     Normal breast exam  Vaginal atrophy  Request for Pap smear  Normal mammogram 2024  Colonoscopy 2023 with polyp.  Due for repeat at 5 years    Plan: Check Pap smear.  Recommend healthy diet weightbearing and balancing exercises.  1. Screening mammogram for breast cancer  -     Mammo screening bilateral w 3d & cad; Future; Expected date: 2025        Ellie Franklin is a 84 y.o.   female who presents with no complaints.  Denies any pelvic pain vaginal bleeding breast bowel or bladder problems.  Presently not sexually active.  Requesting a Pap smear.  Fractured her right arm 2023.  Presently rehabbing at home.  No change in family history.  Maternal grandfather (colon cancer).  Medications reviewed.  Eating healthy and exercising.      Objective     /80   Ht 5' 3.5\" (1.613 m)   Wt 56.2 kg (124 lb)   LMP  (LMP Unknown)   BMI 21.62 kg/m²     Physical Exam  Vitals and nursing note reviewed.   Constitutional:       General: She is not in acute distress.     Appearance: She is well-developed.   HENT:      Head: Normocephalic and atraumatic.   Eyes:      Conjunctiva/sclera: Conjunctivae normal.   Cardiovascular:      Rate and Rhythm: Normal rate and regular rhythm.      Heart sounds: No murmur heard.  Pulmonary:      Effort: Pulmonary effort is normal. No respiratory distress.      Breath sounds: Normal breath sounds.   Abdominal:      Palpations: Abdomen is soft.      Tenderness: There is no abdominal tenderness.   Genitourinary:     Vagina: Normal.      Cervix: Normal.      Uterus: Normal.       Adnexa: Right adnexa normal and left adnexa normal.      Rectum: Normal.      Comments: Urethra and [place]'s glands.  Vaginal atrophy.  No uterine prolapse " cystocele or rectocele.  Musculoskeletal:         General: No swelling.      Cervical back: Neck supple.   Skin:     General: Skin is warm and dry.      Capillary Refill: Capillary refill takes less than 2 seconds.   Neurological:      Mental Status: She is alert.   Psychiatric:         Mood and Affect: Mood normal.       Administrative Statements

## 2024-06-18 ENCOUNTER — TELEPHONE (OUTPATIENT)
Age: 84
End: 2024-06-18

## 2024-06-18 NOTE — TELEPHONE ENCOUNTER
Could try Dr. Ibarra's office at the [facility]. She did just see gyn yesterday

## 2024-06-18 NOTE — TELEPHONE ENCOUNTER
Pt requested a provider referral for a gynecologist at the [facility] or the nearby area. Please contact pt and advise. Thank you for your help.

## 2024-06-18 NOTE — TELEPHONE ENCOUNTER
Advised pt of your message. Pt wanted you to know her current gynecologist is only practicing for another 6 months.

## 2024-06-20 LAB
LAB AP GYN PRIMARY INTERPRETATION: NORMAL
Lab: NORMAL

## 2024-07-17 ENCOUNTER — CLINICAL SUPPORT (OUTPATIENT)
Age: 84
End: 2024-07-17
Payer: COMMERCIAL

## 2024-07-17 VITALS
SYSTOLIC BLOOD PRESSURE: 116 MMHG | DIASTOLIC BLOOD PRESSURE: 62 MMHG | BODY MASS INDEX: 21.73 KG/M2 | TEMPERATURE: 97.2 F | HEART RATE: 66 BPM | WEIGHT: 124.6 LBS | OXYGEN SATURATION: 97 %

## 2024-07-17 DIAGNOSIS — M81.0 SENILE OSTEOPOROSIS: Primary | ICD-10-CM

## 2024-07-17 PROCEDURE — 96372 THER/PROPH/DIAG INJ SC/IM: CPT

## 2024-07-17 NOTE — PROGRESS NOTES
Assessment/Plan:    Ellie Franklin came into the [facility] Rheumatology Office today 07/17/24 to receive Evenity #2 injection.      Verbal consent obtained.  Consent given by: patient    patient states patient has been medically healthy with no underlining concerns/complications.      Ellie Franklin presents with no symptoms today.       All insturctions were reviewed with the patient.    If the patient should have any questions/concerns, advised patient to contacted [facility] Rheumatology Office.       Subjective:     History provided by: patient    Patient ID: Ellie Franklin is a 84 y.o. female      Objective:    There were no vitals filed for this visit.    Patient tolerated the injection well without any complications.  Injection site/s b/l upper arms.  Medication was provided by office.    Patient signed consent form yes   Patient signed ABN form no (If no patient is not a medicare patient).   Patient waited 15 minutes after injection no (This only applies to patient's receiving first time injection).       Last Visit: 6/11/2024  Next visit:8/20/2024

## 2024-08-20 ENCOUNTER — CLINICAL SUPPORT (OUTPATIENT)
Age: 84
End: 2024-08-20
Payer: COMMERCIAL

## 2024-08-20 VITALS
SYSTOLIC BLOOD PRESSURE: 116 MMHG | WEIGHT: 127.6 LBS | DIASTOLIC BLOOD PRESSURE: 70 MMHG | OXYGEN SATURATION: 100 % | TEMPERATURE: 97.6 F | HEART RATE: 65 BPM | BODY MASS INDEX: 22.6 KG/M2

## 2024-08-20 DIAGNOSIS — M81.0 SENILE OSTEOPOROSIS: Primary | ICD-10-CM

## 2024-08-20 PROCEDURE — 96372 THER/PROPH/DIAG INJ SC/IM: CPT

## 2024-08-20 NOTE — PROGRESS NOTES
Assessment/Plan:    Ellie Franklin came into the [facility] Rheumatology Office today 08/20/24 to receive Evenity #3 injection.      Verbal consent obtained.  Consent given by: patient    patient states patient has been medically healthy with no underlining concerns/complications.      Ellie Franklin presents with no symptoms today.       All insturctions were reviewed with the patient.    If the patient should have any questions/concerns, advised patient to contacted [facility] Rheumatology Office.       Subjective:     History provided by: patient    Patient ID: Ellie Franklin is a 84 y.o. female      Objective:    Vitals:    08/20/24 1006   BP: 116/70   BP Location: Right arm   Patient Position: Sitting   Cuff Size: Adult   Pulse: 65   Temp: 97.6 °F (36.4 °C)   TempSrc: Temporal   SpO2: 100%   Weight: 57.9 kg (127 lb 9.6 oz)       Patient tolerated the injection well without any complications.  Injection site/s bilateral thighs.  Medication was provided by office.    Patient signed consent form yes   Patient signed ABN form no (If no patient is not a medicare patient).   Patient waited 15 minutes after injection no (This only applies to patient's receiving first time injection).       Last Visit: 7/17/2024  Next visit: 9/23/2024

## 2024-08-27 NOTE — ED NOTES
Pt stood up with no complaints of dizziness just not feeling well, pulse ox at 100% before and after ambulation     Farrukh Falcon  07/23/22 [id number] 2 = A lot of assistance

## 2024-09-03 ENCOUNTER — APPOINTMENT (OUTPATIENT)
Dept: RADIOLOGY | Facility: AMBULARY SURGERY CENTER | Age: 84
End: 2024-09-03
Attending: STUDENT IN AN ORGANIZED HEALTH CARE EDUCATION/TRAINING PROGRAM
Payer: COMMERCIAL

## 2024-09-03 ENCOUNTER — OFFICE VISIT (OUTPATIENT)
Dept: OBGYN CLINIC | Facility: CLINIC | Age: 84
End: 2024-09-03
Payer: COMMERCIAL

## 2024-09-03 VITALS — BODY MASS INDEX: 22.61 KG/M2 | HEIGHT: 63 IN | WEIGHT: 127.6 LBS

## 2024-09-03 DIAGNOSIS — S42.494D OTHER CLOSED NONDISPLACED FRACTURE OF DISTAL END OF RIGHT HUMERUS WITH ROUTINE HEALING, SUBSEQUENT ENCOUNTER: Primary | ICD-10-CM

## 2024-09-03 DIAGNOSIS — S42.494D OTHER CLOSED NONDISPLACED FRACTURE OF DISTAL END OF RIGHT HUMERUS WITH ROUTINE HEALING, SUBSEQUENT ENCOUNTER: ICD-10-CM

## 2024-09-03 PROCEDURE — 1160F RVW MEDS BY RX/DR IN RCRD: CPT | Performed by: STUDENT IN AN ORGANIZED HEALTH CARE EDUCATION/TRAINING PROGRAM

## 2024-09-03 PROCEDURE — 1159F MED LIST DOCD IN RCRD: CPT | Performed by: STUDENT IN AN ORGANIZED HEALTH CARE EDUCATION/TRAINING PROGRAM

## 2024-09-03 PROCEDURE — 99213 OFFICE O/P EST LOW 20 MIN: CPT | Performed by: STUDENT IN AN ORGANIZED HEALTH CARE EDUCATION/TRAINING PROGRAM

## 2024-09-03 PROCEDURE — 73080 X-RAY EXAM OF ELBOW: CPT

## 2024-09-03 NOTE — PROGRESS NOTES
Orthopaedics Office Visit - postop Patient Visit    ASSESSMENT/PLAN:    Assessment:   #1 Right Closed Distal Humerus Supracondylar Fracture status post ORIF, date of surgery 12/14/2023    Plan:   X-rays of the right humerus were reviewed with the patient was demonstrated well-maintained alignment of the patient's right supracondylar distal humerus fracture with complete fracture healing no change in the patient's previous hardware alignment  Patient doing well with no right elbow pain at rest or with motion  weight bearing as tolerated without restrictions  Continue HEP to right elbow to work on strengthening.  Patient has completed formal physical therapy with return to her baseline  Continue to take Tylenol as needed for pain  Patient is cleared for pool activities, bathing  Symptomatic hardware no longer bothering the patient  Follow-up on an as-needed basis    To Do Next Visit:  X-rays of right elbow      _____________________________________________________  CHIEF COMPLAINT:  Chief Complaint   Patient presents with    Right Arm - Post-op         SUBJECTIVE:  Ellie Franklin is a 84 y.o. female who presents for initial visit accompanied by her son for evaluation of right elbow pain.  Patient had a mechanical fall 1 week ago and injured her right elbow.  She was evaluated in the ER and was provided a splint and referred to orthopedics.  Pain is well localized to the right elbow without radiation, pain is worse with motion and palpation, improves at rest and with the splint.  She denies associated numbness or tingling to the arm.  She does have history of a right olecranon fracture ORIF done over 20 years ago.  She denies any pain in her elbow before this new fall, she denies any issues with her previous surgery or hardware.  She states that she is fairly active for her age and is independent on all of her activities of daily living including driving.    Interval history on 12/18/2023:  Patient is status post  right distal humerus ORIF 4 days ago, date of surgery 12/14/2023.  Patient presents for unscheduled follow-up, she states that she was taking the prescribed prophylactic Keflex and she experience some lips and tongue swelling on day #2 prompting presentation to the ER.  While she was in the ER, she was given Benadryl and the antibiotic was discontinued.  In the ER, the ER physician took down her postoperative dressing, and new dressing was applied consisting only of fluffy Webril and a very loose Ace wrap.  According to the son who is present at the visit today in the emergency department the date did not see any signs of a allergic reaction to the antibiotics.  They discontinued them out of caution.  Patient presents today for dressing evaluation and concerns regarding the antibiotic situation.  Her pain is well-controlled, the peripheral block that she received worked very well and has just started to wear off.  She has only taken Tylenol.    Interval history 12/26/2023:  The patient presents 12 days s/p right distal midshaft humerus ORIF, 12/14/2023.  She is doing well.  She denies any secondary incidences of sensation of swelling in her mouth or lips.  today she complains of mild left elbow and upper arm soreness.  She denies distal tingling or numbness.  She has used Tylenol yet has stopped as she is feeling better.  No longer using any narcotic pain medications she has used sling.  Patient has visiting home health aid come to her house.    Her main complaint has been of the Ace wrap.  She has discontinued the Ace wrap and feels better now.  No fevers or chills    Interval History 1/22/2024  Patient presents 6 weeks s/p right distal midshaft humerus ORIF, 12/14/2023.  She is doing well. She denies distal tingling or numbness.  She has used Tylenol yet has stopped as she is feeling better.  No longer using any narcotic pain medications. Patient has visiting home health aid come to her house. Patient has been  working with PT. She feels that she has made significant improvements in terms of both ROM and strength. She would like to continue working with formal PT.     Interval History 3/4/2024  Patient presents 12 weeks s/p right distal midshaft humerus ORIF, 12/14/2023.  She is doing well. She denies distal tingling or numbness.  She no longer takes any analgesics. Patient has completed PT. She feels that she has made significant improvements in terms of both ROM and strength.  She has returned back to her baseline range of motion.  Denies any numbness or paresthesias in the upper extremity.  Only complaint is feeling a slight snapping feeling over the posterior lateral aspect of the elbow over the plate.  This is nonpainful.    Interval history 6/4/2024  Patient presents approximately 5-months status post open reduction internal fixation of right distal humerus. She states overall she is doing well and denies any significant pain in the extremity. She feels that she has made significant improvements in terms of both ROM and strength.  She has returned back to her baseline range of motion. She offers no additional complaints. She has completed her PT at this time. She denies any numbness or paresthesias.     Interval history 9/3/2024  Patient is now 9 months status post open reduction internal fixation of the right distal humerus.  The patient no longer is having any symptoms associated with the hardware over the distal humerus.  She has been able to return to all activities as tolerated.  She has returned to normal range of motion and strength.  She is doing home exercise programs.  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    Colon polyp     Fracture of arm     Glaucoma     Hyponatremia 12/05/2018    Hypotension     Nasal congestion        PAST SURGICAL HISTORY:  Past Surgical History:   Procedure Laterality Date     BUNIONECTOMY      BUNIONECTOMY      CATARACT EXTRACTION      ELBOW SURGERY Right     EYE SURGERY  CATARACTS    FRACTURE SURGERY  PINS IN BROKEN ELBOW    IR CEREBRAL ANGIOGRAPHY  06/26/2019    IR CEREBRAL ANGIOGRAPHY / INTERVENTION  11/25/2018    DE OPEN TX HUMERAL SUPRACONDYLAR FRACTURE W/O XTN Right 12/14/2023    Procedure: OPEN REDUCTION W/ INTERNAL FIXATION (ORIF) HUMERUS MIDSHAFT / DISTAL;  Surgeon: Todd Johnson DO;  Location: [facility];  Service: Orthopedics    TONSILLECTOMY         FAMILY HISTORY:  Family History   Problem Relation Age of Onset    No Known Problems Mother     Heart attack Father     Diabetes Father     Heart failure Father     No Known Problems Sister     No Known Problems Daughter     No Known Problems Maternal Grandmother     Cancer Maternal Grandfather     Colon cancer Maternal Grandfather 40    No Known Problems Paternal Grandmother     No Known Problems Paternal Grandfather     No Known Problems Maternal Aunt     Cancer Paternal Aunt         pancreatic    No Known Problems Paternal Aunt     Heart disease Family        SOCIAL HISTORY:  Social History     Tobacco Use    Smoking status: Never    Smokeless tobacco: Never   Vaping Use    Vaping status: Never Used   Substance Use Topics    Alcohol use: Not Currently     Comment: not using due to her condition    Drug use: No       MEDICATIONS:    Current Outpatient Medications:     acetaminophen (TYLENOL) 650 mg CR tablet, Take 1 tablet (650 mg total) by mouth every 8 (eight) hours as needed for mild pain, Disp: 30 tablet, Rfl: 0    chlorhexidine (PERIDEX) 0.12 % solution, , Disp: , Rfl:     cholecalciferol (VITAMIN D3) 1,000 units tablet, Take 1,000 Units by mouth daily, Disp: , Rfl:     dorzolamide-timolol (COSOPT) 22.3-6.8 MG/ML ophthalmic solution, Administer 1 drop to both eyes 2 (two) times a day, Disp: , Rfl:     estradiol (Yuvafem) 10 MCG TABS vaginal tablet, Insert 1 tablet (10 mcg total) into the vagina 2 (two) times a week, Disp:  "24 tablet, Rfl: 2    Multiple Vitamins-Minerals (PreserVision AREDS 2) CAPS, Take by mouth, Disp: , Rfl:     multivitamin (THERAGRAN) TABS, Take 1 tablet by mouth daily Centrum Silver, Disp: , Rfl:     travoprost (TRAVATAN-Z) 0.004 % ophthalmic solution, 1 drop daily at bedtime, Disp: , Rfl:     Dorzolamide HCl-Timolol Mal PF 22.3-6.8 MG/ML SOLN, , Disp: , Rfl:     Current Facility-Administered Medications:     romosozumab-aqqg (EVENITY) subcutaneous injection 210 mg, 210 mg, Subcutaneous (multi inj), Q30 Days, , 210 mg at 08/20/24 1013    romosozumab-aqqg (EVENITY) subcutaneous injection 210 mg, 210 mg, Subcutaneous (multi inj), Once,     ALLERGIES:  Allergies   Allergen Reactions    Atorvastatin Other (See Comments)     Severe muscle cramping     Codeine GI Intolerance    Metronidazole Dizziness     Headaches/dizzy/swelling of tongue    Risedronate Sodium Other (See Comments)     Severe muscle cramping        REVIEW OF SYSTEMS:  MSK: right elbow pain  Neuro: denies numbness or tingling  Pertinent items are otherwise noted in HPI.  A comprehensive review of systems was otherwise negative.    LABS:  HgA1c:   Lab Results   Component Value Date    HGBA1C 5.1 07/22/2019     BMP:   Lab Results   Component Value Date    CALCIUM 8.7 05/09/2024    K 3.7 05/09/2024    CO2 31 05/09/2024     05/09/2024    BUN 16 05/09/2024    CREATININE 0.63 05/09/2024     CBC: No components found for: \"CBC\"    _____________________________________________________  PHYSICAL EXAMINATION:  Musculoskeletal: Right Upper Extremity  The right upper extremity was exposed inspected.  The patient's incision over the posterior aspect of the elbow is well-healed and sealed without any surrounding erythema induration.  The patient has no tenderness to palpation juan-incisionally.  The patient's plate is palpable subcutaneously on the lateral border of the distal humerus.  This is nontender.  With flexion and extension the sliding of the skin " overlying the plate is able to be palpable is nonpainful.  ROM elbow is 7-120 without pain. There is no palpable subluxation of the ulnar nerve with flexion and extension of the elbow.  Elbow flexion and extension strength are 5/5. The patient's sensation is intact to light touch in the median, radial, ulnar nerve distributions.  AIN, PIN, ulnar nerves are intact.  +2 radial pulses distally.  Compartments are all soft compressible        _____________________________________________________  STUDIES REVIEWED:  I personally reviewed the images and interpretation is as follows:  Right elbow x-ray demonstrates x-ray of the right elbow demonstrates well-maintained alignment of the right distal humerus fracture with no signs of hardware failure or loosening. Fracture appears healed    PROCEDURES PERFORMED:  Procedures: none    Todd Johnson DO

## 2024-09-23 ENCOUNTER — CLINICAL SUPPORT (OUTPATIENT)
Age: 84
End: 2024-09-23
Payer: COMMERCIAL

## 2024-09-23 ENCOUNTER — TELEPHONE (OUTPATIENT)
Age: 84
End: 2024-09-23

## 2024-09-23 VITALS
OXYGEN SATURATION: 98 % | TEMPERATURE: 97.9 F | HEART RATE: 68 BPM | SYSTOLIC BLOOD PRESSURE: 118 MMHG | BODY MASS INDEX: 22.5 KG/M2 | DIASTOLIC BLOOD PRESSURE: 70 MMHG | WEIGHT: 127 LBS | HEIGHT: 63 IN

## 2024-09-23 DIAGNOSIS — M81.0 SENILE OSTEOPOROSIS: Primary | ICD-10-CM

## 2024-09-23 PROCEDURE — 96372 THER/PROPH/DIAG INJ SC/IM: CPT

## 2024-09-23 NOTE — PROGRESS NOTES
"Assessment/Plan:    Ellie Franklin came into the [facility] Rheumatology Office today 09/23/24 to receive Evenity  injection.      Verbal consent obtained.  Consent given by: patient    patient states patient has been medically healthy with no underlining concerns/complications.      Ellie Franklin presents with no symptoms today.       All insturctions were reviewed with the patient.    If the patient should have any questions/concerns, advised patient to contacted [facility] Rheumatology Office.       Subjective:     History provided by: patient    Patient ID: Ellie Franklin is a 84 y.o. female      Objective:    Vitals:    09/23/24 1139   TempSrc: Temporal   Weight: 57.6 kg (127 lb)   Height: 5' 3\" (1.6 m)       Patient tolerated the injection well without any complications.  Injection site/s left and right arm.  Medication was provided by provider stock.    Patient signed consent form no   Patient signed ABN form no (If no patient is not a medicare patient).   Patient waited 15 minutes after injection yes (This only applies to patient's receiving first time injection).       Last Visit: 8/20/2024  Next visit:10/10/2024     "

## 2024-09-23 NOTE — TELEPHONE ENCOUNTER
Patient calling stating she received aventity shot today in arm, would like to know when she is able to get COVID vaccine. Also asking if she needs to get it in opposite arm, will this affect aventity, please advise.

## 2024-10-10 ENCOUNTER — OFFICE VISIT (OUTPATIENT)
Age: 84
End: 2024-10-10

## 2024-10-10 VITALS
BODY MASS INDEX: 22.15 KG/M2 | SYSTOLIC BLOOD PRESSURE: 116 MMHG | HEIGHT: 63 IN | OXYGEN SATURATION: 96 % | TEMPERATURE: 96 F | WEIGHT: 125 LBS | HEART RATE: 71 BPM | DIASTOLIC BLOOD PRESSURE: 72 MMHG

## 2024-10-10 DIAGNOSIS — M41.25 OTHER IDIOPATHIC SCOLIOSIS, THORACOLUMBAR REGION: ICD-10-CM

## 2024-10-10 DIAGNOSIS — Z86.39 HISTORY OF THYROID DISEASE: ICD-10-CM

## 2024-10-10 DIAGNOSIS — S42.494D OTHER CLOSED NONDISPLACED FRACTURE OF DISTAL END OF RIGHT HUMERUS WITH ROUTINE HEALING, SUBSEQUENT ENCOUNTER: ICD-10-CM

## 2024-10-10 DIAGNOSIS — M81.0 SENILE OSTEOPOROSIS: Primary | ICD-10-CM

## 2024-10-10 DIAGNOSIS — M15.0 PRIMARY GENERALIZED (OSTEO)ARTHRITIS: ICD-10-CM

## 2024-10-10 DIAGNOSIS — H90.3 SENSORINEURAL HEARING LOSS (SNHL) OF BOTH EARS: ICD-10-CM

## 2024-10-10 DIAGNOSIS — I60.2 NONTRAUMATIC SUBARACHNOID HEMORRHAGE FROM ANTERIOR COMMUNICATING ARTERY (HCC): ICD-10-CM

## 2024-10-10 DIAGNOSIS — E55.9 VITAMIN D INSUFFICIENCY: ICD-10-CM

## 2024-10-10 NOTE — PATIENT INSTRUCTIONS
Continue monthly Evenity series until completion, which likely will be the end of May.  We will tentatively plan to start your Prolia the beginning of July.  I will make sure that we submit after the first of the year so that you will be cleared to start it when you are scheduled.  Get lab work in early June.  Continue calcium by diet and supplement and vitamin D supplement.  Continue your home exercise program as tolerated.  You will be due for your DEXA in January.  I gave you a prescription for that so you can schedule it.  I will be in touch with you to report on your results.

## 2024-10-10 NOTE — PROGRESS NOTES
Assessment/Plan:    Senile osteoporosis  Osteoporosis considered severe in view of low trauma fracture, with history of nondisplaced fracture distal right humerus requiring open reduction internal fixation.  History of prior elbow fracture after a fall on cement.  Metabolic workup nonrevealing.  DEXA from January 2023 significant for markedly low T-scores with femoral neck T-score -3.0 and forearm T-score -5.0.  High FRAX risk for fracture.  Patient is currently getting Evenity monthly series.  She is due for her fifth dose at the end of the month.  Will plan to transition to Prolia approximately 1 month after completion of the Evenity injections.  That would likely be in July of next year at which time she will have her first dose of Prolia and an office visit.  Monitor DEXA every 2 years.  She will be due in January 2025.  Prescription given for follow-up DEXA.  Continue calcium and vitamin D supplement and home exercise program as tolerated.  Practice fall prevention.    Primary generalized (osteo)arthritis  Primary generalized osteoarthritis with interphalangeal osteoarthritis hands and feet, and thoracolumbar degenerative spondylosis with scoliosis.  She has no significant joint or spine pain.  Encouraged home exercise program as tolerated.  Practice fall prevention in view of history of severe osteoporosis with prior low trauma fracture.  Continue to monitor.    Fracture of distal end of right humerus  Left trauma osteoporotic fracture of the distal right humerus, nondisplaced, requiring open reduction internal fixation.  This is consistent with osteoporotic fracture, and with history of markedly low BMD, she would be considered to have severe osteoporosis with very high risk for fracture.  She is currently on monthly Evenity injections with plans to transition to Prolia every 6 months, approximately 1 month after completion of Evenity series.  Monitor BMD every 2 years.  Continue calcium and vitamin D  supplement as well as practice fall prevention.  Continue to monitor.    Other idiopathic scoliosis, thoracolumbar region  Thoracolumbar scoliosis and degenerative spondylosis with x-rays significant for no compression deformities.  Continue Evenity monthly with plans to transition to Prolia every 6 months approximately 1 month after completion of Evenity.  Continue calcium and vitamin D supplement and home exercise program as tolerated.  Practice fall prevention.    History of thyroid disease  History of remote questionable thyroid disease.  She was treated with Synthroid when in college, which was ultimately discontinued when she was first .  Recent TSH dated 5/11/2024 normal.  Continue to monitor clinically and with lab work to rule out subclinical hypothyroidism.    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  She is status post coil placement.  Prior to the aneurysm rupture she did have temporary hearing loss.  She does have documented bilateral sensorineural hearing loss at this time.  No current complaints of headache or visual symptoms.  Continue to monitor.    Sensorineural hearing loss (SNHL) of both ears  Followed by audiologist.         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  She is status post coil placement.  Prior to the aneurysm rupture she did have temporary hearing loss.  She does have documented bilateral sensorineural hearing loss at this time.  No current complaints of headache or visual symptoms.  Continue to monitor.         Sensorineural hearing loss (SNHL) of both ears     Followed by audiologist.         Fracture of distal end of right humerus     Left trauma osteoporotic fracture of the distal  right humerus, nondisplaced, requiring open reduction internal fixation.  This is consistent with osteoporotic fracture, and with history of markedly low BMD, she would be considered to have severe osteoporosis with very high risk for fracture.  She is currently on monthly Evenity injections with plans to transition to Prolia every 6 months, approximately 1 month after completion of Evenity series.  Monitor BMD every 2 years.  Continue calcium and vitamin D supplement as well as practice fall prevention.  Continue to monitor.         Senile osteoporosis - Primary     Osteoporosis considered severe in view of low trauma fracture, with history of nondisplaced fracture distal right humerus requiring open reduction internal fixation.  History of prior elbow fracture after a fall on cement.  Metabolic workup nonrevealing.  DEXA from January 2023 significant for markedly low T-scores with femoral neck T-score -3.0 and forearm T-score -5.0.  High FRAX risk for fracture.  Patient is currently getting Evenity monthly series.  She is due for her fifth dose at the end of the month.  Will plan to transition to Prolia approximately 1 month after completion of the Evenity injections.  That would likely be in July of next year at which time she will have her first dose of Prolia and an office visit.  Monitor DEXA every 2 years.  She will be due in January 2025.  Prescription given for follow-up DEXA.  Continue calcium and vitamin D supplement and home exercise program as tolerated.  Practice fall prevention.         Relevant Orders    DXA bone density spine hip and pelvis    Comprehensive metabolic panel    Primary generalized (osteo)arthritis     Primary generalized osteoarthritis with interphalangeal osteoarthritis hands and feet, and thoracolumbar degenerative spondylosis with scoliosis.  She has no significant joint or spine pain.  Encouraged home exercise program as tolerated.  Practice fall prevention in view of history of  severe osteoporosis with prior low trauma fracture.  Continue to monitor.         History of thyroid disease     History of remote questionable thyroid disease.  She was treated with Synthroid when in college, which was ultimately discontinued when she was first .  Recent TSH dated 5/11/2024 normal.  Continue to monitor clinically and with lab work to rule out subclinical hypothyroidism.         Other idiopathic scoliosis, thoracolumbar region     Thoracolumbar scoliosis and degenerative spondylosis with x-rays significant for no compression deformities.  Continue Evenity monthly with plans to transition to Prolia every 6 months approximately 1 month after completion of Evenity.  Continue calcium and vitamin D supplement and home exercise program as tolerated.  Practice fall prevention.          Other Visit Diagnoses       Vitamin D insufficiency        Relevant Orders    Vitamin D 25 hydroxy                Reviewed records, labs, and imaging with the patient in detail.  Counseled patient.  Discussion regarding my findings and recommendations.  Office visit with documentation 30 min.    Subjective:      Patient ID: Ellie Franklin is a 84 y.o. female.    84-year-old female with osteoporosis, status post ORIF of closed right distal humerus supracondylar fracture and olecranon fracture dated 12/14/2023.  Fracture occurred with low trauma with pulling sweatshirt over her head which caused her to fall and utilize her right arm to break the fall.  She had a prior elbow fracture in 2003 after a fall on cement.  Patient has been menopausal since age 51 naturally.  She briefly took hormone therapy but was intolerant.  She has a 1-1/2 inch height loss, but does have documented scoliosis.  Family history is significant for son being treated for osteoporosis with history of cystic fibrosis.  Patient had remote history of nephrolithiasis with no recurrence.  She was put on Synthroid in college but this was  discontinued when she was first .  She does take vitamin D 1000 IU daily in addition to the vitamin D3 and her Centrum multivitamin.    DEXA dated 1/11/2023 significant for spine T-score -1.8.  Left total hip T-score -2.8 with femoral neck -3.0.  Forearm T-score -5.0.  FRAX risk for hip fracture 9.3% and major fracture risk 22%.  She was on Fosamax in the past, but is not sure for how long.  We started her on Evenity, and she is due for her fifth dose of the series at the end of the month.  She will complete 12 monthly doses and ultimately transition to Prolia.    Patient has no significant joint pain, joint swelling, back pain, or neck pain.  She does note morning stiffness of a couple minutes duration.  She does have a history of periodontal disease and is currently on PreviDent through the periodontist.  She does have dry mouth.  She also has dry eyes secondary to glaucoma which is treated by ophthalmologist.  She also takes PreserVision.  She has no history of headache.  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  She is status post coil placement.  Prior to the aneurysm rupture, she did have temporary hearing loss.  She has bilateral sensorineural hearing loss at present.  Review of systems is otherwise remarkable for insomnia with early awakening for which she uses melatonin.    Lab work dated 5/11/2024 significant for celiac panel negative.  IgA 182.  Serum protein electrophoresis with no monoclonal bands.  Immunofixation unremarkable.  PTH normal at 40.2.  TSH normal.  Vitamin D 41.6.  White blood cell count 4.21 with absolute neutrophil count 2.90.  CRP 1.7.  Creatinine 0.63 with estimated GFR 83.  Calcium 8.7.  24-hour urine calcium to 53.8.        Allergies  Allergies   Allergen Reactions    Atorvastatin Other (See Comments)     Severe muscle cramping     Codeine GI Intolerance    Metronidazole Dizziness     Headaches/dizzy/swelling of tongue     Risedronate Sodium Other (See Comments)     Severe muscle cramping        Home Medications    Current Outpatient Medications:     acetaminophen (TYLENOL) 650 mg CR tablet, Take 1 tablet (650 mg total) by mouth every 8 (eight) hours as needed for mild pain, Disp: 30 tablet, Rfl: 0    chlorhexidine (PERIDEX) 0.12 % solution, , Disp: , Rfl:     cholecalciferol (VITAMIN D3) 1,000 units tablet, Take 1,000 Units by mouth daily, Disp: , Rfl:     dorzolamide-timolol (COSOPT) 22.3-6.8 MG/ML ophthalmic solution, Administer 1 drop to both eyes 2 (two) times a day, Disp: , Rfl:     estradiol (Yuvafem) 10 MCG TABS vaginal tablet, Insert 1 tablet (10 mcg total) into the vagina 2 (two) times a week, Disp: 24 tablet, Rfl: 2    Multiple Vitamins-Minerals (PreserVision AREDS 2) CAPS, Take by mouth, Disp: , Rfl:     multivitamin (THERAGRAN) TABS, Take 1 tablet by mouth daily Centrum Silver, Disp: , Rfl:     travoprost (TRAVATAN-Z) 0.004 % ophthalmic solution, 1 drop daily at bedtime, Disp: , Rfl:     Dorzolamide HCl-Timolol Mal PF 22.3-6.8 MG/ML SOLN, , Disp: , Rfl:     Current Facility-Administered Medications:     romosozumab-aqqg (EVENITY) subcutaneous injection 210 mg, 210 mg, Subcutaneous (multi inj), Q30 Days, , 210 mg at 08/20/24 1013    romosozumab-aqqg (EVENITY) subcutaneous injection 210 mg, 210 mg, Subcutaneous (multi inj), Once,     romosozumab-aqqg (EVENITY) subcutaneous injection 210 mg, 210 mg, Subcutaneous (multi inj), Q30 Days, , 210 mg at 09/23/24 1507    Past Medical History  Past Medical History:   Diagnosis Date    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    Colon polyp     Fracture of arm     Glaucoma     Hyponatremia 12/05/2018    Hypotension     Nasal congestion        Past Surgical History   Past Surgical History:   Procedure Laterality Date    BUNIONECTOMY      BUNIONECTOMY      CATARACT EXTRACTION      ELBOW SURGERY Right     EYE SURGERY  CATARACTS    FRACTURE SURGERY  PINS IN BROKEN ELBOW    IR CEREBRAL  ANGIOGRAPHY  06/26/2019    IR CEREBRAL ANGIOGRAPHY / INTERVENTION  11/25/2018    MS OPEN TX HUMERAL SUPRACONDYLAR FRACTURE W/O XTN Right 12/14/2023    Procedure: OPEN REDUCTION W/ INTERNAL FIXATION (ORIF) HUMERUS MIDSHAFT / DISTAL;  Surgeon: Todd Johnson DO;  Location: [facility];  Service: Orthopedics    TONSILLECTOMY         Family History   Family History   Problem Relation Age of Onset    No Known Problems Mother     Heart attack Father     Diabetes Father     Heart failure Father     No Known Problems Sister     No Known Problems Daughter     No Known Problems Maternal Grandmother     Cancer Maternal Grandfather     Colon cancer Maternal Grandfather 40    No Known Problems Paternal Grandmother     No Known Problems Paternal Grandfather     No Known Problems Maternal Aunt     Cancer Paternal Aunt         pancreatic    No Known Problems Paternal Aunt     Heart disease Family        The following portions of the patient's history were reviewed and updated as appropriate: allergies, current medications, past family history, past medical history, past social history, past surgical history, and problem list.    Review of Systems   Constitutional:  Negative for chills and fever.   HENT:  Positive for dental problem (periodontal disease) and hearing loss. Negative for ear pain and sore throat.         Dry mouth   Eyes:  Negative for pain and visual disturbance.        Dry eyes from glaucoma   Respiratory:  Negative for cough and shortness of breath.    Cardiovascular:  Negative for chest pain and palpitations.   Gastrointestinal:  Negative for abdominal pain and vomiting.   Genitourinary:  Negative for dysuria and hematuria.        Nocturia   Musculoskeletal:  Negative for arthralgias and back pain.   Skin:  Negative for color change and rash.   Neurological:  Negative for seizures and syncope.   Psychiatric/Behavioral:  Positive for sleep disturbance.    All other systems reviewed and are negative.     "    Objective:      /72   Pulse 71   Temp (!) 96 °F (35.6 °C) (Temporal)   Ht 5' 3\" (1.6 m)   Wt 56.7 kg (125 lb)   LMP  (LMP Unknown)   SpO2 96%   BMI 22.14 kg/m²          Physical Exam  Vitals reviewed.   Constitutional:       Appearance: Normal appearance.   HENT:      Head: Normocephalic.      Nose:      Comments: Nose and throat unremarkable.  Eyes:      Extraocular Movements: Extraocular movements intact.   Neck:      Comments: Without masses, thyromegaly, lymphadenopathy  Cardiovascular:      Rate and Rhythm: Regular rhythm.   Pulmonary:      Breath sounds: Normal breath sounds.   Abdominal:      Palpations: Abdomen is soft.   Musculoskeletal:      Cervical back: Neck supple.      Comments: Neck decreased lateral flexion.  Shoulders full range of motion.  Elbows right lacks full extension secondary to prior fracture.  Left full range of motion.  Wrists full range of motion.  Tinel's positive bilateral wrist.  Hands squaring first carpometacarpal joints bilaterally with interphalangeal osteoarthritis.  Thickening flexor tendon sheaths ring fingers right greater than left and middle fingers.  No triggering appreciated.  No synovitis noted.  Back mild dorsal kyphosis.  Straight leg raising negative bilaterally.  Hips full range of motion.  Knees small cool effusions right greater than left with full range of motion with patellofemoral crepitus.  Ankles full range of motion.  Feet rearfoot hyperpronation with midfoot cavus deformities, high instep's, hallux valgus deformities, and hammertoe deformities.  No synovitis appreciated.   Skin:     General: Skin is warm.      Comments: Varicose veins lower extremities.  Trace ankle edema.  Periungual erythema with no digital ulcerations or dilated nailfold capillary loops.  No Raynaud's appreciated.   Neurological:      General: No focal deficit present.               This note was written in part using the assistance of the [employer] Flu[employer] " Direct [id number] microphone system. Those portions using this system have been dictated and not read.

## 2024-10-11 ENCOUNTER — TELEPHONE (OUTPATIENT)
Age: 84
End: 2024-10-11

## 2024-10-11 NOTE — TELEPHONE ENCOUNTER
Patient asking if her 10/25, 8:30 appointment  for evenity injection can be moved to a later time on that same day.    Patient requesting a call back

## 2024-10-12 NOTE — ASSESSMENT & PLAN NOTE
History of remote questionable thyroid disease.  She was treated with Synthroid when in college, which was ultimately discontinued when she was first .  Recent TSH dated 5/11/2024 normal.  Continue to monitor clinically and with lab work to rule out subclinical hypothyroidism.

## 2024-10-12 NOTE — ASSESSMENT & PLAN NOTE
Thoracolumbar scoliosis and degenerative spondylosis with x-rays significant for no compression deformities.  Continue Evenity monthly with plans to transition to Prolia every 6 months approximately 1 month after completion of Evenity.  Continue calcium and vitamin D supplement and home exercise program as tolerated.  Practice fall prevention.

## 2024-10-12 NOTE — ASSESSMENT & PLAN NOTE
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  She is status post coil placement.  Prior to the aneurysm rupture she did have temporary hearing loss.  She does have documented bilateral sensorineural hearing loss at this time.  No current complaints of headache or visual symptoms.  Continue to monitor.

## 2024-10-12 NOTE — ASSESSMENT & PLAN NOTE
Primary generalized osteoarthritis with interphalangeal osteoarthritis hands and feet, and thoracolumbar degenerative spondylosis with scoliosis.  She has no significant joint or spine pain.  Encouraged home exercise program as tolerated.  Practice fall prevention in view of history of severe osteoporosis with prior low trauma fracture.  Continue to monitor.

## 2024-10-12 NOTE — ASSESSMENT & PLAN NOTE
Osteoporosis considered severe in view of low trauma fracture, with history of nondisplaced fracture distal right humerus requiring open reduction internal fixation.  History of prior elbow fracture after a fall on cement.  Metabolic workup nonrevealing.  DEXA from January 2023 significant for markedly low T-scores with femoral neck T-score -3.0 and forearm T-score -5.0.  High FRAX risk for fracture.  Patient is currently getting Evenity monthly series.  She is due for her fifth dose at the end of the month.  Will plan to transition to Prolia approximately 1 month after completion of the Evenity injections.  That would likely be in July of next year at which time she will have her first dose of Prolia and an office visit.  Monitor DEXA every 2 years.  She will be due in January 2025.  Prescription given for follow-up DEXA.  Continue calcium and vitamin D supplement and home exercise program as tolerated.  Practice fall prevention.

## 2024-10-12 NOTE — ASSESSMENT & PLAN NOTE
Left trauma osteoporotic fracture of the distal right humerus, nondisplaced, requiring open reduction internal fixation.  This is consistent with osteoporotic fracture, and with history of markedly low BMD, she would be considered to have severe osteoporosis with very high risk for fracture.  She is currently on monthly Evenity injections with plans to transition to Prolia every 6 months, approximately 1 month after completion of Evenity series.  Monitor BMD every 2 years.  Continue calcium and vitamin D supplement as well as practice fall prevention.  Continue to monitor.

## 2024-10-14 NOTE — TELEPHONE ENCOUNTER
Patient calling office back stating the appointment for her injection is fine and does not need to change the time

## 2024-10-25 ENCOUNTER — CLINICAL SUPPORT (OUTPATIENT)
Dept: RHEUMATOLOGY | Facility: CLINIC | Age: 84
End: 2024-10-25
Payer: COMMERCIAL

## 2024-10-25 DIAGNOSIS — M81.0 AGE-RELATED OSTEOPOROSIS WITHOUT CURRENT PATHOLOGICAL FRACTURE: ICD-10-CM

## 2024-10-25 DIAGNOSIS — M81.0 SENILE OSTEOPOROSIS: Primary | ICD-10-CM

## 2024-10-25 PROCEDURE — 96372 THER/PROPH/DIAG INJ SC/IM: CPT

## 2024-10-25 NOTE — PROGRESS NOTES
Assessment/Plan:    Ellie Franklin came into the [facility] Office today 10/25/24 to receive Evenity injection.      Verbal consent obtained.  Consent given by: patient    patient states patient has been medically healthy with no underlining concerns/complications.      Ellie Franklin presents with no symptoms today.       All insturctions were reviewed with the patient.    If the patient should have any questions/concerns, advised patient to contacted [facility].       Subjective:     History provided by: patient    Patient ID: Ellie Franklin is a 84 y.o. female      Objective:    There were no vitals filed for this visit.    Patient tolerated the injection well without any complications.  Injection site/s Right and left thigh.  Medication was provided by Rheumatology.    Patient signed consent form yes   Patient signed ABN form no (If no patient is not a medicare patient).   Patient waited 15 minutes after injection no (This only applies to patient's receiving first time injection).       Last Visit: Visit date not found  Next visit:11/27/2024

## 2024-11-22 ENCOUNTER — RA CDI HCC (OUTPATIENT)
Dept: OTHER | Facility: HOSPITAL | Age: 84
End: 2024-11-22

## 2024-11-25 ENCOUNTER — NURSE TRIAGE (OUTPATIENT)
Age: 84
End: 2024-11-25

## 2024-11-25 ENCOUNTER — TELEPHONE (OUTPATIENT)
Age: 84
End: 2024-11-25

## 2024-11-25 NOTE — TELEPHONE ENCOUNTER
Regarding: hip pain  ----- Message from Tanja POST sent at 11/25/2024 11:04 AM EST -----  Pt calls in and states she just wanted to let provider know she is having L hip pain and it better but still have some pain yesterday she could not sit up straight

## 2024-11-25 NOTE — TELEPHONE ENCOUNTER
Rheumatology provider: Dr. Mireles    Chief complaint:    Pt calling regarding left hip pain. Pt has a hx of osteoarthritis, osteoporosis. She reports that yesterday she was experiencing a lot of pain in her left hip. The pain was aggravated by sitting in a chair and was alleviated by lying down or exercising. She did use Tylenol OTC for the pain as well as magnesium before bed last night which she felt was effective at managing the pain. Pain had mostly subsided by the time she woke up this morning. Recommended pt continue tylenol OTC for pain management and call back if she should develop any worsening symptoms.

## 2024-11-25 NOTE — TELEPHONE ENCOUNTER
"Reason for Disposition  • Hip pain    Answer Assessment - Initial Assessment Questions  1. LOCATION and RADIATION: \"Where is the pain located?\"       L hip  2. QUALITY: \"What does the pain feel like?\"  (e.g., sharp, dull, aching, burning)      Unspecified  3. SEVERITY: \"How bad is the pain?\" \"What does it keep you from doing?\"   (Scale 1-10; or mild, moderate, severe)      Improved from yesterday  4. ONSET: \"When did the pain start?\" \"Does it come and go, or is it there all the time?\"      Yesterday AM  5. WORK OR EXERCISE: \"Has there been any recent work or exercise that involved this part of the body?\"       None specified  6. CAUSE: \"What do you think is causing the hip pain?\"       Arthritis  7. AGGRAVATING FACTORS: \"What makes the hip pain worse?\" (e.g., walking, climbing stairs, running)      Sitting aggravated the pain  8. OTHER SYMPTOMS: \"Do you have any other symptoms?\" (e.g., back pain, pain shooting down leg,  fever, rash)      No  9. OTHER      Tylenol OTC, Magnesium hs last night.    Protocols used: Hip Pain-Adult-[place]    "

## 2024-11-26 ENCOUNTER — APPOINTMENT (OUTPATIENT)
Dept: LAB | Facility: CLINIC | Age: 84
End: 2024-11-26
Payer: COMMERCIAL

## 2024-11-26 DIAGNOSIS — E78.2 MIXED HYPERLIPIDEMIA: ICD-10-CM

## 2024-11-26 LAB
CHOLEST SERPL-MCNC: 229 MG/DL (ref ?–200)
HDLC SERPL-MCNC: 96 MG/DL
LDLC SERPL CALC-MCNC: 119 MG/DL (ref 0–100)
NONHDLC SERPL-MCNC: 133 MG/DL
TRIGL SERPL-MCNC: 70 MG/DL (ref ?–150)

## 2024-11-26 PROCEDURE — 80061 LIPID PANEL: CPT

## 2024-11-26 PROCEDURE — 36415 COLL VENOUS BLD VENIPUNCTURE: CPT

## 2024-11-27 ENCOUNTER — CLINICAL SUPPORT (OUTPATIENT)
Dept: RHEUMATOLOGY | Facility: CLINIC | Age: 84
End: 2024-11-27
Payer: COMMERCIAL

## 2024-11-27 ENCOUNTER — RESULTS FOLLOW-UP (OUTPATIENT)
Dept: FAMILY MEDICINE CLINIC | Facility: CLINIC | Age: 84
End: 2024-11-27

## 2024-11-27 DIAGNOSIS — M81.0 AGE-RELATED OSTEOPOROSIS WITHOUT CURRENT PATHOLOGICAL FRACTURE: Primary | ICD-10-CM

## 2024-11-27 PROCEDURE — 96372 THER/PROPH/DIAG INJ SC/IM: CPT

## 2024-11-27 NOTE — PROGRESS NOTES
Assessment/Plan:    Ellie Franklin came into the [facility] Office today 11/27/24 to receive Evenity injection.      Verbal consent obtained.  Consent given by: patient    patient states patient has been medically healthy with no underlining concerns/complications.      Ellie Franklin presents with no symptoms today.       All insturctions were reviewed with the patient.    If the patient should have any questions/concerns, advised patient to contacted [facility].       Subjective:     History provided by: patient    Patient ID: Ellie Franklin is a 84 y.o. female      Objective:    There were no vitals filed for this visit.    Patient tolerated the injection well without any complications.  Injection site/s Left and right Arms.  Medication was provided by Rheumatology.    Patient signed consent form yes   Patient signed ABN form no (If no patient is not a medicare patient).   Patient waited 15 minutes after injection no (This only applies to patient's receiving first time injection).       Last Visit: 10/25/2024  Next visit:Visit date not found

## 2024-12-02 ENCOUNTER — OFFICE VISIT (OUTPATIENT)
Dept: FAMILY MEDICINE CLINIC | Facility: CLINIC | Age: 84
End: 2024-12-02
Payer: COMMERCIAL

## 2024-12-02 VITALS
WEIGHT: 126.8 LBS | RESPIRATION RATE: 14 BRPM | OXYGEN SATURATION: 99 % | TEMPERATURE: 97.8 F | SYSTOLIC BLOOD PRESSURE: 109 MMHG | DIASTOLIC BLOOD PRESSURE: 58 MMHG | HEART RATE: 79 BPM | BODY MASS INDEX: 22.47 KG/M2 | HEIGHT: 63 IN

## 2024-12-02 DIAGNOSIS — E87.1 HYPONATREMIA: ICD-10-CM

## 2024-12-02 DIAGNOSIS — G47.01 INSOMNIA DUE TO MEDICAL CONDITION: ICD-10-CM

## 2024-12-02 DIAGNOSIS — E78.2 MIXED HYPERLIPIDEMIA: ICD-10-CM

## 2024-12-02 DIAGNOSIS — M81.0 SENILE OSTEOPOROSIS: Primary | ICD-10-CM

## 2024-12-02 PROCEDURE — G2211 COMPLEX E/M VISIT ADD ON: HCPCS | Performed by: FAMILY MEDICINE

## 2024-12-02 PROCEDURE — 99214 OFFICE O/P EST MOD 30 MIN: CPT | Performed by: FAMILY MEDICINE

## 2024-12-02 NOTE — ASSESSMENT & PLAN NOTE
Seeing rheumatology and has received 6 evenity injections. Side effects reported are tolerable. Continue ca and vitamin D. Also plans to walk the indoor track at the local community [place]

## 2024-12-02 NOTE — PROGRESS NOTES
Name: Ellie Franklin      : 1940      MRN: [id number]  Encounter Provider: Elodia Newton MD  Encounter Date: 2024   Encounter department: [facility]  :  Assessment & Plan  Senile osteoporosis  Seeing rheumatology and has received 6 evenity injections. Side effects reported are tolerable. Continue ca and vitamin D. Also plans to walk the indoor track at the local [facility]       Mixed hyperlipidemia  Lab Results   Component Value Date    CHOLESTEROL 229 (H) 2024    CHOLESTEROL 242 (H) 2023    CHOLESTEROL 227 (H) 2022     Lab Results   Component Value Date    HDL 96 2024    HDL 86 2023    HDL 85 2022     Lab Results   Component Value Date    TRIG 70 2024    TRIG 73 2023    TRIG 73 2022     Lab Results   Component Value Date    NONHDLC 133 2024    NONHDLC 118 2021    NONHDLC 139 2020     LDL mildly elevated but the HDL within goal. Continue low cholesterol diet.          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 condition  Magnesium does help to manage this       Hyponatremia  Resolved.     Lab Results   Component Value Date    SODIUM 139 2024    K 3.7 2024     2024    CO2 31 2024    BUN 16 2024    CREATININE 0.63 2024    GLUC 116 2022    CALCIUM 8.7 2024                   History of Present Illness     Here for follow-up.  No acute concerns.  Has received 6 Evenity shots.  Has experienced side effects with the injections which include fatigue, joint pain, and insomnia.  Symptoms are tolerable.  Does have a follow-up DEXA scheduled in January.  Mood is stable.  Has reached out to old friends.  Also plans to visit her son in [facility].  Joint pains stable as long as she stays active.  Does plan to go to the [facility] and walk the indoor track. Received flu shot this year. Considering RSV vaccine     Review of Systems   Constitutional:  Negative for fatigue.    Respiratory: Negative.     Cardiovascular: Negative.    Gastrointestinal: Negative.    Musculoskeletal:  Positive for arthralgias and myalgias.   Skin: Negative.    Psychiatric/Behavioral:  Positive for sleep disturbance.      Medical History Reviewed by provider this encounter:     .  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    Colon polyp     Fracture of arm     Glaucoma     Hyponatremia 12/05/2018    Hypotension     Nasal congestion      Past Surgical History:   Procedure Laterality Date    BUNIONECTOMY      BUNIONECTOMY      CATARACT EXTRACTION      ELBOW SURGERY Right     EYE SURGERY  CATARACTS    FRACTURE SURGERY  PINS IN BROKEN ELBOW    IR CEREBRAL ANGIOGRAPHY  06/26/2019    IR CEREBRAL ANGIOGRAPHY / INTERVENTION  11/25/2018    VT OPEN TX HUMERAL SUPRACONDYLAR FRACTURE W/O XTN Right 12/14/2023    Procedure: OPEN REDUCTION W/ INTERNAL FIXATION (ORIF) HUMERUS MIDSHAFT / DISTAL;  Surgeon: Todd Johnson DO;  Location: [facility];  Service: Orthopedics    TONSILLECTOMY       Family History   Problem Relation Age of Onset    No Known Problems Mother     Heart attack Father     Diabetes Father     Heart failure Father     No Known Problems Sister     No Known Problems Daughter     No Known Problems Maternal Grandmother     Cancer Maternal Grandfather     Colon cancer Maternal Grandfather 40    No Known Problems Paternal Grandmother     No Known Problems Paternal Grandfather     No Known Problems Maternal Aunt     Cancer Paternal Aunt         pancreatic    No Known Problems Paternal Aunt     Heart disease Family       reports that she has never smoked. She has never used smokeless tobacco. She reports that she does not currently use alcohol. She reports that she does not use drugs.  Current Outpatient Medications on File Prior to Visit   Medication Sig Dispense Refill    acetaminophen (TYLENOL) 650 mg CR tablet Take 1 tablet (650 mg total) by mouth every  8 (eight) hours as needed for mild pain 30 tablet 0    chlorhexidine (PERIDEX) 0.12 % solution       cholecalciferol (VITAMIN D3) 1,000 units tablet Take 1,000 Units by mouth daily      dorzolamide-timolol (COSOPT) 22.3-6.8 MG/ML ophthalmic solution Administer 1 drop to both eyes 2 (two) times a day      estradiol (Yuvafem) 10 MCG TABS vaginal tablet Insert 1 tablet (10 mcg total) into the vagina 2 (two) times a week 24 tablet 2    Multiple Vitamins-Minerals (PreserVision AREDS 2) CAPS Take by mouth      multivitamin (THERAGRAN) TABS Take 1 tablet by mouth daily Centrum Silver      travoprost (TRAVATAN-Z) 0.004 % ophthalmic solution 1 drop daily at bedtime      [DISCONTINUED] Dorzolamide HCl-Timolol Mal PF 22.3-6.8 MG/ML SOLN  (Patient not taking: Reported on 12/2/2024)       Current Facility-Administered Medications on File Prior to Visit   Medication Dose Route Frequency Provider Last Rate Last Admin    romosozumab-aqqg (EVENITY) subcutaneous injection 210 mg  210 mg Subcutaneous (multi inj) Q30 Days    210 mg at 08/20/24 1013    romosozumab-aqqg (EVENITY) subcutaneous injection 210 mg  210 mg Subcutaneous (multi inj) Once         romosozumab-aqqg (EVENITY) subcutaneous injection 210 mg  210 mg Subcutaneous (multi inj) Q30 Days    210 mg at 11/27/24 1138     Allergies   Allergen Reactions    Atorvastatin Other (See Comments)     Severe muscle cramping     Codeine GI Intolerance    Metronidazole Dizziness     Headaches/dizzy/swelling of tongue    Risedronate Sodium Other (See Comments)     Severe muscle cramping       Current Outpatient Medications on File Prior to Visit   Medication Sig Dispense Refill    acetaminophen (TYLENOL) 650 mg CR tablet Take 1 tablet (650 mg total) by mouth every 8 (eight) hours as needed for mild pain 30 tablet 0    chlorhexidine (PERIDEX) 0.12 % solution       cholecalciferol (VITAMIN D3) 1,000 units tablet Take 1,000 Units by mouth daily      dorzolamide-timolol (COSOPT) 22.3-6.8 MG/ML  "ophthalmic solution Administer 1 drop to both eyes 2 (two) times a day      estradiol (Yuvafem) 10 MCG TABS vaginal tablet Insert 1 tablet (10 mcg total) into the vagina 2 (two) times a week 24 tablet 2    Multiple Vitamins-Minerals (PreserVision AREDS 2) CAPS Take by mouth      multivitamin (THERAGRAN) TABS Take 1 tablet by mouth daily Centrum Silver      travoprost (TRAVATAN-Z) 0.004 % ophthalmic solution 1 drop daily at bedtime      [DISCONTINUED] Dorzolamide HCl-Timolol Mal PF 22.3-6.8 MG/ML SOLN  (Patient not taking: Reported on 12/2/2024)       Current Facility-Administered Medications on File Prior to Visit   Medication Dose Route Frequency Provider Last Rate Last Admin    romosozumab-aqqg (EVENITY) subcutaneous injection 210 mg  210 mg Subcutaneous (multi inj) Q30 Days    210 mg at 08/20/24 1013    romosozumab-aqqg (EVENITY) subcutaneous injection 210 mg  210 mg Subcutaneous (multi inj) Once         romosozumab-aqqg (EVENITY) subcutaneous injection 210 mg  210 mg Subcutaneous (multi inj) Q30 Days    210 mg at 11/27/24 1138      Social History     Tobacco Use    Smoking status: Never    Smokeless tobacco: Never   Vaping Use    Vaping status: Never Used   Substance and Sexual Activity    Alcohol use: Not Currently     Comment: not using due to her condition    Drug use: No    Sexual activity: Not Currently     Birth control/protection: Post-menopausal        Objective   /58 (BP Location: Left arm, Patient Position: Sitting, Cuff Size: Adult)   Pulse 79   Temp 97.8 °F (36.6 °C) (Tympanic)   Resp 14   Ht 5' 3\" (1.6 m)   Wt 57.5 kg (126 lb 12.8 oz)   LMP  (LMP Unknown)   SpO2 99%   BMI 22.46 kg/m²      Physical Exam  Constitutional:       General: She is not in acute distress.     Appearance: Normal appearance. She is not ill-appearing or toxic-appearing.   HENT:      Head: Normocephalic and atraumatic.      Right Ear: Tympanic membrane normal.      Left Ear: Tympanic membrane normal.      " Mouth/Throat:      Mouth: Mucous membranes are moist.   Eyes:      Extraocular Movements: Extraocular movements intact.   Cardiovascular:      Rate and Rhythm: Normal rate and regular rhythm.      Heart sounds: Murmur heard.   Pulmonary:      Effort: Pulmonary effort is normal. No respiratory distress.      Breath sounds: Normal breath sounds. No stridor. No wheezing, rhonchi or rales.   Abdominal:      General: There is no distension.      Palpations: Abdomen is soft. There is no mass.      Tenderness: There is no abdominal tenderness. There is no guarding or rebound.      Hernia: No hernia is present.   Skin:     General: Skin is warm.   Neurological:      Mental Status: She is alert and oriented to person, place, and time.   Psychiatric:         Mood and Affect: Mood normal.         Behavior: Behavior normal.

## 2024-12-02 NOTE — ASSESSMENT & PLAN NOTE
Lab Results   Component Value Date    CHOLESTEROL 229 (H) 11/26/2024    CHOLESTEROL 242 (H) 11/13/2023    CHOLESTEROL 227 (H) 11/14/2022     Lab Results   Component Value Date    HDL 96 11/26/2024    HDL 86 11/13/2023    HDL 85 11/14/2022     Lab Results   Component Value Date    TRIG 70 11/26/2024    TRIG 73 11/13/2023    TRIG 73 11/14/2022     Lab Results   Component Value Date    NONHDLC 133 11/26/2024    NONHDLC 118 08/13/2021    NONHDLC 139 02/05/2020     LDL mildly elevated but the HDL within goal. Continue low cholesterol diet.

## 2024-12-30 ENCOUNTER — CLINICAL SUPPORT (OUTPATIENT)
Dept: RHEUMATOLOGY | Facility: CLINIC | Age: 84
End: 2024-12-30
Payer: COMMERCIAL

## 2024-12-30 VITALS — SYSTOLIC BLOOD PRESSURE: 118 MMHG | DIASTOLIC BLOOD PRESSURE: 60 MMHG | OXYGEN SATURATION: 97 % | HEART RATE: 69 BPM

## 2024-12-30 DIAGNOSIS — M81.0 AGE-RELATED OSTEOPOROSIS WITHOUT CURRENT PATHOLOGICAL FRACTURE: Primary | ICD-10-CM

## 2024-12-30 PROCEDURE — 96372 THER/PROPH/DIAG INJ SC/IM: CPT

## 2024-12-30 NOTE — PROGRESS NOTES
Assessment/Plan:    Ellie Franklin came into the [facility] Rheumatology Office today 12/30/24 to receive Evenity injection.      Verbal consent obtained.  Consent given by: patient    patient states patient has been medically healthy with no underlining concerns/complications.      Ellie Franklin presents with no symptoms today.       All insturctions were reviewed with the patient.    If the patient should have any questions/concerns, advised patient to contacted [facility] Office.       Subjective:     History provided by: patient    Patient ID: Ellie Franklin is a 84 y.o. female      Objective:    There were no vitals filed for this visit.    Patient tolerated the injection well without any complications.  Injection site/s left and right thigh.  Medication was provided by Rheumatology.    Patient signed consent form yes   Patient signed ABN form yes (If no patient is not a medicare patient).   Patient waited 15 minutes after injection no (This only applies to patient's receiving first time injection).       Last Visit: 11/27/2024  Next visit:1/31/2025

## 2024-12-31 DIAGNOSIS — N95.2 ATROPHIC VAGINITIS: ICD-10-CM

## 2024-12-31 NOTE — TELEPHONE ENCOUNTER
Reason for call:   [] Refill   [x] Prior Auth  [] Other:     Office:   [] PCP/Provider -   [x] Specialty/Provider - obgyn / [place]    Medication: estradiol (Yuvafem) 10 MCG TABS vaginal tablet    Dose/Frequency: Sig: Insert 1 tablet (10 mcg total) into the vagina 2 (two) times a week    Quantity: 24    Pharmacy: [id number] - [facility] [place] - [id number]  [address] [facility] [address]  Phone: [phone]  Fax: [phone]    Does the patient have enough for 3 days?   [] Yes   [x] No - Send as HP to POD      Patient is looking for a refill until she sees new provider.

## 2025-01-02 RX ORDER — ESTRADIOL 10 UG/1
1 INSERT VAGINAL 2 TIMES WEEKLY
Qty: 24 TABLET | Refills: 0 | Status: SHIPPED | OUTPATIENT
Start: 2025-01-02

## 2025-01-02 NOTE — TELEPHONE ENCOUNTER
MYC message sent , letting pt know medication refill was sent to the pharmacy and to follow up with them to make sure it is ready for .

## 2025-01-23 ENCOUNTER — HOSPITAL ENCOUNTER (OUTPATIENT)
Dept: RADIOLOGY | Age: 85
Discharge: HOME | End: 2025-01-23
Payer: COMMERCIAL

## 2025-01-23 VITALS — BODY MASS INDEX: 22.15 KG/M2 | HEIGHT: 63 IN | WEIGHT: 125 LBS

## 2025-01-23 DIAGNOSIS — M81.0 SENILE OSTEOPOROSIS: ICD-10-CM

## 2025-01-23 PROCEDURE — 77080 DXA BONE DENSITY AXIAL: CPT

## 2025-01-29 ENCOUNTER — TELEPHONE (OUTPATIENT)
Age: 85
End: 2025-01-29

## 2025-01-29 NOTE — TELEPHONE ENCOUNTER
Pt calling to ask if she will need to come in for appt to review DXA. Informed pt once Dr. Mireles reviews, one of the nurses will contact her with the results and any recommendations. Do not expect her to need to come in.

## 2025-01-31 ENCOUNTER — CLINICAL SUPPORT (OUTPATIENT)
Dept: RHEUMATOLOGY | Facility: CLINIC | Age: 85
End: 2025-01-31
Payer: COMMERCIAL

## 2025-01-31 VITALS
HEART RATE: 77 BPM | WEIGHT: 130 LBS | OXYGEN SATURATION: 97 % | SYSTOLIC BLOOD PRESSURE: 132 MMHG | DIASTOLIC BLOOD PRESSURE: 62 MMHG | BODY MASS INDEX: 23.4 KG/M2

## 2025-01-31 DIAGNOSIS — M81.0 AGE-RELATED OSTEOPOROSIS WITHOUT CURRENT PATHOLOGICAL FRACTURE: Primary | ICD-10-CM

## 2025-01-31 PROCEDURE — 96372 THER/PROPH/DIAG INJ SC/IM: CPT

## 2025-01-31 NOTE — PROGRESS NOTES
Assessment/Plan:    Ellie Franklin came into the [facility] Rheumatology Office today 01/31/25 to receive Evenity #8 injection.      Verbal consent obtained.  Consent given by: patient    patient states patient has been medically healthy with no underlining concerns/complications.      Ellie Franklin presents with no symptoms today.       All insturctions were reviewed with the patient.    If the patient should have any questions/concerns, advised patient to contacted [facility] Office.       Subjective:     History provided by: patient    Patient ID: Ellie Franklin is a 84 y.o. female      Objective:    There were no vitals filed for this visit.    Patient tolerated the injection well without any complications.  Injection site/s b/l arms.  Medication was provided by rheumatology.    Patient signed consent form yes   Patient signed ABN form yes (If no patient is not a medicare patient).   Patient waited 15 minutes after injection no (This only applies to patient's receiving first time injection).       Last Visit: 12/30/2024  Next visit:3/4/2025

## 2025-02-02 ENCOUNTER — RESULTS FOLLOW-UP (OUTPATIENT)
Age: 85
End: 2025-02-02

## 2025-02-03 NOTE — TELEPHONE ENCOUNTER
Pt calling back informed her of result note from provider pt verbalizes understanding and gives thanks

## 2025-02-27 ENCOUNTER — HOSPITAL ENCOUNTER (OUTPATIENT)
Dept: RADIOLOGY | Age: 85
Discharge: HOME | End: 2025-02-27
Payer: COMMERCIAL

## 2025-02-27 DIAGNOSIS — Z12.31 SCREENING MAMMOGRAM FOR BREAST CANCER: ICD-10-CM

## 2025-02-27 PROCEDURE — 77063 BREAST TOMOSYNTHESIS BI: CPT

## 2025-02-27 PROCEDURE — 77067 SCR MAMMO BI INCL CAD: CPT

## 2025-03-03 ENCOUNTER — TELEPHONE (OUTPATIENT)
Age: 85
End: 2025-03-03

## 2025-03-03 DIAGNOSIS — Z01.84 IMMUNITY STATUS TESTING: Primary | ICD-10-CM

## 2025-03-03 NOTE — TELEPHONE ENCOUNTER
Patient called in with concern about measles and inquiring about titer level and if she should be vaccinated. Patient reports having measles as a child. Please follow up with patient for provider response for possible lab order or new vaccine if appropriate..

## 2025-03-04 ENCOUNTER — CLINICAL SUPPORT (OUTPATIENT)
Dept: RHEUMATOLOGY | Facility: CLINIC | Age: 85
End: 2025-03-04
Payer: COMMERCIAL

## 2025-03-04 ENCOUNTER — RESULTS FOLLOW-UP (OUTPATIENT)
Age: 85
End: 2025-03-04

## 2025-03-04 VITALS — HEART RATE: 70 BPM | OXYGEN SATURATION: 97 % | DIASTOLIC BLOOD PRESSURE: 64 MMHG | SYSTOLIC BLOOD PRESSURE: 104 MMHG

## 2025-03-04 DIAGNOSIS — M80.00XA AGE-RELATED OSTEOPOROSIS WITH CURRENT PATHOLOGICAL FRACTURE, UNSPECIFIED SITE, INITIAL ENCOUNTER FOR FRACTURE: Primary | ICD-10-CM

## 2025-03-04 PROCEDURE — 96372 THER/PROPH/DIAG INJ SC/IM: CPT

## 2025-03-04 NOTE — PROGRESS NOTES
Assessment/Plan:    Ellie Franklin came into the [facility] Rheumatology Office today 03/04/25 to receive Evenity #9 injection.      Verbal consent obtained.  Consent given by: patient    patient states patient has been medically healthy with no underlining concerns/complications.      Ellie Franklin presents with no symptoms today.       All insturctions were reviewed with the patient.    If the patient should have any questions/concerns, advised patient to contacted [facility] Office.       Subjective:     History provided by: patient    Patient ID: Ellie Franklin is a 84 y.o. female      Objective:    There were no vitals filed for this visit.    Patient tolerated the injection well without any complications.  Injection site/s bl arms.  Medication was provided by rheumatology.    Patient signed consent form yes   Patient signed ABN form yes (If no patient is not a medicare patient).   Patient waited 15 minutes after injection no (This only applies to patient's receiving first time injection).       Last Visit: 1/31/2025  Next visit:4/4/2025

## 2025-03-05 NOTE — TELEPHONE ENCOUNTER
Please advise. Patient made aware you will be out of the office till tomorrow and will address this when you return.

## 2025-03-06 ENCOUNTER — APPOINTMENT (OUTPATIENT)
Dept: LAB | Facility: CLINIC | Age: 85
End: 2025-03-06
Payer: COMMERCIAL

## 2025-03-06 DIAGNOSIS — Z01.84 IMMUNITY STATUS TESTING: ICD-10-CM

## 2025-03-06 PROCEDURE — 86762 RUBELLA ANTIBODY: CPT

## 2025-03-06 PROCEDURE — 86735 MUMPS ANTIBODY: CPT

## 2025-03-06 PROCEDURE — 86765 RUBEOLA ANTIBODY: CPT

## 2025-03-06 NOTE — TELEPHONE ENCOUNTER
Patient was called her concern was potential exposure ,Patient was made aware order placed to check immunity status.

## 2025-03-07 ENCOUNTER — RESULTS FOLLOW-UP (OUTPATIENT)
Dept: FAMILY MEDICINE CLINIC | Facility: CLINIC | Age: 85
End: 2025-03-07

## 2025-03-07 LAB
MEV IGG SER IA-ACNC: >300 AU/ML
MUV IGG SER IA-ACNC: 94.3 AU/ML
RUBV IGG SERPL IA-ACNC: 13.5 INDEX

## 2025-03-11 ENCOUNTER — TELEPHONE (OUTPATIENT)
Age: 85
End: 2025-03-11

## 2025-03-11 NOTE — TELEPHONE ENCOUNTER
Patient calling to confirm that she is up to date with her Covid vaccinations. Reports [facility]  Is wanting to know if she should be getting it every 6 months or every 12 months? Please f/u with patient with provider response.

## 2025-03-16 ENCOUNTER — APPOINTMENT (EMERGENCY)
Dept: CT IMAGING | Facility: HOSPITAL | Age: 85
End: 2025-03-16
Payer: COMMERCIAL

## 2025-03-16 ENCOUNTER — HOSPITAL ENCOUNTER (EMERGENCY)
Facility: HOSPITAL | Age: 85
Discharge: HOME | End: 2025-03-16
Attending: EMERGENCY MEDICINE
Payer: COMMERCIAL

## 2025-03-16 ENCOUNTER — APPOINTMENT (EMERGENCY)
Dept: RADIOLOGY | Facility: HOSPITAL | Age: 85
End: 2025-03-16
Payer: COMMERCIAL

## 2025-03-16 VITALS
SYSTOLIC BLOOD PRESSURE: 170 MMHG | DIASTOLIC BLOOD PRESSURE: 98 MMHG | BODY MASS INDEX: 22.5 KG/M2 | RESPIRATION RATE: 17 BRPM | TEMPERATURE: 98.2 F | OXYGEN SATURATION: 98 % | WEIGHT: 125 LBS | HEART RATE: 84 BPM

## 2025-03-16 DIAGNOSIS — S01.111A: ICD-10-CM
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
## 2025-03-16 PROCEDURE — 70450 CT HEAD/BRAIN W/O DYE: CPT

## 2025-03-16 PROCEDURE — 73130 X-RAY EXAM OF HAND: CPT

## 2025-03-16 PROCEDURE — 99284 EMERGENCY DEPT VISIT MOD MDM: CPT | Performed by: EMERGENCY MEDICINE

## 2025-03-16 PROCEDURE — 12011 RPR F/E/E/N/L/M 2.5 CM/<: CPT | Performed by: EMERGENCY MEDICINE

## 2025-03-16 PROCEDURE — 99284 EMERGENCY DEPT VISIT MOD MDM: CPT

## 2025-03-16 RX ORDER — LIDOCAINE HYDROCHLORIDE 10 MG/ML
5 INJECTION, SOLUTION EPIDURAL; INFILTRATION; INTRACAUDAL; PERINEURAL ONCE
Status: COMPLETED | OUTPATIENT
Start: 2025-03-16 | End: 2025-03-16

## 2025-03-16 RX ADMIN — LIDOCAINE HYDROCHLORIDE 5 ML: 10 INJECTION, SOLUTION EPIDURAL; INFILTRATION; INTRACAUDAL at 18:05

## 2025-03-16 NOTE — ED PROVIDER NOTES
Time reflects when diagnosis was documented in both MDM as applicable and the Disposition within this note       Time User Action Codes Description Comment    3/16/2025  7:52 PM Sylvester Pedroza Add [W19.XXXA] Fall, initial encounter     3/16/2025  7:53 PM Sylvester Pedroza Add [S01.111A] Laceration of skin of eyebrow, right, initial encounter           ED Disposition       ED Disposition   Discharge    Condition   Stable    Date/Time   Sun Mar 16, 2025  7:52 PM    Comment   Ellie Franklin discharge to home/self care.                   Assessment & Plan       Medical Decision Making  84-year-old female presents with right eyebrow ridge laceration  At risk for ICH, laceration, ecchymosis, osseous fracture, dislocation, other intracranial abnormality, etc.  Patient is up-to-date on her Tdap vaccine  Patient declines any medications for pain at this time  Head CT shows no intracranial hemorrhage or other acute intracranial abnormality  X-ray of the left hand shows no acute osseous fractures or dislocations  Patient agrees to laceration repair, clean thoroughly and draped in sterile fashion  Patient neurovascularly intact after procedure, no immediate complications  Anticipatory guidance given with strict return precautions  Answered all patient's questions, 1 over laboratory and imaging results  Patient has no further question, in stable condition cleared for discharge    Amount and/or Complexity of Data Reviewed  Radiology: ordered and independent interpretation performed. Decision-making details documented in ED Course.    Risk  Prescription drug management.        ED Course as of 03/16/25 1959   Sun Mar 16, 2025   1908 CT head without contrast       Medications   lidocaine (PF) (XYLOCAINE-MPF) 1 % injection 5 mL (5 mL Infiltration Given 3/16/25 1805)       ED Risk Strat Scores                                                History of Present Illness       Chief Complaint   Patient presents with    Fall     Pt states she  went for a walk and tripped up the curb and fall hitting her R eyebrow and knees on cement. Denies LOC. +lac to R eyebrow- bleeding controlled. 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    Colon polyp     Fracture of arm     Glaucoma     Hyponatremia 12/05/2018    Hypotension     Nasal congestion       Past Surgical History:   Procedure Laterality Date    BUNIONECTOMY      BUNIONECTOMY      CATARACT EXTRACTION      ELBOW SURGERY Right     EYE SURGERY  CATARACTS    FRACTURE SURGERY  PINS IN BROKEN ELBOW    IR CEREBRAL ANGIOGRAPHY  06/26/2019    IR CEREBRAL ANGIOGRAPHY / INTERVENTION  11/25/2018    TN OPEN TX HUMERAL SUPRACONDYLAR FRACTURE W/O XTN Right 12/14/2023    Procedure: OPEN REDUCTION W/ INTERNAL FIXATION (ORIF) HUMERUS MIDSHAFT / DISTAL;  Surgeon: Todd Johnson DO;  [facility];  Service: Orthopedics    TONSILLECTOMY        Family History   Problem Relation Age of Onset    No Known Problems Mother     Heart attack Father     Diabetes Father     Heart failure Father     No Known Problems Sister     No Known Problems Daughter     No Known Problems Maternal Grandmother     Cancer Maternal Grandfather     Colon cancer Maternal Grandfather 40    No Known Problems Paternal Grandmother     No Known Problems Paternal Grandfather     No Known Problems Maternal Aunt     Cancer Paternal Aunt         pancreatic    No Known Problems Paternal Aunt     Heart disease Family       Social History     Tobacco Use    Smoking status: Never    Smokeless tobacco: Never   Vaping Use    Vaping status: Never Used   Substance Use Topics    Alcohol use: Not Currently     Comment: not using due to her condition    Drug use: No      E-Cigarette/Vaping    E-Cigarette Use Never User       E-Cigarette/Vaping Substances    Nicotine No     THC No     CBD No     Flavoring No     Unknown No       I have reviewed and agree with the history as documented.     84-year-old female presents  status post mechanical fall that occurred approximately an hour ago.  Patient states that she was walking outside and attempting to step up on a curb when her leg got caught up and she fell to the ground.  Patient extended her left arm to break her fall.  Patient admits to head strike with mild hematoma around right eyebrow ridge.  Patient denies loss of consciousness, confusion, vision changes.  Patient admits to some left hand pain as well as bilateral patellar abrasions.  Patient is up-to-date on Tdap      Fall  Associated symptoms: no abdominal pain, no back pain, no chest pain, no headaches, no nausea, no neck pain, no seizures and no vomiting        Review of Systems   Constitutional:  Negative for chills and fever.   HENT:  Negative for ear pain and trouble swallowing.    Eyes:  Negative for pain and visual disturbance.   Respiratory:  Negative for cough, chest tightness and shortness of breath.    Cardiovascular:  Negative for chest pain.   Gastrointestinal:  Negative for abdominal pain, nausea and vomiting.   Genitourinary:  Negative for dysuria and hematuria.   Musculoskeletal:  Negative for arthralgias, back pain, gait problem, neck pain and neck stiffness.   Skin:  Positive for color change. Negative for rash.   Neurological:  Negative for dizziness, seizures, syncope, facial asymmetry, weakness, light-headedness and headaches.   Hematological:  Does not bruise/bleed easily.   Psychiatric/Behavioral:  Negative for confusion.    All other systems reviewed and are negative.          Objective       ED Triage Vitals [03/16/25 1708]   Temperature Pulse Blood Pressure Respirations SpO2 Patient Position - Orthostatic VS   98.2 °F (36.8 °C) 84 170/98 17 98 % --      Temp Source Heart Rate Source BP Location FiO2 (%) Pain Score    Oral Monitor -- -- 3      Vitals      Date and Time Temp Pulse SpO2 Resp BP Pain Score FACES Pain Rating User   03/16/25 1708 98.2 °F (36.8 °C) 84 98 % 17 170/98 3 -- CH             Physical Exam  Vitals and nursing note reviewed.   Constitutional:       General: She is not in acute distress.     Appearance: She is well-developed.   HENT:      Head: Normocephalic and atraumatic.      Mouth/Throat:      Mouth: Mucous membranes are moist.   Eyes:      General: No scleral icterus.        Right eye: No discharge.         Left eye: No discharge.      Conjunctiva/sclera: Conjunctivae normal.      Pupils: Pupils are equal, round, and reactive to light.   Cardiovascular:      Rate and Rhythm: Normal rate and regular rhythm.      Pulses: Normal pulses.      Heart sounds: No murmur heard.  Pulmonary:      Effort: Pulmonary effort is normal.      Breath sounds: Normal breath sounds. No wheezing, rhonchi or rales.   Chest:      Chest wall: No tenderness.   Abdominal:      General: Abdomen is flat. There is no distension.      Palpations: Abdomen is soft.      Tenderness: There is no abdominal tenderness. There is no guarding.   Musculoskeletal:         General: Tenderness present. No swelling.      Cervical back: Normal range of motion and neck supple. No rigidity or tenderness.      Right lower leg: No edema.      Left lower leg: No edema.   Skin:     General: Skin is warm and dry.      Capillary Refill: Capillary refill takes less than 2 seconds.      Findings: Abrasion, bruising, erythema and signs of injury present.             Comments: Patient has 2 cm hematoma with overlying laceration superior to right orbit.  Patient has bilateral patellar abrasions.  Patient has mild ecchymosis to proximal left hand and abrasion to third digit of left hand.   Neurological:      General: No focal deficit present.      Mental Status: She is alert. Mental status is at baseline.      Sensory: No sensory deficit.      Motor: No weakness.   Psychiatric:         Mood and Affect: Mood normal.         Behavior: Behavior normal.         Thought Content: Thought content normal.         Judgment: Judgment normal.          Results Reviewed       None            CT head without contrast   Final Interpretation by Josr Gray MD (03/16 1907)      No acute intracranial abnormality.                  Workstation performed: [id number]         XR hand 3+ views LEFT   ED Interpretation by Dawit Maguire MD (03/16 1901)   No fracture          Universal Protocol:  Procedure performed by:  Consent: Verbal consent obtained.  Consent given by: patient  Patient understanding: patient states understanding of the procedure being performed  Patient consent: the patient's understanding of the procedure matches consent given  Procedure consent: procedure consent matches procedure scheduled  Relevant documents: relevant documents present and verified  Patient identity confirmed: verbally with patient  Laceration repair    Date/Time: 3/16/2025 7:51 PM    Performed by: Sylvester Pedroza DO  Authorized by: Sylvester Pedroza DO  Body area: head/neck  Location details: right eyebrow  Laceration length: 2.5 (1 cm +1.5 cm) cm  Tendon involvement: none  Nerve involvement: none  Vascular damage: no    Anesthesia:  Local Anesthetic: lidocaine 1% without epinephrine  Anesthetic total: 2 mL    Sedation:  Patient sedated: no        Procedure Details:  Preparation: Patient was prepped and draped in the usual sterile fashion.  Irrigation solution: saline  Irrigation method: tap  Debridement: none  Degree of undermining: none  Mucous membrane closure: 5-0 Chromic gut  Number of sutures: 7  Approximation: close  Approximation difficulty: simple          ED Medication and Procedure Management   Prior to Admission Medications   Prescriptions Last Dose Informant Patient Reported? Taking?   Multiple Vitamins-Minerals (PreserVision AREDS 2) CAPS  Self Yes No   Sig: Take by mouth   acetaminophen (TYLENOL) 650 mg CR tablet  Self No No   Sig: Take 1 tablet (650 mg total) by mouth every 8 (eight) hours as needed for mild pain   chlorhexidine (PERIDEX) 0.12 % solution   Self Yes No   cholecalciferol (VITAMIN D3) 1,000 units tablet  Self Yes No   Sig: Take 1,000 Units by mouth daily   dorzolamide-timolol (COSOPT) 22.3-6.8 MG/ML ophthalmic solution  Self Yes No   Sig: Administer 1 drop to both eyes 2 (two) times a day   estradiol (Yuvafem) 10 MCG TABS vaginal tablet   No No   Sig: Insert 1 tablet (10 mcg total) into the vagina 2 (two) times a week   multivitamin (THERAGRAN) TABS  Self Yes No   Sig: Take 1 tablet by mouth daily Centrum Silver   travoprost (TRAVATAN-Z) 0.004 % ophthalmic solution  Self Yes No   Si drop daily at bedtime      Facility-Administered Medications Last Administration Doses Remaining   romosozumab-aqqg (EVENITY) subcutaneous injection 210 mg 2024 10:13 AM 9   romosozumab-aqqg (EVENITY) subcutaneous injection 210 mg None recorded 1   romosozumab-aqqg (EVENITY) subcutaneous injection 210 mg 3/4/2025  9:54 AM 1        Discharge Medication List as of 3/16/2025  7:55 PM        CONTINUE these medications which have NOT CHANGED    Details   acetaminophen (TYLENOL) 650 mg CR tablet Take 1 tablet (650 mg total) by mouth every 8 (eight) hours as needed for mild pain, Starting Thu 2023, Normal      chlorhexidine (PERIDEX) 0.12 % solution Historical Med      cholecalciferol (VITAMIN D3) 1,000 units tablet Take 1,000 Units by mouth daily, Historical Med      dorzolamide-timolol (COSOPT) 22.3-6.8 MG/ML ophthalmic solution Administer 1 drop to both eyes 2 (two) times a day, Historical Med      estradiol (Yuvafem) 10 MCG TABS vaginal tablet Insert 1 tablet (10 mcg total) into the vagina 2 (two) times a week, Starting u 2025, Normal      Multiple Vitamins-Minerals (PreserVision AREDS 2) CAPS Take by mouth, Historical Med      multivitamin (THERAGRAN) TABS Take 1 tablet by mouth daily Centrum Silver, Historical Med      travoprost (TRAVATAN-Z) 0.004 % ophthalmic solution 1 drop daily at bedtime, Historical Med           No discharge procedures on file.  ED  SEPSIS DOCUMENTATION   Time reflects when diagnosis was documented in both MDM as applicable and the Disposition within this note       Time User Action Codes Description Comment    3/16/2025  7:52 PM Sylvester Pedroza [W19.XXXA] Fall, initial encounter     3/16/2025  7:53 PM Sylvester Pedroza Add [S01.111A] Laceration of skin of eyebrow, right, initial encounter                  Sylvester Pedroza DO  03/16/25 1959

## 2025-03-16 NOTE — ED ATTENDING ATTESTATION
3/16/2025  I, Dawit Maguire MD, saw and evaluated the patient. I have discussed the patient with the resident/non-physician practitioner and agree with the resident's/non-physician practitioner's findings, Plan of Care, and MDM as documented in the resident's/non-physician practitioner's note, except where noted. All available labs and Radiology studies were reviewed.  I was present for key portions of any procedure(s) performed by the resident/non-physician practitioner and I was immediately available to provide assistance.       At this point I agree with the current assessment done in the Emergency Department.  I have conducted an independent evaluation of this patient a history and physical is as follows:    84-year-old female brought for evaluation after she tripped over a curb and fell.  She landed on outstretched left hand, also struck her knees and her face.  She has lacerations of the right eyebrow, and abrasion of the upper lip.  She also has a contusion of the left elbow, left hand, and both knees.  She states she was able to get up and ambulate afterwards.  She complains of pain mostly in the left hand.  Denies headache, neck pain, visual changes, nausea, vomiting, chest pain, back pain, abdominal pain, hip pain.  No anticoagulation or aspirin.  Given age and head strike will CT head to rule out intracranial injury.  Will also x-ray hand to rule out fractures and plan repair of eyebrow lacerations.    ED Course  ED Course as of 03/16/25 2007   Sun Mar 16, 2025   1815 No acute fracture on hand x-ray.   1911 CT head unremarkable.     Laceration sutured by resident without any acute complications with absorbable suture.  Discussed local wound care.  Can follow-up as needed.    Critical Care Time  Procedures

## 2025-03-16 NOTE — DISCHARGE INSTRUCTIONS
Please keep the sutures dry for the next 24 hours    You do not need to cover your stitches with any bandages but you may if you want to    You can use Tylenol 500 to 750 mg every 6 hours as needed for pain    The stitches will fall out on their own home and do not need to be removed

## 2025-03-17 ENCOUNTER — TELEPHONE (OUTPATIENT)
Dept: NEUROSURGERY | Facility: CLINIC | Age: 85
End: 2025-03-17

## 2025-03-17 ENCOUNTER — TELEPHONE (OUTPATIENT)
Age: 85
End: 2025-03-17

## 2025-03-17 NOTE — TELEPHONE ENCOUNTER
Call received from patient reporting that she was in the ED yesterday d/t a fall and questioned if she should have an appt. Advised after review it does not look like there is any need for neurosurgery at this time.     She stated an understanding and was appreciative of the call.

## 2025-03-17 NOTE — TELEPHONE ENCOUNTER
LM for pt that Dr. Mireles reviewed her images and the CT which were unremarkable. Can use hot/cold for wrist. Follow up with PCP to see if therapy is needed and can take tylenol. To call if any questions

## 2025-03-17 NOTE — TELEPHONE ENCOUNTER
Pt wanted to let Dr. Mireles be aware that she fell yesterday and hit her head, went to ED and got 7 stitches. Her left wrist is a little sore, had Xray done and states that Dr. Mireles can review the imaging. Looked like everything was fine. Just wanted to make Dr. Mireles aware

## 2025-03-17 NOTE — TELEPHONE ENCOUNTER
Pt called to schedule an ED f/u appt with Dr Newton.  She fell over the weekend and hit her head.  She went to the ED and she said all of her tests were fine.  She wanted Dr Newton to know that.  She also has 7 stitches above her right eye that are to be checked in a week.  Dr Newton did not have any availability, so offered either Friday 3/21 with Vane MILLER or Monday 3/24 with Ruby MILLER.  Pt chose to schedule Friday morning appt.

## 2025-03-21 ENCOUNTER — OFFICE VISIT (OUTPATIENT)
Dept: FAMILY MEDICINE CLINIC | Facility: CLINIC | Age: 85
End: 2025-03-21
Payer: COMMERCIAL

## 2025-03-21 VITALS
HEIGHT: 63 IN | HEART RATE: 75 BPM | DIASTOLIC BLOOD PRESSURE: 74 MMHG | TEMPERATURE: 97.9 F | OXYGEN SATURATION: 97 % | BODY MASS INDEX: 22.61 KG/M2 | SYSTOLIC BLOOD PRESSURE: 138 MMHG | WEIGHT: 127.6 LBS | RESPIRATION RATE: 16 BRPM

## 2025-03-21 DIAGNOSIS — S01.81XD FACIAL LACERATION, SUBSEQUENT ENCOUNTER: Primary | ICD-10-CM

## 2025-03-21 PROCEDURE — G2211 COMPLEX E/M VISIT ADD ON: HCPCS | Performed by: NURSE PRACTITIONER

## 2025-03-21 PROCEDURE — 99213 OFFICE O/P EST LOW 20 MIN: CPT | Performed by: NURSE PRACTITIONER

## 2025-03-21 NOTE — PROGRESS NOTES
"Name: Ellie Franklin      : 1940      MRN: [id number]  Encounter Provider: PAUL Peralta  Encounter Date: 3/21/2025   Encounter department: [facility]  :  Assessment & Plan  Facial laceration, subsequent encounter  Well healing suture lines with good approximation.  No surrounding swelling, erythema, no drainage.  Ok to wash her hair.  Some of the suture tails were trimmed a bit as they were quite long and poking her in the eyelid.  She will schedule to come back next week for suture removal if they have not dissolved on their own by then.                History of Present Illness   Pt is an 83 yo female here today for ER follow up.   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  Pt seen at [facility] ER 3/16 for evaluation after a mechanical fall with a laceration at the R brow ridge.  CT negative for intracranial hemorrhage or other intracranial abnormality.  X-ray of hand negative for fracture.  Laceration sutured with 7 sutures and she was told to have it checked for healing after about a week.  She is not having any pain.  She has left the wound open to air and has put some neosporin on it occasionally.  She denies headache, dizziness.  Nothing has drained from the wound.      Review of Systems   Eyes:  Negative for pain, discharge and visual disturbance.   Skin:  Positive for color change and wound.   Neurological:  Negative for headaches.       Objective   /74 (BP Location: Left arm, Patient Position: Sitting, Cuff Size: Standard)   Pulse 75   Temp 97.9 °F (36.6 °C) (Tympanic)   Resp 16   Ht 5' 3\" (1.6 m)   Wt 57.9 kg (127 lb 9.6 oz)   LMP  (LMP Unknown)   SpO2 97%   BMI 22.60 kg/m²      Physical Exam  Vitals reviewed.   Constitutional:       General: She " is awake. She is not in acute distress.     Appearance: Normal appearance. She is well-developed and well-groomed.   Eyes:      General: Lids are normal.      Conjunctiva/sclera: Conjunctivae normal.        Comments: Bruising below orbit.  Suture lines marked above   Pulmonary:      Effort: Pulmonary effort is normal. No tachypnea, accessory muscle usage or respiratory distress.   Skin:     Findings: Bruising (R eye), erythema and laceration present.   Neurological:      Mental Status: She is alert and oriented to person, place, and time.   Psychiatric:         Attention and Perception: Attention normal.         Mood and Affect: Mood normal.         Speech: Speech normal.         Behavior: Behavior normal. Behavior is cooperative.         Thought Content: Thought content normal.         Cognition and Memory: Cognition normal.         Judgment: Judgment normal.

## 2025-03-31 ENCOUNTER — RA CDI HCC (OUTPATIENT)
Dept: OTHER | Facility: HOSPITAL | Age: 85
End: 2025-03-31

## 2025-04-04 ENCOUNTER — CLINICAL SUPPORT (OUTPATIENT)
Dept: RHEUMATOLOGY | Facility: CLINIC | Age: 85
End: 2025-04-04
Payer: COMMERCIAL

## 2025-04-04 DIAGNOSIS — M80.00XA AGE-RELATED OSTEOPOROSIS WITH CURRENT PATHOLOGICAL FRACTURE, UNSPECIFIED SITE, INITIAL ENCOUNTER FOR FRACTURE: Primary | ICD-10-CM

## 2025-04-04 PROCEDURE — 96372 THER/PROPH/DIAG INJ SC/IM: CPT

## 2025-04-04 NOTE — PROGRESS NOTES
Assessment/Plan:    Ellie Franklin came into the [facility] Rheumatology Office today 04/04/25 to receive Evenity #10 injection.      Verbal consent obtained.  Consent given by: patient    patient states patient has been medically healthy with no underlining concerns/complications.      Ellie Franklin presents with no symptoms today.       All insturctions were reviewed with the patient.    If the patient should have any questions/concerns, advised patient to contacted [facility] Rheumatology Office.       Subjective:     History provided by: patient    Patient ID: Ellie Franklin is a 85 y.o. female      Objective:    There were no vitals filed for this visit.    Patient tolerated the injection well without any complications.  Injection site/s both arms bilaterally.  Medication was provided by provider stock.    Patient signed consent form no   Patient signed ABN form no (If no patient is not a medicare patient).   Patient waited 15 minutes after injection no (This only applies to patient's receiving first time injection).       Last Visit: 3/4/2025  Next visit:5/5/2025

## 2025-04-09 ENCOUNTER — OFFICE VISIT (OUTPATIENT)
Dept: FAMILY MEDICINE CLINIC | Facility: CLINIC | Age: 85
End: 2025-04-09
Payer: COMMERCIAL

## 2025-04-09 VITALS
HEIGHT: 63 IN | DIASTOLIC BLOOD PRESSURE: 80 MMHG | TEMPERATURE: 97.9 F | SYSTOLIC BLOOD PRESSURE: 118 MMHG | HEART RATE: 68 BPM | RESPIRATION RATE: 18 BRPM | WEIGHT: 128 LBS | OXYGEN SATURATION: 99 % | BODY MASS INDEX: 22.68 KG/M2

## 2025-04-09 DIAGNOSIS — S01.81XD FACIAL LACERATION, SUBSEQUENT ENCOUNTER: Primary | ICD-10-CM
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
## 2025-04-09 PROCEDURE — 99213 OFFICE O/P EST LOW 20 MIN: CPT | Performed by: FAMILY MEDICINE

## 2025-04-09 PROCEDURE — G2211 COMPLEX E/M VISIT ADD ON: HCPCS | Performed by: FAMILY MEDICINE

## 2025-04-09 NOTE — PROGRESS NOTES
Suture removal    Date/Time: 4/9/2025 2:45 PM    Performed by: Elodia Newton MD  Authorized by: Elodia Newton MD  [place] Protocol:  procedure performed by consultantConsent: Verbal consent obtained. Written consent not obtained.  Consent given by: patient  Timeout called at: 4/9/2025 3:15 PM.  Patient understanding: patient states understanding of the procedure being performed  Patient identity confirmed: verbally with patient      Patient location:  Clinic  Location:     Laterality:  Right    Location:  Head/neck    Head/neck location:  Eyebrow    Eyebrow location:  R eyebrow  Procedure details:     Tools used:  Suture removal kit    Wound appearance:  No sign(s) of infection and clean    Number of sutures removed:  7  Post-procedure details:     Post-removal:  Antibiotic ointment applied    Patient tolerance of procedure:  Tolerated well, no immediate complications  Comments:      Patient here to remove stitches over her right brow.  Patient had a mechanical fall on 3/16 while walking.  She tripped over a curb and hit the side of her face and knees.  She received 7 stitches that same day.  Denies facial pain, headache, blurry vision, gait disturbance, or vomiting.  She was evaluated by one of my colleagues a week after the fall.  Sutures removed without complications.  Edges are well-approximated and intact.  No concerns for infection

## 2025-05-05 ENCOUNTER — CLINICAL SUPPORT (OUTPATIENT)
Dept: RHEUMATOLOGY | Facility: CLINIC | Age: 85
End: 2025-05-05
Payer: COMMERCIAL

## 2025-05-05 VITALS — HEART RATE: 71 BPM | OXYGEN SATURATION: 100 %

## 2025-05-05 DIAGNOSIS — M81.0 AGE-RELATED OSTEOPOROSIS WITHOUT CURRENT PATHOLOGICAL FRACTURE: Primary | ICD-10-CM

## 2025-05-05 DIAGNOSIS — N95.2 ATROPHIC VAGINITIS: ICD-10-CM

## 2025-05-05 PROCEDURE — 96372 THER/PROPH/DIAG INJ SC/IM: CPT

## 2025-05-05 NOTE — TELEPHONE ENCOUNTER
Reason for call:   [x] Refill   [] Prior Auth  [] Other:     Office:   [] PCP/Provider -   [x] Specialty/Provider - gyn/ Ch[place] Rehman MD     Medication: estradiol (Yuvafem) 10 MCG TABS vaginal tablet     Dose/Frequency: 1 tablet, Vaginal, 2 times weekly     Quantity: 24    Pharmacy: [id number] [address] [facility]     Local Pharmacy   Does the patient have enough for 3 days?   [x] Yes   [] No - Send as HP to POD    Mail Away Pharmacy   Does the patient have enough for 10 days?   [] Yes   [] No - Send as HP to POD

## 2025-05-05 NOTE — PROGRESS NOTES
Assessment/Plan:    Ellie Franklin came into the [facility] Rheumatology Office today 05/05/25 to receive Evenity #11 injection.      Verbal consent obtained.  Consent given by: patient    patient states patient has been medically healthy with no underlining concerns/complications.      Ellie Franklin presents with no symptoms today.       All insturctions were reviewed with the patient.    If the patient should have any questions/concerns, advised patient to contacted [facility] Rheumatology Office.       Subjective:     History provided by: patient    Patient ID: Ellie Fraknlin is a 85 y.o. female      Objective:    There were no vitals filed for this visit.    Patient tolerated the injection well without any complications.  Injection site/s b/l arms.  Medication was provided by rheumatology.  Specialty Pharmacy Name N/A     Patient signed consent form yes   Patient signed ABN form yes (If no patient is not a medicare patient).   Patient waited 15 minutes after injection no (This only applies to patient's receiving first time injection).       Last Visit: 4/4/2025  Next visit:6/9/2025

## 2025-05-06 RX ORDER — ESTRADIOL 10 UG/1
1 TABLET, FILM COATED VAGINAL 2 TIMES WEEKLY
Qty: 24 TABLET | Refills: 0 | Status: SHIPPED | OUTPATIENT
Start: 2025-05-08

## 2025-05-23 ENCOUNTER — RA CDI HCC (OUTPATIENT)
Dept: OTHER | Facility: HOSPITAL | Age: 85
End: 2025-05-23

## 2025-06-02 ENCOUNTER — OFFICE VISIT (OUTPATIENT)
Dept: FAMILY MEDICINE CLINIC | Facility: CLINIC | Age: 85
End: 2025-06-02
Payer: COMMERCIAL

## 2025-06-02 VITALS
TEMPERATURE: 96.5 F | DIASTOLIC BLOOD PRESSURE: 70 MMHG | HEIGHT: 63 IN | SYSTOLIC BLOOD PRESSURE: 110 MMHG | HEART RATE: 70 BPM | OXYGEN SATURATION: 99 % | RESPIRATION RATE: 17 BRPM | BODY MASS INDEX: 22.5 KG/M2 | WEIGHT: 127 LBS

## 2025-06-02 DIAGNOSIS — Z80.0 FAMILY HX OF COLON CANCER: ICD-10-CM

## 2025-06-02 DIAGNOSIS — E78.2 MIXED HYPERLIPIDEMIA: ICD-10-CM

## 2025-06-02 DIAGNOSIS — Z00.00 MEDICARE ANNUAL WELLNESS VISIT, SUBSEQUENT: Primary | ICD-10-CM

## 2025-06-02 DIAGNOSIS — M81.0 SENILE OSTEOPOROSIS: ICD-10-CM

## 2025-06-02 DIAGNOSIS — E87.1 HYPONATREMIA: ICD-10-CM

## 2025-06-02 DIAGNOSIS — G47.01 INSOMNIA DUE TO MEDICAL CONDITION: ICD-10-CM

## 2025-06-02 DIAGNOSIS — Z83.719 FAMILY HISTORY OF COLONIC POLYPS: ICD-10-CM

## 2025-06-02 PROCEDURE — G0439 PPPS, SUBSEQ VISIT: HCPCS | Performed by: FAMILY MEDICINE

## 2025-06-02 NOTE — ASSESSMENT & PLAN NOTE
Rheumatology managing and receiving her last shot of evenity next week. Does report side effects from evenity including myalgia, constipation, and insomnia. She is excited to complete the treatment  Orders:    Lipid panel; Future    Vitamin D 25 hydroxy; Future    Comprehensive metabolic panel; Future

## 2025-06-02 NOTE — PROGRESS NOTES
Name: Ellie Franklin      : 1940      MRN: [id number]  Encounter Provider: Elodia Newton MD  Encounter Date: 2025   Encounter department: [facility]  :  Assessment & Plan  Medicare annual wellness visit, subsequent  Completed. UTD for breast cancer and colon cancer screening. Patient concerned with her FH of colon cancer and personal history of polyps that screening should be continued. Advised pt to discuss this with gastroenterologist since screening is no longer indicated based on screening guidelines. FBW to be done in 1 year.  Orders:  •  Lipid panel; Future  •  Vitamin D 25 hydroxy; Future  •  Comprehensive metabolic panel; Future    Family history of colonic polyps    Orders:  •  Lipid panel; Future  •  Vitamin D 25 hydroxy; Future  •  Comprehensive metabolic panel; Future    Family hx of colon cancer    Orders:  •  Lipid panel; Future  •  Vitamin D 25 hydroxy; Future  •  Comprehensive metabolic panel; Future    Senile osteoporosis  Rheumatology managing and receiving her last shot of evenity next week. Does report side effects from evenity including myalgia, constipation, and insomnia. She is excited to complete the treatment  Orders:  •  Lipid panel; Future  •  Vitamin D 25 hydroxy; Future  •  Comprehensive metabolic panel; Future    Mixed hyperlipidemia  Lab Results   Component Value Date    CHOLESTEROL 229 (H) 2024    CHOLESTEROL 242 (H) 2023    CHOLESTEROL 227 (H) 2022     Lab Results   Component Value Date    HDL 96 2024    HDL 86 2023    HDL 85 2022     Lab Results   Component Value Date    TRIG 70 2024    TRIG 73 2023    TRIG 73 2022     Lab Results   Component Value Date    NONHDLC 133 2024    NONHDLC 118 2021    NONHDLC 139 2020    Increased based on recent [facility] screening labs. Evenity may also increase lipids. Will recheck in 1 year.     Orders:  •  Lipid panel; Future    Insomnia due  to medical condition         Hyponatremia  Lab Results   Component Value Date    SODIUM 139 05/09/2024    K 3.7 05/09/2024     05/09/2024    CO2 31 05/09/2024    AGAP 5 05/09/2024    BUN 16 05/09/2024    CREATININE 0.63 05/09/2024    GLUC 116 07/23/2022    GLUF 85 05/09/2024    CALCIUM 8.7 05/09/2024    AST 20 05/09/2024    ALT 12 05/09/2024    ALKPHOS 60 05/09/2024    TP 6.7 05/09/2024    TP 6.3 (L) 05/09/2024    TBILI 0.57 05/09/2024    EGFR 83 05/09/2024     Resolved         Depression Screening and Follow-up Plan: Patient was screened for depression during today's encounter. They screened negative with a PHQ-2 score of 0.      Falls Plan of Care: balance, strength, and gait training instructions were provided. Medications that increase falls were reviewed. Vitamin D supplementation was recommended.   Preventive health issues were discussed with patient, and age appropriate screening tests were ordered as noted in patient's After Visit Summary. Personalized health advice and appropriate referrals for health education or preventive services given if needed, as noted in patient's After Visit Summary.    History of Present Illness     Here for AWV   [facility]   Avoids walking outdoor following the fall   Brow is healing but still feels a bit sore  Receiving her last evenity shot next week   Did lifeline screening and lipid panel was as follow:  Chol 249  Trig 68     Hdl 94      Patient Care Team:  Elodia Newton MD as PCP - General (Family Medicine)  Arlene Lange MD (Colon and Rectal Surgery)    Review of Systems   Gastrointestinal:  Positive for constipation.   Musculoskeletal:  Positive for myalgias. Negative for gait problem.   Neurological:  Negative for dizziness and light-headedness.   Psychiatric/Behavioral:  Positive for sleep disturbance.      Medical History Reviewed by provider this encounter:  Tobacco  Allergies  Meds  Problems  Med Hx  Surg Hx  Fam  Hx       Annual Wellness Visit Questionnaire   Ellie is here for her Subsequent Wellness visit.     Health Risk Assessment:   Patient rates overall health as good. Patient feels that their physical health rating is same. Patient is satisfied with their life. Eyesight was rated as same. Hearing was rated as same. Patient feels that their emotional and mental health rating is same. Patients states they are never, rarely angry. Patient states they are sometimes unusually tired/fatigued. Pain experienced in the last 7 days has been some. Patient's pain rating has been 3/10. Patient states that she has experienced no weight loss or gain in last 6 months. Insomnia as a result of the shot   Also experiences body aches and constipation with the evenity    Depression Screening:   PHQ-2 Score: 0      Fall Risk Screening:   In the past year, patient has experienced: history of falling in past year    Number of falls: 1  Feels unsteady when standing or walking?: No    Worried about falling?: No      Urinary Incontinence Screening:   Patient has not leaked urine accidently in the last six months.     Home Safety:  Patient does not have trouble with stairs inside or outside of their home. Patient has working smoke alarms and has working carbon monoxide detector. Home safety hazards include: none.     Nutrition:   Current diet is Low Cholesterol, Low Saturated Fat and Limited junk food.     Medications:   Patient is currently taking over-the-counter supplements. OTC medications include: see medication list. Patient is able to manage medications.     Activities of Daily Living (ADLs)/Instrumental Activities of Daily Living (IADLs):   Walk and transfer into and out of bed and chair?: Yes  Dress and groom yourself?: Yes    Bathe or shower yourself?: Yes    Feed yourself? Yes  Do your laundry/housekeeping?: Yes  Manage your money, pay your bills and track your expenses?: Yes  Make your own meals?: Yes    Do your own shopping?:  Yes    Previous Hospitalizations:   Any hospitalizations or ED visits within the last 12 months?: Yes    How many hospitalizations have you had in the last year?: 1-2    Hospitalization Comments: While walking outside my home, my Sketchers caught on the concrete and catapulted forward and I hit my head on the curb and needed stiches above my eye.  I also had an CT scan and Xray of my hand and both were negative.    Advance Care Planning:   Living will: Yes    Durable POA for healthcare: Yes    Advanced directive: Yes      Comments: POA is Selwyn Bello Jr     Cognitive Screening:   Provider or family/friend/caregiver concerned regarding cognition?: No    Preventive Screenings      Cardiovascular Screening:    General: Screening Not Indicated and History Lipid Disorder      Colorectal Cancer Screening:     General: Screening Current      Breast Cancer Screening:     General: Screening Current      Cervical Cancer Screening:    General: Screening Not Indicated      Osteoporosis Screening:    General: Screening Not Indicated and History Osteoporosis      Lung Cancer Screening:     General: Screening Not Indicated    Screening, Brief Intervention, and Referral to Treatment (SBIRT)     Screening  Typical number of drinks in a day: 0  Typical number of drinks in a week: 0  Interpretation: Low risk drinking behavior.    AUDIT-C Screenin) How often did you have a drink containing alcohol in the past year? never  2) How many drinks did you have on a typical day when you were drinking in the past year? 0  3) How often did you have 6 or more drinks on one occasion in the past year? never    AUDIT-C Score: 0  Interpretation: Score 0-2 (female): Negative screen for alcohol misuse    Single Item Drug Screening:  How often have you used an illegal drug (including marijuana) or a prescription medication for non-medical reasons in the past year? never    Single Item Drug Screen Score: 0  Interpretation: Negative screen for possible  "drug use disorder    Social Drivers of Health     Financial Resource Strain: Low Risk  (5/18/2023)    Overall Financial Resource Strain (CARDIA)    • Difficulty of Paying Living Expenses: Not hard at all   Food Insecurity: No Food Insecurity (6/2/2025)    Nursing - Inadequate Food Risk Classification    • Worried About Running Out of Food in the Last Year: Never true    • Ran Out of Food in the Last Year: Never true   Transportation Needs: No Transportation Needs (6/2/2025)    PRAPARE - Transportation    • Lack of Transportation (Medical): No    • Lack of Transportation (Non-Medical): No   Housing Stability: Low Risk  (6/2/2025)    Housing Stability Vital Sign    • Unable to Pay for Housing in the Last Year: No    • Number of Times Moved in the Last Year: 1    • Homeless in the Last Year: No   Utilities: Not At Risk (6/2/2025)    [facility] Utilities    • Threatened with loss of utilities: No     No results found.    Objective   /70 (BP Location: Left arm, Patient Position: Sitting, Cuff Size: Standard)   Pulse 70   Temp (!) 96.5 °F (35.8 °C) (Tympanic)   Resp 17   Ht 5' 3\" (1.6 m)   Wt 57.6 kg (127 lb)   LMP  (LMP Unknown)   SpO2 99%   BMI 22.50 kg/m²     Physical Exam  Vitals reviewed.   Constitutional:       General: She is not in acute distress.     Appearance: Normal appearance. She is not ill-appearing or toxic-appearing.   HENT:      Head: Normocephalic and atraumatic.      Right Ear: Tympanic membrane normal.      Left Ear: Tympanic membrane normal.      Mouth/Throat:      Mouth: Mucous membranes are moist.      Pharynx: No oropharyngeal exudate or posterior oropharyngeal erythema.     Eyes:      Extraocular Movements: Extraocular movements intact.       Cardiovascular:      Rate and Rhythm: Normal rate and regular rhythm.      Heart sounds: No murmur heard.  Pulmonary:      Effort: Pulmonary effort is normal.      Breath sounds: Normal breath sounds.   Abdominal:      General: Abdomen is flat. There " is no distension.      Palpations: There is no mass.      Tenderness: There is no abdominal tenderness. There is no guarding or rebound.      Hernia: No hernia is present.     Musculoskeletal:      Right lower leg: No edema.      Left lower leg: No edema.   Lymphadenopathy:      Cervical: No cervical adenopathy.     Skin:     General: Skin is warm.     Neurological:      Mental Status: She is alert and oriented to person, place, and time.      Gait: Gait normal.     Psychiatric:         Mood and Affect: Mood normal.         Behavior: Behavior normal.         Thought Content: Thought content normal.

## 2025-06-02 NOTE — ASSESSMENT & PLAN NOTE
Lab Results   Component Value Date    CHOLESTEROL 229 (H) 11/26/2024    CHOLESTEROL 242 (H) 11/13/2023    CHOLESTEROL 227 (H) 11/14/2022     Lab Results   Component Value Date    HDL 96 11/26/2024    HDL 86 11/13/2023    HDL 85 11/14/2022     Lab Results   Component Value Date    TRIG 70 11/26/2024    TRIG 73 11/13/2023    TRIG 73 11/14/2022     Lab Results   Component Value Date    NONHDLC 133 11/26/2024    NONHDLC 118 08/13/2021    NONHDLC 139 02/05/2020    Increased based on recent lif[facility] screening labs. Evenity may also increase lipids. Will recheck in 1 year.     Orders:    Lipid panel; Future

## 2025-06-02 NOTE — ASSESSMENT & PLAN NOTE
Orders:    Lipid panel; Future    Vitamin D 25 hydroxy; Future    Comprehensive metabolic panel; Future

## 2025-06-02 NOTE — PATIENT INSTRUCTIONS
Medicare Preventive Visit Patient Instructions  Thank you for completing your Welcome to Medicare Visit or Medicare Annual Wellness Visit today. Your next wellness visit will be due in one year (6/3/2026).  The screening/preventive services that you may require over the next 5-10 years are detailed below. Some tests may not apply to you based off risk factors and/or age. Screening tests ordered at today's visit but not completed yet may show as past due. Also, please note that scanned in results may not display below.  Preventive Screenings:  Service Recommendations Previous Testing/Comments   Colorectal Cancer Screening  * Colonoscopy    * Fecal Occult Blood Test (FOBT)/Fecal Immunochemical Test (FIT)  * Fecal DNA/Cologuard Test  * Flexible Sigmoidoscopy Age: 45-75 years old   Colonoscopy: every 10 years (may be performed more frequently if at higher risk)  OR  FOBT/FIT: every 1 year  OR  Cologuard: every 3 years  OR  Sigmoidoscopy: every 5 years  Screening may be recommended earlier than age 45 if at higher risk for colorectal cancer. Also, an individualized decision between you and your healthcare provider will decide whether screening between the ages of 76-85 would be appropriate. Colonoscopy: 08/18/2023  FOBT/FIT: Not on file  Cologuard: Not on file  Sigmoidoscopy: Not on file    Screening Current     Breast Cancer Screening Age: 40+ years old  Frequency: every 1-2 years  Not required if history of left and right mastectomy Mammogram: 02/27/2025    Screening Current   Cervical Cancer Screening Between the ages of 21-29, pap smear recommended once every 3 years.   Between the ages of 30-65, can perform pap smear with HPV co-testing every 5 years.   Recommendations may differ for women with a history of total hysterectomy, cervical cancer, or abnormal pap smears in past. Pap Smear: 06/17/2024    Screening Not Indicated   Hepatitis C Screening Once for adults born between 1945 and 1965  More frequently in  patients at high risk for Hepatitis C Hep C Antibody: Not on file        Diabetes Screening 1-2 times per year if you're at risk for diabetes or have pre-diabetes Fasting glucose: 85 mg/dL (5/9/2024)  A1C: No results in last 5 years (No results in last 5 years)      Cholesterol Screening Once every 5 years if you don't have a lipid disorder. May order more often based on risk factors. Lipid panel: 11/26/2024    Screening Not Indicated  History Lipid Disorder     Other Preventive Screenings Covered by Medicare:  Abdominal Aortic Aneurysm (AAA) Screening: covered once if your at risk. You're considered to be at risk if you have a family history of AAA.  Lung Cancer Screening: covers low dose CT scan once per year if you meet all of the following conditions: (1) Age 55-77; (2) No signs or symptoms of lung cancer; (3) Current smoker or have quit smoking within the last 15 years; (4) You have a tobacco smoking history of at least 20 pack years (packs per day multiplied by number of years you smoked); (5) You get a written order from a healthcare provider.  Glaucoma Screening: covered annually if you're considered high risk: (1) You have diabetes OR (2) Family history of glaucoma OR (3)  aged 50 and older OR (4)  American aged 65 and older  Osteoporosis Screening: covered every 2 years if you meet one of the following conditions: (1) You're estrogen deficient and at risk for osteoporosis based off medical history and other findings; (2) Have a vertebral abnormality; (3) On glucocorticoid therapy for more than 3 months; (4) Have primary hyperparathyroidism; (5) On osteoporosis medications and need to assess response to drug therapy.   Last bone density test (DXA Scan): 01/23/2025.  HIV Screening: covered annually if you're between the age of 15-65. Also covered annually if you are younger than 15 and older than 65 with risk factors for HIV infection. For pregnant patients, it is covered up to 3  times per pregnancy.    Immunizations:  Immunization Recommendations   Influenza Vaccine Annual influenza vaccination during flu season is recommended for all persons aged >= 6 months who do not have contraindications   Pneumococcal Vaccine   * Pneumococcal conjugate vaccine = PCV13 (Prevnar 13), PCV15 (Vaxneuvance), PCV20 (Prevnar 20)  * Pneumococcal polysaccharide vaccine = PPSV23 (Pneumovax) Adults 19-63 yo with certain risk factors or if 65+ yo  If never received any pneumonia vaccine: recommend Prevnar 20 (PCV20)  Give PCV20 if previously received 1 dose of PCV13 or PPSV23   Hepatitis B Vaccine 3 dose series if at intermediate or high risk (ex: diabetes, end stage renal disease, liver disease)   Respiratory syncytial virus (RSV) Vaccine - COVERED BY MEDICARE PART D  * RSVPreF3 (Arexvy) CDC recommends that adults 60 years of age and older may receive a single dose of RSV vaccine using shared clinical decision-making (SCDM)   Tetanus (Td) Vaccine - COST NOT COVERED BY MEDICARE PART B Following completion of primary series, a booster dose should be given every 10 years to maintain immunity against tetanus. Td may also be given as tetanus wound prophylaxis.   Tdap Vaccine - COST NOT COVERED BY MEDICARE PART B Recommended at least once for all adults. For pregnant patients, recommended with each pregnancy.   Shingles Vaccine (Shingrix) - COST NOT COVERED BY MEDICARE PART B  2 shot series recommended in those 19 years and older who have or will have weakened immune systems or those 50 years and older     Health Maintenance Due:      Topic Date Due   • Breast Cancer Screening: Mammogram  02/27/2026   • Colorectal Cancer Screening  08/16/2028     Immunizations Due:      Topic Date Due   • COVID-19 Vaccine (7 - 2024-25 season) 09/01/2024   • Influenza Vaccine (Season Ended) 09/01/2025     Advance Directives   What are advance directives?  Advance directives are legal documents that state your wishes and plans for  medical care. These plans are made ahead of time in case you lose your ability to make decisions for yourself. Advance directives can apply to any medical decision, such as the treatments you want, and if you want to donate organs.   What are the types of advance directives?  There are many types of advance directives, and each state has rules about how to use them. You may choose a combination of any of the following:  Living will:  This is a written record of the treatment you want. You can also choose which treatments you do not want, which to limit, and which to stop at a certain time. This includes surgery, medicine, IV fluid, and tube feedings.   Durable power of  for healthcare (DPAHC):  This is a written record that states who you want to make healthcare choices for you when you are unable to make them for yourself. This person, called a proxy, is usually a family member or a friend. You may choose more than 1 proxy.  Do not resuscitate (DNR) order:  A DNR order is used in case your heart stops beating or you stop breathing. It is a request not to have certain forms of treatment, such as CPR. A DNR order may be included in other types of advance directives.  Medical directive:  This covers the care that you want if you are in a coma, near death, or unable to make decisions for yourself. You can list the treatments you want for each condition. Treatment may include pain medicine, surgery, blood transfusions, dialysis, IV or tube feedings, and a ventilator (breathing machine).  Values history:  This document has questions about your views, beliefs, and how you feel and think about life. This information can help others choose the care that you would choose.  Why are advance directives important?  An advance directive helps you control your care. Although spoken wishes may be used, it is better to have your wishes written down. Spoken wishes can be misunderstood, or not followed. Treatments may be given  even if you do not want them. An advance directive may make it easier for your family to make difficult choices about your care.   Fall Prevention    Fall prevention  includes ways to make your home and other areas safer. It also includes ways you can move more carefully to prevent a fall. Health conditions that cause changes in your blood pressure, vision, or muscle strength and coordination may increase your risk for falls. Medicines may also increase your risk for falls if they make you dizzy, weak, or sleepy.   Fall prevention tips:   Stand or sit up slowly.    Use assistive devices as directed.    Wear shoes that fit well and have soles that .    Wear a personal alarm.    Stay active.    Manage your medical conditions.    Home Safety Tips:  Add items to prevent falls in the bathroom.    Keep paths clear.    Install bright lights in your home.    Keep items you use often on shelves within reach.    Paint or place reflective tape on the edges of your stairs.     © Copyright [employer] 2018 Information is for End User's use only and may not be sold, redistributed or otherwise used for commercial purposes. All illustrations and images included in CareNotes® are the copyrighted property of [employer].A[employer]M., Inc. or [employer]

## 2025-06-09 ENCOUNTER — CLINICAL SUPPORT (OUTPATIENT)
Dept: RHEUMATOLOGY | Facility: CLINIC | Age: 85
End: 2025-06-09

## 2025-06-09 VITALS
SYSTOLIC BLOOD PRESSURE: 108 MMHG | HEART RATE: 74 BPM | OXYGEN SATURATION: 99 % | DIASTOLIC BLOOD PRESSURE: 80 MMHG | HEIGHT: 63 IN | BODY MASS INDEX: 22.43 KG/M2 | WEIGHT: 126.6 LBS

## 2025-06-09 DIAGNOSIS — M81.0 AGE-RELATED OSTEOPOROSIS WITHOUT CURRENT PATHOLOGICAL FRACTURE: Primary | ICD-10-CM

## 2025-06-09 NOTE — PROGRESS NOTES
"Assessment/Plan:    Ellie Franklin came into the [facility] today 06/09/25 to receive Evenity #12 injection.      Verbal consent obtained.  Consent given by: patient    patient states patient has been medically healthy with no underlining concerns/complications.      Ellie Franklin presents with no symptoms today.       All insturctions were reviewed with the patient.    If the patient should have any questions/concerns, advised patient to contacted [facility].       Subjective:     History provided by: patient    Patient ID: Ellie Franklin is a 85 y.o. female      Objective:    Vitals:    06/09/25 1020   BP: 108/80   Pulse: 74   SpO2: 99%   Weight: 57.4 kg (126 lb 9.6 oz)   Height: 5' 3\" (1.6 m)       Patient tolerated the injection well without any complications.  Injection site/s bilateral thing injections.  Number of Dose Evenity #12  Medication was provided by provider stock.          Patient signed consent form no   Patient signed ABN form no (If no patient is not a medicare patient).   Patient waited 15 minutes after injection no (This only applies to patient's receiving first time injection).       Last Visit: 5/5/2025  Next visit:Visit date not found     "

## 2025-07-01 ENCOUNTER — ANNUAL EXAM (OUTPATIENT)
Dept: OBGYN CLINIC | Facility: CLINIC | Age: 85
End: 2025-07-01
Payer: COMMERCIAL

## 2025-07-01 VITALS
HEIGHT: 63 IN | BODY MASS INDEX: 21.97 KG/M2 | DIASTOLIC BLOOD PRESSURE: 72 MMHG | WEIGHT: 124 LBS | SYSTOLIC BLOOD PRESSURE: 130 MMHG

## 2025-07-01 DIAGNOSIS — Z12.31 ENCOUNTER FOR SCREENING MAMMOGRAM FOR MALIGNANT NEOPLASM OF BREAST: ICD-10-CM

## 2025-07-01 DIAGNOSIS — M81.0 SENILE OSTEOPOROSIS: ICD-10-CM

## 2025-07-01 DIAGNOSIS — Z01.419 WELL WOMAN EXAM WITH ROUTINE GYNECOLOGICAL EXAM: Primary | ICD-10-CM

## 2025-07-01 DIAGNOSIS — N95.2 ATROPHIC VAGINITIS: ICD-10-CM

## 2025-07-01 PROCEDURE — G0101 CA SCREEN;PELVIC/BREAST EXAM: HCPCS | Performed by: OBSTETRICS & GYNECOLOGY

## 2025-07-01 RX ORDER — AMOXICILLIN 500 MG/1
2000 CAPSULE ORAL ONCE
Qty: 4 CAPSULE | Refills: 4 | Status: SHIPPED | OUTPATIENT
Start: 2025-07-01 | End: 2025-07-01

## 2025-07-01 RX ORDER — LATANOPROST OPHTHALMIC SOLUTION 0.005% 50 UG/ML
1 SOLUTION/ DROPS TOPICAL
COMMUNITY
Start: 2025-06-18

## 2025-07-01 RX ORDER — ESTRADIOL 10 UG/1
1 TABLET, FILM COATED VAGINAL 2 TIMES WEEKLY
Qty: 24 TABLET | Refills: 3 | Status: SHIPPED | OUTPATIENT
Start: 2025-07-03

## 2025-07-01 NOTE — ASSESSMENT & PLAN NOTE
Orders:    amoxicillin (AMOXIL) 500 mg capsule; Take 4 capsules (2,000 mg total) by mouth 1 (one) time for 1 dose

## 2025-07-01 NOTE — PROGRESS NOTES
Annual Wellness Visit  Name: Ellie Franklin      : 1940      MRN: [id number]  Encounter Provider: Estefani Ibarra MD  Encounter Date: 2025   Encounter department: [facility]    85 y.o.  yo presents today for her annual exam.:  Assessment & Plan  Well woman exam with routine gynecological exam    Orders:    Mammo screening bilateral w 3d and cad; Future    Encounter for screening mammogram for malignant neoplasm of breast    Orders:    Mammo screening bilateral w 3d and cad; Future    Atrophic vaginitis    Orders:    estradiol (Yuvafem) 10 MCG TABS vaginal tablet; Insert 1 tablet (10 mcg total) into the vagina 2 (two) times a week    Senile osteoporosis    Orders:    amoxicillin (AMOXIL) 500 mg capsule; Take 4 capsules (2,000 mg total) by mouth 1 (one) time for 1 dose          The following were reviewed in today's visit: ASCCP guidelines (Pap screen not indicated after age 65), STD testing breast self exam, mammography screening ordered, use and side effects of HRT, menopause, and osteoporosis.    Patient to return to office in yearly for annual exam.     All questions have been answered to her satisfaction.        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  (Pap 2024 neg pap (no HPV due to age)  Mammo 25 normal   DXA 2025 osteoporosis/osteoarthritis   Colonoscopy 23- diverticulosis repeat in 5 years)             HPI: Ellie Franklin is a 85 y.o.  who presents for annual gynecologic examination.  She has the following concerns:  none, doing well with Vagifem, would like refill on Amoxicillin she takes prior to dental cleans      Health Maintenance:    Exercise: intermittently  Breast exams/breast awareness: yes  Last mammogram:   Colorectal cancer  screenin  DEXA:     Past Medical History[1]    Past Surgical History[2]    Past OB/Gyn History:   No LMP recorded (lmp unknown). Patient is postmenopausal.    Patient is menopausal.   Menopausal symptoms: None  Last Pap:  : no abnormalities; further pap screening not indicated  History of abnormal Pap smear: no    Patient is not currently sexually active.     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 )   Tobacco Use    Smoking status: Never     Passive exposure: Never    Smokeless tobacco: Never   Vaping Use    Vaping status: Never Used   Substance and Sexual Activity    Alcohol use: Not Currently     Comment: not using due to her condition    Drug use: No    Sexual activity: Not Currently     Partners: Male     Birth control/protection: Post-menopausal   Other Topics Concern    Not on file   Social History Narrative    Not on file     Social Drivers of Health     Financial Resource Strain: Low Risk  (2023)    Overall Financial Resource Strain (CARDIA)     Difficulty of Paying Living Expenses: Not hard at all   Food Insecurity: No Food Insecurity (2025)    Nursing - Inadequate Food Risk Classification     Worried About Running Out of Food in the Last Year: Never true     Ran Out of Food in the Last Year: Never true     Ran Out of Food in the Last Year: Not on file   Transportation Needs: No Transportation Needs (2025)    PRAPARE - Transportation     Lack of Transportation (Medical): No     Lack of Transportation (Non-Medical): No   Physical Activity: Not on  "file   Stress: Not on file   Social Connections: Not on file   Intimate Partner Violence: Not on file   Housing Stability: Low Risk  (6/2/2025)    Housing Stability Vital Sign     Unable to Pay for Housing in the Last Year: No     Number of Times Moved in the Last Year: 1     Homeless in the Last Year: No     Domestic violence screen: negative    Allergies:  Allergies[4]    Medications:  Current Medications[5]    Review of Systems:  Review of Systems   Constitutional: Negative.    HENT: Negative.     Respiratory: Negative.     Cardiovascular: Negative.    Gastrointestinal: Negative.    Genitourinary: Negative.    Neurological: Negative.    Psychiatric/Behavioral: Negative.           Physical Exam:  /72 (BP Location: Right arm, Patient Position: Sitting, Cuff Size: Standard)   Ht 5' 3\" (1.6 m)   Wt 56.2 kg (124 lb)   LMP  (LMP Unknown)   BMI 21.97 kg/m²    Physical Exam  Constitutional:       Appearance: Normal appearance.   Genitourinary:      Bladder and urethral meatus normal.      No lesions in the vagina.      Right Labia: No rash, tenderness, lesions, skin changes or Bartholin's cyst.     Left Labia: No tenderness, lesions, skin changes, Bartholin's cyst or rash.     No vaginal erythema, tenderness or bleeding.        Right Adnexa: not tender, not full and no mass present.     Left Adnexa: not tender, not full and no mass present.     Cervix is nulliparous.      No cervical motion tenderness, discharge, lesion or polyp.      Uterus is not enlarged, fixed or tender.      No uterine mass detected.     Urethral meatus caruncle not present.     No urethral tenderness or mass present.   Breasts:     Right: No swelling, bleeding, inverted nipple, mass, nipple discharge, skin change or tenderness.      Left: No swelling, bleeding, inverted nipple, mass, nipple discharge, skin change or tenderness.   HENT:      Head: Normocephalic and atraumatic.     Eyes:      Extraocular Movements: Extraocular movements " intact.      Conjunctiva/sclera: Conjunctivae normal.      Pupils: Pupils are equal, round, and reactive to light.       Cardiovascular:      Rate and Rhythm: Normal rate and regular rhythm.      Heart sounds: Normal heart sounds. No murmur heard.  Pulmonary:      Effort: Pulmonary effort is normal. No respiratory distress.      Breath sounds: Normal breath sounds. No wheezing or rales.   Abdominal:      General: There is no distension.      Palpations: Abdomen is soft.      Tenderness: There is no abdominal tenderness. There is no guarding.     Neurological:      General: No focal deficit present.      Mental Status: She is alert and oriented to person, place, and time.     Skin:     General: Skin is warm and dry.     Psychiatric:         Mood and Affect: Mood normal.         Behavior: Behavior normal.   Vitals and nursing note reviewed.                                    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    Breast cyst     Colon polyp     Fracture of arm     Glaucoma     Hyponatremia 12/05/2018    Hypotension     Menopause ovarian failure     Nasal congestion    [2]   Past Surgical History:  Procedure Laterality Date    BUNIONECTOMY      BUNIONECTOMY      CATARACT EXTRACTION      ELBOW SURGERY Right     EYE SURGERY  CATARACTS    FRACTURE SURGERY  PINS IN BROKEN ELBOW    IR CEREBRAL ANGIOGRAPHY  06/26/2019    IR CEREBRAL ANGIOGRAPHY / INTERVENTION  11/25/2018    NE OPEN TX HUMERAL SUPRACONDYLAR FRACTURE W/O XTN Right 12/14/2023    Procedure: OPEN REDUCTION W/ INTERNAL FIXATION (ORIF) HUMERUS MIDSHAFT / DISTAL;  Surgeon: Todd Johnson DO;  Location: [facility];  Service: Orthopedics    TONSILLECTOMY     [3]   Family History  Problem Relation Name Age of Onset    Osteoporosis Mother ANGÉLICA     Heart attack Father Juan Manuel     Diabetes Father Juan Manuel     Heart failure Father Juan Manuel     No Known Problems Sister      No Known Problems Daughter at birth     No Known  Problems Maternal Grandmother      Cancer Maternal Grandfather james     Colon cancer Maternal Grandfather james 40    No Known Problems Paternal Grandmother      No Known Problems Paternal Grandfather      No Known Problems Maternal Aunt      Cancer Paternal Aunt Aunt Elizabeth         pancreatic    No Known Problems Paternal Aunt      Heart disease Family     [4]   Allergies  Allergen Reactions    Tramazoline Other (See Comments)    Atorvastatin Other (See Comments)     Severe muscle cramping     Codeine GI Intolerance    Metronidazole Dizziness     Headaches/dizzy/swelling of tongue    Risedronate Sodium Other (See Comments)     Severe muscle cramping    [5]   Current Outpatient Medications:     amoxicillin (AMOXIL) 500 mg capsule, Take 4 capsules (2,000 mg total) by mouth 1 (one) time for 1 dose, Disp: 4 capsule, Rfl: 4    cholecalciferol (VITAMIN D3) 1,000 units tablet, Take 1,000 Units by mouth in the morning., Disp: , Rfl:     dorzolamide-timolol (COSOPT) 22.3-6.8 MG/ML ophthalmic solution, Administer 1 drop to both eyes in the morning and 1 drop in the evening., Disp: , Rfl:     [START ON 7/3/2025] estradiol (Yuvafem) 10 MCG TABS vaginal tablet, Insert 1 tablet (10 mcg total) into the vagina 2 (two) times a week, Disp: 24 tablet, Rfl: 3    Iyuzeh 0.005 % SOLN, Administer 1 drop to both eyes, Disp: , Rfl:     multivitamin (THERAGRAN) TABS, Take 1 tablet by mouth in the morning. Centrum Silver., Disp: , Rfl:     travoprost (TRAVATAN-Z) 0.004 % ophthalmic solution, 1 drop daily at bedtime, Disp: , Rfl:

## 2025-07-14 ENCOUNTER — OFFICE VISIT (OUTPATIENT)
Age: 85
End: 2025-07-14
Payer: COMMERCIAL

## 2025-07-14 VITALS
OXYGEN SATURATION: 97 % | SYSTOLIC BLOOD PRESSURE: 122 MMHG | BODY MASS INDEX: 21.97 KG/M2 | HEIGHT: 63 IN | TEMPERATURE: 98.7 F | HEART RATE: 76 BPM | WEIGHT: 124 LBS | DIASTOLIC BLOOD PRESSURE: 66 MMHG

## 2025-07-14 DIAGNOSIS — M41.25 OTHER IDIOPATHIC SCOLIOSIS, THORACOLUMBAR REGION: ICD-10-CM

## 2025-07-14 DIAGNOSIS — E55.9 VITAMIN D INSUFFICIENCY: ICD-10-CM

## 2025-07-14 DIAGNOSIS — M15.0 PRIMARY GENERALIZED (OSTEO)ARTHRITIS: ICD-10-CM

## 2025-07-14 DIAGNOSIS — S42.494D OTHER CLOSED NONDISPLACED FRACTURE OF DISTAL END OF RIGHT HUMERUS WITH ROUTINE HEALING, SUBSEQUENT ENCOUNTER: ICD-10-CM

## 2025-07-14 DIAGNOSIS — M81.0 AGE-RELATED OSTEOPOROSIS WITHOUT CURRENT PATHOLOGICAL FRACTURE: Primary | ICD-10-CM

## 2025-07-14 DIAGNOSIS — I60.2 NONTRAUMATIC SUBARACHNOID HEMORRHAGE FROM ANTERIOR COMMUNICATING ARTERY (HCC): ICD-10-CM

## 2025-07-14 DIAGNOSIS — H90.3 SENSORINEURAL HEARING LOSS (SNHL) OF BOTH EARS: ICD-10-CM

## 2025-07-14 PROCEDURE — 99214 OFFICE O/P EST MOD 30 MIN: CPT | Performed by: INTERNAL MEDICINE

## 2025-07-14 RX ORDER — AMOXICILLIN 500 MG/1
CAPSULE ORAL
COMMUNITY
Start: 2025-07-09 | End: 2025-07-18

## 2025-07-14 NOTE — PROGRESS NOTES
Assessment/Plan:    1. Age-related osteoporosis without current pathological fracture  -     Comprehensive metabolic panel; Future; Expected date: 07/14/2025 (Use expected date for collection)  -     Comprehensive metabolic panel; Future; Expected date: 01/07/2026 (Use expected date for collection)  2. Primary generalized (osteo)arthritis  3. Sensorineural hearing loss (SNHL) of both ears  4. Other idiopathic scoliosis, thoracolumbar region  5. 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 Other closed nondisplaced fracture of distal end of right humerus with routine healing, subsequent encounter  7. Vitamin D insufficiency  -     Vitamin D 25 hydroxy; Future; Expected date: 07/14/2025 (Use expected date for collection)      Assessment & Plan  1. Osteoporosis.  - Osteoporosis considered severe in view of low trauma fracture, with metabolic workup nonrevealing.  Patient completed 12 monthly doses of Evenity last month, and is to transition to Prolia this month.  She will get her first injection once her lab work is completed and reviewed.  We did discuss risk-benefit profile of Prolia during the office visit.  - DEXA dated 01/23/2025: L1, L2, and L4 T-score -1.4, left total hip T-score -2.6, left femoral neck T-score -2.5, left distal forearm T-score -5.2.  - Discussed the possibility of repeating Evenity series in the future to enhance bone structure and reduce fracture risk.  Await results of further studies regarding repeat Evenity series.  - Prolia will be initiated as a long-term treatment.  I did discuss the use of Tylenol for symptom management if she should get flulike symptoms with the first dose of Prolia. She will continue calcium, vitamin D, home exercises, and fall prevention practices. Bone density will be monitored every 2 years.    2. Periodontal disease.  - Using PreviDent toothpaste nightly as directed.  - Sees the dentist and periodontist on an alternating basis  every 3 months.  Encourage vigilant dental hygiene.    3. Glaucoma.  - Has dry eyes from glaucoma.  - Seeing an ophthalmologist for management.    4. Bilateral sensorineural hearing loss.  - Decision to use hearing aids will be based on personal comfort and communication needs.    5. Insomnia.  - Continues to experience insomnia with early awakening.  - Uses melatonin for management.    6. Anterior communicating artery aneurysm.  - Had embolization and coil placement in 2018.  - No recurrence.  No neurologic symptoms.  Continue to monitor for potential symptoms through her primary care physician.    Orders Placed This Encounter   Procedures   • Comprehensive metabolic panel   • Vitamin D 25 hydroxy   • Comprehensive metabolic panel          Reviewed records, labs, and imaging with the patient in detail.  Counseled patient.  Discussion regarding my findings and recommendations.  Office visit with documentation 30 min.    Subjective:      Patient ID: Ellie Franklin is a 85 y.o. female.    History of Present Illness  The patient is an 85-year-old female who presents for a follow-up of osteoporosis, status post ORIF of a closed right distal humerus supracondylar fracture and olecranon fracture from 12/2023. It was a low trauma fracture. She had a prior elbow fracture in 2003 after a fall on cement. She reports a 1-1/2 inch height loss and has documented scoliosis. She briefly took hormone therapy post menopause but was intolerant. Family history is significant for her son treated for osteoporosis secondary to cystic fibrosis. She has a remote history of nephrolithiasis with no recurrence. She was on Synthroid in college, but this was discontinued when she was first .    She reports no significant joint pain, stiffness, or swelling. However, she experiences neck and back soreness upon arising in the morning. This discomfort is alleviated by performing physical therapy exercises and stretches in bed, which she  has been doing since her aneurysm in 2018. These exercises primarily target her core and lower body, and she feels fine after completing them. She completed her Evenity injections in 06/2025 and is to start Prolia injections this month.    She uses PreviDent toothpaste as directed for her periodontal disease, applying it nightly before bed. She visits her dentist or periodontist every 3 months, with her most recent visit last week. She occasionally experiences dry mouth.    She has dry eyes due to glaucoma and is under the care of an ophthalmologist for this condition. She recently switched doctors.    She has bilateral sensorineural hearing loss and is considering the use of hearing aids. She has an appointment with her audiologist this week.    She continues to experience insomnia with early awakening, which she manages with melatonin. She notes that frequent urination at night disrupts her sleep.    She has no history of headaches.    Sleep: She continues to experience insomnia with early awakening, which she manages with melatonin. She notes that frequent urination at night disrupts her sleep.    PERTINENT PAST SURGICAL HISTORY:  - ORIF of closed right distal humerus supracondylar fracture and olecranon fracture (12/2023)  - Embolization and coil placement for anterior communicating artery aneurysm rupture (2018)    PERTINENT FAMILY HISTORY  Family history is significant for her son treated for osteoporosis secondary to cystic fibrosis.  HPI    Results  Imaging   - DEXA scan: 01/23/2025, L1, L2, and L4 T-score -1.4; left total hip T-score -2.6; left femoral neck T-score -2.5; left distal forearm T-score -5.2. Increase in bone density in the lumbar spine at 5.3%.       Allergies  Allergies   Allergen Reactions   • Tramazoline Other (See Comments)   • Atorvastatin Other (See Comments)     Severe muscle cramping    • Codeine GI Intolerance   • Metronidazole Dizziness     Headaches/dizzy/swelling of tongue   •  "Risedronate Sodium Other (See Comments)     Severe muscle cramping        Home Medications  Current Medications[1]    Past Medical History  Past Medical History[2]    Past Surgical History   Past Surgical History[3]    Family History   Family History[4]    The following portions of the patient's history were reviewed and updated as appropriate: allergies, current medications, past family history, past medical history, past social history, past surgical history, and problem list.    Review of Systems   Constitutional:  Negative for chills and fever.   HENT:  Positive for dental problem (periodontal disease) and hearing loss. Negative for ear pain and sore throat.         Dry mouth   Eyes:  Negative for pain and visual disturbance.        Dry eyes from glaucoma   Respiratory:  Negative for cough and shortness of breath.    Cardiovascular:  Negative for chest pain and palpitations.   Gastrointestinal:  Negative for abdominal pain and vomiting.   Genitourinary:  Negative for dysuria and hematuria.        Nocturia   Musculoskeletal:  Negative for arthralgias and back pain.   Skin:  Negative for color change and rash.   Neurological:  Negative for seizures and syncope.   Psychiatric/Behavioral:  Positive for sleep disturbance.    All other systems reviewed and are negative.        Objective:  /66 (BP Location: Left arm, Patient Position: Sitting, Cuff Size: Standard)   Pulse 76   Temp 98.7 °F (37.1 °C) (Tympanic)   Ht 5' 3\" (1.6 m)   Wt 56.2 kg (124 lb)   LMP  (LMP Unknown)   SpO2 97%   BMI 21.97 kg/m²        Physical Exam    Physical Exam  Vitals reviewed.   Constitutional:       Appearance: Normal appearance.   HENT:      Head: Normocephalic.      Nose:      Comments: Nose and throat unremarkable.    Eyes:      Extraocular Movements: Extraocular movements intact.     Neck:      Comments: Without masses, thyromegaly, lymphadenopathy  Cardiovascular:      Rate and Rhythm: Regular rhythm.   Pulmonary:      " Breath sounds: Normal breath sounds.   Abdominal:      Palpations: Abdomen is soft.     Musculoskeletal:      Cervical back: Neck supple.      Comments: Neck decreased lateral flexion.  Shoulders full range of motion.  Elbows right lacks full extension secondary to prior fracture.  Left full range of motion.  Wrists full range of motion.  Tinel's positive bilateral wrist.  Hands squaring first carpometacarpal joints bilaterally with interphalangeal osteoarthritis.  Thickening flexor tendon sheaths ring fingers right greater than left and middle fingers.  No triggering appreciated.  No synovitis noted.  Back mild dorsal kyphosis.  Straight leg raising negative bilaterally.  Hips full range of motion.  Knees small cool effusions right greater than left with full range of motion with patellofemoral crepitus.  Ankles full range of motion.  Feet rearfoot hyperpronation with midfoot cavus deformities, high instep's, hallux valgus deformities, and hammertoe deformities.  No synovitis appreciated.     Skin:     General: Skin is warm.      Comments: Varicose veins lower extremities.  Trace ankle edema.  Periungual erythema with no digital ulcerations or dilated nailfold capillary loops.  No Raynaud's appreciated.     Neurological:      General: No focal deficit present.                 This note was written in part using the assistance of the [employer] [id number] microphone system. Those portions using this system have been dictated and not read.         [1]    Current Outpatient Medications:   •  cholecalciferol (VITAMIN D3) 1,000 units tablet, Take 1,000 Units by mouth in the morning., Disp: , Rfl:   •  dorzolamide-timolol (COSOPT) 22.3-6.8 MG/ML ophthalmic solution, Administer 1 drop to both eyes in the morning and 1 drop in the evening., Disp: , Rfl:   •  estradiol (Yuvafem) 10 MCG TABS vaginal tablet, Insert 1 tablet (10 mcg total) into the vagina 2 (two) times a week, Disp: 24 tablet, Rfl:  3  •  Iyuzeh 0.005 % SOLN, Administer 1 drop to both eyes, Disp: , Rfl:   •  multivitamin (THERAGRAN) TABS, Take 1 tablet by mouth in the morning. 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   • Breast cyst    • Colon polyp    • Fracture of arm    • Glaucoma    • Hyponatremia 12/05/2018   • Hypotension    • Menopause ovarian failure    • Nasal congestion    [3]  Past Surgical History:  Procedure Laterality Date   • BUNIONECTOMY     • BUNIONECTOMY     • CATARACT EXTRACTION     • ELBOW SURGERY Right    • EYE SURGERY  CATARACTS   • FRACTURE SURGERY  PINS IN BROKEN ELBOW   • IR CEREBRAL ANGIOGRAPHY  06/26/2019   • IR CEREBRAL ANGIOGRAPHY / INTERVENTION  11/25/2018   • FL OPEN TX HUMERAL SUPRACONDYLAR FRACTURE W/O XTN Right 12/14/2023    Procedure: OPEN REDUCTION W/ INTERNAL FIXATION (ORIF) HUMERUS MIDSHAFT / DISTAL;  Surgeon: Todd Johnson DO;  Location[facility];  Service: Orthopedics   • TONSILLECTOMY     [4]  Family History  Problem Relation Name Age of Onset   • Osteoporosis Mother ANGÉLICA    • Heart attack Father Juan Manuel    • Diabetes Father Juan Manuel    • Heart failure Father Juan Manuel    • No Known Problems Sister     • No Known Problems Daughter at birth    • No Known Problems Maternal Grandmother     • Cancer Maternal Grandfather dikran    • Colon cancer Maternal Grandfather dikran 40   • No Known Problems Paternal Grandmother     • No Known Problems Paternal Grandfather     • No Known Problems Maternal Aunt     • Cancer Paternal Aunt Aunt Elizabeth         pancreatic   • No Known Problems Paternal Aunt     • Heart disease Family

## 2025-07-14 NOTE — PATIENT INSTRUCTIONS
Get the blood work today and we will reschedule your Prolia injection for you at the [facility].  The Prolia is given every 6 months.  You have to get lab work the week before each Prolia injection.  We will continue to measure your bone density every 2 years.  Continue your calcium and vitamin D supplement and home exercise program as tolerated.  Always practice fall prevention.

## 2025-07-15 ENCOUNTER — APPOINTMENT (OUTPATIENT)
Dept: LAB | Facility: CLINIC | Age: 85
End: 2025-07-15
Payer: COMMERCIAL

## 2025-07-15 DIAGNOSIS — E55.9 VITAMIN D INSUFFICIENCY: ICD-10-CM

## 2025-07-15 DIAGNOSIS — M81.0 AGE-RELATED OSTEOPOROSIS WITHOUT CURRENT PATHOLOGICAL FRACTURE: ICD-10-CM

## 2025-07-15 DIAGNOSIS — M81.0 SENILE OSTEOPOROSIS: ICD-10-CM

## 2025-07-15 LAB
25(OH)D3 SERPL-MCNC: 44.2 NG/ML (ref 30–100)
ALBUMIN SERPL BCG-MCNC: 4.2 G/DL (ref 3.5–5)
ALP SERPL-CCNC: 53 U/L (ref 34–104)
ALT SERPL W P-5'-P-CCNC: 14 U/L (ref 7–52)
ANION GAP SERPL CALCULATED.3IONS-SCNC: 4 MMOL/L (ref 4–13)
AST SERPL W P-5'-P-CCNC: 23 U/L (ref 13–39)
BILIRUB SERPL-MCNC: 0.77 MG/DL (ref 0.2–1)
BUN SERPL-MCNC: 15 MG/DL (ref 5–25)
CALCIUM SERPL-MCNC: 9 MG/DL (ref 8.4–10.2)
CHLORIDE SERPL-SCNC: 103 MMOL/L (ref 96–108)
CO2 SERPL-SCNC: 34 MMOL/L (ref 21–32)
CREAT SERPL-MCNC: 0.63 MG/DL (ref 0.6–1.3)
GFR SERPL CREATININE-BSD FRML MDRD: 82 ML/MIN/1.73SQ M
GLUCOSE P FAST SERPL-MCNC: 84 MG/DL (ref 65–99)
POTASSIUM SERPL-SCNC: 3.8 MMOL/L (ref 3.5–5.3)
PROT SERPL-MCNC: 6.7 G/DL (ref 6.4–8.4)
SODIUM SERPL-SCNC: 141 MMOL/L (ref 135–147)

## 2025-07-15 PROCEDURE — 36415 COLL VENOUS BLD VENIPUNCTURE: CPT

## 2025-07-15 PROCEDURE — 80053 COMPREHEN METABOLIC PANEL: CPT

## 2025-07-15 PROCEDURE — 82306 VITAMIN D 25 HYDROXY: CPT

## 2025-07-16 ENCOUNTER — TELEPHONE (OUTPATIENT)
Age: 85
End: 2025-07-16

## 2025-07-16 NOTE — TELEPHONE ENCOUNTER
Dr. Field Hernandez / Prolia    Pt states labs need reviewed for her Prolia injection at the [place] [facility] this Friday, 07/18/25. Also, pt wants to confirm Prolia was sent to [facility].

## 2025-07-18 ENCOUNTER — CLINICAL SUPPORT (OUTPATIENT)
Dept: RHEUMATOLOGY | Facility: CLINIC | Age: 85
End: 2025-07-18
Payer: COMMERCIAL

## 2025-07-18 VITALS
DIASTOLIC BLOOD PRESSURE: 76 MMHG | SYSTOLIC BLOOD PRESSURE: 122 MMHG | WEIGHT: 124 LBS | OXYGEN SATURATION: 97 % | HEIGHT: 63 IN | HEART RATE: 78 BPM | BODY MASS INDEX: 21.97 KG/M2

## 2025-07-18 DIAGNOSIS — M81.0 AGE-RELATED OSTEOPOROSIS WITHOUT CURRENT PATHOLOGICAL FRACTURE: Primary | ICD-10-CM

## 2025-07-18 PROCEDURE — 96372 THER/PROPH/DIAG INJ SC/IM: CPT

## 2025-07-18 NOTE — PROGRESS NOTES
"Assessment/Plan:    Ellie Franklin came into the [facility] Office today 07/18/25 to receive Prolia injection.      Verbal consent obtained.  Consent given by: patient    patient states patient has been medically healthy with no underlining concerns/complications.      Ellie Franklin presents with no symptoms today.       All insturctions were reviewed with the patient.    If the patient should have any questions/concerns, advised patient to contacted [facility].       Subjective:     History provided by: patient    Patient ID: Ellie Franklin is a 85 y.o. female      Objective:    Vitals:    07/18/25 0909   BP: 122/76   Pulse: 78   SpO2: 97%   Weight: 56.2 kg (124 lb)   Height: 5' 3\" (1.6 m)       Patient tolerated the injection well without any complications.  Injection site/s left arm.  Number of Dose #1  Medication was provided by provider stock.  Specialty Pharmacy Name N/A         Patient signed consent form yes   Patient signed ABN form yes (If no patient is not a medicare patient).   Patient waited 15 minutes after injection yes (This only applies to patient's receiving first time injection).       Last Visit: 6/9/2025  Next visit:Visit date not found     "

## 2025-07-23 DIAGNOSIS — M81.0 AGE-RELATED OSTEOPOROSIS WITHOUT CURRENT PATHOLOGICAL FRACTURE: Primary | ICD-10-CM

## (undated) DEVICE — SUT MONOCRYL 3-0 PS-2 27 IN Y427H

## (undated) DEVICE — ARM SLING: Brand: DEROYAL

## (undated) DEVICE — DISPOSABLE OR TOWEL: Brand: CARDINAL HEALTH

## (undated) DEVICE — GLOVE INDICATOR PI UNDERGLOVE SZ 7.5 BLUE

## (undated) DEVICE — DRAPE C-ARM X-RAY

## (undated) DEVICE — SUT MONOCRYL 2-0 SH 27 IN Y417H

## (undated) DEVICE — DRAPE SHEET X-LG

## (undated) DEVICE — DISPOSABLE EQUIPMENT COVER: Brand: SMALL TOWEL DRAPE

## (undated) DEVICE — DRESSING MEPILEX AG BORDER POST-OP 4 X 8 IN

## (undated) DEVICE — ALCOHOL ISOPROPYL BLUE

## (undated) DEVICE — GLOVE SRG BIOGEL 7.5

## (undated) DEVICE — BONE WAX WHITE: Brand: BONE WAX WHITE

## (undated) DEVICE — ADHESIVE SKIN HIGH VISCOSITY EXOFIN 1ML

## (undated) DEVICE — 1.6MM KIRSCHNER WIRE W/TROCAR POINT 150MM
Type: IMPLANTABLE DEVICE | Site: HUMERUS | Status: NON-FUNCTIONAL
Removed: 2023-12-14

## (undated) DEVICE — CHLORAPREP HI-LITE 26ML ORANGE

## (undated) DEVICE — ACE WRAP 6 IN UNSTERILE

## (undated) DEVICE — IMPERVIOUS STOCKINETTE: Brand: DEROYAL

## (undated) DEVICE — GAUZE SPONGES,16 PLY: Brand: CURITY

## (undated) DEVICE — 2.8MM PERCUTANEOUS DRILL BIT F/LCP® PL QC/200MM/100MM CALIB: Brand: LCP

## (undated) DEVICE — CURITY NON-ADHERENT STRIPS: Brand: CURITY

## (undated) DEVICE — 3.5MM CORTEX SCREW SELF-TAPPING 22MM: Type: IMPLANTABLE DEVICE | Site: HUMERUS | Status: NON-FUNCTIONAL

## (undated) DEVICE — 2.7MM VA LCKNG SCREW SLF-TPNG WITH T8 STARDRIVE RECESS 44MM: Type: IMPLANTABLE DEVICE | Site: HUMERUS | Status: NON-FUNCTIONAL

## (undated) DEVICE — PADDING CAST 4 IN  COTTON STRL

## (undated) DEVICE — INTENDED FOR TISSUE SEPARATION, AND OTHER PROCEDURES THAT REQUIRE A SHARP SURGICAL BLADE TO PUNCTURE OR CUT.: Brand: BARD-PARKER SAFETY BLADES SIZE 10, STERILE

## (undated) DEVICE — INTENDED FOR TISSUE SEPARATION, AND OTHER PROCEDURES THAT REQUIRE A SHARP SURGICAL BLADE TO PUNCTURE OR CUT.: Brand: BARD-PARKER SAFETY BLADES SIZE 15, STERILE

## (undated) DEVICE — ACE WRAP 4 IN UNSTERILE

## (undated) DEVICE — PROXIMATE SKIN STAPLERS (35 WIDE) CONTAINS 35 STAINLESS STEEL STAPLES (FIXED HEAD): Brand: PROXIMATE

## (undated) DEVICE — BETHLEHEM UNIV MAJOR ORTHO,KIT: Brand: CARDINAL HEALTH

## (undated) DEVICE — HEAVY DUTY TABLE COVER: Brand: CONVERTORS

## (undated) DEVICE — GLOVE INDICATOR PI UNDERGLOVE SZ 8 BLUE

## (undated) DEVICE — ELECTRODE BLADE MOD E-Z CLEAN  2.75IN 7CM -0012AM

## (undated) DEVICE — GLOVE SRG BIOGEL 8

## (undated) DEVICE — PENCIL ELECTROSURG E-Z CLEAN -0035H

## (undated) DEVICE — SPONGE SCRUB 4 PCT CHLORHEXIDINE

## (undated) DEVICE — HYDROGEN PEROXIDE 3 PCT 4OZ